# Patient Record
Sex: FEMALE | Race: WHITE | NOT HISPANIC OR LATINO | Employment: UNEMPLOYED | URBAN - NONMETROPOLITAN AREA
[De-identification: names, ages, dates, MRNs, and addresses within clinical notes are randomized per-mention and may not be internally consistent; named-entity substitution may affect disease eponyms.]

---

## 2017-01-02 ENCOUNTER — HOSPITAL ENCOUNTER (EMERGENCY)
Facility: HOSPITAL | Age: 27
Discharge: HOME | End: 2017-01-02
Attending: PHYSICIAN ASSISTANT | Admitting: PHYSICIAN ASSISTANT
Payer: COMMERCIAL

## 2017-01-02 VITALS
DIASTOLIC BLOOD PRESSURE: 73 MMHG | OXYGEN SATURATION: 97 % | RESPIRATION RATE: 18 BRPM | SYSTOLIC BLOOD PRESSURE: 118 MMHG | HEART RATE: 82 BPM | TEMPERATURE: 97.4 F

## 2017-01-02 DIAGNOSIS — J20.9 ACUTE BRONCHITIS, UNSPECIFIED ORGANISM: ICD-10-CM

## 2017-01-02 PROCEDURE — 99213 OFFICE O/P EST LOW 20 MIN: CPT | Performed by: PHYSICIAN ASSISTANT

## 2017-01-02 PROCEDURE — 99213 OFFICE O/P EST LOW 20 MIN: CPT

## 2017-01-02 RX ORDER — CLARITHROMYCIN 500 MG
500 TABLET ORAL 2 TIMES DAILY
Qty: 20 TABLET | Refills: 0 | Status: SHIPPED | OUTPATIENT
Start: 2017-01-02 | End: 2017-01-12

## 2017-01-02 ASSESSMENT — ENCOUNTER SYMPTOMS
PSYCHIATRIC NEGATIVE: 1
LIGHT-HEADEDNESS: 0
NECK STIFFNESS: 0
SORE THROAT: 0
SINUS PRESSURE: 0
ABDOMINAL PAIN: 0
NECK PAIN: 0
NAUSEA: 0
DIZZINESS: 0
FEVER: 0
COUGH: 1
VOMITING: 0
FATIGUE: 0
APPETITE CHANGE: 0
CARDIOVASCULAR NEGATIVE: 1
HEADACHES: 0
EYE REDNESS: 0
VOICE CHANGE: 0
DIARRHEA: 0
EYE DISCHARGE: 0
TROUBLE SWALLOWING: 0

## 2017-01-02 NOTE — ED AVS SNAPSHOT
[facility]    [address] [address]    Phone:  [phone]                                       Jannet San   MRN: [id number]    Department:  [facility]   Date of Visit:  1/2/2017           After Visit Summary Signature Page     I have received my discharge instructions, and my questions have been answered. I have discussed any challenges I see with this plan with the nurse or doctor.    ..........................................................................................................................................  Patient/Patient Representative Signature      ..........................................................................................................................................  Patient Representative Print Name and Relationship to Patient    ..................................................               ................................................  Date                                            Time    ..........................................................................................................................................  Reviewed by Signature/Title    ...................................................              ..............................................  Date                                                            Time
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
## 2017-01-02 NOTE — ED AVS SNAPSHOT
[facility]    [address]    [place] [address]    Phone:  [phone]                                       Jannet San   MRN: [id number]    Department[facility]  [facility]   Date of Visit:  1/2/2017           Patient Information     Date Of Birth          1990        Your diagnoses for this visit were:     Acute bronchitis, unspecified organism        You were seen by Brianna Enriquez PA.      Follow-up Information     Follow up with Leona Matthews MD.    Specialty:  Family Practice    Why:  If symptoms worsen    Contact information:    [facility] [place]  [address]  [place] [address] [phone]          Follow up with [facility].    Specialty:  EMERGENCY MEDICINE    Why:  If further concerns develop    Contact information:    [address]  [place] [address] [phone]    Additional information:    From [facility]: Take [address]. Turn left at [address]. Turn left at the first stoplight on [address]. At the first stop sign, take a right onto [address]. Take a left into the parking lot and continue through until you reach the North enterance of the building.       From [place]: Take [address]. Take the [address] ramp towards [place]. Turn left onto [address]. Take a slight right onto [address]. Turn left at the first stoplight on [address]. At the first stop sign, take a right onto [address]. Take a left into the parking lot and continue through until you reach the North enterance of the building.       From [place]: Take [address]. Take a right at [address]. At the first stop sign, take a right onto [address]. Take a left into the parking lot and continue through until you reach the North enterance of the building.       Discharge References/Attachments     BRONCHITIS, ACUTE (ENGLISH)         Review of your medicines      START  taking        Dose / Directions Last dose taken    clarithromycin 500 MG tablet   Commonly known as:  BIAXIN   Dose:  500 mg   Quantity:  20 tablet        Take 1 tablet (500 mg) by mouth 2 times daily for 10 days   Refills:  0          Our records show that you are taking the medicines listed below. If these are incorrect, please call your family doctor or clinic.        Dose / Directions Last dose taken    etonogestrel 68 MG Impl   Commonly known as:  IMPLANON/NEXPLANON   Dose:  1 each        1 each (68 mg) by Subdermal route continuous   Refills:  0                Prescriptions were sent or printed at these locations (1 Prescription)                   [id number] - [place], [place] - [address]   [address] [facility], [place] [address]    Telephone:  [phone]   Fax:  [phone]   Hours:                  E-Prescribed (1 of 1)         clarithromycin (BIAXIN) 500 MG tablet                Orders Needing Specimen Collection     None      Pending Results     No orders found from 1/1/2017 to 1/3/2017.            Thank you for choosing [facility]       Thank you for choosing [facility] for your care. Our goal is always to provide you with excellent care. Hearing back from our patients is one way we can continue to improve our services. Please take a few minutes to complete the written survey that you may receive in the mail after you visit with us. Thank you!        [employer] Information     [facility] [facility] gives you secure access to your electronic health record. If you see a primary care provider, you can also send messages to your care team and make appointments. If you have questions, please call your primary care clinic.  If you do not have a primary care provider, please call [phone] and they will assist you.        After Visit Summary       This is your record. Keep this with you and show to your community pharmacist(s) and doctor(s) at your next visit.

## 2017-01-05 ENCOUNTER — HOSPITAL ENCOUNTER (EMERGENCY)
Facility: HOSPITAL | Age: 27
Discharge: HOME | End: 2017-01-05
Attending: FAMILY MEDICINE | Admitting: FAMILY MEDICINE
Payer: COMMERCIAL

## 2017-01-05 VITALS
OXYGEN SATURATION: 100 % | SYSTOLIC BLOOD PRESSURE: 134 MMHG | DIASTOLIC BLOOD PRESSURE: 84 MMHG | TEMPERATURE: 98 F | HEART RATE: 70 BPM | RESPIRATION RATE: 16 BRPM

## 2017-01-05 DIAGNOSIS — M94.0 COSTOCHONDRITIS: ICD-10-CM

## 2017-01-05 DIAGNOSIS — J20.9 ACUTE BRONCHITIS, UNSPECIFIED ORGANISM: ICD-10-CM

## 2017-01-05 PROCEDURE — 99283 EMERGENCY DEPT VISIT LOW MDM: CPT

## 2017-01-05 PROCEDURE — 99284 EMERGENCY DEPT VISIT MOD MDM: CPT | Performed by: FAMILY MEDICINE

## 2017-01-05 RX ORDER — BENZONATATE 200 MG/1
200 CAPSULE ORAL 3 TIMES DAILY PRN
Qty: 42 CAPSULE | Refills: 0 | Status: SHIPPED | OUTPATIENT
Start: 2017-01-05 | End: 2017-01-19

## 2017-01-05 RX ORDER — IBUPROFEN 800 MG/1
800 TABLET, FILM COATED ORAL EVERY 8 HOURS PRN
Qty: 60 TABLET | Refills: 0 | Status: SHIPPED | OUTPATIENT
Start: 2017-01-05 | End: 2017-01-13

## 2017-01-05 RX ORDER — TRAMADOL HYDROCHLORIDE 50 MG/1
50-100 TABLET ORAL EVERY 6 HOURS PRN
Qty: 15 TABLET | Refills: 0 | Status: SHIPPED | OUTPATIENT
Start: 2017-01-05 | End: 2017-01-19

## 2017-01-05 ASSESSMENT — ENCOUNTER SYMPTOMS
SHORTNESS OF BREATH: 0
GASTROINTESTINAL NEGATIVE: 1
COUGH: 1
ACTIVITY CHANGE: 0
WHEEZING: 0
NEUROLOGICAL NEGATIVE: 1

## 2017-01-05 NOTE — ED AVS SNAPSHOT
[facility]    [address] [address]    Phone:  [phone]                                       Jannet San   MRN: [id number]    Department[facility]  [facility]   Date of Visit:  1/5/2017           After Visit Summary Signature Page     I have received my discharge instructions, and my questions have been answered. I have discussed any challenges I see with this plan with the nurse or doctor.    ..........................................................................................................................................  Patient/Patient Representative Signature      ..........................................................................................................................................  Patient Representative Print Name and Relationship to Patient    ..................................................               ................................................  Date                                            Time    ..........................................................................................................................................  Reviewed by Signature/Title    ...................................................              ..............................................  Date                                                            Time

## 2017-01-05 NOTE — ED PROVIDER NOTES
"  History     Chief Complaint   Patient presents with     Rib Pain     HPI  Jannet San is a 26 year old female who came to the ED with pain in her right lateral ribs that is a result of coughing with bronchitis.  She states \"I literally came in her for some pain medication like hydrocodone for this\".  Discussed the fact that costochondritis does not respond well to narcotics, that it does improve with ibuprofen and similar drugs.  She then requested codeine cough syrup, and I offered her Tessalon for the cough.  She was not happy with this.  Gave her small supply of Ultram for nighttime use, no additional narcotics.    I have reviewed the Medications, Allergies, Past Medical and Surgical History, and Social History in the Epic system.    Review of Systems   Constitutional: Negative for activity change.   HENT: Negative.    Respiratory: Positive for cough. Negative for shortness of breath and wheezing.         Occasionally productive.   Cardiovascular: Positive for chest pain.        Right lateral ribs   Gastrointestinal: Negative.    Genitourinary: Negative.    Musculoskeletal:        Costochondritis   Skin: Negative.    Neurological: Negative.    Psychiatric/Behavioral:        Angry.       Physical Exam   BP: 136/84 mmHg  Heart Rate: 70  Temp: 98  F (36.7  C)  Resp: 16  SpO2: 100 %  Physical Exam   Constitutional: She is oriented to person, place, and time. She appears well-developed and well-nourished.   HENT:   Head: Normocephalic and atraumatic.   Neck: Normal range of motion. Neck supple.   Cardiovascular: Normal rate, regular rhythm, normal heart sounds and intact distal pulses.    No murmur heard.  Pulmonary/Chest: Effort normal and breath sounds normal. No respiratory distress.   Abdominal: Soft. Bowel sounds are normal. She exhibits no distension. There is no tenderness.   Musculoskeletal: Normal range of motion. She exhibits tenderness.        Arms:  Neurological: She is alert and oriented to person, " place, and time.   Skin: Skin is warm and dry.   Psychiatric: Her affect is angry and inappropriate. Cognition and memory are normal. She expresses inappropriate judgment.   Nursing note and vitals reviewed.      ED Course   Procedures        Labs Ordered and Resulted from Time of ED Arrival Up to the Time of Departure from the ED - No data to display    Assessments & Plan (with Medical Decision Making)   Patient here for pain control for costochondritis.  Offered most effective therapy, wanted narcotics, not given excepting #15 Ultram, which she did not appreciate.  Advised to ice and splint the area when coughing, she states the cough is less than previously.  She should see Dr. Matthews for follow up if the cough does not resolve, she is currently on Biaxin from the urgent care.    I have reviewed the nursing notes.    I have reviewed the findings, diagnosis, plan and need for follow up with the patient.    Discharge Medication List as of 1/5/2017  9:09 AM      START taking these medications    Details   benzonatate (TESSALON) 200 MG capsule Take 1 capsule (200 mg) by mouth 3 times daily as needed for cough, Disp-42 capsule, R-0, E-Prescribe      ibuprofen (ADVIL/MOTRIN) 800 MG tablet Take 1 tablet (800 mg) by mouth every 8 hours as needed for moderate pain, Disp-60 tablet, R-0, E-Prescribe      traMADol (ULTRAM) 50 MG tablet Take 1-2 tablets ( mg) by mouth every 6 hours as needed for pain, Disp-15 tablet, R-0, Local Print             Final diagnoses:   Costochondritis   Acute bronchitis, unspecified organism       1/5/2017   [facility]      Blanka Wing MD  01/05/17 0902

## 2017-01-05 NOTE — ED AVS SNAPSHOT
[facility]    [address] [place] [address]    Phone:  [phone]                                       Jannet San   MRN: [id number]    Department[facility]  [facility]   Date of Visit:  1/5/2017           Patient Information     Date Of Birth          1990        Your diagnoses for this visit were:     Costochondritis     Acute bronchitis, unspecified organism        You were seen by Blanka Wing MD.      Follow-up Information     Follow up with Leona Matthews MD.    Specialty:  Family Practice    Why:  As needed    Contact information:    [facility] [place] [facility] [place]  [address]  [facility] [address]  [phone]          Discharge Instructions         Chest Wall Pain: Costochondritis    The chest pain that you have had today is caused by costochondritis. This condition is caused by an inflammation of the cartilage joining your ribs your breastbone or the soft joints in the ribs themselves. It is not caused by heart or lung problems. The inflammation may have been brought on by a blow to the chest, lifting heavy objects, intense exercise, or an illness that made you cough and sneeze. It often occurs during times of emotional stress. It can be painful, but it is not dangerous. It usually goes away in 1 to 2 weeks. But it may happen again. Rarely, a more serious condition may cause symptoms similar to costochondritis. That s why it s important to watch for the warning signs listed below.  Home care  Follow these guidelines when caring for yourself at home:    You may use ibuprofen to control pain, even if another pain medicine was prescribed.    You can also help ease pain by using a hot, wet compress or heating pad. Use this with or without a medicated skin cream that helps relieves pain.  Ice may also be helpful.    Do stretching exercise as advised by your provider.    Take any prescribed medicines as directed.  Follow-up care  Follow up with your health  care provider, or as advised, if you do not start to get better in the next 2 days.  When to seek medical advice  Call your health care provider right away if any of these occur:    A change in the type of pain. Call if it feels different, becomes more serious, lasts longer, or spreads into your shoulder, arm, neck, jaw, or back.    Shortness of breath or pain gets worse when you breathe    Weakness, dizziness, or fainting    Cough with dark-colored sputum (phlegm) or blood    Abdominal pain    Dark red or black stools    Fever of 100.4 F (38 C) or higher, or as directed by your health care provider    1059-6075 [employer]. [address] [place] [address], [place] [address]. All rights reserved. This information is not intended as a substitute for professional medical care. Always follow your healthcare professional's instructions.             Review of your medicines      START taking        Dose / Directions Last dose taken    benzonatate 200 MG capsule   Commonly known as:  TESSALON   Dose:  200 mg   Quantity:  42 capsule        Take 1 capsule (200 mg) by mouth 3 times daily as needed for cough   Refills:  0        ibuprofen 800 MG tablet   Commonly known as:  ADVIL/MOTRIN   Dose:  800 mg   Quantity:  60 tablet        Take 1 tablet (800 mg) by mouth every 8 hours as needed for moderate pain   Refills:  0        traMADol 50 MG tablet   Commonly known as:  ULTRAM   Dose:   mg   Quantity:  15 tablet        Take 1-2 tablets ( mg) by mouth every 6 hours as needed for pain   Refills:  0          Our records show that you are taking the medicines listed below. If these are incorrect, please call your family doctor or clinic.        Dose / Directions Last dose taken    clarithromycin 500 MG tablet   Commonly known as:  BIAXIN   Dose:  500 mg   Quantity:  20 tablet        Take 1 tablet (500 mg) by mouth 2 times daily for 10 days   Refills:  0        etonogestrel 68 MG Impl   Commonly known as:   IMPLANON/NEXPLANON   Dose:  1 each        1 each (68 mg) by Subdermal route continuous   Refills:  0                Prescriptions were sent or printed at these locations (3 Prescriptions)                   [id number] - [place], [place] - [address]   [address] [place] [address]    Telephone:  [phone]   Fax:  [phone]   Hours:                  E-Prescribed (2 of 3)         benzonatate (TESSALON) 200 MG capsule               ibuprofen (ADVIL/MOTRIN) 800 MG tablet                 Printed at Department/Unit printer (1 of 3)         traMADol (ULTRAM) 50 MG tablet                Orders Needing Specimen Collection     None      Pending Results     No orders found from 1/4/2017 to 1/6/2017.            Pending Culture Results     No orders found from 1/4/2017 to 1/6/2017.            Thank you for choosing [facility]       Thank you for choosing [facility] for your care. Our goal is always to provide you with excellent care. Hearing back from our patients is one way we can continue to improve our services. Please take a few minutes to complete the written survey that you may receive in the mail after you visit with us. Thank you!        [employer] Information     [employer] gives you secure access to your electronic health record. If you see a primary care provider, you can also send messages to your care team and make appointments. If you have questions, please call your primary care clinic.  If you do not have a primary care provider, please call [phone] and they will assist you.        Care EveryWhere ID     This is your Care EveryWhere ID. This could be used by other organizations to access your [facility] medical records  [id number]        After Visit Summary       This is your record. Keep this with you and show to your community pharmacist(s) and doctor(s) at your next visit.

## 2017-01-05 NOTE — DISCHARGE INSTRUCTIONS
Chest Wall Pain: Costochondritis    The chest pain that you have had today is caused by costochondritis. This condition is caused by an inflammation of the cartilage joining your ribs your breastbone or the soft joints in the ribs themselves. It is not caused by heart or lung problems. The inflammation may have been brought on by a blow to the chest, lifting heavy objects, intense exercise, or an illness that made you cough and sneeze. It often occurs during times of emotional stress. It can be painful, but it is not dangerous. It usually goes away in 1 to 2 weeks. But it may happen again. Rarely, a more serious condition may cause symptoms similar to costochondritis. That s why it s important to watch for the warning signs listed below.  Home care  Follow these guidelines when caring for yourself at home:    You may use ibuprofen to control pain, even if another pain medicine was prescribed.    You can also help ease pain by using a hot, wet compress or heating pad. Use this with or without a medicated skin cream that helps relieves pain.  Ice may also be helpful.    Do stretching exercise as advised by your provider.    Take any prescribed medicines as directed.  Follow-up care  Follow up with your health care provider, or as advised, if you do not start to get better in the next 2 days.  When to seek medical advice  Call your health care provider right away if any of these occur:    A change in the type of pain. Call if it feels different, becomes more serious, lasts longer, or spreads into your shoulder, arm, neck, jaw, or back.    Shortness of breath or pain gets worse when you breathe    Weakness, dizziness, or fainting    Cough with dark-colored sputum (phlegm) or blood    Abdominal pain    Dark red or black stools    Fever of 100.4 F (38 C) or higher, or as directed by your health care provider    3431-4750 [employer]. [address] [place] [address], [place] [address]. All rights reserved. This  information is not intended as a substitute for professional medical care. Always follow your healthcare professional's instructions.

## 2017-01-05 NOTE — ED NOTES
"Patient to ED with reports of her \"ribs hurting\" relating to her coughing from her bronchitis.  Patient has been on an abx since Monday, no fevers. Patient wanting to be seen in the ED \"so I can get out of here quicker\" rather then waiting for UC to open  "

## 2017-01-05 NOTE — ED NOTES
Reports cough times one month, production with brown sputum.  Seen by UC on 1/2/17, dx bronchitis.  Pt reports inability to sleep due to cough.  Rates pain 7/10 R rib side.

## 2017-01-05 NOTE — ED NOTES
"Pt verbalized understanding of discharge instructions.  Pt given paper script for ultram pt states \"i'm not gonna fill this shit\".  Pt states no other questions or concerns.    "

## 2017-01-19 ENCOUNTER — HOSPITAL ENCOUNTER (EMERGENCY)
Facility: HOSPITAL | Age: 27
Discharge: HOME | End: 2017-01-19
Attending: NURSE PRACTITIONER | Admitting: NURSE PRACTITIONER
Payer: COMMERCIAL

## 2017-01-19 VITALS
TEMPERATURE: 98.6 F | RESPIRATION RATE: 16 BRPM | SYSTOLIC BLOOD PRESSURE: 131 MMHG | DIASTOLIC BLOOD PRESSURE: 74 MMHG | OXYGEN SATURATION: 98 % | HEART RATE: 65 BPM

## 2017-01-19 DIAGNOSIS — S20.211A CONTUSION OF RIB, RIGHT, INITIAL ENCOUNTER: ICD-10-CM

## 2017-01-19 PROCEDURE — 99213 OFFICE O/P EST LOW 20 MIN: CPT | Mod: 25

## 2017-01-19 PROCEDURE — 99213 OFFICE O/P EST LOW 20 MIN: CPT | Performed by: NURSE PRACTITIONER

## 2017-01-19 PROCEDURE — 71020 ZZHC CHEST TWO VIEWS, FRONT/LAT: CPT | Mod: TC

## 2017-01-19 RX ORDER — CYCLOBENZAPRINE HCL 10 MG
5 TABLET ORAL 3 TIMES DAILY PRN
Qty: 5 TABLET | Refills: 0 | Status: SHIPPED | OUTPATIENT
Start: 2017-01-19 | End: 2017-01-25

## 2017-01-19 RX ORDER — BENZONATATE 100 MG/1
100 CAPSULE ORAL 3 TIMES DAILY PRN
Qty: 30 CAPSULE | Refills: 0 | Status: SHIPPED | OUTPATIENT
Start: 2017-01-19 | End: 2017-02-06

## 2017-01-19 ASSESSMENT — ENCOUNTER SYMPTOMS
ACTIVITY CHANGE: 0
SINUS PRESSURE: 0
FEVER: 0
SHORTNESS OF BREATH: 0
APPETITE CHANGE: 0
SORE THROAT: 0
PSYCHIATRIC NEGATIVE: 1
STRIDOR: 0
RHINORRHEA: 0
COUGH: 1
TROUBLE SWALLOWING: 0
CHILLS: 0
NAUSEA: 0
WHEEZING: 0
VOMITING: 0
DYSURIA: 0

## 2017-01-19 NOTE — ED NOTES
Pt presents today with her son for c/o chest muscle pain from coughing so much from bronchitis and this has been going on for a few weeks and feels she may still have something, isn't sleeping because of the cough and is having a lot of pain associated with it.

## 2017-01-19 NOTE — ED PROVIDER NOTES
History     Chief Complaint   Patient presents with     Rib Pain     The history is provided by the patient. No  was used.     Jannet San is a 26 year old female who presents with right lower rib pain. She recently had bronchitis and has been coughing. Denies SOB. She has been taking tessalon pearls and ibuprofen with mild relief. Denies fever, chills, or night sweats. Eating and drinking well. Bowel and bladder are working well.     I have reviewed the Medications, Allergies, Past Medical and Surgical History, and Social History in the Epic system.    Review of Systems   Constitutional: Negative for fever, chills, activity change, appetite change and fatigue.   HENT: Positive for congestion and ear pain. Negative for ear discharge, rhinorrhea, sinus pressure, sore throat and trouble swallowing.    Respiratory: Positive for cough. Negative for shortness of breath, wheezing and stridor.    Gastrointestinal: Negative for nausea and vomiting.   Genitourinary: Negative for dysuria.   Musculoskeletal:        Right lower rib pain.    Skin: Negative for rash.   Psychiatric/Behavioral: Negative.        Physical Exam   BP: 131/74 mmHg  Pulse: 65  Temp: 98.6  F (37  C)  Resp: 16  SpO2: 98 %  Physical Exam   Constitutional: She is oriented to person, place, and time. She appears well-developed and well-nourished. No distress.   HENT:   Right Ear: External ear normal.   Left Ear: External ear normal.   Mouth/Throat: Oropharynx is clear and moist. No oropharyngeal exudate.   Neck: Normal range of motion. Neck supple.   Cardiovascular: Normal rate, regular rhythm and normal heart sounds.    Pulmonary/Chest: Effort normal. No respiratory distress. She has no wheezes. She has no rales. She exhibits tenderness.   Pain is reproducible to right lower rib cage on palpation.    Abdominal: Soft. She exhibits no distension.   Musculoskeletal: Normal range of motion.   Lymphadenopathy:     She has no cervical  adenopathy.   Neurological: She is alert and oriented to person, place, and time.   Skin: Skin is warm and dry. No rash noted. She is not diaphoretic.   Psychiatric: She has a normal mood and affect. Her behavior is normal.   Nursing note and vitals reviewed.      ED Course   Procedures  Results for orders placed or performed during the hospital encounter of 01/19/17   Chest XR,  PA & LAT    Narrative    CHEST TWO VIEWS    HISTORY:  Cough, pain.    FINDINGS:  The cardiac silhouette and pulmonary vasculature are within  normal limits.  Lungs are clear.  Bony structures are unremarkable.    IMPRESSION:  NO EVIDENCE OF ACUTE ABNORMALITY.  Exam Date: Jan 19, 2017 11:09:51 AM  Author: SAURAV MIKE  This report is final and signed         Assessments & Plan (with Medical Decision Making)     I have reviewed the nursing notes.    I have reviewed the findings, diagnosis, plan and need for follow up with the patient.  Discharged in stable condition.     New Prescriptions    BENZONATATE (TESSALON) 100 MG CAPSULE    Take 1 capsule (100 mg) by mouth 3 times daily as needed for cough    CYCLOBENZAPRINE (FLEXERIL) 10 MG TABLET    Take 0.5 tablets (5 mg) by mouth 3 times daily as needed for muscle spasms (Take 5-10mg 3 times daily as needed for muscle spasm.)       Final diagnoses:   Contusion of rib, right, initial encounter     Take tylenol and or ibuprofen for pain.   Take Flexeril as directed for spasm. Do not drive or participate in activities that require alertness when taking.   Use a heating pad to right lower rib pain. Protect skin from burn.   Follow up with PCP with an increase in symptoms or concerns.   Return to urgent care or emergency department with an increase in symptoms or concerns.     ELMER Willams  10:37 AM  January 19, 2017          Cori Conti NP  01/22/17 [id number]

## 2017-01-19 NOTE — DISCHARGE INSTRUCTIONS
Take tylenol and or ibuprofen for pain.   Take Flexeril as directed for spasm. Do not drive or participate in activities that require alertness when taking.   Use a heating pad to right lower rib pain. Protect skin from burn.   Follow up with PCP with an increase in symptoms or concerns.   Return to urgent care or emergency department with an increase in symptoms or concerns.

## 2017-01-19 NOTE — ED AVS SNAPSHOT
[facility]    [address] [address]    Phone:  [phone]                                       Jannet San   MRN: [id number]    Department:  [facility]   Date of Visit:  1/19/2017           After Visit Summary Signature Page     I have received my discharge instructions, and my questions have been answered. I have discussed any challenges I see with this plan with the nurse or doctor.    ..........................................................................................................................................  Patient/Patient Representative Signature      ..........................................................................................................................................  Patient Representative Print Name and Relationship to Patient    ..................................................               ................................................  Date                                            Time    ..........................................................................................................................................  Reviewed by Signature/Title    ...................................................              ..............................................  Date                                                            Time

## 2017-01-22 ASSESSMENT — ENCOUNTER SYMPTOMS: FATIGUE: 0

## 2017-02-06 ENCOUNTER — OFFICE VISIT (OUTPATIENT)
Dept: OBGYN | Facility: OTHER | Age: 27
End: 2017-02-06
Attending: ADVANCED PRACTICE MIDWIFE
Payer: COMMERCIAL

## 2017-02-06 VITALS
WEIGHT: 175 LBS | OXYGEN SATURATION: 98 % | HEIGHT: 71 IN | BODY MASS INDEX: 24.5 KG/M2 | HEART RATE: 86 BPM | DIASTOLIC BLOOD PRESSURE: 70 MMHG | SYSTOLIC BLOOD PRESSURE: 116 MMHG

## 2017-02-06 DIAGNOSIS — N64.4 MASTODYNIA: ICD-10-CM

## 2017-02-06 DIAGNOSIS — N63.0 LUMP OR MASS IN BREAST: Primary | ICD-10-CM

## 2017-02-06 PROCEDURE — 99212 OFFICE O/P EST SF 10 MIN: CPT | Performed by: ADVANCED PRACTICE MIDWIFE

## 2017-02-06 ASSESSMENT — PAIN SCALES - GENERAL: PAINLEVEL: MILD PAIN (2)

## 2017-02-06 NOTE — NURSING NOTE
"Chief Complaint   Patient presents with     Breast Pain     Right breast pain       Initial /70 mmHg  Pulse 86  Ht 5' 11\" (1.803 m)  Wt 175 lb (79.379 kg)  BMI 24.42 kg/m2  SpO2 98%  LMP 12/25/2016 Estimated body mass index is 24.42 kg/(m^2) as calculated from the following:    Height as of this encounter: 5' 11\" (1.803 m).    Weight as of this encounter: 175 lb (79.379 kg).  Medication Reconciliation: cori Lynne      "

## 2017-02-08 ENCOUNTER — OFFICE VISIT (OUTPATIENT)
Dept: OBGYN | Facility: OTHER | Age: 27
End: 2017-02-08
Attending: ADVANCED PRACTICE MIDWIFE
Payer: COMMERCIAL

## 2017-02-08 ENCOUNTER — OFFICE VISIT (OUTPATIENT)
Dept: PSYCHIATRY | Facility: OTHER | Age: 27
End: 2017-02-08
Attending: ADVANCED PRACTICE MIDWIFE
Payer: COMMERCIAL

## 2017-02-08 VITALS
WEIGHT: 175 LBS | HEIGHT: 71 IN | HEART RATE: 87 BPM | SYSTOLIC BLOOD PRESSURE: 114 MMHG | OXYGEN SATURATION: 100 % | DIASTOLIC BLOOD PRESSURE: 76 MMHG | BODY MASS INDEX: 24.5 KG/M2

## 2017-02-08 VITALS
TEMPERATURE: 98.4 F | HEIGHT: 71 IN | WEIGHT: 175 LBS | BODY MASS INDEX: 24.5 KG/M2 | SYSTOLIC BLOOD PRESSURE: 108 MMHG | HEART RATE: 67 BPM | DIASTOLIC BLOOD PRESSURE: 60 MMHG

## 2017-02-08 DIAGNOSIS — F48.9 MENTAL HEALTH PROBLEM: ICD-10-CM

## 2017-02-08 DIAGNOSIS — F60.3 BORDERLINE PERSONALITY DISORDER (H): Primary | ICD-10-CM

## 2017-02-08 DIAGNOSIS — N63.10 LUMP OF RIGHT BREAST: ICD-10-CM

## 2017-02-08 DIAGNOSIS — Z01.419 WELL WOMAN EXAM: ICD-10-CM

## 2017-02-08 DIAGNOSIS — Z20.2 EXPOSURE TO STD: Primary | ICD-10-CM

## 2017-02-08 LAB
ERYTHROCYTE [DISTWIDTH] IN BLOOD BY AUTOMATED COUNT: 12.7 % (ref 10–15)
HCT VFR BLD AUTO: 41.3 % (ref 35–47)
HGB BLD-MCNC: 14.2 G/DL (ref 11.7–15.7)
MCH RBC QN AUTO: 29.6 PG (ref 26.5–33)
MCHC RBC AUTO-ENTMCNC: 34.4 G/DL (ref 31.5–36.5)
MCV RBC AUTO: 86 FL (ref 78–100)
MICRO REPORT STATUS: NORMAL
PLATELET # BLD AUTO: 232 10E9/L (ref 150–450)
RBC # BLD AUTO: 4.79 10E12/L (ref 3.8–5.2)
SPECIMEN SOURCE: NORMAL
TSH SERPL DL<=0.005 MIU/L-ACNC: 0.84 MU/L (ref 0.4–4)
WBC # BLD AUTO: 7.3 10E9/L (ref 4–11)
WET PREP SPEC: NORMAL

## 2017-02-08 PROCEDURE — 86803 HEPATITIS C AB TEST: CPT | Mod: 90 | Performed by: ADVANCED PRACTICE MIDWIFE

## 2017-02-08 PROCEDURE — 84443 ASSAY THYROID STIM HORMONE: CPT | Performed by: ADVANCED PRACTICE MIDWIFE

## 2017-02-08 PROCEDURE — 87340 HEPATITIS B SURFACE AG IA: CPT | Mod: 90 | Performed by: ADVANCED PRACTICE MIDWIFE

## 2017-02-08 PROCEDURE — 36415 COLL VENOUS BLD VENIPUNCTURE: CPT | Performed by: ADVANCED PRACTICE MIDWIFE

## 2017-02-08 PROCEDURE — 99213 OFFICE O/P EST LOW 20 MIN: CPT | Performed by: PSYCHIATRY & NEUROLOGY

## 2017-02-08 PROCEDURE — 85027 COMPLETE CBC AUTOMATED: CPT | Performed by: ADVANCED PRACTICE MIDWIFE

## 2017-02-08 PROCEDURE — 87389 HIV-1 AG W/HIV-1&-2 AB AG IA: CPT | Mod: 90 | Performed by: ADVANCED PRACTICE MIDWIFE

## 2017-02-08 PROCEDURE — 99000 SPECIMEN HANDLING OFFICE-LAB: CPT | Performed by: ADVANCED PRACTICE MIDWIFE

## 2017-02-08 PROCEDURE — 88142 CYTOPATH C/V THIN LAYER: CPT | Performed by: ADVANCED PRACTICE MIDWIFE

## 2017-02-08 PROCEDURE — 87210 SMEAR WET MOUNT SALINE/INK: CPT | Performed by: ADVANCED PRACTICE MIDWIFE

## 2017-02-08 PROCEDURE — 99395 PREV VISIT EST AGE 18-39: CPT | Performed by: ADVANCED PRACTICE MIDWIFE

## 2017-02-08 PROCEDURE — 87491 CHLMYD TRACH DNA AMP PROBE: CPT | Mod: 90 | Performed by: ADVANCED PRACTICE MIDWIFE

## 2017-02-08 PROCEDURE — 99213 OFFICE O/P EST LOW 20 MIN: CPT | Mod: 25 | Performed by: ADVANCED PRACTICE MIDWIFE

## 2017-02-08 PROCEDURE — 87591 N.GONORRHOEAE DNA AMP PROB: CPT | Mod: 90 | Performed by: ADVANCED PRACTICE MIDWIFE

## 2017-02-08 PROCEDURE — 86780 TREPONEMA PALLIDUM: CPT | Mod: 90 | Performed by: ADVANCED PRACTICE MIDWIFE

## 2017-02-08 ASSESSMENT — ANXIETY QUESTIONNAIRES
2. NOT BEING ABLE TO STOP OR CONTROL WORRYING: SEVERAL DAYS
1. FEELING NERVOUS, ANXIOUS, OR ON EDGE: SEVERAL DAYS
3. WORRYING TOO MUCH ABOUT DIFFERENT THINGS: SEVERAL DAYS
IF YOU CHECKED OFF ANY PROBLEMS ON THIS QUESTIONNAIRE, HOW DIFFICULT HAVE THESE PROBLEMS MADE IT FOR YOU TO DO YOUR WORK, TAKE CARE OF THINGS AT HOME, OR GET ALONG WITH OTHER PEOPLE: EXTREMELY DIFFICULT
7. FEELING AFRAID AS IF SOMETHING AWFUL MIGHT HAPPEN: SEVERAL DAYS
5. BEING SO RESTLESS THAT IT IS HARD TO SIT STILL: NEARLY EVERY DAY

## 2017-02-08 ASSESSMENT — PAIN SCALES - GENERAL
PAINLEVEL: MODERATE PAIN (4)
PAINLEVEL: MILD PAIN (3)

## 2017-02-08 ASSESSMENT — PATIENT HEALTH QUESTIONNAIRE - PHQ9: 5. POOR APPETITE OR OVEREATING: SEVERAL DAYS
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
## 2017-02-08 NOTE — PROGRESS NOTES
"Last visit was 12/31/15 with April Libia: no med changes made at that visit (lithium - plan was to check a level and creatinine following visit) and was suggested Jannet do therapy -> \" I highly recommend that she get back into therapy.  She dropped out of DBT therapy because \"I don't want to be with a bunch of low lifes\" she does not want to back individual therapy yet\"      PSYCHIATRY CLINIC PROGRESS NOTE   20 minute medication management, more than 50% of time spent counseling patient on medications, medication side effects, symptom history and management   SUBJECTIVE / INTERIM HISTORY                                                                       Jannet just quit her job at [employer] yesterday. In past was working at [employer] but let go in re: missing work.  Finances majorly strained and not sure how she is going to make ends meet. Her son is 6 yo and has behavioral issues. She is with him daily and gets overwhelming. Son going to start program 4-5 days per week soon for kiddos with emotional issues. Jannet living with her ex- (father of son) and she notes \"I hate him\". She has a lot of resentment towards him. I inquired about medications today and she doesn't want to take any medications at this time and notes is afraid of starting new medications. I inquired about if she has had a therapist in past and she met with once through [facility] and felt they didn't click so never went back.Went through sx required to meet hypomanic episode and she thinks chance she has had # of sx required for at least several days but she isn't certain. Jannet notes she also has period however where mood is more rapidly up and down over day or two. I inquired about sx of borderline personaltiy d/o and she identified with many of the sx. She has lot of guilt in re: feels like she is a bad mom.   MEDICAL / SURGICAL HISTORY                pregnant [if applicable]--no     Patient Active Problem List   Diagnosis     Depression " "    Lumbago     PTSD (post-traumatic stress disorder)     Migraine     Drug use disorder     Bilateral low back pain without sciatica     Bilateral low back pain with left-sided sciatica     Bipolar II disorder (H)     Fibromyalgia     Lump or mass in breast     Mastodynia     Encounter for gynecological examination without abnormal finding     ALLERGY   Bee pollen  MEDICATIONS                                                                                             Current Outpatient Prescriptions   Medication Sig     etonogestrel (IMPLANON/NEXPLANON) 68 MG IMPL 1 each (68 mg) by Subdermal route continuous     No current facility-administered medications for this visit.     Facility-Administered Medications Ordered in Other Visits   Medication     lidocaine 1 % injection 10 mL     betamethasone acet & sod phos (CELESTONE) injection 12 mg       VITALS   /60 mmHg  Pulse 67  Temp(Src) 98.4  F (36.9  C) (Tympanic)  Ht 5' 11\" (1.803 m)  Wt 175 lb (79.379 kg)  BMI 24.42 kg/m2  LMP 12/25/2016     LABS                                                                                                                           Results for orders placed or performed in visit on 02/08/17   TSH with free T4 reflex   Result Value Ref Range    TSH 0.84 0.40 - 4.00 mU/L   CBC with platelets   Result Value Ref Range    WBC 7.3 4.0 - 11.0 10e9/L    RBC Count 4.79 3.8 - 5.2 10e12/L    Hemoglobin 14.2 11.7 - 15.7 g/dL    Hematocrit 41.3 35.0 - 47.0 %    MCV 86 78 - 100 fl    MCH 29.6 26.5 - 33.0 pg    MCHC 34.4 31.5 - 36.5 g/dL    RDW 12.7 10.0 - 15.0 %    Platelet Count 232 150 - 450 10e9/L   Wet prep   Result Value Ref Range    Specimen Description Vagina     Wet Prep       Few WBC'S seen  No Trichomonas seen  No clue cells seen  No yeast seen      Micro Report Status FINAL 02/08/2017        MENTAL STATUS EXAM                                                                                        Alert. Oriented to person, " "place, and date / time. Well groomed, calm, cooperative with good eye contact. No problems with speech or psychomotor behavior. Mood was described as \"up and down\" and affect was congruent to speech content and full range - tearful. Thought process, including associations, was unremarkable and thought content was devoid homicidal ideation and psychotic thought. + SI with no plan or intent. No hallucinations. Insight was good. Judgment was intact and adequate for safety. Fund of knowledge was intact. Pt demonstrates no obvious problems with attention, concentration, language, recent or remote memory although these were not formally tested.       DIAGNOSES      (Use of Axes system will continue, although absent from DSM 5)        Major depressive disorder, recurrent, moderate    Borderline personality disorder  Dependent personality disorder  PLAN                                                                                                                       1)  MEDICATIONS:     no medication changes are made today as she did not want to start any medications at this time    2)  THERAPY: I agree with April who was seeing her that therapy is a big (important) part of treatment for Jannet that is missing    3)  LABS:  [facility]  4)  PT MONITOR [call for probs]: worsening symptoms , SI/HI  5)  REFERRALS [CD, medical, other]:  None  6)  RTC: I asked Jannet today if she would like to follow-up with me or April - she would like to follow-up with April as she had been working with her ~ year ago.                                                                                                                                                                                                  "

## 2017-02-08 NOTE — NURSING NOTE
"Chief Complaint   Patient presents with     Consult     Mental health.  Referral from Manuel Mccarthy.       Initial /60 mmHg  Pulse 67  Temp(Src) 98.4  F (36.9  C) (Tympanic)  Ht 5' 11\" (1.803 m)  Wt 175 lb (79.379 kg)  BMI 24.42 kg/m2  LMP 12/25/2016 Estimated body mass index is 24.42 kg/(m^2) as calculated from the following:    Height as of this encounter: 5' 11\" (1.803 m).    Weight as of this encounter: 175 lb (79.379 kg).  Medication Reconciliation: complete     OLGA KITCHEN      "

## 2017-02-08 NOTE — PATIENT INSTRUCTIONS
Follow up with mental health counselor    Return in one year for well-woman annual visit or as needed.

## 2017-02-08 NOTE — NURSING NOTE
"Chief Complaint   Patient presents with     Gyn Exam       Initial /76 mmHg  Pulse 87  Ht 5' 11\" (1.803 m)  Wt 175 lb (79.379 kg)  BMI 24.42 kg/m2  SpO2 100%  LMP 12/25/2016 Estimated body mass index is 24.42 kg/(m^2) as calculated from the following:    Height as of this encounter: 5' 11\" (1.803 m).    Weight as of this encounter: 175 lb (79.379 kg).  Medication Reconciliation: cori Lynne      "

## 2017-02-09 ASSESSMENT — PATIENT HEALTH QUESTIONNAIRE - PHQ9: SUM OF ALL RESPONSES TO PHQ QUESTIONS 1-9: 8

## 2017-02-10 ENCOUNTER — HOSPITAL ENCOUNTER (OUTPATIENT)
Dept: ULTRASOUND IMAGING | Facility: HOSPITAL | Age: 27
Discharge: HOME | End: 2017-02-10
Attending: ADVANCED PRACTICE MIDWIFE | Admitting: ADVANCED PRACTICE MIDWIFE
Payer: COMMERCIAL

## 2017-02-10 LAB
C TRACH DNA SPEC QL NAA+PROBE: NORMAL
HBV SURFACE AG SERPL QL IA: NONREACTIVE
HCV AB SERPL QL IA: NORMAL
HIV 1+2 AB+HIV1 P24 AG SERPL QL IA: NORMAL
N GONORRHOEA DNA SPEC QL NAA+PROBE: NORMAL
SPECIMEN SOURCE: NORMAL
SPECIMEN SOURCE: NORMAL

## 2017-02-10 PROCEDURE — 76642 ULTRASOUND BREAST LIMITED: CPT | Mod: TC,RT

## 2017-02-11 LAB — T PALLIDUM AB SER QL AGGL: NORMAL

## 2017-02-15 LAB
COPATH REPORT: NORMAL
PAP: NORMAL

## 2017-03-16 ENCOUNTER — OFFICE VISIT (OUTPATIENT)
Dept: PSYCHIATRY | Facility: OTHER | Age: 27
End: 2017-03-16
Attending: NURSE PRACTITIONER
Payer: COMMERCIAL

## 2017-03-16 VITALS
SYSTOLIC BLOOD PRESSURE: 128 MMHG | DIASTOLIC BLOOD PRESSURE: 84 MMHG | WEIGHT: 180 LBS | HEART RATE: 101 BPM | TEMPERATURE: 99.4 F | BODY MASS INDEX: 25.2 KG/M2 | HEIGHT: 71 IN

## 2017-03-16 DIAGNOSIS — F31.81 BIPOLAR 2 DISORDER (H): Primary | ICD-10-CM

## 2017-03-16 PROCEDURE — 99214 OFFICE O/P EST MOD 30 MIN: CPT | Performed by: NURSE PRACTITIONER

## 2017-03-16 RX ORDER — CLONAZEPAM 1 MG/1
TABLET ORAL
Qty: 14 TABLET | Refills: 0 | Status: SHIPPED | OUTPATIENT
Start: 2017-03-16 | End: 2017-03-30

## 2017-03-16 RX ORDER — DIVALPROEX SODIUM 250 MG/1
TABLET, EXTENDED RELEASE ORAL
Qty: 28 TABLET | Refills: 1 | Status: SHIPPED | OUTPATIENT
Start: 2017-03-16 | End: 2017-03-30

## 2017-03-16 ASSESSMENT — ANXIETY QUESTIONNAIRES
GAD7 TOTAL SCORE: 18
7. FEELING AFRAID AS IF SOMETHING AWFUL MIGHT HAPPEN: NEARLY EVERY DAY
6. BECOMING EASILY ANNOYED OR IRRITABLE: NEARLY EVERY DAY
1. FEELING NERVOUS, ANXIOUS, OR ON EDGE: NEARLY EVERY DAY
3. WORRYING TOO MUCH ABOUT DIFFERENT THINGS: NEARLY EVERY DAY
5. BEING SO RESTLESS THAT IT IS HARD TO SIT STILL: NOT AT ALL
2. NOT BEING ABLE TO STOP OR CONTROL WORRYING: NEARLY EVERY DAY
IF YOU CHECKED OFF ANY PROBLEMS ON THIS QUESTIONNAIRE, HOW DIFFICULT HAVE THESE PROBLEMS MADE IT FOR YOU TO DO YOUR WORK, TAKE CARE OF THINGS AT HOME, OR GET ALONG WITH OTHER PEOPLE: EXTREMELY DIFFICULT

## 2017-03-16 ASSESSMENT — PATIENT HEALTH QUESTIONNAIRE - PHQ9: 5. POOR APPETITE OR OVEREATING: NEARLY EVERY DAY

## 2017-03-16 ASSESSMENT — PAIN SCALES - GENERAL: PAINLEVEL: NO PAIN (0)

## 2017-03-16 NOTE — PROGRESS NOTES
[facility]  Psychiatric Progress Note      Impression:   This is a 26 year old female with borderlin PD and bipolar type II disorder. I have not seen her in over a year. Since i last saw her she moved to [facility] she was transferred for work. She broke up with her boyfriend and now she is homeless without a job living at friends houses. She starts sobbing and tells me how her ex  calls her a loser and bad mom. She is crying and does report SI. She has no plan and no intent. She states her brother is picking her up tomorrow and she is spending the weekend with him. She still has significant issues with abandonment. She has difficulty sleeping. She has had an increase in hunger. She is often very irritable. She is going to be moveing into [facility] though is unsure of how long it will take. She still has 50/50 custody of her son though he lives at her ex's    Educated regarding medication indications, risks, benefits, side effects, contraindications and possible interactions. Verbally expressed understanding.        DIagnoses:     Borderline PD  Bipolar type II  Marijuana use disorder, moderate    Attestation:  Patient has been seen and evaluated by me,  April Shweta Reza NP        PHQ-9 SCORE 12/24/2015 2/8/2017 3/16/2017   Total Score - - -   Total Score 8 8 25           Interim History:   The patient's care was discussed with the treatment team and chart notes were reviewed.          Medications:     Prescription Medications as of 3/16/2017             DiphenhydrAMINE HCl (BENADRYL PO) Take 75 mg by mouth nightly as needed    clonazePAM (KLONOPIN) 1 MG tablet Take 1/2 to 1 tablet daily prn    divalproex (DEPAKOTE ER) 250 MG 24 hr tablet Take 250 mg for 3 days then increase to 500 mg for 3 days then increase to 750 mg for 3 days then increase to 1000 mg    etonogestrel (IMPLANON/NEXPLANON) 68 MG IMPL 1 each (68 mg) by Subdermal route continuous      Facility Administered  "Medications as of 3/16/2017             lidocaine 1 % injection 10 mL 10 mLs by Other route once    betamethasone acet & sod phos (CELESTONE) injection 12 mg 2 mLs (12 mg) by EPIDURAL route once                   10 point ROS negative        Allergies:     Allergies   Allergen Reactions     Bee Pollen Swelling     Throat              Psychiatric Examination:   /84 (BP Location: Left arm, Patient Position: Chair, Cuff Size: Adult Regular)  Pulse 101  Temp 99.4  F (37.4  C) (Tympanic)  Ht 1.803 m (5' 11\")  Wt 81.6 kg (180 lb)  BMI 25.1 kg/m2  Weight is 180 lbs 0 oz  Body mass index is 25.1 kg/(m^2).    Appearance:  poorly groomed  Attitude:  cooperative  Eye Contact:  fair  Mood:  sad  and depressed  Affect:  tearful  Speech:  clear, coherent  Psychomotor Behavior:  no evidence of tardive dyskinesia, dystonia, or tics  Thought Process:  logical and goal oriented  Associations:  no loose associations  Thought Content:  passive suicidal ideation present and no auditory hallucinations present  Insight:  fair  Judgment:  fair  Oriented to:  time, person, and place  Attention Span and Concentration:  intact  Recent and Remote Memory:  intact  Fund of Knowledge: appropriate  Muscle Strength and Tone: normal  Gait and Station: Normal           Labs:     Results for orders placed or performed in visit on 02/08/17   US Breast Right Limited 1-3 Quadrants    Narrative    ULTRASOUND OF RIGHT BREAST - LIMITED    HISTORY:  The patient states that she has had an approximately  pea-sized region of palpable concern that becomes larger during the  time of her menstrual cycle. It is variably painful and tender, she  states. It has been present for a few months, and she states that she  had a normal breast ultrasound recently while [place] state.    FINDINGS:  Scanning is completed in the right breast upper-outer  quadrant, at the site of palpable concern.  Scanning here fails to  demonstrate evidence for a cyst, or a focal " hyperechoic or hypoechoic  abnormality.    IMPRESSION:  THE ULTRASOUND IS NEGATIVE.  IN REGARDS TO THE AREA OF  PALPABLE CONCERN, IT IS RECOMMENDED TO PROCEED ON CLINICAL GROUNDS.  IF THE ABNORMALITY DOES CONTINUE TO PERSIST OR GETS LARGER, A  MAMMOGRAM COULD BE CONSIDERED.    BI-RADS CATEGORY:  1 - Negative.  Exam Date: Feb 10, 2017 03:44:00 PM  Author: KARLO RANDOLPH  This report is final and signed     A pap thin layer screen reflex to HPV if ASCUS - recommend age 25 - 29   Result Value Ref Range    PAP NIL     Copath Report         Patient Name: HUMBERTO HINKLE  MR#: [id number]  Specimen #: [id number]  Collected: 2/8/2017  Received: 2/9/2017  Reported: 2/15/2017 10:36  Ordering Phy(s): ADIEL YOO    For improved result formatting, select 'View Enhanced Report Format'  under Linked Documents section.    SPECIMEN/STAIN PROCESS:  Pap thin layer prep screening ([facility])       Pap-Cyto x 1, Pap with reflex to HPV if ASCUS x 1    SOURCE: Cervical, endocervical  ----------------------------------------------------------------   Pap thin layer prep screening ([facility])  SPECIMEN ADEQUACY:  Satisfactory for evaluation.  -Transformation zone component present.    CYTOLOGIC INTERPRETATION:    Negative for Intraepithelial Lesion or Malignancy    Electronically signed out by:  NIDIA Champion ( ASCP)    Processed and screened at [facility],  Critical access hospital    CLINICAL HISTORY:  LMP: 12/25/16  Previous normal pap: 9/14,    Papanicolaou Test Limitations:  Cervical cytology is a scr eening test  with limited sensitivity; regular screening is critical for cancer  prevention; Pap tests are primarily effective for the  diagnosis/prevention of squamous cell carcinoma, not adenocarcinomas or  other cancers.    TESTING LAB LOCATION:  [address] [address]  [phone]    COLLECTION SITE:  Client:  [facility]  Location:  [facility] ([facility])     TSH with free T4 reflex   Result Value Ref Range    TSH 0.84 0.40 - 4.00 mU/L   CBC with platelets   Result Value Ref Range    WBC 7.3 4.0 - 11.0 10e9/L    RBC Count 4.79 3.8 - 5.2 10e12/L    Hemoglobin 14.2 11.7 - 15.7 g/dL    Hematocrit 41.3 35.0 - 47.0 %    MCV 86 78 - 100 fl    MCH 29.6 26.5 - 33.0 pg    MCHC 34.4 31.5 - 36.5 g/dL    RDW 12.7 10.0 - 15.0 %    Platelet Count 232 150 - 450 10e9/L   HIV Antigen Antibody Combo   Result Value Ref Range    HIV Antigen Antibody Combo  NR     Nonreactive   HIV-1 p24 Ag & HIV-1/HIV-2 Ab Not Detected     Hepatitis B surface antigen   Result Value Ref Range    Hep B Surface Agn Nonreactive NR   Hepatitis C antibody   Result Value Ref Range    Hepatitis C Antibody  NR     Nonreactive   Assay performance characteristics have not been established for newborns,   infants, and children     Treponema pallidum antibody confirm   Result Value Ref Range    T Pallidum by TP-PA conf       Non Reactive  Reference range: Non Reactive  (Note)  Performed by [employer] [facility],  [address] [place] [id number] [phone]  [email or web address], Aditya Martin MD, Lab. Director     Wet prep   Result Value Ref Range    Specimen Description Vagina     Wet Prep       Few WBC'S seen  No Trichomonas seen  No clue cells seen  No yeast seen      Micro Report Status FINAL 02/08/2017    Neisseria gonorrhoeae PCR   Result Value Ref Range    Specimen Descrip Cervix     N Gonorrhea PCR  NEG     Negative   Negative for N. gonorrhoeae rRNA by transcription mediated amplification.   A negative result by transcription mediated amplification does not preclude the   presence of N. gonorrhoeae infection because results are dependent on proper   and adequate collection, absence of inhibitors, and sufficient rRNA to be   detected.     Chlamydia trachomatis PCR   Result Value Ref Range    Specimen Description Cervix     Chlamydia Trachomatis PCR  NEG     Negative   Negative for C. trachomatis rRNA by  transcription mediated amplification.   A negative result by transcription mediated amplification does not preclude the   presence of C. trachomatis infection because results are dependent on proper   and adequate collection, absence of inhibitors, and sufficient rRNA to be   detected.                Plan:   Start depakote  for 3 days then 500 for 3 days then 750 for 3 days then increase to 1000 mg   Start klonopin 0.5-1 mg daily prn- 2 week supply will be given with refill. Risk of OD

## 2017-03-16 NOTE — NURSING NOTE
"Chief Complaint   Patient presents with     RECHECK     Mental health.  Patient reports that she is not doing well in all aspects of her life.       Initial /84 (BP Location: Left arm, Patient Position: Chair, Cuff Size: Adult Regular)  Pulse 101  Temp 99.4  F (37.4  C) (Tympanic)  Ht 5' 11\" (1.803 m)  Wt 180 lb (81.6 kg)  BMI 25.1 kg/m2 Estimated body mass index is 25.1 kg/(m^2) as calculated from the following:    Height as of this encounter: 5' 11\" (1.803 m).    Weight as of this encounter: 180 lb (81.6 kg).  Medication Reconciliation: complete     OLGA KITCHEN      "

## 2017-03-16 NOTE — PATIENT INSTRUCTIONS
Take depakote  mg at night for 3 days then increase to 500 mg for 3 nights then increase to 750 for 3 nights then increase 1000 mg at night and continue    Start klonopin 0.5-1 mg daily as needed for anxiety.

## 2017-03-16 NOTE — MR AVS SNAPSHOT
After Visit Summary   3/16/2017    Jannet San    MRN: [id number]           Patient Information     Date Of Birth          1990        Visit Information        Provider Department      3/16/2017 1:00 PM Nellie [facility] NP [facility]        Today's Diagnoses     Bipolar 2 disorder (H)    -  1      Care Instructions    Take depakote  mg at night for 3 days then increase to 500 mg for 3 nights then increase to 750 for 3 nights then increase 1000 mg at night and continue    Start klonopin 0.5-1 mg daily as needed for anxiety.         Follow-ups after your visit        Who to contact     If you have questions or need follow up information about today's clinic visit or your schedule please contact [facility] directly at [phone].  Normal or non-critical lab and imaging results will be communicated to you by MyChart, letter or phone within 4 business days after the clinic has received the results. If you do not hear from us within 7 days, please contact the clinic through MyChart or phone. If you have a critical or abnormal lab result, we will notify you by phone as soon as possible.  Submit refill requests through [employer] or call your pharmacy and they will forward the refill request to us. Please allow 3 business days for your refill to be completed.          Additional Information About Your Visit        MyChart Information     [employer] gives you secure access to your electronic health record. If you see a primary care provider, you can also send messages to your care team and make appointments. If you have questions, please call your primary care clinic.  If you do not have a primary care provider, please call [phone] and they will assist you.        Care EveryWhere ID     This is your Care EveryWhere ID. This could be used by other organizations to access your [facility] medical records  [id number]        Your Vitals Were     Pulse Temperature  "Height BMI (Body Mass Index)          101 99.4  F (37.4  C) (Tympanic) 1.803 m (5' 11\") 25.1 kg/m2         Blood Pressure from Last 3 Encounters:   03/16/17 128/84   02/08/17 108/60   02/08/17 114/76    Weight from Last 3 Encounters:   03/16/17 81.6 kg (180 lb)   02/08/17 79.4 kg (175 lb)   02/08/17 79.4 kg (175 lb)              Today, you had the following     No orders found for display         Today's Medication Changes          These changes are accurate as of: 3/16/17  1:39 PM.  If you have any questions, ask your nurse or doctor.               Start taking these medicines.        Dose/Directions    clonazePAM 1 MG tablet   Commonly known as:  klonoPIN   Used for:  Bipolar 2 disorder (H)   Started by:  Nellie Reza NP        Take 1/2 to 1 tablet daily prn   Quantity:  14 tablet   Refills:  0       divalproex 250 MG 24 hr tablet   Commonly known as:  DEPAKOTE ER   Used for:  Bipolar 2 disorder (H)   Started by:  Nellie Reza NP        Take 250 mg for 3 days then increase to 500 mg for 3 days then increase to 750 mg for 3 days then increase to 1000 mg   Quantity:  28 tablet   Refills:  1            Where to get your medicines      These medications were sent to [facility] [place], [address] [address]     Phone:  [phone]     divalproex 250 MG 24 hr tablet         Some of these will need a paper prescription and others can be bought over the counter.  Ask your nurse if you have questions.     Bring a paper prescription for each of these medications     clonazePAM 1 MG tablet                Primary Care Provider Office Phone # Fax #    Leona Matthews [phone]       [facility] [address]  [place] [address]        Thank you!     Thank you for choosing [facility]  for your care. Our goal is always to provide you with excellent care. Hearing back from our patients is one way we can continue to " improve our services. Please take a few minutes to complete the written survey that you may receive in the mail after your visit with us. Thank you!             Your Updated Medication List - Protect others around you: Learn how to safely use, store and throw away your medicines at www.disposemymeds.org.          This list is accurate as of: 3/16/17  1:39 PM.  Always use your most recent med list.                   Brand Name Dispense Instructions for use    BENADRYL PO      Take 75 mg by mouth nightly as needed       clonazePAM 1 MG tablet    klonoPIN    14 tablet    Take 1/2 to 1 tablet daily prn       divalproex 250 MG 24 hr tablet    DEPAKOTE ER    28 tablet    Take 250 mg for 3 days then increase to 500 mg for 3 days then increase to 750 mg for 3 days then increase to 1000 mg       etonogestrel 68 MG Impl    IMPLANON/NEXPLANON     1 each (68 mg) by Subdermal route continuous

## 2017-03-17 ASSESSMENT — PATIENT HEALTH QUESTIONNAIRE - PHQ9: SUM OF ALL RESPONSES TO PHQ QUESTIONS 1-9: 25

## 2017-03-17 ASSESSMENT — ANXIETY QUESTIONNAIRES: GAD7 TOTAL SCORE: 18

## 2017-03-23 ENCOUNTER — TELEPHONE (OUTPATIENT)
Dept: PSYCHIATRY | Facility: OTHER | Age: 27
End: 2017-03-23

## 2017-03-23 NOTE — TELEPHONE ENCOUNTER
Patient c/o nausea, vomiting and feeling dizzy with the next dose increase of 750 mg of Depakote yesterday.  Patient reports no problem before this.  Wondering if she should stick with 500 mg.  Next f/u is 3-30-17.  Thank you, please advise.

## 2017-03-27 NOTE — TELEPHONE ENCOUNTER
Patient notified to stay with dose of 500 mg of Depakote until seen at appt. on 3-30.  She is ok with this plan.

## 2017-03-30 ENCOUNTER — OFFICE VISIT (OUTPATIENT)
Dept: PSYCHIATRY | Facility: OTHER | Age: 27
End: 2017-03-30
Attending: NURSE PRACTITIONER
Payer: COMMERCIAL

## 2017-03-30 VITALS
BODY MASS INDEX: 25.2 KG/M2 | DIASTOLIC BLOOD PRESSURE: 56 MMHG | HEIGHT: 71 IN | WEIGHT: 180 LBS | TEMPERATURE: 98.3 F | HEART RATE: 66 BPM | SYSTOLIC BLOOD PRESSURE: 116 MMHG

## 2017-03-30 DIAGNOSIS — F31.81 BIPOLAR 2 DISORDER (H): Primary | ICD-10-CM

## 2017-03-30 PROCEDURE — 99214 OFFICE O/P EST MOD 30 MIN: CPT | Performed by: NURSE PRACTITIONER

## 2017-03-30 RX ORDER — OXCARBAZEPINE 150 MG/1
TABLET, FILM COATED ORAL
Qty: 120 TABLET | Refills: 1 | Status: SHIPPED | OUTPATIENT
Start: 2017-03-30 | End: 2017-04-20

## 2017-03-30 RX ORDER — LORAZEPAM 0.5 MG/1
.5-1 TABLET ORAL DAILY PRN
Qty: 60 TABLET | Refills: 1 | Status: SHIPPED | OUTPATIENT
Start: 2017-03-30 | End: 2017-04-20

## 2017-03-30 RX ORDER — OXCARBAZEPINE 150 MG/1
300 TABLET, FILM COATED ORAL 2 TIMES DAILY
Qty: 120 TABLET | Refills: 1 | Status: SHIPPED | OUTPATIENT
Start: 2017-03-30 | End: 2017-03-30

## 2017-03-30 ASSESSMENT — ANXIETY QUESTIONNAIRES
7. FEELING AFRAID AS IF SOMETHING AWFUL MIGHT HAPPEN: MORE THAN HALF THE DAYS
6. BECOMING EASILY ANNOYED OR IRRITABLE: MORE THAN HALF THE DAYS
3. WORRYING TOO MUCH ABOUT DIFFERENT THINGS: NEARLY EVERY DAY
2. NOT BEING ABLE TO STOP OR CONTROL WORRYING: MORE THAN HALF THE DAYS
5. BEING SO RESTLESS THAT IT IS HARD TO SIT STILL: NEARLY EVERY DAY
IF YOU CHECKED OFF ANY PROBLEMS ON THIS QUESTIONNAIRE, HOW DIFFICULT HAVE THESE PROBLEMS MADE IT FOR YOU TO DO YOUR WORK, TAKE CARE OF THINGS AT HOME, OR GET ALONG WITH OTHER PEOPLE: VERY DIFFICULT
1. FEELING NERVOUS, ANXIOUS, OR ON EDGE: MORE THAN HALF THE DAYS
GAD7 TOTAL SCORE: 17

## 2017-03-30 ASSESSMENT — PAIN SCALES - GENERAL: PAINLEVEL: MODERATE PAIN (4)

## 2017-03-30 ASSESSMENT — PATIENT HEALTH QUESTIONNAIRE - PHQ9: 5. POOR APPETITE OR OVEREATING: NEARLY EVERY DAY

## 2017-03-30 NOTE — PROGRESS NOTES
"[facility]  Psychiatric Progress Note     Impression:     Was nauseated dizzy, sleepy. Doesn't feel like klonopin works. Feels sedated from it. Wants something that isnt sedating. Making plans with people then \"freaking out about going\". Is attempting to date. Very nervous about it. Afraid to get close to them because it wont work out. Is now living at [facility]. Would like a pet note. She is still tearful. She denies any SI. Does have history of ovarian cysts. Will stop depakote.    She doesn't want to take an antidepressant. She states she has had headaches when on any antddepressants.     Educated regarding medication indications, risks, benefits, side effects, contraindications and possible interactions. Verbally expressed understanding.   DIagnoses:      Borderline PD  Bipolar type II  Marijuana use disorder, moderate     Attestation:  Patient has been seen and evaluated by me, April Shweta Reza NP         PHQ-9 SCORE 2/8/2017 3/16/2017 3/30/2017   Total Score - - -   Total Score 8 25 23       Interim History:   The patient's care was discussed with the treatment team and chart notes were reviewed.      Medications:      Prescription Medications as of 3/30/2017             DiphenhydrAMINE HCl (BENADRYL PO) Take 75 mg by mouth nightly as needed    clonazePAM (KLONOPIN) 1 MG tablet Take 1/2 to 1 tablet daily prn    divalproex (DEPAKOTE ER) 250 MG 24 hr tablet Take 250 mg for 3 days then increase to 500 mg for 3 days then increase to 750 mg for 3 days then increase to 1000 mg    etonogestrel (IMPLANON/NEXPLANON) 68 MG IMPL 1 each (68 mg) by Subdermal route continuous      Facility Administered Medications as of 3/30/2017             lidocaine 1 % injection 10 mL 10 mLs by Other route once    betamethasone acet & sod phos (CELESTONE) injection 12 mg 2 mLs (12 mg) by EPIDURAL route once                      10 point ROS negative states she has pain in abd likely from ovarian cysts.  Allergies: " "           Allergies   Allergen Reactions     Bee Pollen Swelling       Throat         Psychiatric Examination:   /56 (BP Location: Right arm, Patient Position: Chair, Cuff Size: Adult Regular)  Pulse 66  Temp 98.3  F (36.8  C) (Tympanic)  Ht 1.803 m (5' 11\")  Wt 81.6 kg (180 lb)  BMI 25.1 kg/m2       Appearance: hygiene improved  Attitude: cooperative  Eye Contact: better  Mood: less depressed   Affect: less tearful  Speech: clear, coherent  Psychomotor Behavior: no evidence of tardive dyskinesia, dystonia, or tics  Thought Process: logical and goal oriented  Associations: no loose associations  Thought Content: No SI  Insight: fair  Judgment: improving  Oriented to: time, person, and place  Attention Span and Concentration: intact  Recent and Remote Memory: intact  Fund of Knowledge: appropriate  Muscle Strength and Tone: normal  Gait and Station: Normal        Labs:    none needed at this appt.          Plan:           Stop depakote. No taper needed. -ovarian cysts  Trileptal 150 bid for one week then increase to ii tab BID  Dc klonopin  Start ativan 0.5-1 mg dialy prn          Has tried effexor and cymbalta  Has been on lamictal with no benefit.   Lithium gave \"runs\"             "

## 2017-03-30 NOTE — MR AVS SNAPSHOT
After Visit Summary   3/30/2017    Jannet San    MRN: [id number]           Patient Information     Date Of Birth          1990        Visit Information        Provider Department      3/30/2017 12:30 PM Libia April Shweta, NP [facility]        Today's Diagnoses     Bipolar 2 disorder (H)    -  1      Care Instructions    Start trileptal 150 mg twice a day for one week then increase to 300 mg twice a day.  Start ativan 0.5-1 mg daily as needed for anxiety  Stop klonopin  Stop depakote        Follow-ups after your visit        Who to contact     If you have questions or need follow up information about today's clinic visit or your schedule please contact [facility] directly at [phone].  Normal or non-critical lab and imaging results will be communicated to you by [employer], letter or phone within 4 business days after the clinic has received the results. If you do not hear from us within 7 days, please contact the clinic through [employer] or phone. If you have a critical or abnormal lab result, we will notify you by phone as soon as possible.  Submit refill requests through [employer] or call your pharmacy and they will forward the refill request to us. Please allow 3 business days for your refill to be completed.          Additional Information About Your Visit        MyChart Information     [employer] gives you secure access to your electronic health record. If you see a primary care provider, you can also send messages to your care team and make appointments. If you have questions, please call your primary care clinic.  If you do not have a primary care provider, please call [phone] and they will assist you.        Care EveryWhere ID     This is your Care EveryWhere ID. This could be used by other organizations to access your [place] medical records  [id number]         Blood Pressure from Last 3 Encounters:   03/16/17 128/84   02/08/17 108/60   02/08/17  114/76    Weight from Last 3 Encounters:   03/16/17 81.6 kg (180 lb)   02/08/17 79.4 kg (175 lb)   02/08/17 79.4 kg (175 lb)              Today, you had the following     No orders found for display         Today's Medication Changes          These changes are accurate as of: 3/30/17 12:57 PM.  If you have any questions, ask your nurse or doctor.               Start taking these medicines.        Dose/Directions    LORazepam 0.5 MG tablet   Commonly known as:  ATIVAN   Used for:  Bipolar 2 disorder (H)   Started by:  Nellie Reza NP        Dose:  0.5-1 mg   Take 1-2 tablets (0.5-1 mg) by mouth daily as needed for anxiety   Quantity:  60 tablet   Refills:  1       OXcarbazepine 150 MG tablet   Commonly known as:  TRILEPTAL   Used for:  Bipolar 2 disorder (H)   Started by:  Nellie Reza NP        Take 150 mg BID for 1 week then increase to 300 mg bid and continue   Quantity:  120 tablet   Refills:  1         Stop taking these medicines if you haven't already. Please contact your care team if you have questions.     clonazePAM 1 MG tablet   Commonly known as:  klonoPIN   Stopped by:  Nellie Reza NP           divalproex 250 MG 24 hr tablet   Commonly known as:  DEPAKOTE ER   Stopped by:  Nellie Reza NP                Where to get your medicines      These medications were sent to [facility] [place] [facility]  [facility] [place] [address]     Phone:  [phone]     OXcarbazepine 150 MG tablet         Some of these will need a paper prescription and others can be bought over the counter.  Ask your nurse if you have questions.     Bring a paper prescription for each of these medications     LORazepam 0.5 MG tablet                Primary Care Provider Office Phone # Fax #    Leona Matthews [phone]       [address] [place] [address]        Thank you!     Thank you for choosing [facility]  for your care. Our goal is always to provide you with excellent care. Hearing back from our patients is one way we can continue to improve our services. Please take a few minutes to complete the written survey that you may receive in the mail after your visit with us. Thank you!             Your Updated Medication List - Protect others around you: Learn how to safely use, store and throw away your medicines at www.disposemymeds.org.          This list is accurate as of: 3/30/17 12:57 PM.  Always use your most recent med list.                   Brand Name Dispense Instructions for use    BENADRYL PO      Take 75 mg by mouth nightly as needed       etonogestrel 68 MG Impl    IMPLANON/NEXPLANON     1 each (68 mg) by Subdermal route continuous       LORazepam 0.5 MG tablet    ATIVAN    60 tablet    Take 1-2 tablets (0.5-1 mg) by mouth daily as needed for anxiety       OXcarbazepine 150 MG tablet    TRILEPTAL    120 tablet    Take 150 mg BID for 1 week then increase to 300 mg bid and continue

## 2017-03-30 NOTE — NURSING NOTE
"Chief Complaint   Patient presents with     RECHECK     Mental health.       Initial /56 (BP Location: Right arm, Patient Position: Chair, Cuff Size: Adult Regular)  Pulse 66  Temp 98.3  F (36.8  C) (Tympanic)  Ht 5' 11\" (1.803 m)  Wt 180 lb (81.6 kg)  BMI 25.1 kg/m2 Estimated body mass index is 25.1 kg/(m^2) as calculated from the following:    Height as of this encounter: 5' 11\" (1.803 m).    Weight as of this encounter: 180 lb (81.6 kg).  Medication Reconciliation: complete     OLGA KITCHEN      "

## 2017-03-30 NOTE — PATIENT INSTRUCTIONS
Start trileptal 150 mg twice a day for one week then increase to 300 mg twice a day.  Start ativan 0.5-1 mg daily as needed for anxiety  Stop klonopin  Stop depakote  Refer to [facility] for testing

## 2017-03-31 ASSESSMENT — PATIENT HEALTH QUESTIONNAIRE - PHQ9: SUM OF ALL RESPONSES TO PHQ QUESTIONS 1-9: 23

## 2017-03-31 ASSESSMENT — ANXIETY QUESTIONNAIRES: GAD7 TOTAL SCORE: 17

## 2017-04-03 ENCOUNTER — TELEPHONE (OUTPATIENT)
Dept: PSYCHIATRY | Facility: OTHER | Age: 27
End: 2017-04-03

## 2017-04-03 ENCOUNTER — HOSPITAL ENCOUNTER (EMERGENCY)
Facility: HOSPITAL | Age: 27
Discharge: HOME | End: 2017-04-03
Attending: PHYSICIAN ASSISTANT | Admitting: PHYSICIAN ASSISTANT
Payer: COMMERCIAL

## 2017-04-03 ENCOUNTER — TELEPHONE (OUTPATIENT)
Dept: FAMILY MEDICINE | Facility: OTHER | Age: 27
End: 2017-04-03

## 2017-04-03 VITALS
SYSTOLIC BLOOD PRESSURE: 115 MMHG | TEMPERATURE: 97.8 F | DIASTOLIC BLOOD PRESSURE: 69 MMHG | OXYGEN SATURATION: 100 % | RESPIRATION RATE: 16 BRPM

## 2017-04-03 DIAGNOSIS — S39.012A SACROILIAC STRAIN, INITIAL ENCOUNTER: ICD-10-CM

## 2017-04-03 DIAGNOSIS — S39.012A STRAIN OF COCCYX, INITIAL ENCOUNTER: ICD-10-CM

## 2017-04-03 PROCEDURE — 72220 X-RAY EXAM SACRUM TAILBONE: CPT | Mod: TC

## 2017-04-03 PROCEDURE — 99213 OFFICE O/P EST LOW 20 MIN: CPT

## 2017-04-03 PROCEDURE — 99213 OFFICE O/P EST LOW 20 MIN: CPT | Performed by: PHYSICIAN ASSISTANT

## 2017-04-03 ASSESSMENT — ENCOUNTER SYMPTOMS
MYALGIAS: 1
CARDIOVASCULAR NEGATIVE: 1
ARTHRALGIAS: 1
PSYCHIATRIC NEGATIVE: 1
CONSTITUTIONAL NEGATIVE: 1
NEUROLOGICAL NEGATIVE: 1

## 2017-04-03 NOTE — ED NOTES
Pt presents with falling  1-1/2 weeks ago and fell on tailbone and right arm which had bruising but is gone now.

## 2017-04-03 NOTE — TELEPHONE ENCOUNTER
Please schedule patient for date/time: Unable to see today, may go to urgent care    Have patient go to ER/Urgent Care Center. Urgent Care hours are 9:30 am to 8 pm, open 7 days a week. Yes.    Provider will call patient.No.    Other:

## 2017-04-03 NOTE — TELEPHONE ENCOUNTER
Patient is requesting a note / letter for an emotional support animal.  Next f/u is 4-27-17.  Thank you, please advise.   Will call Jnanet when ready.  Will fax to housing facility and mail out copy per request.  Fax # to facility is [phone].

## 2017-04-03 NOTE — TELEPHONE ENCOUNTER
8:55 AM    Reason for Call: OVERBOOK    Patient is having the following symptoms: Tailbone pain - fell / would like xray for 1 weeks.    The patient is requesting an appointment for overbook this week  with Dr ELIO Matthews.    Was an appointment offered for this call? Yes - first avail 4-24-17 pt declined    Preferred method for responding to this message: Telephone Call - [phone]    If we cannot reach you directly, may we leave a detailed response at the number you provided? Yes    Can this message wait until your PCP/provider returns, if unavailable today? Not applicable,     Tresa Pierce

## 2017-04-03 NOTE — ED AVS SNAPSHOT
[facility]    [address] [address]    Phone:  [phone]                                       Jannet San   MRN: [id number]    Department:  [facility]   Date of Visit:  4/3/2017           After Visit Summary Signature Page     I have received my discharge instructions, and my questions have been answered. I have discussed any challenges I see with this plan with the nurse or doctor.    ..........................................................................................................................................  Patient/Patient Representative Signature      ..........................................................................................................................................  Patient Representative Print Name and Relationship to Patient    ..................................................               ................................................  Date                                            Time    ..........................................................................................................................................  Reviewed by Signature/Title    ...................................................              ..............................................  Date                                                            Time

## 2017-04-03 NOTE — LETTER
[facility] [place]  [address] [place] [address]  [place] [address]  Phone: [phone]    04/04/17    Jannet San  [address] [place] [address] [address] [place] [address]      To whom it may concern:     I have been working as Jannet's psychiatric medication provider for over a year. I do feel that a therapeutic pet would decrease her depression and anxiety.    Sincerely,          Nellie Reza NP
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
## 2017-04-03 NOTE — ED AVS SNAPSHOT
[facility]    [address]    [place] [address]    Phone:  [phone]                                       Jannet San   MRN: [id number]    Department:  [facility]   Date of Visit:  4/3/2017           Patient Information     Date Of Birth          1990        Your diagnoses for this visit were:     Strain of coccyx, initial encounter     Sacroiliac strain, initial encounter        You were seen by Brianna Enriquez PA.      Follow-up Information     Follow up with Leona Matthews MD.    Specialty:  Family Practice    Why:  If symptoms worsen    Contact information:    [facility] [place]  [address]  [place] [address] [phone]          Follow up with [facility].    Specialty:  EMERGENCY MEDICINE    Why:  If further concerns develop    Contact information:    [address]  [place] [address] [phone]    Additional information:    From [place] Area: Take [address]. Turn left at [address]. Turn left at the first stoplight on [address]. At the first stop sign, take a right onto [address]. Take a left into the parking lot and continue through until you reach the North enterance of the building.       From [place]: Take [address]. Take the [address] ramp towards [place]. Turn left onto [address]. Take a slight right onto [address]. Turn left at the first stoplight on [address]. At the first stop sign, take a right onto [address]. Take a left into the parking lot and continue through until you reach the North enterance of the building.       From [place]: Take [address]. Take a right at [address]. At the first stop sign, take a right onto [address]. Take a left into the parking lot and continue through until you reach the North enterance of the building.         Discharge Instructions       Take Tylenol or Motrin as directed on  the bottle as needed for pain.    Future Appointments        Provider Department Dept Phone Center    4/27/2017 2:30 PM April Shweta Reza NP [facility] [phone] [facility]    6/13/2017 12:30 PM Reina Adamsonamanda Garibay, [facility] [phone] [facility]         Review of your medicines      Our records show that you are taking the medicines listed below. If these are incorrect, please call your family doctor or clinic.        Dose / Directions Last dose taken    BENADRYL PO   Dose:  75 mg        Take 75 mg by mouth nightly as needed   Refills:  0        etonogestrel 68 MG Impl   Commonly known as:  IMPLANON/NEXPLANON   Dose:  1 each        1 each (68 mg) by Subdermal route continuous   Refills:  0        LORazepam 0.5 MG tablet   Commonly known as:  ATIVAN   Dose:  0.5-1 mg   Quantity:  60 tablet        Take 1-2 tablets (0.5-1 mg) by mouth daily as needed for anxiety   Refills:  1        OXcarbazepine 150 MG tablet   Commonly known as:  TRILEPTAL   Quantity:  120 tablet        Take 150 mg BID for 1 week then increase to 300 mg bid and continue   Refills:  1                Procedures and tests performed during your visit     XR Sacrum and Coccyx 2 Views      Orders Needing Specimen Collection     None      Pending Results     Date and Time Order Name Status Description    4/3/2017 1457 XR Sacrum and Coccyx 2 Views In process             Pending Culture Results     No orders found from 4/1/2017 to 4/4/2017.            Thank you for choosing [facility]       Thank you for choosing [facility] for your care. Our goal is always to provide you with excellent care. Hearing back from our patients is one way we can continue to improve our services. Please take a few minutes to complete the written survey that you may receive in the mail after you visit with us. Thank you!        [employer] Information     [employer] gives you secure access to your electronic health record. If you see a  primary care provider, you can also send messages to your care team and make appointments. If you have questions, please call your primary care clinic.  If you do not have a primary care provider, please call [phone] and they will assist you.        Care EveryWhere ID     This is your Care EveryWhere ID. This could be used by other organizations to access your [facility] medical records  [id number]        After Visit Summary       This is your record. Keep this with you and show to your community pharmacist(s) and doctor(s) at your next visit.

## 2017-04-07 NOTE — TELEPHONE ENCOUNTER
Patient notified by  that letter (for  animal) is completed and will send copy in mail to her, per request.  Will fax copy of letter to facility.  Gave call back # for questions.

## 2017-04-14 ENCOUNTER — TRANSFERRED RECORDS (OUTPATIENT)
Dept: HEALTH INFORMATION MANAGEMENT | Facility: HOSPITAL | Age: 27
End: 2017-04-14

## 2017-04-19 ENCOUNTER — OFFICE VISIT (OUTPATIENT)
Dept: OBGYN | Facility: OTHER | Age: 27
End: 2017-04-19
Attending: ADVANCED PRACTICE MIDWIFE
Payer: COMMERCIAL

## 2017-04-19 VITALS
OXYGEN SATURATION: 99 % | SYSTOLIC BLOOD PRESSURE: 112 MMHG | HEIGHT: 71 IN | BODY MASS INDEX: 25.9 KG/M2 | WEIGHT: 185 LBS | DIASTOLIC BLOOD PRESSURE: 76 MMHG | HEART RATE: 67 BPM

## 2017-04-19 DIAGNOSIS — Z30.40 ENCOUNTER FOR SURVEILLANCE OF CONTRACEPTIVES: ICD-10-CM

## 2017-04-19 DIAGNOSIS — Z30.46 NEXPLANON REMOVAL: Primary | ICD-10-CM

## 2017-04-19 PROCEDURE — 11982 REMOVE DRUG IMPLANT DEVICE: CPT | Performed by: ADVANCED PRACTICE MIDWIFE

## 2017-04-19 PROCEDURE — 99213 OFFICE O/P EST LOW 20 MIN: CPT | Mod: 25 | Performed by: ADVANCED PRACTICE MIDWIFE

## 2017-04-19 ASSESSMENT — PAIN SCALES - GENERAL: PAINLEVEL: NO PAIN (0)

## 2017-04-19 NOTE — NURSING NOTE
"Chief Complaint   Patient presents with     Vaginal Bleeding     Irregular Bleeding        Initial /76 (BP Location: Left arm, Patient Position: Chair, Cuff Size: Adult Regular)  Pulse 67  Ht 5' 11\" (1.803 m)  Wt 185 lb (83.9 kg)  LMP 03/21/2017  SpO2 99%  BMI 25.8 kg/m2 Estimated body mass index is 25.8 kg/(m^2) as calculated from the following:    Height as of this encounter: 5' 11\" (1.803 m).    Weight as of this encounter: 185 lb (83.9 kg).  Medication Reconciliation: cori Lynne      "

## 2017-04-19 NOTE — MR AVS SNAPSHOT
After Visit Summary   4/19/2017    Jannet San    MRN: [id number]           Patient Information     Date Of Birth          1990        Visit Information        Provider Department      4/19/2017 3:00 PM [facility]        Today's Diagnoses     Nexplanon removal    -  1    Encounter for surveillance of contraceptives          Care Instructions    Return as needed.        Follow-ups after your visit        Your next 10 appointments already scheduled     Apr 20, 2017  2:30 PM CDT   (Arrive by 2:15 PM)   Return Visit with Nellie Reza NP   [facility] ([facility]    [address] [place] [id number] [phone]            Jun 13, 2017 12:30 PM CDT   (Arrive by 12:15 PM)   New Visit with Reina Garibay [facility] [place] [facility] ([facility])    [address] [place] [id number] [phone]              Who to contact     If you have questions or need follow up information about today's clinic visit or your schedule please contact [facility] directly at [phone].  Normal or non-critical lab and imaging results will be communicated to you by MyChart, letter or phone within 4 business days after the clinic has received the results. If you do not hear from us within 7 days, please contact the clinic through [employer] or phone. If you have a critical or abnormal lab result, we will notify you by phone as soon as possible.  Submit refill requests through [employer] or call your pharmacy and they will forward the refill request to us. Please allow 3 business days for your refill to be completed.          Additional Information About Your Visit        MyChart Information     [employer] gives you secure access to your electronic health record. If you see a primary care provider, you can also send messages to your care team and make appointments. If you have questions, please call your  "primary care clinic.  If you do not have a primary care provider, please call [phone] and they will assist you.        Care EveryWhere ID     This is your Care EveryWhere ID. This could be used by other organizations to access your [facility] medical records  [id number]        Your Vitals Were     Pulse Height Last Period Pulse Oximetry BMI (Body Mass Index)       67 5' 11\" (1.803 m) 03/21/2017 99% 25.8 kg/m2        Blood Pressure from Last 3 Encounters:   04/19/17 112/76   04/03/17 115/69   03/30/17 116/56    Weight from Last 3 Encounters:   04/19/17 185 lb (83.9 kg)   03/30/17 180 lb (81.6 kg)   03/16/17 180 lb (81.6 kg)              We Performed the Following     REMOVAL [facility]        Primary Care Provider Office Phone # Fax #    Leona Matthews [phone]       [facility] [place] [address] [facility] [address]        Thank you!     Thank you for choosing [facility] [place]  for your care. Our goal is always to provide you with excellent care. Hearing back from our patients is one way we can continue to improve our services. Please take a few minutes to complete the written survey that you may receive in the mail after your visit with us. Thank you!             Your Updated Medication List - Protect others around you: Learn how to safely use, store and throw away your medicines at www.disposemymeds.org.          This list is accurate as of: 4/19/17  3:49 PM.  Always use your most recent med list.                   Brand Name Dispense Instructions for use    BENADRYL PO      Take 75 mg by mouth nightly as needed       etonogestrel 68 MG Impl    IMPLANON/NEXPLANON     1 each (68 mg) by Subdermal route continuous       LORazepam 0.5 MG tablet    ATIVAN    60 tablet    Take 1-2 tablets (0.5-1 mg) by mouth daily as needed for anxiety       OXcarbazepine 150 MG tablet    TRILEPTAL    120 tablet    Take 150 mg BID for 1 week then increase to 300 mg bid and continue         "

## 2017-04-20 ENCOUNTER — OFFICE VISIT (OUTPATIENT)
Dept: PSYCHIATRY | Facility: OTHER | Age: 27
End: 2017-04-20
Attending: NURSE PRACTITIONER
Payer: COMMERCIAL

## 2017-04-20 VITALS
HEART RATE: 75 BPM | SYSTOLIC BLOOD PRESSURE: 96 MMHG | DIASTOLIC BLOOD PRESSURE: 68 MMHG | HEIGHT: 71 IN | TEMPERATURE: 98.4 F | BODY MASS INDEX: 25.9 KG/M2 | WEIGHT: 185 LBS

## 2017-04-20 DIAGNOSIS — F90.0 ATTENTION DEFICIT HYPERACTIVITY DISORDER (ADHD), PREDOMINANTLY INATTENTIVE TYPE: Primary | ICD-10-CM

## 2017-04-20 DIAGNOSIS — F31.81 BIPOLAR 2 DISORDER (H): ICD-10-CM

## 2017-04-20 PROCEDURE — 90833 PSYTX W PT W E/M 30 MIN: CPT | Performed by: NURSE PRACTITIONER

## 2017-04-20 PROCEDURE — 99214 OFFICE O/P EST MOD 30 MIN: CPT | Performed by: NURSE PRACTITIONER

## 2017-04-20 RX ORDER — LORAZEPAM 0.5 MG/1
.5-1 TABLET ORAL DAILY PRN
Qty: 60 TABLET | Refills: 1 | Status: SHIPPED | OUTPATIENT
Start: 2017-04-20 | End: 2017-05-04

## 2017-04-20 RX ORDER — ATOMOXETINE 25 MG/1
CAPSULE ORAL
Qty: 60 CAPSULE | Refills: 1 | Status: SHIPPED | OUTPATIENT
Start: 2017-04-20 | End: 2017-05-04

## 2017-04-20 ASSESSMENT — PATIENT HEALTH QUESTIONNAIRE - PHQ9: 5. POOR APPETITE OR OVEREATING: NEARLY EVERY DAY

## 2017-04-20 ASSESSMENT — ANXIETY QUESTIONNAIRES
1. FEELING NERVOUS, ANXIOUS, OR ON EDGE: NEARLY EVERY DAY
7. FEELING AFRAID AS IF SOMETHING AWFUL MIGHT HAPPEN: NEARLY EVERY DAY
5. BEING SO RESTLESS THAT IT IS HARD TO SIT STILL: NEARLY EVERY DAY
3. WORRYING TOO MUCH ABOUT DIFFERENT THINGS: MORE THAN HALF THE DAYS
GAD7 TOTAL SCORE: 19
6. BECOMING EASILY ANNOYED OR IRRITABLE: MORE THAN HALF THE DAYS
2. NOT BEING ABLE TO STOP OR CONTROL WORRYING: NEARLY EVERY DAY

## 2017-04-20 ASSESSMENT — PAIN SCALES - GENERAL: PAINLEVEL: MODERATE PAIN (4)

## 2017-04-20 NOTE — PATIENT INSTRUCTIONS
Start strattera 25 mg for one week then increase to 50 mg    Stop trileptal    Start zoloft 50 mg daily    Continue ativan

## 2017-04-20 NOTE — MR AVS SNAPSHOT
After Visit Summary   4/20/2017    Jannet San    MRN: [id number]           Patient Information     Date Of Birth          1990        Visit Information        Provider Department      4/20/2017 2:30 PM Nellie Reza NP [facility]        Today's Diagnoses     Attention deficit hyperactivity disorder (ADHD), predominantly inattentive type    -  1    Bipolar 2 disorder (H)          Care Instructions    Start strattera 25 mg for one week then increase to 50 mg    Stop trileptal    Start zoloft 50 mg daily    Continue ativan        Follow-ups after your visit        Your next 10 appointments already scheduled     Jun 13, 2017 12:30 PM CDT   (Arrive by 12:15 PM)   New Visit with Reina [facility]ib[facility], [facility]   [facility] [place] [facility] ([facility]    [address] [place] [address] [phone]              Who to contact     If you have questions or need follow up information about today's clinic visit or your schedule please contact [facility] directly at [phone].  Normal or non-critical lab and imaging results will be communicated to you by [employer], letter or phone within 4 business days after the clinic has received the results. If you do not hear from us within 7 days, please contact the clinic through [employer] or phone. If you have a critical or abnormal lab result, we will notify you by phone as soon as possible.  Submit refill requests through [employer] or call your pharmacy and they will forward the refill request to us. Please allow 3 business days for your refill to be completed.          Additional Information About Your Visit        [employer] Information     [employer] gives you secure access to your electronic health record. If you see a primary care provider, you can also send messages to your care team and make appointments. If you have questions, please call your primary care clinic.  If you do not have a primary care provider,  "please call [phone] and they will assist you.        Care EveryWhere ID     This is your Care EveryWhere ID. This could be used by other organizations to access your [facility] medical records  [id number]        Your Vitals Were     Pulse Temperature Height Last Period BMI (Body Mass Index)       75 98.4  F (36.9  C) (Tympanic) 1.803 m (5' 11\") 03/21/2017 25.8 kg/m2        Blood Pressure from Last 3 Encounters:   04/20/17 96/68   04/19/17 112/76   04/03/17 115/69    Weight from Last 3 Encounters:   04/20/17 83.9 kg (185 lb)   04/19/17 83.9 kg (185 lb)   03/30/17 81.6 kg (180 lb)              Today, you had the following     No orders found for display         Today's Medication Changes          These changes are accurate as of: 4/20/17  3:16 PM.  If you have any questions, ask your nurse or doctor.               Start taking these medicines.        Dose/Directions    sertraline 50 MG tablet   Commonly known as:  ZOLOFT   Used for:  Bipolar 2 disorder (H)   Started by:  Nellie Reza NP        Dose:  50 mg   Take 1 tablet (50 mg) by mouth daily   Quantity:  30 tablet   Refills:  1         Stop taking these medicines if you haven't already. Please contact your care team if you have questions.     OXcarbazepine 150 MG tablet   Commonly known as:  TRILEPTAL   Stopped by:  Nellie Reza NP                Where to get your medicines      These medications were sent to [facility]  [address]  [facility] [place] [address]     Phone:  [phone]     sertraline 50 MG tablet         Some of these will need a paper prescription and others can be bought over the counter.  Ask your nurse if you have questions.     Bring a paper prescription for each of these medications     LORazepam 0.5 MG tablet                Primary Care Provider Office Phone # Fax #    Leona Matthews [phone]       [facility] [place] [address]  [place] " [address]        Thank you!     Thank you [facility]  for your care. Our goal is always to provide you with excellent care. Hearing back from our patients is one way we can continue to improve our services. Please take a few minutes to complete the written survey that you may receive in the mail after your visit with us. Thank you!             Your Updated Medication List - Protect others around you: Learn how to safely use, store and throw away your medicines at www.disposemymeds.org.          This list is accurate as of: 4/20/17  3:16 PM.  Always use your most recent med list.                   Brand Name Dispense Instructions for use    BENADRYL PO      Take 75 mg by mouth nightly as needed       LORazepam 0.5 MG tablet    ATIVAN    60 tablet    Take 1-2 tablets (0.5-1 mg) by mouth daily as needed for anxiety       sertraline 50 MG tablet    ZOLOFT    30 tablet    Take 1 tablet (50 mg) by mouth daily

## 2017-04-20 NOTE — NURSING NOTE
"Chief Complaint   Patient presents with     RECHECK     Mental health.       Initial BP 96/68 (BP Location: Left arm, Patient Position: Chair, Cuff Size: Adult Regular)  Pulse 75  Temp 98.4  F (36.9  C) (Tympanic)  Ht 5' 11\" (1.803 m)  Wt 185 lb (83.9 kg)  LMP 03/21/2017  BMI 25.8 kg/m2 Estimated body mass index is 25.8 kg/(m^2) as calculated from the following:    Height as of this encounter: 5' 11\" (1.803 m).    Weight as of this encounter: 185 lb (83.9 kg).  Medication Reconciliation: complete     OLGA KITCHEN      "

## 2017-04-21 ENCOUNTER — TELEPHONE (OUTPATIENT)
Dept: PSYCHIATRY | Facility: OTHER | Age: 27
End: 2017-04-21

## 2017-04-21 ASSESSMENT — ANXIETY QUESTIONNAIRES: GAD7 TOTAL SCORE: 19

## 2017-04-21 ASSESSMENT — PATIENT HEALTH QUESTIONNAIRE - PHQ9: SUM OF ALL RESPONSES TO PHQ QUESTIONS 1-9: 22

## 2017-04-21 NOTE — PROGRESS NOTES
"  PSYCHIATRY CLINIC PROGRESS NOTE         16 min Spent on therapy  SUBJECTIVE / INTERIM HISTORY                                                                       Lavonne did go to psychological testing that i recommended. Stanley Woody does believe she has symptoms of ADHD and not bipolar idsorder. Mood stabilizers and antidepressants have had limited benefit. Mmpi does reflect that she does have borderline and avoidant pd traits that account for a large part of depression. She is not in therpay. She still has passive SI. She has no plan. She has one attempt many years ago which was an OD on xanax. She has no intent. She states \"my son keeps me going\". She is now living in [place]. She has no vehicle and no employment. She shares custody of her son.     Initially when she came in she was very relaxed looking. Too relaxed. i have never seen her this relaxed. She doesn't have slurred speech though she has slow speech and her eyes appear tired or very relaxed. I asked her if she drove to the appt and she reminded me she didn't have a vehicle. She did walk. i did tell her that i have never seen her this relaxed and i was wondering if she was high on marijuana or had been overusing ativan. She denied that she smoked marijuana today. She does state she took 2 0.5 mg ativan at 12:00 today prior to coming here. This could account for her narcotised appearance. It is questionable if she took more than 1 mg prior to coming here. She will be monitored closely while on the ativan and it will be used only short term until her depression and anxiety are more controlled.   She states she sleeps poorly. She has difficult staying asleep and falling asleep. We discussed past medicatios tried that may have helped. The most effective medication for her was zoloft per her report. When i had her on zoloft she was not near as tearful as she has been the past few times i have seen her. I also recall her responding fairly well to " "lithium with no side effects. I asked her if she wanted to try paxil as that is one of the many ssri's she has not treid. She states \"i have heard too many bad side effects\" she is very fearful of weight gain potential. She states she wants to start zoloft again and is hoping to start something for ADHD. Her son also has adhd. He does take adderal and she doesn't like that he is on it. She has never heard of strattera and is willing to try this along with the zoloft.     We discussed ECT at [facility]. She was quite interested in this. She spoke with Stanley ferguson about this and he did not feel that she has tried and failed enough medications and would like her see treated for ADD to see if that alleviates depression and anxiety symptoms. She has not tried brintellex nor has she tried antipsychotics to target some paranoia she has from personality d/o traits. abilify and latuda will be considered at next appt likely.     She denies intent of SI though she is appearing extremely depressed and her hope is bleak. i did ask her if she felt the need for hospitalzation or crisis center. She declines currently though has made comments that if she was hospitalized, \"my  would try to get custody\".          MEDICAL ROS-  negative  MEDICAL / SURGICAL HISTORY                pregnant [if applicable]--no     Patient Active Problem List   Diagnosis     Depression     Lumbago     PTSD (post-traumatic stress disorder)     Migraine     Drug use disorder     Bilateral low back pain without sciatica     Bilateral low back pain with left-sided sciatica     Bipolar II disorder (H)     Fibromyalgia     Lump or mass in breast     Mastodynia     Encounter for gynecological examination without abnormal finding     Encounter for surveillance of contraceptives     ALLERGY   Bee pollen  MEDICATIONS                                                                                             Current Outpatient Prescriptions   Medication Sig     " "LORazepam (ATIVAN) 0.5 MG tablet Take 1-2 tablets (0.5-1 mg) by mouth daily as needed for anxiety     sertraline (ZOLOFT) 50 MG tablet Take 1 tablet (50 mg) by mouth daily     atomoxetine (STRATTERA) 25 MG capsule Take 1 pill for 1 week then increase to 2 pills     DiphenhydrAMINE HCl (BENADRYL PO) Take 75 mg by mouth nightly as needed     No current facility-administered medications for this visit.      Facility-Administered Medications Ordered in Other Visits   Medication     lidocaine 1 % injection 10 mL     betamethasone acet & sod phos (CELESTONE) injection 12 mg     DRUG INTERACTION CHECK was unremarkable.  VITALS   BP 96/68 (BP Location: Left arm, Patient Position: Chair, Cuff Size: Adult Regular)  Pulse 75  Temp 98.4  F (36.9  C) (Tympanic)  Ht 5' 11\" (1.803 m)  Wt 185 lb (83.9 kg)  LMP 03/21/2017  BMI 25.8 kg/m2     PHQ9                       PHQ-9 SCORE 3/16/2017 3/30/2017 4/20/2017   Total Score - - -   Total Score 25 23 22       LABS                                                                                                                         None needed at this appt  MENTAL STATUS EXAM                                                                                       Appearance:  awake, alert, adequately groomed and appeared as age stated  Attitude:  cooperative  Eye Contact:  poor   Mood:  depressed  Affect:  guarded  Speech:  clear, coherent  Psychomotor Behavior:  no evidence of tardive dyskinesia, dystonia, or tics  Thought Process:  logical and goal oriented  Associations:  no loose associations  Thought Content:  no evidence of psychotic thought, passive suicidal ideation present and no visual hallucinations present  Insight:  limited  Judgment:  limited  Oriented to:  time, person, and place  Attention Span and Concentration:  intact  Recent and Remote Memory:  intact  Fund of Knowledge: appropriate  Muscle Strength and Tone: normal  Gait and Station: Normal      DIAGNOSES       "   ptsd   Borderline PD  Avoidant traits  Mdd, recurrent, severe      PLAN                                                                                                                       1)  MEDICATIONS:         Restart zoloft 50 mg     Dc trileptal  Will only give 2 week supply of ativan at a time due to risk of OD  Did have psych testint with jesse ferguson showing ptsd, add  Start strattera 25 mg for one week then increase to 50 mg     At her next appt we will discuss latuda or abilfiy.    2)  THERAPY:   Needs to start seening therapist regularly    3)  LABS:  None needed at this appt  4)  PT MONITOR [call for probs]:  anxiety  5)  REFERRALS [CD, medical, other]:  None  6)  RTC:    2 weeks

## 2017-04-21 NOTE — TELEPHONE ENCOUNTER
Received PA form from [employer] for the medication Strattera 25 mg.  Will complete, fax and wait for determination.

## 2017-04-25 ENCOUNTER — TELEPHONE (OUTPATIENT)
Dept: PSYCHIATRY | Facility: OTHER | Age: 27
End: 2017-04-25

## 2017-04-25 NOTE — TELEPHONE ENCOUNTER
PA approval was received from [employer] for the medication Strattera 25 mg capsule.  Coverage is approved for the following time frame:  4- through 4-23-18.  The following reference # has been assigned to this approval:  [id number]

## 2017-05-04 ENCOUNTER — OFFICE VISIT (OUTPATIENT)
Dept: PSYCHIATRY | Facility: OTHER | Age: 27
End: 2017-05-04
Attending: NURSE PRACTITIONER
Payer: COMMERCIAL

## 2017-05-04 VITALS
DIASTOLIC BLOOD PRESSURE: 70 MMHG | HEIGHT: 71 IN | BODY MASS INDEX: 25.2 KG/M2 | WEIGHT: 180 LBS | SYSTOLIC BLOOD PRESSURE: 126 MMHG | OXYGEN SATURATION: 100 % | HEART RATE: 70 BPM | TEMPERATURE: 98.1 F | RESPIRATION RATE: 18 BRPM

## 2017-05-04 DIAGNOSIS — F90.0 ATTENTION DEFICIT HYPERACTIVITY DISORDER (ADHD), PREDOMINANTLY INATTENTIVE TYPE: Primary | ICD-10-CM

## 2017-05-04 PROCEDURE — 99212 OFFICE O/P EST SF 10 MIN: CPT

## 2017-05-04 PROCEDURE — 99214 OFFICE O/P EST MOD 30 MIN: CPT | Performed by: NURSE PRACTITIONER

## 2017-05-04 RX ORDER — LISDEXAMFETAMINE DIMESYLATE 30 MG/1
30 CAPSULE ORAL EVERY MORNING
Qty: 30 CAPSULE | Refills: 0 | Status: SHIPPED | OUTPATIENT
Start: 2017-05-04 | End: 2017-06-22 | Stop reason: DRUGHIGH

## 2017-05-04 ASSESSMENT — ANXIETY QUESTIONNAIRES
IF YOU CHECKED OFF ANY PROBLEMS ON THIS QUESTIONNAIRE, HOW DIFFICULT HAVE THESE PROBLEMS MADE IT FOR YOU TO DO YOUR WORK, TAKE CARE OF THINGS AT HOME, OR GET ALONG WITH OTHER PEOPLE: EXTREMELY DIFFICULT
GAD7 TOTAL SCORE: 17
1. FEELING NERVOUS, ANXIOUS, OR ON EDGE: MORE THAN HALF THE DAYS
5. BEING SO RESTLESS THAT IT IS HARD TO SIT STILL: MORE THAN HALF THE DAYS
6. BECOMING EASILY ANNOYED OR IRRITABLE: NEARLY EVERY DAY
2. NOT BEING ABLE TO STOP OR CONTROL WORRYING: MORE THAN HALF THE DAYS
7. FEELING AFRAID AS IF SOMETHING AWFUL MIGHT HAPPEN: NEARLY EVERY DAY
3. WORRYING TOO MUCH ABOUT DIFFERENT THINGS: MORE THAN HALF THE DAYS

## 2017-05-04 ASSESSMENT — PAIN SCALES - GENERAL: PAINLEVEL: NO PAIN (0)

## 2017-05-04 ASSESSMENT — PATIENT HEALTH QUESTIONNAIRE - PHQ9: 5. POOR APPETITE OR OVEREATING: NEARLY EVERY DAY

## 2017-05-04 NOTE — MR AVS SNAPSHOT
After Visit Summary   5/4/2017    Jannet San    MRN: [id number]           Patient Information     Date Of Birth          1990        Visit Information        Provider Department      5/4/2017 1:30 PM Nellie Reza NP [facility]        Today's Diagnoses     Attention deficit hyperactivity disorder (ADHD), predominantly inattentive type    -  1      Care Instructions    Take ativan 0.5 mg for one week then stop    Start vyvanse 30 mg daily    Stop strattera        Follow-ups after your visit        Your next 10 appointments already scheduled     May 15, 2017  3:30 PM CDT   (Arrive by 3:15 PM)   SHORT with Leona Matthews MD   [facility] ([facility])    [address] [place] [address]   [phone]            Jun 13, 2017 12:30 PM CDT   (Arrive by 12:15 PM)   New Visit with Reina Garibay [facility] [place] [facility] ([facility])    [address] [place] [id number] [phone]              Who to contact     If you have questions or need follow up information about today's clinic visit or your schedule please contact [facility] directly at [phone].  Normal or non-critical lab and imaging results will be communicated to you by MyChart, letter or phone within 4 business days after the clinic has received the results. If you do not hear from us within 7 days, please contact the clinic through [employer] or phone. If you have a critical or abnormal lab result, we will notify you by phone as soon as possible.  Submit refill requests through [employer] or call your pharmacy and they will forward the refill request to us. Please allow 3 business days for your refill to be completed.          Additional Information About Your Visit        [employer]ociety[employer] Information     [employer] gives you secure access to your electronic health record. If you see a primary care provider, you can also send messages to your care team  "and make appointments. If you have questions, please call your primary care clinic.  If you do not have a primary care provider, please call [phone] and they will assist you.        Care EveryWhere ID     This is your Care EveryWhere ID. This could be used by other organizations to access your [facility] medical records  [id number]        Your Vitals Were     Pulse Temperature Respirations Height Last Period Pulse Oximetry    70 98.1  F (36.7  C) (Tympanic) 18 5' 11\" (1.803 m) 03/21/2017 100%    BMI (Body Mass Index)                   25.1 kg/m2            Blood Pressure from Last 3 Encounters:   05/04/17 126/70   04/20/17 96/68   04/19/17 112/76    Weight from Last 3 Encounters:   05/04/17 180 lb (81.6 kg)   04/20/17 185 lb (83.9 kg)   04/19/17 185 lb (83.9 kg)              Today, you had the following     No orders found for display         Today's Medication Changes          These changes are accurate as of: 5/4/17  2:05 PM.  If you have any questions, ask your nurse or doctor.               Start taking these medicines.        Dose/Directions    lisdexamfetamine 30 MG capsule   Commonly known as:  VYVANSE   Used for:  Attention deficit hyperactivity disorder (ADHD), predominantly inattentive type   Started by:  Nellie Reza NP        Dose:  30 mg   Take 1 capsule (30 mg) by mouth every morning   Quantity:  30 capsule   Refills:  0         Stop taking these medicines if you haven't already. Please contact your care team if you have questions.     atomoxetine 25 MG capsule   Commonly known as:  STRATTERA   Stopped by:  Nellie Reza NP           LORazepam 0.5 MG tablet   Commonly known as:  ATIVAN   Stopped by:  Nellie Reza NP                Where to get your medicines      Some of these will need a paper prescription and others can be bought over the counter.  Ask your nurse if you have questions.     Bring a paper prescription for each of these medications     " lisdexamfetamine 30 MG capsule                Primary Care Provider Office Phone # Fax #    Leona Matthews [phone]       [facility] [place] [address]  [facility] [address]        Thank you!     Thank you for choosing [facility] [place]  for your care. Our goal is always to provide you with excellent care. Hearing back from our patients is one way we can continue to improve our services. Please take a few minutes to complete the written survey that you may receive in the mail after your visit with us. Thank you!             Your Updated Medication List - Protect others around you: Learn how to safely use, store and throw away your medicines at www.disposemymeds.org.          This list is accurate as of: 5/4/17  2:05 PM.  Always use your most recent med list.                   Brand Name Dispense Instructions for use    BENADRYL PO      Take 75 mg by mouth nightly as needed       lisdexamfetamine 30 MG capsule    VYVANSE    30 capsule    Take 1 capsule (30 mg) by mouth every morning       sertraline 50 MG tablet    ZOLOFT    30 tablet    Take 1 tablet (50 mg) by mouth daily

## 2017-05-04 NOTE — NURSING NOTE
"Chief Complaint   Patient presents with     RECHECK     Follow up ptsd.  Patient has had nausea and headaches and intermittent fever up to 99.6       Initial /70 (BP Location: Left arm, Patient Position: Chair, Cuff Size: Adult Regular)  Pulse 70  Temp 98.1  F (36.7  C) (Tympanic)  Resp 18  Ht 5' 11\" (1.803 m)  Wt 180 lb (81.6 kg)  LMP 03/21/2017  SpO2 100%  BMI 25.1 kg/m2 Estimated body mass index is 25.1 kg/(m^2) as calculated from the following:    Height as of this encounter: 5' 11\" (1.803 m).    Weight as of this encounter: 180 lb (81.6 kg).  Medication Reconciliation: complete     Marcela Mora    "

## 2017-05-04 NOTE — PROGRESS NOTES
PSYCHIATRY CLINIC PROGRESS NOTE         Taking typically 0.5 mg daily of ativan at times might take a seond one at night. Just got back from [place] with a friend. She is not in a relationship withthis man she went on a trip with. Is now on 50 mg of strattera. Feels like she has to nap every day. She has been feeling sick. Did have some SI with no plan. Is a bit tearful today when talking about her recent birthday.       I did send her for psychiatric testing with [facility]. She was diagnosed with ADHD and also had mmpi. She does have difficulty with concentration. She has poor follow through on tasks. Difficulty following others conversations.      MEDICAL ROS-  negative  MEDICAL / SURGICAL HISTORY pregnant [if applicable]--no          Patient Active Problem List   Diagnosis     Depression     Lumbago     PTSD (post-traumatic stress disorder)     Migraine     Drug use disorder     Bilateral low back pain without sciatica     Bilateral low back pain with left-sided sciatica     Bipolar II disorder (H)     Fibromyalgia     Lump or mass in breast     Mastodynia     Encounter for gynecological examination without abnormal finding     Encounter for surveillance of contraceptives      ALLERGY   Bee pollen  MEDICATIONS       Prescription Medications as of 5/4/2017             LORazepam (ATIVAN) 0.5 MG tablet Take 1-2 tablets (0.5-1 mg) by mouth daily as needed for anxiety    sertraline (ZOLOFT) 50 MG tablet Take 1 tablet (50 mg) by mouth daily    atomoxetine (STRATTERA) 25 MG capsule Take 1 pill for 1 week then increase to 2 pills    DiphenhydrAMINE HCl (BENADRYL PO) Take 75 mg by mouth nightly as needed      Facility Administered Medications as of 5/4/2017             lidocaine 1 % injection 10 mL 10 mLs by Other route once    betamethasone acet & sod phos (CELESTONE) injection 12 mg 2 mLs (12 mg) by EPIDURAL route once          DRUG INTERACTION CHECK was unremarkable.  VITALS   /70 (BP Location: Left arm,  "Patient Position: Chair, Cuff Size: Adult Regular)  Pulse 70  Temp 98.1  F (36.7  C) (Tympanic)  Resp 18  Ht 5' 11\" (1.803 m)  Wt 180 lb (81.6 kg)  LMP 03/21/2017  SpO2 100%  BMI 25.1 kg/m2    PHQ9      PHQ-9 SCORE 3/30/2017 4/20/2017 5/4/2017   Total Score - - -   Total Score 23 22 25          LABS        None needed at this appt  MENTAL STATUS EXAM     Appearance:  awake, alert and adequately groomed  Attitude:  cooperative  Eye Contact:  good  Mood:  sad   Affect:  intensity is flat  Speech:  clear, coherent  Psychomotor Behavior:  no evidence of tardive dyskinesia, dystonia, or tics  Thought Process:  logical  Associations:  no loose associations  Thought Content:  no evidence of suicidal ideation or homicidal ideation, no evidence of psychotic thought and no visual hallucinations present  Insight:  fair  Judgment:  fair  Oriented to:  time, person, and place  Attention Span and Concentration:  fair  Recent and Remote Memory:  fair  Fund of Knowledge: appropriate  Muscle Strength and Tone: normal  Gait and Station: Normal            DIAGNOSES     ptsd   Borderline PD  Avoidant traits  Mdd, recurrent, severe  adhd        PLAN    1) MEDICATIONS:   Dc strattera  Start vyvanse 30 mg daily   take ativan 0.5 mg for one week then stop       2) THERAPY: has appt with reyna June 13     3) LABS: None needed at this appt  4) PT MONITOR [call for probs]: anxiety  5) REFERRALS [CD, medical, other]: None  6)  RTC: 2 weeks                "

## 2017-05-05 ASSESSMENT — ANXIETY QUESTIONNAIRES: GAD7 TOTAL SCORE: 17

## 2017-05-05 ASSESSMENT — PATIENT HEALTH QUESTIONNAIRE - PHQ9: SUM OF ALL RESPONSES TO PHQ QUESTIONS 1-9: 25

## 2017-05-15 ENCOUNTER — OFFICE VISIT (OUTPATIENT)
Dept: FAMILY MEDICINE | Facility: OTHER | Age: 27
End: 2017-05-15
Attending: FAMILY MEDICINE
Payer: COMMERCIAL

## 2017-05-15 VITALS
DIASTOLIC BLOOD PRESSURE: 60 MMHG | WEIGHT: 185 LBS | OXYGEN SATURATION: 98 % | HEART RATE: 79 BPM | BODY MASS INDEX: 25.9 KG/M2 | RESPIRATION RATE: 20 BRPM | SYSTOLIC BLOOD PRESSURE: 124 MMHG | HEIGHT: 71 IN

## 2017-05-15 DIAGNOSIS — G89.29 CHRONIC LEFT-SIDED LOW BACK PAIN WITHOUT SCIATICA: ICD-10-CM

## 2017-05-15 DIAGNOSIS — M54.40 BACK PAIN OF LUMBAR REGION WITH SCIATICA: Primary | ICD-10-CM

## 2017-05-15 DIAGNOSIS — M54.50 CHRONIC BILATERAL LOW BACK PAIN WITHOUT SCIATICA: ICD-10-CM

## 2017-05-15 DIAGNOSIS — G89.29 CHRONIC BILATERAL LOW BACK PAIN WITHOUT SCIATICA: ICD-10-CM

## 2017-05-15 DIAGNOSIS — M54.50 CHRONIC LEFT-SIDED LOW BACK PAIN WITHOUT SCIATICA: ICD-10-CM

## 2017-05-15 PROCEDURE — 99213 OFFICE O/P EST LOW 20 MIN: CPT | Performed by: FAMILY MEDICINE

## 2017-05-15 PROCEDURE — 99212 OFFICE O/P EST SF 10 MIN: CPT

## 2017-05-15 ASSESSMENT — PAIN SCALES - GENERAL: PAINLEVEL: MODERATE PAIN (4)

## 2017-05-15 NOTE — NURSING NOTE
"Chief Complaint   Patient presents with     Back Pain     pain is worse in the morning and at night. No pain right now.        Initial /60  Pulse 79  Resp 20  Ht 5' 11\" (1.803 m)  Wt 185 lb (83.9 kg)  LMP 03/21/2017  SpO2 98%  Breastfeeding? No  BMI 25.8 kg/m2 Estimated body mass index is 25.8 kg/(m^2) as calculated from the following:    Height as of this encounter: 5' 11\" (1.803 m).    Weight as of this encounter: 185 lb (83.9 kg).  Medication Reconciliation: complete   Marium Lee      "

## 2017-05-15 NOTE — MR AVS SNAPSHOT
After Visit Summary   5/15/2017    Jannet San    MRN: [id number]           Patient Information     Date Of Birth          1990        Visit Information        Provider Department      5/15/2017 3:30 PM Leona Matthews MD [facility]        Today's Diagnoses     Back pain of lumbar region with sciatica    -  1    Chronic left-sided low back pain without sciatica           Follow-ups after your visit        Your next 10 appointments already scheduled     May 17, 2017  1:15 PM CDT   Radiology with [facility]   [facility] ([facility] )    [address] [id number] [phone]            May 25, 2017  1:00 PM CDT   (Arrive by 12:45 PM)   Return Visit with Nellie Reza NP   [facility] ([facility])    [address] [id number] [phone]            Jun 13, 2017 12:30 PM CDT   (Arrive by 12:15 PM)   New Visit with Reina Garibay [facility] ([facility])    [address] [id number] [phone]              Who to contact     If you have questions or need follow up information about today's clinic visit or your schedule please contact [facility] directly at [phone].  Normal or non-critical lab and imaging results will be communicated to you by MyChart, letter or phone within 4 business days after the clinic has received the results. If you do not hear from us within 7 days, please contact the clinic through MyChart or phone. If you have a critical or abnormal lab result, we will notify you by phone as soon as possible.  Submit refill requests through [employer] or call your pharmacy and they will forward the refill request to us. Please allow 3 business days for your refill to be completed.          Additional Information About Your Visit        [employer] Information     [employer] gives you secure access to your electronic health record. If you  "see a primary care provider, you can also send messages to your care team and make appointments. If you have questions, please call your primary care clinic.  If you do not have a primary care provider, please call [phone] and they will assist you.        Care EveryWhere ID     This is your Care EveryWhere ID. This could be used by other organizations to access your [place] medical records  [id number]        Your Vitals Were     Pulse Respirations Height Last Period Pulse Oximetry Breastfeeding?    79 20 5' 11\" (1.803 m) 03/21/2017 98% No    BMI (Body Mass Index)                   25.8 kg/m2            Blood Pressure from Last 3 Encounters:   05/15/17 124/60   05/04/17 126/70   04/20/17 96/68    Weight from Last 3 Encounters:   05/15/17 185 lb (83.9 kg)   05/04/17 180 lb (81.6 kg)   04/20/17 185 lb (83.9 kg)              We Performed the Following     MR Lumbar Spine w/o Contrast        Primary Care Provider Office Phone # Fax #    Leona Matthews [phone]       [facility] [place] [address]  [place] [address]        Thank you!     Thank you for choosing [facility]  for your care. Our goal is always to provide you with excellent care. Hearing back from our patients is one way we can continue to improve our services. Please take a few minutes to complete the written survey that you may receive in the mail after your visit with us. Thank you!             Your Updated Medication List - Protect others around you: Learn how to safely use, store and throw away your medicines at www.disposemymeds.org.          This list is accurate as of: 5/15/17  4:17 PM.  Always use your most recent med list.                   Brand Name Dispense Instructions for use    BENADRYL PO      Take 75 mg by mouth nightly as needed       lisdexamfetamine 30 MG capsule    VYVANSE    30 capsule    Take 1 capsule (30 mg) by mouth every morning       sertraline 50 MG tablet    ZOLOFT    30 tablet    " Take 1 tablet (50 mg) by mouth daily
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
## 2017-05-17 ENCOUNTER — HOSPITAL ENCOUNTER (OUTPATIENT)
Dept: MRI IMAGING | Facility: HOSPITAL | Age: 27
Discharge: HOME | End: 2017-05-17
Attending: FAMILY MEDICINE | Admitting: FAMILY MEDICINE
Payer: COMMERCIAL

## 2017-05-17 PROCEDURE — 72148 MRI LUMBAR SPINE W/O DYE: CPT | Mod: TC

## 2017-05-18 DIAGNOSIS — F31.81 BIPOLAR 2 DISORDER (H): ICD-10-CM

## 2017-05-25 ENCOUNTER — OFFICE VISIT (OUTPATIENT)
Dept: PSYCHIATRY | Facility: OTHER | Age: 27
End: 2017-05-25
Attending: NURSE PRACTITIONER
Payer: COMMERCIAL

## 2017-05-25 VITALS
WEIGHT: 185 LBS | BODY MASS INDEX: 25.9 KG/M2 | HEART RATE: 74 BPM | HEIGHT: 71 IN | DIASTOLIC BLOOD PRESSURE: 66 MMHG | SYSTOLIC BLOOD PRESSURE: 110 MMHG | TEMPERATURE: 98.6 F

## 2017-05-25 DIAGNOSIS — F31.81 BIPOLAR 2 DISORDER (H): ICD-10-CM

## 2017-05-25 DIAGNOSIS — F90.0 ATTENTION DEFICIT HYPERACTIVITY DISORDER (ADHD), PREDOMINANTLY INATTENTIVE TYPE: Primary | ICD-10-CM

## 2017-05-25 PROCEDURE — 99212 OFFICE O/P EST SF 10 MIN: CPT

## 2017-05-25 PROCEDURE — 90833 PSYTX W PT W E/M 30 MIN: CPT | Performed by: NURSE PRACTITIONER

## 2017-05-25 PROCEDURE — 90833 PSYTX W PT W E/M 30 MIN: CPT

## 2017-05-25 PROCEDURE — 99214 OFFICE O/P EST MOD 30 MIN: CPT | Performed by: NURSE PRACTITIONER

## 2017-05-25 RX ORDER — LISDEXAMFETAMINE DIMESYLATE 40 MG/1
40 CAPSULE ORAL EVERY MORNING
Qty: 30 CAPSULE | Refills: 0 | Status: SHIPPED | OUTPATIENT
Start: 2017-05-25 | End: 2017-06-22

## 2017-05-25 RX ORDER — SERTRALINE HYDROCHLORIDE 25 MG/1
75 TABLET, FILM COATED ORAL DAILY
Qty: 90 TABLET | Refills: 1 | Status: SHIPPED | OUTPATIENT
Start: 2017-05-25 | End: 2017-06-22

## 2017-05-25 ASSESSMENT — ANXIETY QUESTIONNAIRES
5. BEING SO RESTLESS THAT IT IS HARD TO SIT STILL: SEVERAL DAYS
1. FEELING NERVOUS, ANXIOUS, OR ON EDGE: SEVERAL DAYS
6. BECOMING EASILY ANNOYED OR IRRITABLE: SEVERAL DAYS
2. NOT BEING ABLE TO STOP OR CONTROL WORRYING: NEARLY EVERY DAY
3. WORRYING TOO MUCH ABOUT DIFFERENT THINGS: MORE THAN HALF THE DAYS
7. FEELING AFRAID AS IF SOMETHING AWFUL MIGHT HAPPEN: SEVERAL DAYS
GAD7 TOTAL SCORE: 11
IF YOU CHECKED OFF ANY PROBLEMS ON THIS QUESTIONNAIRE, HOW DIFFICULT HAVE THESE PROBLEMS MADE IT FOR YOU TO DO YOUR WORK, TAKE CARE OF THINGS AT HOME, OR GET ALONG WITH OTHER PEOPLE: VERY DIFFICULT

## 2017-05-25 ASSESSMENT — PATIENT HEALTH QUESTIONNAIRE - PHQ9: 5. POOR APPETITE OR OVEREATING: MORE THAN HALF THE DAYS

## 2017-05-25 ASSESSMENT — PAIN SCALES - GENERAL: PAINLEVEL: MILD PAIN (2)

## 2017-05-25 NOTE — MR AVS SNAPSHOT
After Visit Summary   5/25/2017    Jannet San    MRN: [id number]           Patient Information     Date Of Birth          1990        Visit Information        Provider Department      5/25/2017 1:00 PM Nellie Reza NP [facility]        Today's Diagnoses     Attention deficit hyperactivity disorder (ADHD), predominantly inattentive type    -  1    Bipolar 2 disorder (H)          Care Instructions    Increase vyvanse to 40 mg daily    Increase zoloft to 75 mg    Continue melatonin               Follow-ups after your visit        Your next 10 appointments already scheduled     May 31, 2017  1:00 PM CDT   Radiology with Need Interventional Radiologist, HI FLUOROSCOPY   [facility] ([facility] )    [address] [place] [id number] [phone]            Jun 13, 2017 12:30 PM CDT   (Arrive by 12:15 PM)   New Visit with Reina Garibay [facility] ([facility])    [address] [address] [phone]              Who to contact     If you have questions or need follow up information about today's clinic visit or your schedule please contact [facility] directly at [phone].  Normal or non-critical lab and imaging results will be communicated to you by MyChart, letter or phone within 4 business days after the clinic has received the results. If you do not hear from us within 7 days, please contact the clinic through MyShapehart or phone. If you have a critical or abnormal lab result, we will notify you by phone as soon as possible.  Submit refill requests through [employer] or call your pharmacy and they will forward the refill request to us. Please allow 3 business days for your refill to be completed.          Additional Information About Your Visit        MyChart Information     [employer] gives you secure access to your electronic health record. If you see a primary care provider,  "you can also send messages to your care team and make appointments. If you have questions, please call your primary care clinic.  If you do not have a primary care provider, please call [phone] and they will assist you.        Care EveryWhere ID     This is your Care EveryWhere ID. This could be used by other organizations to access your [place] medical records  [id number]        Your Vitals Were     Pulse Temperature Height BMI (Body Mass Index)          74 98.6  F (37  C) (Tympanic) 1.803 m (5' 11\") 25.8 kg/m2         Blood Pressure from Last 3 Encounters:   05/25/17 110/66   05/15/17 124/60   05/04/17 126/70    Weight from Last 3 Encounters:   05/25/17 83.9 kg (185 lb)   05/15/17 83.9 kg (185 lb)   05/04/17 81.6 kg (180 lb)              Today, you had the following     No orders found for display         Today's Medication Changes          These changes are accurate as of: 5/25/17  1:33 PM.  If you have any questions, ask your nurse or doctor.               These medicines have changed or have updated prescriptions.        Dose/Directions    * lisdexamfetamine 30 MG capsule   Commonly known as:  VYVANSE   This may have changed:  Another medication with the same name was added. Make sure you understand how and when to take each.   Used for:  Attention deficit hyperactivity disorder (ADHD), predominantly inattentive type   Changed by:  Nellie Reza NP        Dose:  30 mg   Take 1 capsule (30 mg) by mouth every morning   Quantity:  30 capsule   Refills:  0       * lisdexamfetamine 40 MG capsule   Commonly known as:  VYVANSE   This may have changed:  You were already taking a medication with the same name, and this prescription was added. Make sure you understand how and when to take each.   Used for:  Attention deficit hyperactivity disorder (ADHD), predominantly inattentive type   Changed by:  Nellie Reza NP        Dose:  40 mg   Take 1 capsule (40 mg) by mouth every morning "   Quantity:  30 capsule   Refills:  0       sertraline 25 MG tablet   Commonly known as:  ZOLOFT   This may have changed:  See the new instructions.   Used for:  Bipolar 2 disorder (H)   Changed by:  Nellie Reza NP        Dose:  75 mg   Take 3 tablets (75 mg) by mouth daily   Quantity:  90 tablet   Refills:  1       * Notice:  This list has 2 medication(s) that are the same as other medications prescribed for you. Read the directions carefully, and ask your doctor or other care provider to review them with you.         Where to get your medicines      These medications were [facility] [place] [facility] [address]  [address] [facility] [place] [address]     Phone:  [phone]     sertraline 25 MG tablet         Some of these will need a paper prescription and others can be bought over the counter.  Ask your nurse if you have questions.     Bring a paper prescription for each of these medications     lisdexamfetamine 40 MG capsule                Primary Care Provider Office Phone # Fax #    Leona Matthews [phone]       [facility] [address]  [facility] [address]        Thank you!     Thank you for choosing [facility]  for your care. Our goal is always to provide you with excellent care. Hearing back from our patients is one way we can continue to improve our services. Please take a few minutes to complete the written survey that you may receive in the mail after your visit with us. Thank you!             Your Updated Medication List - Protect others around you: Learn how to safely use, store and throw away your medicines at www.disposemymeds.org.          This list is accurate as of: 5/25/17  1:33 PM.  Always use your most recent med list.                   Brand Name Dispense Instructions for use    BENADRYL PO      Take 75 mg by mouth nightly as needed       * lisdexamfetamine 30 MG capsule    VYVANSE    30 capsule    Take 1  capsule (30 mg) by mouth every morning       * lisdexamfetamine 40 MG capsule    VYVANSE    30 capsule    Take 1 capsule (40 mg) by mouth every morning       MAGNESIUM OXIDE PO      Take 500 mg by mouth       MELATONIN PO      Take 10 mg by mouth At Bedtime       sertraline 25 MG tablet    ZOLOFT    90 tablet    Take 3 tablets (75 mg) by mouth daily       * Notice:  This list has 2 medication(s) that are the same as other medications prescribed for you. Read the directions carefully, and ask your doctor or other care provider to review them with you.

## 2017-05-25 NOTE — PROGRESS NOTES
20 min spent in therapy with patient      PSYCHIATRY CLINIC PROGRESS NOTE             Her last appointment, she started him on Vyvanse 30 mg daily and Ativan taper was given.she should be completely off of ativan.  Since her last appointment she met with her primary care doctor for back pain and declined a prescription for Relafen.  There has been questions if there is substance use She does admit to using THC. I did check the [place] prescription monitoring sites today and there is no concerns That she is getting any medications other than from myself And her primary care doctor.    States she can feel like she can finally relax. She states she sits down and is able to play games. She looks much more put together. She went and hung out with friends. She appears much calmer and interacts with her son better. She doesn't appear to be as short tempered. Appears more confident. Was much less tearful though when talking about her ex . Passive SI. With no plan. Has seen xiao marty three times. Sees reyna June 13th. I talked to her about PHP though states seh wouldn't have time beucase she watches her son during week  She is upset about her ex          MEDICAL ROS-  negative  MEDICAL / SURGICAL HISTORY pregnant [if applicable]--no             Patient Active Problem List   Diagnosis     Depression     Lumbago     PTSD (post-traumatic stress disorder)     Migraine     Drug use disorder     Bilateral low back pain without sciatica     Bilateral low back pain with left-sided sciatica     Bipolar II disorder (H)     Fibromyalgia     Lump or mass in breast     Mastodynia     Encounter for gynecological examination without abnormal finding     Encounter for surveillance of contraceptives       ALLERGY   Bee pollen  MEDICATIONS        Prescription Medications as of 5/4/2017              LORazepam (ATIVAN) 0.5 MG tablet Take 1-2 tablets (0.5-1 mg) by mouth daily as needed for anxiety     sertraline (ZOLOFT) 50 MG  "tablet Take 1 tablet (50 mg) by mouth daily     atomoxetine (STRATTERA) 25 MG capsule Take 1 pill for 1 week then increase to 2 pills     DiphenhydrAMINE HCl (BENADRYL PO) Take 75 mg by mouth nightly as needed      Facility Administered Medications as of 5/4/2017              lidocaine 1 % injection 10 mL 10 mLs by Other route once     betamethasone acet & sod phos (CELESTONE) injection 12 mg 2 mLs (12 mg) by EPIDURAL route once            DRUG INTERACTION CHECK was unremarkable.  VITALS   /70 (BP Location: Left arm, Patient Position: Chair, Cuff Size: Adult Regular)  Pulse 70  Temp 98.1  F (36.7  C) (Tympanic)  Resp 18  Ht 5' 11\" (1.803 m)  Wt 180 lb (81.6 kg)  LMP 03/21/2017  SpO2 100%  BMI 25.1 kg/m2     PHQ9       PHQ-9 SCORE 3/30/2017 4/20/2017 5/4/2017   Total Score - - -   Total Score 23 22 25            LABS        None needed at this appt  MENTAL STATUS EXAM        Appearance:  awake, alert and adequately groomed  Attitude:  cooperative  Eye Contact:  good  Mood:  better  Affect:  appropriate and in normal range and mood congruent  Speech:  clear, coherent and decreased prosody  Psychomotor Behavior:  no evidence of tardive dyskinesia, dystonia, or tics  Thought Process:  logical and goal oriented  Associations:  no loose associations  Thought Content:  no evidence of suicidal ideation or homicidal ideation, no evidence of psychotic thought and no visual hallucinations present  Insight:  good  Judgment:  fair  Oriented to:  time, person, and place  Attention Span and Concentration:  intact  Recent and Remote Memory:  intact  Fund of Knowledge: appropriate  Muscle Strength and Tone: normal  Gait and Station: Normal                 DIAGNOSES     ptsd   Borderline PD  Avoidant traits  Mdd, recurrent, severe  adhd          PLAN    1) MEDICATIONS:   Increase vyvanse to 40 mg daily  Increase zoloft to 75 mg  Continue melatonin           2) THERAPY: has appt with reyna June 13      3) LABS: None " needed at this appt  4) PT MONITOR [call for probs]: anxiety  5) REFERRALS [CD, medical, other]: None  6)  RTC: 2 weeks

## 2017-05-26 ASSESSMENT — ANXIETY QUESTIONNAIRES: GAD7 TOTAL SCORE: 11

## 2017-06-12 ENCOUNTER — HOSPITAL ENCOUNTER (OUTPATIENT)
Dept: GENERAL RADIOLOGY | Facility: HOSPITAL | Age: 27
Discharge: HOME | End: 2017-06-12
Attending: FAMILY MEDICINE | Admitting: FAMILY MEDICINE
Payer: COMMERCIAL

## 2017-06-12 PROCEDURE — 99212 OFFICE O/P EST SF 10 MIN: CPT | Mod: TC

## 2017-06-13 ENCOUNTER — OFFICE VISIT (OUTPATIENT)
Dept: PSYCHOLOGY | Facility: OTHER | Age: 27
End: 2017-06-13
Attending: SOCIAL WORKER
Payer: COMMERCIAL

## 2017-06-13 DIAGNOSIS — F33.1 MAJOR DEPRESSIVE DISORDER, RECURRENT EPISODE, MODERATE (H): Primary | ICD-10-CM

## 2017-06-13 DIAGNOSIS — F60.3 BORDERLINE PERSONALITY DISORDER (H): ICD-10-CM

## 2017-06-13 DIAGNOSIS — M54.50 LOWER BACK PAIN: Primary | ICD-10-CM

## 2017-06-13 PROCEDURE — 90837 PSYTX W PT 60 MINUTES: CPT | Performed by: SOCIAL WORKER

## 2017-06-13 PROCEDURE — 90834 PSYTX W PT 45 MINUTES: CPT | Performed by: SOCIAL WORKER

## 2017-06-13 ASSESSMENT — PATIENT HEALTH QUESTIONNAIRE - PHQ9: 5. POOR APPETITE OR OVEREATING: SEVERAL DAYS

## 2017-06-13 ASSESSMENT — ANXIETY QUESTIONNAIRES
1. FEELING NERVOUS, ANXIOUS, OR ON EDGE: SEVERAL DAYS
2. NOT BEING ABLE TO STOP OR CONTROL WORRYING: SEVERAL DAYS
IF YOU CHECKED OFF ANY PROBLEMS ON THIS QUESTIONNAIRE, HOW DIFFICULT HAVE THESE PROBLEMS MADE IT FOR YOU TO DO YOUR WORK, TAKE CARE OF THINGS AT HOME, OR GET ALONG WITH OTHER PEOPLE: SOMEWHAT DIFFICULT
5. BEING SO RESTLESS THAT IT IS HARD TO SIT STILL: SEVERAL DAYS
6. BECOMING EASILY ANNOYED OR IRRITABLE: SEVERAL DAYS
3. WORRYING TOO MUCH ABOUT DIFFERENT THINGS: SEVERAL DAYS
GAD7 TOTAL SCORE: 7
7. FEELING AFRAID AS IF SOMETHING AWFUL MIGHT HAPPEN: SEVERAL DAYS

## 2017-06-13 NOTE — MR AVS SNAPSHOT
After Visit Summary   6/13/2017    Jannet San    MRN: [id number]           Patient Information     Date Of Birth          1990        Visit Information        Provider Department      6/13/2017 12:30 PM Reina Garibay [facility]        Today's Diagnoses     Major depressive disorder, recurrent episode, moderate (H)    -  1    Borderline personality disorder           Follow-ups after your visit        Your next 10 appointments already scheduled     Jun 28, 2017  2:30 PM CDT   Radiology with Need Interventional Radiologist, [place] INTERVENTIONAL ROOM   [facility] ([facility] )    [address] [address]   [phone]            Jul 20, 2017 10:45 AM CDT   (Arrive by 10:30 AM)   Return Visit with Nellie Reza NP   [facility] ([facility] )    [address] [id number] [phone]              Who to contact     If you have questions or need follow up information about today's clinic visit or your schedule please contact [facility] directly at [phone].  Normal or non-critical lab and imaging results will be communicated to you by [employer] Apphart, letter or phone within 4 business days after the clinic has received the results. If you do not hear from us within 7 days, please contact the clinic through [employer] or phone. If you have a critical or abnormal lab result, we will notify you by phone as soon as possible.  Submit refill requests through [employer]Anthem or call your pharmacy and they will forward the refill request to us. Please allow 3 business days for your refill to be completed.          Additional Information About Your Visit        MyChart Information     [employer] gives you secure access to your electronic health record. If you see a primary care provider, you can also send messages to your care team and make appointments. If you have questions, please  call your primary care clinic.  If you do not have a primary care provider, please call [phone] and they will assist you.        Care EveryWhere ID     This is your Care EveryWhere ID. This could be used by other organizations to access your [place] medical records  [id number]         Blood Pressure from Last 3 Encounters:   06/26/17 128/80   06/22/17 136/78   05/25/17 110/66    Weight from Last 3 Encounters:   06/26/17 180 lb (81.6 kg)   06/22/17 180 lb (81.6 kg)   05/25/17 185 lb (83.9 kg)              Today, you had the following     No orders found for display       Primary Care Provider Office Phone # Fax #    Leona Matthews [phone]       [facility] [place] [address]  [facility] [address]        Equal Access to Services     [facility] Hospital[facility]ARD[facility] : Hadii aad ku hadasho Soomaali, waaxda luqadaha, qaybta kaalmada adeegyada, waxay idiin hayaan adepepper nieto . So [facility] [phone].    ATENCIÓN: Si habla español, tiene a mcdermott disposición servicios gratuitos de asistencia lingüística. Llame al [phone].    We comply with applicable federal civil rights laws and [place] laws. We do not discriminate on the basis of race, color, national origin, age, disability sex, sexual orientation or gender identity.            Thank you!     Thank you for choosing [facility]  for your care. Our goal is always to provide you with excellent care. Hearing back from our patients is one way we can continue to improve our services. Please take a few minutes to complete the written survey that you may receive in the mail after your visit with us. Thank you!             Your Updated Medication List - Protect others around you: Learn how to safely use, store and throw away your medicines at www.disposemymeds.org.          This list is accurate as of: 6/13/17 11:59 PM.  Always use your most recent med list.                   Brand Name Dispense Instructions for use Diagnosis    BENADRYL PO       Take 75 mg by mouth nightly as needed        lisdexamfetamine 30 MG capsule    VYVANSE    30 capsule    Take 1 capsule (30 mg) by mouth every morning    Attention deficit hyperactivity disorder (ADHD), predominantly inattentive type       MAGNESIUM OXIDE PO      Take 500 mg by mouth        MELATONIN PO      Take 10 mg by mouth At Bedtime

## 2017-06-22 ENCOUNTER — OFFICE VISIT (OUTPATIENT)
Dept: PSYCHOLOGY | Facility: OTHER | Age: 27
End: 2017-06-22
Attending: SOCIAL WORKER
Payer: COMMERCIAL

## 2017-06-22 ENCOUNTER — OFFICE VISIT (OUTPATIENT)
Dept: PSYCHIATRY | Facility: OTHER | Age: 27
End: 2017-06-22
Attending: NURSE PRACTITIONER
Payer: COMMERCIAL

## 2017-06-22 VITALS
HEIGHT: 71 IN | SYSTOLIC BLOOD PRESSURE: 136 MMHG | HEART RATE: 75 BPM | WEIGHT: 180 LBS | BODY MASS INDEX: 25.2 KG/M2 | TEMPERATURE: 97.3 F | DIASTOLIC BLOOD PRESSURE: 78 MMHG

## 2017-06-22 DIAGNOSIS — F90.0 ATTENTION DEFICIT HYPERACTIVITY DISORDER (ADHD), PREDOMINANTLY INATTENTIVE TYPE: ICD-10-CM

## 2017-06-22 DIAGNOSIS — F60.3 BORDERLINE PERSONALITY DISORDER (H): ICD-10-CM

## 2017-06-22 DIAGNOSIS — F31.81 BIPOLAR 2 DISORDER (H): ICD-10-CM

## 2017-06-22 DIAGNOSIS — F33.1 MAJOR DEPRESSIVE DISORDER, RECURRENT EPISODE, MODERATE (H): Primary | ICD-10-CM

## 2017-06-22 PROCEDURE — 90837 PSYTX W PT 60 MINUTES: CPT | Performed by: SOCIAL WORKER

## 2017-06-22 PROCEDURE — 99213 OFFICE O/P EST LOW 20 MIN: CPT | Performed by: NURSE PRACTITIONER

## 2017-06-22 PROCEDURE — 99212 OFFICE O/P EST SF 10 MIN: CPT | Mod: 25

## 2017-06-22 RX ORDER — LISDEXAMFETAMINE DIMESYLATE 40 MG/1
40 CAPSULE ORAL EVERY MORNING
Qty: 30 CAPSULE | Refills: 0 | Status: SHIPPED | OUTPATIENT
Start: 2017-06-22 | End: 2017-07-28

## 2017-06-22 RX ORDER — SERTRALINE HYDROCHLORIDE 25 MG/1
75 TABLET, FILM COATED ORAL DAILY
Qty: 90 TABLET | Refills: 1 | Status: SHIPPED | OUTPATIENT
Start: 2017-06-22 | End: 2017-08-03

## 2017-06-22 ASSESSMENT — PATIENT HEALTH QUESTIONNAIRE - PHQ9
5. POOR APPETITE OR OVEREATING: SEVERAL DAYS
5. POOR APPETITE OR OVEREATING: SEVERAL DAYS

## 2017-06-22 ASSESSMENT — ANXIETY QUESTIONNAIRES
5. BEING SO RESTLESS THAT IT IS HARD TO SIT STILL: SEVERAL DAYS
3. WORRYING TOO MUCH ABOUT DIFFERENT THINGS: SEVERAL DAYS
2. NOT BEING ABLE TO STOP OR CONTROL WORRYING: SEVERAL DAYS
GAD7 TOTAL SCORE: 7
1. FEELING NERVOUS, ANXIOUS, OR ON EDGE: SEVERAL DAYS
7. FEELING AFRAID AS IF SOMETHING AWFUL MIGHT HAPPEN: SEVERAL DAYS
IF YOU CHECKED OFF ANY PROBLEMS ON THIS QUESTIONNAIRE, HOW DIFFICULT HAVE THESE PROBLEMS MADE IT FOR YOU TO DO YOUR WORK, TAKE CARE OF THINGS AT HOME, OR GET ALONG WITH OTHER PEOPLE: SOMEWHAT DIFFICULT
5. BEING SO RESTLESS THAT IT IS HARD TO SIT STILL: SEVERAL DAYS
6. BECOMING EASILY ANNOYED OR IRRITABLE: MORE THAN HALF THE DAYS
IF YOU CHECKED OFF ANY PROBLEMS ON THIS QUESTIONNAIRE, HOW DIFFICULT HAVE THESE PROBLEMS MADE IT FOR YOU TO DO YOUR WORK, TAKE CARE OF THINGS AT HOME, OR GET ALONG WITH OTHER PEOPLE: SOMEWHAT DIFFICULT
GAD7 TOTAL SCORE: 8
3. WORRYING TOO MUCH ABOUT DIFFERENT THINGS: SEVERAL DAYS
7. FEELING AFRAID AS IF SOMETHING AWFUL MIGHT HAPPEN: SEVERAL DAYS
2. NOT BEING ABLE TO STOP OR CONTROL WORRYING: SEVERAL DAYS
1. FEELING NERVOUS, ANXIOUS, OR ON EDGE: SEVERAL DAYS
6. BECOMING EASILY ANNOYED OR IRRITABLE: SEVERAL DAYS

## 2017-06-22 ASSESSMENT — PAIN SCALES - GENERAL: PAINLEVEL: MODERATE PAIN (5)

## 2017-06-22 NOTE — MR AVS SNAPSHOT
After Visit Summary   6/22/2017    Jannet San    MRN: [id number]           Patient Information     Date Of Birth          1990        Visit Information        Provider Department      6/22/2017 4:00 PM [facility]        Today's Diagnoses     Major depressive disorder, recurrent episode, moderate (H)    -  1    Borderline personality disorder           Follow-ups after your visit        Your next 10 appointments already scheduled     Jul 27, 2017 10:45 AM CDT   (Arrive by 10:30 AM)   Return Visit with Nellie Reza NP   [facility] ([facility] )    [address] [place] [address] [phone]              Who to contact     If you have questions or need follow up information about today's clinic visit or your schedule please contact [facility] directly at [phone].  Normal or non-critical lab and imaging results will be communicated to you by MyChart, letter or phone within 4 business days after the clinic has received the results. If you do not hear from us within 7 days, please contact the clinic through MyChart or phone. If you have a critical or abnormal lab result, we will notify you by phone as soon as possible.  Submit refill requests through [employer] or call your pharmacy and they will forward the refill request to us. Please allow 3 business days for your refill to be completed.          Additional Information About Your Visit        MyChart Information     [employer] gives you secure access to your electronic health record. If you see a primary care provider, you can also send messages to your care team and make appointments. If you have questions, please call your primary care clinic.  If you do not have a primary care provider, please call [phone] and they will assist you.        Care EveryWhere ID     This is your Care EveryWhere ID. This could be used by other  organizations to access your [place] medical records  [id number]         Blood Pressure from Last 3 Encounters:   07/09/17 133/92   06/26/17 128/80   06/22/17 136/78    Weight from Last 3 Encounters:   06/26/17 180 lb (81.6 kg)   06/22/17 180 lb (81.6 kg)   05/25/17 185 lb (83.9 kg)              Today, you had the following     No orders found for display         Today's Medication Changes          These changes are accurate as of: 6/22/17 11:59 PM.  If you have any questions, ask your nurse or doctor.               These medicines have changed or have updated prescriptions.        Dose/Directions    lisdexamfetamine 40 MG capsule   Commonly known as:  VYVANSE   This may have changed:  Another medication with the same name was removed. Continue taking this medication, and follow the directions you see here.   Used for:  Attention deficit hyperactivity disorder (ADHD), predominantly inattentive type   Changed by:  Nellie Reza NP        Dose:  40 mg   Take 1 capsule (40 mg) by mouth every morning   Quantity:  30 capsule   Refills:  0            Where to get your medicines      These medications were sent to [facility] [place] [place] - [address]  [address] [place] [address]     Phone:  [phone]     sertraline 25 MG tablet         Some of these will need a paper prescription and others can be bought over the counter.  Ask your nurse if you have questions.     Bring a paper prescription for each of these medications     lisdexamfetamine 40 MG capsule                Primary Care Provider Office Phone # Fax #    Leona Matthews [phone]       [facility] [place] [address] [place] [address]        Equal Access to Services     [facility] : Hadii deng So, waaxda luqadaha, qaybta kaalmada robby, ryann hartmann. So [facility] [phone].    ATENCIÓN: Si habla español, tiene a mcdermott disposición servicios  satinder de asistencia lingüística. Beatrice miguel [phone].    We comply with applicable federal civil rights laws and [place] laws. We do not discriminate on the basis of race, color, national origin, age, disability sex, sexual orientation or gender identity.            Thank you!     Thank you for choosing [facility]  for your care. Our goal is always to provide you with excellent care. Hearing back from our patients is one way we can continue to improve our services. Please take a few minutes to complete the written survey that you may receive in the mail after your visit with us. Thank you!             Your Updated Medication List - Protect others around you: Learn how to safely use, store and throw away your medicines at www.disposemymeds.org.          This list is accurate as of: 6/22/17 11:59 PM.  Always use your most recent med list.                   Brand Name Dispense Instructions for use Diagnosis    BENADRYL PO      Take 75 mg by mouth nightly as needed        lisdexamfetamine 40 MG capsule    VYVANSE    30 capsule    Take 1 capsule (40 mg) by mouth every morning    Attention deficit hyperactivity disorder (ADHD), predominantly inattentive type       MAGNESIUM OXIDE PO      Take 500 mg by mouth        MELATONIN PO      Take 10 mg by mouth At Bedtime        sertraline 25 MG tablet    ZOLOFT    90 tablet    Take 3 tablets (75 mg) by mouth daily    Bipolar 2 disorder (H)

## 2017-06-22 NOTE — NURSING NOTE
"Chief Complaint   Patient presents with     RECHECK     Mental health.  No concerns today.   Patient reports medications are working pretty good.       Initial /78 (BP Location: Right arm, Patient Position: Sitting, Cuff Size: Adult Regular)  Pulse 75  Temp 97.3  F (36.3  C) (Tympanic)  Ht 5' 11\" (1.803 m)  Wt 180 lb (81.6 kg)  BMI 25.1 kg/m2 Estimated body mass index is 25.1 kg/(m^2) as calculated from the following:    Height as of this encounter: 5' 11\" (1.803 m).    Weight as of this encounter: 180 lb (81.6 kg).  Medication Reconciliation: complete     OLGA KITCHEN      "

## 2017-06-22 NOTE — MR AVS SNAPSHOT
After Visit Summary   6/22/2017    Jannet San    MRN: [id number]           Patient Information     Date Of Birth          1990        Visit Information        Provider Department      6/22/2017 10:45 AM Nellie Reza NP [facility]        Today's Diagnoses     Bipolar 2 disorder (H)        Attention deficit hyperactivity disorder (ADHD), predominantly inattentive type           Follow-ups after your visit        Your next 10 appointments already scheduled     Jun 22, 2017  4:00 PM CDT   (Arrive by 3:45 PM)   Return Visit with Reina Garibay, [facility] ([facility] )    [address] [address]   [phone]            Jun 28, 2017  2:30 PM CDT   Radiology with [facility] Interventional Radiologist, [place] INTERVENTIONAL ROOM   [place] Inter[facility]iology ([facility] )    [address] [address]   [phone]            Jul 20, 2017 10:45 AM CDT   (Arrive by 10:30 AM)   Return Visit with Nellie Reza NP   [facility] ([facility] )    [address] [address] [phone]              Who to contact     If you have questions or need follow up information about today's clinic visit or your schedule please contact [facility] directly at [phone].  Normal or non-critical lab and imaging results will be communicated to you by MyChart, letter or phone within 4 business days after the clinic has received the results. If you do not hear from us within 7 days, please contact the clinic through MyChart or phone. If you have a critical or abnormal lab result, we will notify you by phone as soon as possible.  Submit refill requests through [employer] or call your pharmacy and they will forward the refill request to us. Please allow 3 business days for your refill to be completed.          Additional Information About  "Your Visit        MyChart Information     [email or web address] gives you secure access to your electronic health record. If you see a primary care provider, you can also send messages to your care team and make appointments. If you have questions, please call your primary care clinic.  If you do not have a primary care provider, please call [phone] and they will assist you.        Care EveryWhere ID     This is your Care EveryWhere ID. This could be used by other organizations to access your [place] medical records  [id number]        Your Vitals Were     Pulse Temperature Height BMI (Body Mass Index)          75 97.3  F (36.3  C) (Tympanic) 1.803 m (5' 11\") 25.1 kg/m2         Blood Pressure from Last 3 Encounters:   06/22/17 136/78   05/25/17 110/66   05/15/17 124/60    Weight from Last 3 Encounters:   06/22/17 81.6 kg (180 lb)   05/25/17 83.9 kg (185 lb)   05/15/17 83.9 kg (185 lb)              Today, you had the following     No orders found for display         Today's Medication Changes          These changes are accurate as of: 6/22/17 12:27 PM.  If you have any questions, ask your nurse or doctor.               These medicines have changed or have updated prescriptions.        Dose/Directions    lisdexamfetamine 40 MG capsule   Commonly known as:  VYVANSE   This may have changed:  Another medication with the same name was removed. Continue taking this medication, and follow the directions you see here.   Used for:  Attention deficit hyperactivity disorder (ADHD), predominantly inattentive type   Changed by:  Nellie Reaz NP        Dose:  40 mg   Take 1 capsule (40 mg) by mouth every morning   Quantity:  30 capsule   Refills:  0            Where to get your medicines      These medications were sent to [facility] - [place] - [address]  [address] [address]     Phone:  [phone]     sertraline 25 MG tablet         Some of these will need a paper prescription and " others can be bought over the counter.  Ask your nurse if you have questions.     Bring a paper prescription for each of these medications     lisdexamfetamine 40 MG capsule                Primary Care Provider Office Phone # Fax #    Leona Matthews [phone]       [facility] [place] [address]  [facility] [address]        Equal Access to Services     [facility] : Hadii aad ku hadasho Soomaali, waaxda luqadaha, qaybta kaalmada adeegyada, waxay idiin hayaan adeeg kharash la'aan . So [facility] [phone].    ATENCIÓN: Si habla español, tiene a mcdermott disposición servicios gratuitos de asistencia lingüística. Llame al [phone].    We comply with applicable federal civil rights laws and [place] laws. We do not discriminate on the basis of race, color, national origin, age, disability sex, sexual orientation or gender identity.            Thank you!     Thank you for choosing [facility]  for your care. Our goal is always to provide you with excellent care. Hearing back from our patients is one way we can continue to improve our services. Please take a few minutes to complete the written survey that you may receive in the mail after your visit with us. Thank you!             Your Updated Medication List - Protect others around you: Learn how to safely use, store and throw away your medicines at www.disposemymeds.org.          This list is accurate as of: 6/22/17 12:27 PM.  Always use your most recent med list.                   Brand Name Dispense Instructions for use Diagnosis    BENADRYL PO      Take 75 mg by mouth nightly as needed        lisdexamfetamine 40 MG capsule    VYVANSE    30 capsule    Take 1 capsule (40 mg) by mouth every morning    Attention deficit hyperactivity disorder (ADHD), predominantly inattentive type       MAGNESIUM OXIDE PO      Take 500 mg by mouth        MELATONIN PO      Take 10 mg by mouth At Bedtime        sertraline 25 MG tablet    ZOLOFT    90  tablet    Take 3 tablets (75 mg) by mouth daily    Bipolar 2 disorder (H)
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
## 2017-06-23 ASSESSMENT — PATIENT HEALTH QUESTIONNAIRE - PHQ9: SUM OF ALL RESPONSES TO PHQ QUESTIONS 1-9: 13

## 2017-06-23 ASSESSMENT — ANXIETY QUESTIONNAIRES: GAD7 TOTAL SCORE: 7

## 2017-06-26 ENCOUNTER — OFFICE VISIT (OUTPATIENT)
Dept: FAMILY MEDICINE | Facility: OTHER | Age: 27
End: 2017-06-26
Attending: FAMILY MEDICINE
Payer: COMMERCIAL

## 2017-06-26 VITALS
DIASTOLIC BLOOD PRESSURE: 80 MMHG | HEIGHT: 71 IN | TEMPERATURE: 98.4 F | RESPIRATION RATE: 20 BRPM | WEIGHT: 180 LBS | BODY MASS INDEX: 25.2 KG/M2 | SYSTOLIC BLOOD PRESSURE: 128 MMHG | HEART RATE: 89 BPM | OXYGEN SATURATION: 98 %

## 2017-06-26 DIAGNOSIS — N89.8 VAGINAL DISCHARGE: Primary | ICD-10-CM

## 2017-06-26 LAB
MICRO REPORT STATUS: NORMAL
SPECIMEN SOURCE: NORMAL
WET PREP SPEC: NORMAL

## 2017-06-26 PROCEDURE — 87591 N.GONORRHOEAE DNA AMP PROB: CPT | Mod: ZL | Performed by: FAMILY MEDICINE

## 2017-06-26 PROCEDURE — 99213 OFFICE O/P EST LOW 20 MIN: CPT | Performed by: FAMILY MEDICINE

## 2017-06-26 PROCEDURE — 99212 OFFICE O/P EST SF 10 MIN: CPT

## 2017-06-26 PROCEDURE — 87210 SMEAR WET MOUNT SALINE/INK: CPT | Mod: ZL | Performed by: FAMILY MEDICINE

## 2017-06-26 PROCEDURE — 87491 CHLMYD TRACH DNA AMP PROBE: CPT | Mod: ZL | Performed by: FAMILY MEDICINE

## 2017-06-26 PROCEDURE — 99000 SPECIMEN HANDLING OFFICE-LAB: CPT | Performed by: FAMILY MEDICINE

## 2017-06-26 ASSESSMENT — PAIN SCALES - GENERAL: PAINLEVEL: NO PAIN (0)

## 2017-06-26 NOTE — NURSING NOTE
"Chief Complaint   Patient presents with     Vaginal Problem     yeast infection for 5 days, itchy thick white discharge       Initial /80  Pulse 89  Temp 98.4  F (36.9  C) (Tympanic)  Resp 20  Ht 5' 11\" (1.803 m)  Wt 180 lb (81.6 kg)  SpO2 98%  Breastfeeding? No  BMI 25.1 kg/m2 Estimated body mass index is 25.1 kg/(m^2) as calculated from the following:    Height as of this encounter: 5' 11\" (1.803 m).    Weight as of this encounter: 180 lb (81.6 kg).  Medication Reconciliation: complete   Marium Lee      "

## 2017-06-26 NOTE — MR AVS SNAPSHOT
After Visit Summary   6/26/2017    Jannet San    MRN: [id number]           Patient Information     Date Of Birth          1990        Visit Information        Provider Department      6/26/2017 2:00 PM Leona Matthews MD [facility]        Today's Diagnoses     Vaginal discharge    -  1       Follow-ups after your visit        Follow-up notes from your care team     Return if symptoms worsen or fail to improve.      Your next 10 appointments already scheduled     Jun 28, 2017  2:30 PM CDT   Radiology with Need Interventional Radiologist, [place] INTERVENTIONAL ROOM   [place] Interventional Radiology ([facility] )    [address] [address]   [phone]            Jul 20, 2017 10:45 AM CDT   (Arrive by 10:30 AM)   Return Visit with Nellie Reza NP   [facility] ([facility] )    [address] [id number] [phone]              Who to contact     If you have questions or need follow up information about today's clinic visit or your schedule please contact [facility] directly at [phone].  Normal or non-critical lab and imaging results will be communicated to you by Care[employer] Partners[employer], letter or phone within 4 business days after the clinic has received the results. If you do not hear from us within 7 days, please contact the clinic through [employer] or phone. If you have a critical or abnormal lab result, we will notify you by phone as soon as possible.  Submit refill requests through [employer] or call your pharmacy and they will forward the refill request to us. Please allow 3 business days for your refill to be completed.          Additional Information About Your Visit        MyChart Information     [employer] gives you secure access to your electronic health record. If you see a primary care provider, you can also send messages to your care team and make appointments. If you have questions,  "please call your primary care clinic.  If you do not have a primary care provider, please call [phone] and they will assist you.        Care EveryWhere ID     This is your Care EveryWhere ID. This could be used by other organizations to access your [place] medical records  [id number]        Your Vitals Were     Pulse Temperature Respirations Height Pulse Oximetry Breastfeeding?    89 98.4  F (36.9  C) (Tympanic) 20 5' 11\" (1.803 m) 98% No    BMI (Body Mass Index)                   25.1 kg/m2            Blood Pressure from Last 3 Encounters:   06/26/17 128/80   06/22/17 136/78   05/25/17 110/66    Weight from Last 3 Encounters:   06/26/17 180 lb (81.6 kg)   06/22/17 180 lb (81.6 kg)   05/25/17 185 lb (83.9 kg)              We Performed the Following     Chlamydia trachomatis PCR     Neisseria gonorrhoeae PCR     Wet prep        Primary Care Provider Office Phone # Fax #    Leona Matthews [phone]       [facility] [place] [address]  [place] [address]        Equal Access to Services     [facility]: Hadii aad ku hadasho Soomaali, waaxda luqadaha, qaybta kaalmada adeegyada, waxay idiin hayaan adeeg juve laleanna . So [facility] [phone].    ATENCIÓN: Si habla español, tiene a mcdermott disposición servicios gratuitos de asistencia lingüística. Llame al [phone].    We comply with applicable federal civil rights laws and [place] laws. We do not discriminate on the basis of race, color, national origin, age, disability sex, sexual orientation or gender identity.            Thank you!     Thank you for choosing [facility] [place]  for your care. Our goal is always to provide you with excellent care. Hearing back from our patients is one way we can continue to improve our services. Please take a few minutes to complete the written survey that you may receive in the mail after your visit with us. Thank you!             Your Updated Medication List - Protect others around you: " Learn how to safely use, store and throw away your medicines at www.disposemymeds.org.          This list is accurate as of: 6/26/17  2:52 PM.  Always use your most recent med list.                   Brand Name Dispense Instructions for use Diagnosis    BENADRYL PO      Take 75 mg by mouth nightly as needed        lisdexamfetamine 40 MG capsule    VYVANSE    30 capsule    Take 1 capsule (40 mg) by mouth every morning    Attention deficit hyperactivity disorder (ADHD), predominantly inattentive type       MAGNESIUM OXIDE PO      Take 500 mg by mouth        MELATONIN PO      Take 10 mg by mouth At Bedtime        sertraline 25 MG tablet    ZOLOFT    90 tablet    Take 3 tablets (75 mg) by mouth daily    Bipolar 2 disorder (H)
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
## 2017-06-27 ENCOUNTER — TELEPHONE (OUTPATIENT)
Dept: FAMILY MEDICINE | Facility: OTHER | Age: 27
End: 2017-06-27

## 2017-06-27 NOTE — TELEPHONE ENCOUNTER
Wet prep was discussed in the office and released to my chart. Other STD testing is pending, not final yet.

## 2017-06-27 NOTE — TELEPHONE ENCOUNTER
Reason for call:  RESULTS    1. What is the test that was ordered? For poss yeast infection  2. Who ordered your test? Dr ELIO Matthews  3. When was the test performed?  06/26/17  Description: Pt called looking for test results. Please call her back at [phone]  Was an appointment offered for this a call? No  Preferred method for responding to this message: Telephone Call  If we cannot reach you directly, may we leave a detailed response at the number you provided? Yes  Can this message wait until your PCP/Provider returns if not available today? Not applicable

## 2017-06-28 ENCOUNTER — HOSPITAL ENCOUNTER (OUTPATIENT)
Dept: INTERVENTIONAL RADIOLOGY/VASCULAR | Facility: HOSPITAL | Age: 27
Discharge: HOME | End: 2017-06-28
Attending: FAMILY MEDICINE | Admitting: FAMILY MEDICINE
Payer: COMMERCIAL

## 2017-06-28 LAB
C TRACH DNA SPEC QL NAA+PROBE: NORMAL
N GONORRHOEA DNA SPEC QL NAA+PROBE: NORMAL
SPECIMEN SOURCE: NORMAL
SPECIMEN SOURCE: NORMAL

## 2017-06-28 PROCEDURE — 25000128 H RX IP 250 OP 636: Performed by: RADIOLOGY

## 2017-06-28 PROCEDURE — 62323 NJX INTERLAMINAR LMBR/SAC: CPT | Mod: TC | Performed by: RADIOLOGY

## 2017-06-28 PROCEDURE — 25000125 ZZHC RX 250: Performed by: RADIOLOGY

## 2017-06-28 PROCEDURE — 62323 NJX INTERLAMINAR LMBR/SAC: CPT | Mod: TC

## 2017-06-28 RX ORDER — METHYLPREDNISOLONE ACETATE 80 MG/ML
80 INJECTION, SUSPENSION INTRA-ARTICULAR; INTRALESIONAL; INTRAMUSCULAR; SOFT TISSUE ONCE
Status: COMPLETED | OUTPATIENT
Start: 2017-06-28 | End: 2017-06-28

## 2017-06-28 RX ORDER — METHYLPREDNISOLONE ACETATE 80 MG/ML
INJECTION, SUSPENSION INTRA-ARTICULAR; INTRALESIONAL; INTRAMUSCULAR; SOFT TISSUE
Status: DISCONTINUED
Start: 2017-06-28 | End: 2017-06-29 | Stop reason: HOSPADM

## 2017-06-28 RX ORDER — IOPAMIDOL 612 MG/ML
15 INJECTION, SOLUTION INTRATHECAL ONCE
Status: COMPLETED | OUTPATIENT
Start: 2017-06-28 | End: 2017-06-28

## 2017-06-28 RX ORDER — LIDOCAINE HYDROCHLORIDE 10 MG/ML
INJECTION, SOLUTION EPIDURAL; INFILTRATION; INTRACAUDAL; PERINEURAL
Status: DISCONTINUED
Start: 2017-06-28 | End: 2017-06-29 | Stop reason: HOSPADM

## 2017-06-28 RX ORDER — LIDOCAINE HYDROCHLORIDE 10 MG/ML
5 INJECTION, SOLUTION INFILTRATION; PERINEURAL ONCE
Status: COMPLETED | OUTPATIENT
Start: 2017-06-28 | End: 2017-06-28

## 2017-06-28 RX ADMIN — METHYLPREDNISOLONE ACETATE 80 MG: 80 INJECTION, SUSPENSION INTRA-ARTICULAR; INTRALESIONAL; INTRAMUSCULAR; SOFT TISSUE at 15:12

## 2017-06-28 RX ADMIN — IOPAMIDOL 4 ML: 612 INJECTION, SOLUTION INTRATHECAL at 15:11

## 2017-06-28 RX ADMIN — LIDOCAINE HYDROCHLORIDE 3 ML: 10 INJECTION, SOLUTION EPIDURAL; INFILTRATION; INTRACAUDAL; PERINEURAL at 15:13

## 2017-06-28 NOTE — PROGRESS NOTES
Treatment Plan    Client's Name: Janent San  YOB: 1990    Date: June 13, 2017    DSM-V Diagnoses:   Major Depressive Disorder  PTSD  Borderline Personality Disorder  Anxiety Disorder, unspecified  DA Date: 4/14/17 (outside agency DA)  Psychosocial / Contextual Factors:   Interpersonal, social, mental health symptoms and service needs, biological    Referral / Collaboration:  Referral to another professional/service is not indicated at this time.    Services to be provided/Interventions:   Interventions will include    Anticipated number of session: 6      MeasurableTreatment Goal(s) related to diagnosis / functional impairment(s)  Goal 1: Client will report a reduction in depressed mood 5 sessions out of 6.      Objective A (Client Action)                  Client will identify 5 CBT skills to use for reducing depressed mood.     Objective B  Client will report using at least 2 CBT skills 5 days out of 7.     Objective C  Client will identify and use mindfulness skills 5 days out of 7.     Goal 2: Client will report a decrease in anxiety 5 out of 6 sessions.     Objective A (Client Action)                  Client will identify 5 relaxation skills.     Objective B  Client will report using relaxations skills 5 days out of 7.     Objective C  Client will participate in EMDR to process trauma/triggers of anxiety     Reina Garibay, [facility] Center[facility]

## 2017-06-28 NOTE — IP AVS SNAPSHOT
[facility]    [address] [place] [address]    Phone:  [phone]    Fax:  [phone]                                       After Visit Summary   6/28/2017    Jannet San    MRN: [id number]           After Visit Summary Signature Page     I have received my discharge instructions, and my questions have been answered. I have discussed any challenges I see with this plan with the nurse or doctor.    ..........................................................................................................................................  Patient/Patient Representative Signature      ..........................................................................................................................................  Patient Representative Print Name and Relationship to Patient    ..................................................               ................................................  Date                                            Time    ..........................................................................................................................................  Reviewed by Signature/Title    ...................................................              ..............................................  Date                                                            Time

## 2017-06-28 NOTE — IP AVS SNAPSHOT
MRN:[id number]                      After Visit Summary   6/28/2017    Jannet San    MRN: [id number]           Visit Information        Provider Department      6/28/2017  2:30 PM Radiologist, Need Interventional; [facility]           Review of your medicines      UNREVIEWED medicines. Ask your doctor about these medicines        Dose / Directions    BENADRYL PO        Dose:  75 mg   Take 75 mg by mouth nightly as needed   Refills:  0       lisdexamfetamine 40 MG capsule   Commonly known as:  VYVANSE   Used for:  Attention deficit hyperactivity disorder (ADHD), predominantly inattentive type        Dose:  40 mg   Take 1 capsule (40 mg) by mouth every morning   Quantity:  30 capsule   Refills:  0       MAGNESIUM OXIDE PO        Dose:  500 mg   Take 500 mg by mouth   Refills:  0       MELATONIN PO        Dose:  10 mg   Take 10 mg by mouth At Bedtime   Refills:  0       sertraline 25 MG tablet   Commonly known as:  ZOLOFT   Used for:  Bipolar 2 disorder (H)        Dose:  75 mg   Take 3 tablets (75 mg) by mouth daily   Quantity:  90 tablet   Refills:  1                Protect others around you: Learn how to safely use, store and throw away your medicines at www.disposemymeds.org.         Follow-ups after your visit        Your next 10 appointments already scheduled     Jul 20, 2017 10:45 AM CDT   (Arrive by 10:30 AM)   Return Visit with Nellie Reza NP   [facility] ([facility] )    [address] [place] [id number] [phone]               Care Instructions        Further instructions from your care team       Home number on file [phone] (home)  Is it ok to leave a message at this number(s)? Yes    Dr Pope completed your procedure on 6/28/2017.    Current Pain Level (0-10 Scale): 5/10  Post Pain Level (0-10):  3/10    Radiology Discharge instructions for Steroid Injection    Activity  Level:     Do not do any heavy activity or exercise for 24 hours.   Do not drive for 4 hours after your injection.  Diet:   Return to your normal diet.  Medications:   If you have stopped taking your Aspirin, Coumadin/Warfarin, Ibuprofen, or any   other blood thinner for this procedure you may resume in the morning unless   your primary care provider has given you other instructions.    Diabetics may see an increase in blood sugar after steroid injections. If you are concerned about your blood sugar, please contact your family doctor.    Site Care:  Remove the bandage and bathe or shower the morning after the procedure.      This is a Pain Management procedure.  You will be contacted in two weeks for follow up.    Call your Primary Care Provider if you have the following (if your primary care provider is not available please seek emergency care):   Nausea with vomiting   Severe headache   Drowsiness or confusion   Redness or drainage at the injection or puncture site   Temperature over 101 degrees F   Other concerns   Worsening back pain   Stiff neck         Additional Information About Your Visit        [employer] Information     [employer] gives you secure access to your electronic health record. If you see a primary care provider, you can also send messages to your care team and make appointments. If you have questions, please call your primary care clinic.  If you do not have a primary care provider, please call [phone] and they will assist you.        Care EveryWhere ID     This is your Care EveryWhere ID. This could be used by other organizations to access your [facility] medical records  [id number]         Primary Care Provider Office Phone # Fax #    Leona Matthews [phone]      Equal Access to Services     CESAR BEST : Becca So, kristy henao, ryann torres. So [facility] [phone].    ATENCIÓN: Hodan balderrama  español, tiene a mcdermott disposición servicios gratuitos de asistencia lingüística. Beatrice miguel [phone].    We comply with applicable federal civil rights laws and [place] laws. We do not discriminate on the basis of race, color, national origin, age, disability sex, sexual orientation or gender identity.            Thank you!     Thank you for choosing [facility] for your care. Our goal is always to provide you with excellent care. Hearing back from our patients is one way we can continue to improve our services. Please take a few minutes to complete the written survey that you may receive in the mail after you visit with us. Thank you!             Medication List: This is a list of all your medications and when to take them. Check marks below indicate your daily home schedule. Keep this list as a reference.      Medications           Morning Afternoon Evening Bedtime As Needed    BENADRYL PO   Take 75 mg by mouth nightly as needed                                lisdexamfetamine 40 MG capsule   Commonly known as:  VYVANSE   Take 1 capsule (40 mg) by mouth every morning                                MAGNESIUM OXIDE PO   Take 500 mg by mouth                                MELATONIN PO   Take 10 mg by mouth At Bedtime                                sertraline 25 MG tablet   Commonly known as:  ZOLOFT   Take 3 tablets (75 mg) by mouth daily

## 2017-06-28 NOTE — DISCHARGE INSTRUCTIONS
Home number on file [phone] (home)  Is it ok to leave a message at this number(s)? Yes    Dr Pope completed your procedure on 6/28/2017.    Current Pain Level (0-10 Scale): 5/10  Post Pain Level (0-10):  3/10    Radiology Discharge instructions for Steroid Injection    Activity Level:     Do not do any heavy activity or exercise for 24 hours.   Do not drive for 4 hours after your injection.  Diet:   Return to your normal diet.  Medications:   If you have stopped taking your Aspirin, Coumadin/Warfarin, Ibuprofen, or any   other blood thinner for this procedure you may resume in the morning unless   your primary care provider has given you other instructions.    Diabetics may see an increase in blood sugar after steroid injections. If you are concerned about your blood sugar, please contact your family doctor.    Site Care:  Remove the bandage and bathe or shower the morning after the procedure.      This is a Pain Management procedure.  You will be contacted in two weeks for follow up.    Call your Primary Care Provider if you have the following (if your primary care provider is not available please seek emergency care):   Nausea with vomiting   Severe headache   Drowsiness or confusion   Redness or drainage at the injection or puncture site   Temperature over 101 degrees F   Other concerns   Worsening back pain   Stiff neck

## 2017-06-28 NOTE — PROGRESS NOTES
Fluoro time for this case was 16 :seconds, less than 5 min  Was patient held? NO  If yes, by whom?

## 2017-06-28 NOTE — PROGRESS NOTES
Progress Note    Client Name: Jannet San  Date: June 13, 2017         Service Type: Individual      Session Start Time: 12:30  Session End Time: 1:25      Session Length: 55 minutes     Session #: 1     Attendees: Client attended alone     DSM-V Diagnoses:    Major Depressive Disorder   PTSD   Borderline Personality Disorder   Anxiety Disorder, unspecified   DA Date: 4/14/17 (outside agency DA)    Treatment Plan Due: 9/13/17  PHQ-9 :   PHQ-9 SCORE 5/4/2017 6/13/2017 6/22/2017   Total Score - - -   Total Score 25 10 13      MARY-7 :    MARY-7 SCORE 5/25/2017 6/13/2017 6/22/2017   Total Score 11 7 7           DATA      Progress Since Last Session (Related to Symptoms / Goals / Homework):   Symptoms: N/A    Homework: N/A     Current / Ongoing Stressors and Concerns:   Jannet explained that she deals with symptoms of depression and anxiety. She reports feeling like her life is a mess. Jannet stated that her symptoms include self doubt and self esteem issues, racing thoughts, feeling a sense of doom, and uncontrollable worry. She also reports a history of failed relationships and friendships. Jannet explained that she is often impulsive and is concerned about her risky sexual behavior. Jannet also reports nightmares and flashbacks from a childhood trauma history.     Treatment Objective(s) Addressed in This Session:   Objective A (Client Action)                  Client will identify 5 CBT skills to use for reducing depressed mood.     Intervention:   Worked on rapport building with Jannet. Also began education on CBT.     Response to Interventions:   Jannet stated that she understood this information.     ASSESSMENT: Current Emotional / Mental Status (status of significant symptoms):   Risk status (Self / Other harm or suicidal ideation)   Client denies current fears or concerns for personal safety.   Client reports the following current or recent suicidal ideation or  behaviors: she reports fleeting thoughts that she would be better off dead, she denies any plan or intent.   Client denies current or recent homicidal ideation or behaviors.   Client denies current or recent self injurious behavior or ideation.   Client denies other safety concerns.   A safety and risk management plan has not been developed at this time, however client was given the after-hours number / 911 should there be a change in any of these risk factors.     Appearance:   Appropriate    Eye Contact:   Good    Psychomotor Behavior: Normal    Attitude:   Cooperative  vague at times    Orientation:   All   Speech    Rate / Production: Normal     Volume:  Normal    Mood:    Normal   Affect:    Flat    Thought Content:  Clear    Thought Form:  Coherent  Circumstantial   Insight:    Poor         Medication Compliance:   Yes     Changes in Health Issues:   None reported     Chemical Use Review:   Substance Use: Chemical use reviewed, no active concerns identified      Tobacco Use: No current tobacco use.       Collateral Reports Completed:   Not Applicable    PLAN: (Client Tasks / Therapist Tasks / Other)  Jannet was asked to return for a follow up in about 2 weeks.    DAMARIS NoeSW

## 2017-06-28 NOTE — PROGRESS NOTES
Verified post-procedural status.  There were no complicationsand patient has no symptoms..  The patient had no questions or concerns.Patient reports pain scale of 3/10, and No bleeding.

## 2017-06-29 ASSESSMENT — ANXIETY QUESTIONNAIRES: GAD7 TOTAL SCORE: 7

## 2017-06-29 ASSESSMENT — PATIENT HEALTH QUESTIONNAIRE - PHQ9: SUM OF ALL RESPONSES TO PHQ QUESTIONS 1-9: 10

## 2017-07-09 ENCOUNTER — HOSPITAL ENCOUNTER (EMERGENCY)
Facility: HOSPITAL | Age: 27
Discharge: HOME | End: 2017-07-09
Attending: INTERNAL MEDICINE | Admitting: INTERNAL MEDICINE
Payer: COMMERCIAL

## 2017-07-09 VITALS
TEMPERATURE: 97.4 F | DIASTOLIC BLOOD PRESSURE: 92 MMHG | RESPIRATION RATE: 16 BRPM | HEART RATE: 81 BPM | SYSTOLIC BLOOD PRESSURE: 133 MMHG | OXYGEN SATURATION: 97 %

## 2017-07-09 DIAGNOSIS — F43.21 ADJUSTMENT DISORDER WITH DEPRESSED MOOD: ICD-10-CM

## 2017-07-09 DIAGNOSIS — R45.851 SUICIDAL IDEATION: ICD-10-CM

## 2017-07-09 LAB
ALBUMIN SERPL-MCNC: 4.2 G/DL (ref 3.4–5)
ALP SERPL-CCNC: 73 U/L (ref 40–150)
ALT SERPL W P-5'-P-CCNC: 26 U/L (ref 0–50)
AMPHETAMINES UR QL SCN: ABNORMAL
ANION GAP SERPL CALCULATED.3IONS-SCNC: 9 MMOL/L (ref 3–14)
APAP SERPL-MCNC: ABNORMAL MG/L (ref 10–20)
AST SERPL W P-5'-P-CCNC: 14 U/L (ref 0–45)
B-HCG SERPL-ACNC: <1 IU/L (ref 0–5)
BARBITURATES UR QL: ABNORMAL
BASOPHILS # BLD AUTO: 0.1 10E9/L (ref 0–0.2)
BASOPHILS NFR BLD AUTO: 0.7 %
BENZODIAZ UR QL: ABNORMAL
BILIRUB SERPL-MCNC: 0.4 MG/DL (ref 0.2–1.3)
BUN SERPL-MCNC: 7 MG/DL (ref 7–30)
CALCIUM SERPL-MCNC: 8.4 MG/DL (ref 8.5–10.1)
CANNABINOIDS UR QL SCN: ABNORMAL
CHLORIDE SERPL-SCNC: 112 MMOL/L (ref 94–109)
CK SERPL-CCNC: 116 U/L (ref 30–225)
CO2 SERPL-SCNC: 22 MMOL/L (ref 20–32)
COCAINE UR QL: ABNORMAL
CREAT SERPL-MCNC: 0.66 MG/DL (ref 0.52–1.04)
DIFFERENTIAL METHOD BLD: NORMAL
EOSINOPHIL # BLD AUTO: 0.1 10E9/L (ref 0–0.7)
EOSINOPHIL NFR BLD AUTO: 0.7 %
ERYTHROCYTE [DISTWIDTH] IN BLOOD BY AUTOMATED COUNT: 13.2 % (ref 10–15)
ETHANOL SERPL-MCNC: 0.11 G/DL
GFR SERPL CREATININE-BSD FRML MDRD: ABNORMAL ML/MIN/1.7M2
GLUCOSE SERPL-MCNC: 88 MG/DL (ref 70–99)
HCG UR QL: NEGATIVE
HCT VFR BLD AUTO: 40.7 % (ref 35–47)
HGB BLD-MCNC: 14.2 G/DL (ref 11.7–15.7)
IMM GRANULOCYTES # BLD: 0 10E9/L (ref 0–0.4)
IMM GRANULOCYTES NFR BLD: 0.3 %
LYMPHOCYTES # BLD AUTO: 3 10E9/L (ref 0.8–5.3)
LYMPHOCYTES NFR BLD AUTO: 41.2 %
MCH RBC QN AUTO: 30.1 PG (ref 26.5–33)
MCHC RBC AUTO-ENTMCNC: 34.9 G/DL (ref 31.5–36.5)
MCV RBC AUTO: 86 FL (ref 78–100)
METHADONE UR QL SCN: ABNORMAL
MONOCYTES # BLD AUTO: 0.4 10E9/L (ref 0–1.3)
MONOCYTES NFR BLD AUTO: 5.8 %
NEUTROPHILS # BLD AUTO: 3.7 10E9/L (ref 1.6–8.3)
NEUTROPHILS NFR BLD AUTO: 51.3 %
NRBC # BLD AUTO: 0 10*3/UL
NRBC BLD AUTO-RTO: 0 /100
OPIATES UR QL SCN: ABNORMAL
PCP UR QL SCN: ABNORMAL
PLATELET # BLD AUTO: 250 10E9/L (ref 150–450)
POTASSIUM SERPL-SCNC: 3.5 MMOL/L (ref 3.4–5.3)
PROT SERPL-MCNC: 7.2 G/DL (ref 6.8–8.8)
RBC # BLD AUTO: 4.71 10E12/L (ref 3.8–5.2)
SALICYLATES SERPL-MCNC: 3 MG/DL
SODIUM SERPL-SCNC: 143 MMOL/L (ref 133–144)
WBC # BLD AUTO: 7.2 10E9/L (ref 4–11)

## 2017-07-09 PROCEDURE — 84702 CHORIONIC GONADOTROPIN TEST: CPT | Performed by: INTERNAL MEDICINE

## 2017-07-09 PROCEDURE — 85025 COMPLETE CBC W/AUTO DIFF WBC: CPT | Performed by: INTERNAL MEDICINE

## 2017-07-09 PROCEDURE — 80320 DRUG SCREEN QUANTALCOHOLS: CPT | Performed by: INTERNAL MEDICINE

## 2017-07-09 PROCEDURE — 96372 THER/PROPH/DIAG INJ SC/IM: CPT | Mod: XS

## 2017-07-09 PROCEDURE — 82550 ASSAY OF CK (CPK): CPT | Performed by: INTERNAL MEDICINE

## 2017-07-09 PROCEDURE — 80329 ANALGESICS NON-OPIOID 1 OR 2: CPT | Performed by: INTERNAL MEDICINE

## 2017-07-09 PROCEDURE — 25000128 H RX IP 250 OP 636: Performed by: INTERNAL MEDICINE

## 2017-07-09 PROCEDURE — 99284 EMERGENCY DEPT VISIT MOD MDM: CPT | Mod: 25

## 2017-07-09 PROCEDURE — 80307 DRUG TEST PRSMV CHEM ANLYZR: CPT | Performed by: INTERNAL MEDICINE

## 2017-07-09 PROCEDURE — 99285 EMERGENCY DEPT VISIT HI MDM: CPT | Performed by: FAMILY MEDICINE

## 2017-07-09 PROCEDURE — 96375 TX/PRO/DX INJ NEW DRUG ADDON: CPT

## 2017-07-09 PROCEDURE — 93005 ELECTROCARDIOGRAM TRACING: CPT

## 2017-07-09 PROCEDURE — 81025 URINE PREGNANCY TEST: CPT | Performed by: INTERNAL MEDICINE

## 2017-07-09 PROCEDURE — 80053 COMPREHEN METABOLIC PANEL: CPT | Performed by: INTERNAL MEDICINE

## 2017-07-09 PROCEDURE — 96374 THER/PROPH/DIAG INJ IV PUSH: CPT

## 2017-07-09 PROCEDURE — 25000128 H RX IP 250 OP 636: Performed by: FAMILY MEDICINE

## 2017-07-09 PROCEDURE — 36415 COLL VENOUS BLD VENIPUNCTURE: CPT | Performed by: INTERNAL MEDICINE

## 2017-07-09 RX ORDER — KETOROLAC TROMETHAMINE 30 MG/ML
30 INJECTION, SOLUTION INTRAMUSCULAR; INTRAVENOUS ONCE
Status: COMPLETED | OUTPATIENT
Start: 2017-07-09 | End: 2017-07-09

## 2017-07-09 RX ORDER — LORAZEPAM 2 MG/ML
5 INJECTION INTRAMUSCULAR ONCE
Status: DISCONTINUED | OUTPATIENT
Start: 2017-07-09 | End: 2017-07-09

## 2017-07-09 RX ORDER — LORAZEPAM 2 MG/ML
2 INJECTION INTRAMUSCULAR ONCE
Status: COMPLETED | OUTPATIENT
Start: 2017-07-09 | End: 2017-07-09

## 2017-07-09 RX ORDER — DIPHENHYDRAMINE HYDROCHLORIDE 50 MG/ML
50 INJECTION INTRAMUSCULAR; INTRAVENOUS ONCE
Status: COMPLETED | OUTPATIENT
Start: 2017-07-09 | End: 2017-07-09

## 2017-07-09 RX ORDER — HALOPERIDOL 5 MG/ML
5 INJECTION INTRAMUSCULAR ONCE
Status: COMPLETED | OUTPATIENT
Start: 2017-07-09 | End: 2017-07-09

## 2017-07-09 RX ADMIN — DIPHENHYDRAMINE HYDROCHLORIDE 50 MG: 50 INJECTION, SOLUTION INTRAMUSCULAR; INTRAVENOUS at 07:45

## 2017-07-09 RX ADMIN — PROCHLORPERAZINE EDISYLATE 5 MG: 5 INJECTION INTRAMUSCULAR; INTRAVENOUS at 15:00

## 2017-07-09 RX ADMIN — HALOPERIDOL LACTATE 5 MG: 5 INJECTION, SOLUTION INTRAMUSCULAR at 07:44

## 2017-07-09 RX ADMIN — LORAZEPAM 2 MG: 2 INJECTION INTRAMUSCULAR at 07:44

## 2017-07-09 RX ADMIN — KETOROLAC TROMETHAMINE 30 MG: 30 INJECTION, SOLUTION INTRAMUSCULAR; INTRAVENOUS at 15:00

## 2017-07-09 NOTE — ED AVS SNAPSHOT
[facility]    [address] [facility] [address]    Phone:  [phone]                                       Jannet San   MRN: [id number]    Department:  [facility]   Date of Visit:  7/9/2017           Patient Information     Date Of Birth          1990        Your diagnoses for this visit were:     Adjustment disorder with depressed mood     Suicidal ideation        You were seen by David Barrera MD and Blanka Wing MD.      Follow-up Information     Follow up with Leona Matthews MD.    Specialty:  Family Practice    Why:  As needed    Contact information:    [facility]  [address]  [facility] [id number] [phone]          Discharge Instructions         Understanding Adjustment Disorders  Most people have stress in their lives, and sometimes you may have more than you can handle. You may find it hard to cope with a stressful event. As a result, you may become anxious and depressed. You might even get sick. These can be symptoms of an adjustment disorder. But you don t have to suffer. Ask your healthcare provider or mental health professional for help.    Common symptoms of an adjustment disorder    Hopelessness    Frequent crying    Depressed mood    Trembling or twitching    Fast, pounding, or fluttering heartbeat (palpitations)    Health problems    Withdrawal    Anxiety or tension   What is an adjustment disorder?  Adjustment disorders sometimes occur when life gets to be too much. They often appear within 3 months of a stressful time. The symptoms vary widely. You might pretend the stressful event never happened. Or you might think about it so much you can t eat or sleep. In most cases, your feelings may seem beyond your control.  What causes it?  The events that trigger an adjustment disorder vary from person to person. Adults may be troubled by work, money, or marriage problems. Teens are more likely bothered by school or  conflict with parents. They also may find it hard to cope with a divorce or sex. The death of a loved one can be especially hard to face. So can major life changes such as a move. Poverty or a lack of social skills may make matters worse.  What can be done?  Adjustment disorders can almost always be helped by therapy. You may feel relieved just to talk to someone. In some cases, only you and your therapist will meet. In others, your whole family may be involved. You might also join a group for people with this disorder. The support and concern of others can help you recover more quickly.  Date Last Reviewed: 1/1/2017 2000-2017 [employer]. [address] [place] [address], [place] [address]. All rights reserved. This information is not intended as a substitute for professional medical care. Always follow your healthcare professional's instructions.          Future Appointments        Provider Department Dept Phone Center    7/20/2017 10:45 AM April Shweta Reza NP [facility] [phone] Beny Paul         Review of your medicines      Our records show that you are taking the medicines listed below. If these are incorrect, please call your family doctor or clinic.        Dose / Directions Last dose taken    BENADRYL PO   Dose:  75 mg        Take 75 mg by mouth nightly as needed   Refills:  0        lisdexamfetamine 40 MG capsule   Commonly known as:  VYVANSE   Dose:  40 mg   Quantity:  30 capsule        Take 1 capsule (40 mg) by mouth every morning   Refills:  0        MAGNESIUM OXIDE PO   Dose:  500 mg        Take 500 mg by mouth   Refills:  0        MELATONIN PO   Dose:  10 mg        Take 10 mg by mouth At Bedtime   Refills:  0        sertraline 25 MG tablet   Commonly known as:  ZOLOFT   Dose:  75 mg   Quantity:  90 tablet        Take 3 tablets (75 mg) by mouth daily   Refills:  1                Procedures and tests performed during your visit     Acetaminophen level    Alcohol ethyl     CBC with platelets differential    CK total    Comprehensive metabolic panel    Drug Screen Urine (Range)    HCG qualitative urine    HCG quantitative pregnancy    Restraints Violent or Self-Destructive Adult (Age 18 or Older)    Salicylate level      Orders Needing Specimen Collection     None      Pending Results     No orders found from 7/7/2017 to 7/10/2017.            Pending Culture Results     No orders found from 7/7/2017 to 7/10/2017.            Thank you for choosing [facility]       Thank you for choosing [facility] for your care. Our goal is always to provide you with excellent care. Hearing back from our patients is one way we can continue to improve our services. Please take a few minutes to complete the written survey that you may receive in the mail after you visit with us. Thank you!        Practice Management [employer] Information     [employer] gives you secure access to your electronic health record. If you see a primary care provider, you can also send messages to your care team and make appointments. If you have questions, please call your primary care clinic.  If you do not have a primary care provider, please call [phone] and they will assist you.        Care EveryWhere ID     This is your Care EveryWhere ID. This could be used by other organizations to access your [facility] medical records  [id number]        Equal Access to Services     CESAR BEST : Becca So, kristy henao, ryann torres. So [facility] [phone].    ATENCIÓN: Si habla español, tiene a mcdermott disposición servicios gratuitos de asistencia lingüística. Llame al [phone].    We comply with applicable federal civil rights laws and [place] laws. We do not discriminate on the basis of race, color, national origin, age, disability sex, sexual orientation or gender identity.            After Visit Summary       This is your record. Keep this with you and show to your  community pharmacist(s) and doctor(s) at your next visit.

## 2017-07-09 NOTE — ED NOTES
"Pt yelling loudly, explained we have other pt's in the ED and she is disturbing and upsetting them. Pt states, \" I do not care about anyone else accept me.\"   "

## 2017-07-09 NOTE — ED NOTES
Patient arrived with [facility] and [facility]. Was reported that patient was uncooperative at her residence and [employer] went there and reported she took an Ativan pill and then they handcuffed her and had [facility] transport pt to the ED. On arrival patient was uncuffed. Non verbal, no response with sternal rub. Pupil are reactive.  RR 8-12 shallow.

## 2017-07-09 NOTE — ED NOTES
Pt states RN May speak to mother and update her on care provided and plan. Mother here and updated. Mom went to get pt clothing.,
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
## 2017-07-09 NOTE — ED NOTES
"Dr. Barrera in to exam pt. Pt states she wants \"my own Dr.\" Order for replacing IV and getting pt into bed per Dr. Barrera. Pt refused and resisted staff. VO for restraint obtained. Dr. Barrera notified face to face and order in [facility] was needed.  "

## 2017-07-09 NOTE — ED NOTES
"Control team arrived. Pt continues to stand at the bedside shielded with a blanket by staff refusing to lay down on stretcher, Explained we want to help her. Pt states, \"I do not want to be here.\" Reinforced we are here to help. Dr. Barrera in to talk with pt. Pt states, \"you are not my drGeronimo, I will not talk to you. I want someone I know.\"  Provider ordered IV to be replaced and pt to get on cot. Attempted to gain pt's cooperation to lay on the cot and rational for. Pt continued to vehemently refuse, hollering she wants more medication. Explained if she could not cooperate, we would have to put her in restraints. Pt continued to adamently refuse to get on stretcher and allow IV to be replaced. Stating she wants to leave.   "

## 2017-07-09 NOTE — ED NOTES
Staci from DEC called following DEC assessment. Recommends Discharge with safety plan being signed. States she feels pt can be safe at home, is willing to sign a safety plan, has appointments scheduled with mental health providers this Tuesday and July 20th, April Reza. [facility] pt told her that she has had thoughts with no plan of harming self over the last couple months. Provider unavailable at this time to take  Call. Staci will send the safety plan, would like a call back regarding the thoughts of the provider for discharging pt. [phone].

## 2017-07-09 NOTE — ED NOTES
Pt pulled IV out and threw it across the room. Crying. Cussed and swore at staff. Pressure dressing applied by [employer].

## 2017-07-09 NOTE — ED NOTES
Pt restraints off. Pt cooperative and contracts to be safe.  Addendum: Restraints removed by Security. Pt's skin intact under restraints.

## 2017-07-09 NOTE — ED NOTES
Pt kicking out and swinging at staff during restraint placement. Reinforced that we were trying to help her and would remove them as soon as she can cooperate.

## 2017-07-09 NOTE — ED NOTES
Safety plan signed and given to pt. Verbalizes she will utilize #'s provided if needed and keep her F/u appts.

## 2017-07-09 NOTE — ED NOTES
Dr. Wayne notified of DEC report and pt's head ache. Orders obtained. Pt states she lives with her 6 y/o son in an apt.

## 2017-07-09 NOTE — ED NOTES
Right ankle, Right wrist, Left ankle, Left wrist, 4 siderail and Lap belt restraints initiated on patient on 7/9/2017 at 0804          Attending Physician Notified: MD ordered restraint (KRISHNA Argueta at 0804, placed in EPIC 0840),     Order received: Yes         Criteria explained to Patient   Restraint care Plan initiated: Pt educated on rational for restraints and behavior necessary for discontinuation of restraints.    Bibiana Hernández

## 2017-07-09 NOTE — ED NOTES
Pt mom called to get an updated on how her daughter is doing. Was notified Pt mom was informed that no medication information can be given. Pt mom stated that the police told her to call the ER for update. I informed her that she is sleeping. Pt mom stats that she will be here in a few hours because she live far away.  Was notified

## 2017-07-09 NOTE — ED NOTES
"Pt screaming, yelling help and lying supine on the stretcher with the sheet over her face. Yelling, \"Please give a F---- Tranquilizer\". States, \"I am going to get aggressive if we do not give her some more medication.\"  Explained medications had been given and it takes time to work. Pt jumped up off the stretcher and came at the  and the RN. Control Team called and [place] [employer]. Pt standing in room naked. Requested to assist putting a gown on. Pt refused. Said, \"I will run all over the hospital, I don't care.\" pt shielded with a bath blanket.    "

## 2017-07-09 NOTE — ED NOTES
"Patient pulled IV out and yelled \"I need some Morphine\" and threw the IV across the room.  Attempted to talk with patient and she was swearing at this writer. Asked patient about a suicidal text message to her mother, patient responded \" I'm not talking to you so don't even try\"  Tried to explained to patient that we are here to help.  Patient responded in a vulgar manner again no to talk with her.   "

## 2017-07-09 NOTE — ED PROVIDER NOTES
History     Chief Complaint   Patient presents with     Altered Mental Status     The history is provided by the patient.     Jannet San is a 27 year old female who brought by PD for alcohol intoxication, suicidal threats, pt very agitated, aggressive to PD, later became unresponsive, in ER pt became aggressive and agitated again , sedated in ER.     I have reviewed the Medications, Allergies, Past Medical and Surgical History, and Social History in the Epic system.      Review of Systems   Unable to perform ROS: Mental status change       Physical Exam   BP: 163/92  Pulse: 89  Temp: 97.1  F (36.2  C)  Resp: 16  SpO2: 100 %  Physical Exam   Constitutional: She appears well-developed and well-nourished.   HENT:   Head: Normocephalic and atraumatic.   Mouth/Throat: No oropharyngeal exudate.   Eyes: Conjunctivae are normal. Pupils are equal, round, and reactive to light.   Neck: Normal range of motion. Neck supple. No JVD present. No tracheal deviation present. No thyromegaly present.   Cardiovascular: Normal rate, regular rhythm, normal heart sounds and intact distal pulses.  Exam reveals no gallop and no friction rub.    No murmur heard.  Pulmonary/Chest: Effort normal and breath sounds normal. No stridor. No respiratory distress. She has no wheezes. She has no rales. She exhibits no tenderness.   Abdominal: Soft. Bowel sounds are normal. She exhibits no distension and no mass. There is no tenderness. There is no rebound and no guarding.   Musculoskeletal: Normal range of motion. She exhibits no edema or tenderness.   Lymphadenopathy:     She has no cervical adenopathy.   Neurological: She is alert.   Skin: Skin is warm and dry. No rash noted. No erythema. No pallor.   Psychiatric: She is agitated, aggressive, hyperactive and combative.   Nursing note and vitals reviewed.      ED Course     ED Course     Procedures               Labs Ordered and Resulted from Time of ED Arrival Up to the Time of Departure from  the ED   COMPREHENSIVE METABOLIC PANEL - Abnormal; Notable for the following:        Result Value    Chloride 112 (*)     Calcium 8.4 (*)     All other components within normal limits   ALCOHOL ETHYL - Abnormal; Notable for the following:     Ethanol g/dL 0.11 (*)     All other components within normal limits   DRUG SCREEN URINE (RANGE) - Abnormal; Notable for the following:     Amphetamine Qual Urine   (*)     Value: Positive   Cutoff for a positive amphetamine is greater than 500 ng/mL. This is an   unconfirmed screening result to be used for medical purposes only.      Cannabinoids Qual Urine   (*)     Value: Positive   Cutoff for a positive cannabinoid is greater than 50 ng/mL. This is an   unconfirmed screening result to be used for medical purposes only.      All other components within normal limits   ACETAMINOPHEN LEVEL - Abnormal; Notable for the following:     Acetaminophen Level   (*)     Value: <2  Therapeutic range: 10-20 mg/L      All other components within normal limits   CBC WITH PLATELETS DIFFERENTIAL   HCG QUANTITATIVE PREGNANCY   HCG QUALITATIVE URINE   CK TOTAL   SALICYLATE LEVEL       Assessments & Plan (with Medical Decision Making)   Alcohol abuse, suicidal threats( as per mother she texted threatening suicide)  Aggressive behavior, severe agitation  Pt sedated in ER    S/o to Dr Govea at the change of shift    Patient awake and DEC assessment done.  Staci feels comfortable sending her home with appointments set up, patient agrees to sign safety plan.  States she said she was suicidal due to being drunk and upset with her parents.  Is now cooperative, will sign contract for safety.    I have reviewed the nursing notes.    I have reviewed the findings, diagnosis, plan and need for follow up with the patient.      New Prescriptions    No medications on file       Final diagnoses:   Adjustment disorder with depressed mood   Suicidal ideation       7/9/2017   [facility]     Blanka Wing MD  07/09/17 1437       Blanka Wing MD  07/09/17 4350

## 2017-07-09 NOTE — ED NOTES
Patient crying but will not talk or open eyes to staff.  Eyes puffy from crying.  Spoke with PD and they did not see any empty pill bottles in her residence

## 2017-07-09 NOTE — DISCHARGE INSTRUCTIONS
Understanding Adjustment Disorders  Most people have stress in their lives, and sometimes you may have more than you can handle. You may find it hard to cope with a stressful event. As a result, you may become anxious and depressed. You might even get sick. These can be symptoms of an adjustment disorder. But you don t have to suffer. Ask your healthcare provider or mental health professional for help.    Common symptoms of an adjustment disorder    Hopelessness    Frequent crying    Depressed mood    Trembling or twitching    Fast, pounding, or fluttering heartbeat (palpitations)    Health problems    Withdrawal    Anxiety or tension   What is an adjustment disorder?  Adjustment disorders sometimes occur when life gets to be too much. They often appear within 3 months of a stressful time. The symptoms vary widely. You might pretend the stressful event never happened. Or you might think about it so much you can t eat or sleep. In most cases, your feelings may seem beyond your control.  What causes it?  The events that trigger an adjustment disorder vary from person to person. Adults may be troubled by work, money, or marriage problems. Teens are more likely bothered by school or conflict with parents. They also may find it hard to cope with a divorce or sex. The death of a loved one can be especially hard to face. So can major life changes such as a move. Poverty or a lack of social skills may make matters worse.  What can be done?  Adjustment disorders can almost always be helped by therapy. You may feel relieved just to talk to someone. In some cases, only you and your therapist will meet. In others, your whole family may be involved. You might also join a group for people with this disorder. The support and concern of others can help you recover more quickly.  Date Last Reviewed: 1/1/2017 2000-2017 [employer]. [address] [place] [address], [place] [address]. All rights reserved. This information is  not intended as a substitute for professional medical care. Always follow your healthcare professional's instructions.

## 2017-07-09 NOTE — ED NOTES
Pt placed in restraints. Continues to cuss and swear loudly. Explained as soon as she can cooperate and we know she is safe, the restraints will be removed.

## 2017-07-09 NOTE — ED NOTES
"Pt cussing and swearing at staff, stating we \"had better give her some F----- Morphine.\" cooperated for injections.   "

## 2017-07-09 NOTE — ED AVS SNAPSHOT
[facility]    [address] [address]    Phone:  [phone]                                       Jannet San   MRN: [id number]    Department:  [facility]   Date of Visit:  7/9/2017           After Visit Summary Signature Page     I have received my discharge instructions, and my questions have been answered. I have discussed any challenges I see with this plan with the nurse or doctor.    ..........................................................................................................................................  Patient/Patient Representative Signature      ..........................................................................................................................................  Patient Representative Print Name and Relationship to Patient    ..................................................               ................................................  Date                                            Time    ..........................................................................................................................................  Reviewed by Signature/Title    ...................................................              ..............................................  Date                                                            Time

## 2017-07-10 NOTE — ED NOTES
Pt states when questioned she only took vyvance this am. She did not take any ativan and does not have ativan PTA to the ED.

## 2017-07-11 ENCOUNTER — TELEPHONE (OUTPATIENT)
Dept: INTERVENTIONAL RADIOLOGY/VASCULAR | Facility: HOSPITAL | Age: 27
End: 2017-07-11

## 2017-07-11 NOTE — TELEPHONE ENCOUNTER
[unfilled]    PAIN MANAGEMENT POST CALL      Procedure: ONEAL TL l2-l3  Radiologist(s): Dr. Simba Pope  Date of Procedure: 6/28/2017    I responded to the patient's questions/concerns.    Pre-procedure pain score was: 5 (See pre-procedure score)  Post-procedure pain score as of today is: 6(Ask in call)   Percentage of pain reduction: 90% (Calculate from patients' post procedure response--do not expect the patient to calculate)     Where is the pain? Patient is having ovarian cyst issues but back is doing good. She is having muscle spasms that she thinks from the ovaries.  Is the pain radiating? No  Is this new pain? No    Patient would not like to pursue another injection at this time. Would like to get another injection of the same for this has been working for her.    Patient will contact provider, Leona Matthews if there are any issues and for the results.    Tierra Garrido

## 2017-07-13 NOTE — PROGRESS NOTES
Progress Note    Client Name: Jannet San  Date: June 22, 2017         Service Type: Individual      Session Start Time: 4:10  Session End Time: 5:05      Session Length: 55 minutes     Session #: 2     Attendees: Client attended alone     DSM-V Diagnoses:    Major Depressive Disorder   PTSD   Borderline Personality Disorder   Anxiety Disorder, unspecified   DA Date: 4/14/17 (outside agency DA)    Treatment Plan Due: 9/13/17  PHQ-9 :   PHQ-9 SCORE 6/13/2017 6/22/2017 6/22/2017   Total Score - - -   Total Score 10 13 11      MARY-7 :    MARY-7 SCORE 6/13/2017 6/22/2017 6/22/2017   Total Score 7 7 8           DATA      Progress Since Last Session (Related to Symptoms / Goals / Homework):   Symptoms: slight improvement    Homework: Completed in session     Current / Ongoing Stressors and Concerns:   Jannet explained that she has been having difficulties with her 5 year old son and her ex-. She discussed her belief that they undermine her parenting. Jannet also reported multiple interpersonal issues in her social interactions. She explained that it is difficult for her to keep friends.     Treatment Objective(s) Addressed in This Session:   Objective A (Client Action)                  Client will identify 5 CBT skills to use for reducing depressed mood.     Intervention:   Provided psycho-education on CBT, NATs. Educated Jannet on communication skills.     Response to Interventions:   Jannet stated that she understood this information.     ASSESSMENT: Current Emotional / Mental Status (status of significant symptoms):   Risk status (Self / Other harm or suicidal ideation)   Client denies current fears or concerns for personal safety.   Client reports the following current or recent suicidal ideation or behaviors: she reports fleeting thoughts that she would be better off dead, she denies any plan or intent.   Client denies current or recent homicidal ideation or  behaviors.   Client denies current or recent self injurious behavior or ideation.   Client denies other safety concerns.   A safety and risk management plan has not been developed at this time, however client was given the after-hours number / 911 should there be a change in any of these risk factors.     Appearance:   Appropriate    Eye Contact:   Good    Psychomotor Behavior: Normal    Attitude:   Cooperative  vague at times    Orientation:   All   Speech    Rate / Production: Normal     Volume:  Normal    Mood:    Normal   Affect:    Flat    Thought Content:  Clear    Thought Form:  Coherent  Circumstantial   Insight:    Poor         Medication Compliance:   Yes     Changes in Health Issues:   None reported     Chemical Use Review:   Substance Use: Chemical use reviewed, no active concerns identified      Tobacco Use: No current tobacco use.       Collateral Reports Completed:   Not Applicable    PLAN: (Client Tasks / Therapist Tasks / Other)  Jannet was asked to return for a follow up.    DAMARIS NoeSW

## 2017-07-14 ENCOUNTER — HOSPITAL ENCOUNTER (EMERGENCY)
Facility: HOSPITAL | Age: 27
Discharge: HOME | End: 2017-07-14
Attending: FAMILY MEDICINE | Admitting: FAMILY MEDICINE
Payer: COMMERCIAL

## 2017-07-14 VITALS
RESPIRATION RATE: 16 BRPM | SYSTOLIC BLOOD PRESSURE: 123 MMHG | OXYGEN SATURATION: 100 % | HEART RATE: 118 BPM | DIASTOLIC BLOOD PRESSURE: 89 MMHG

## 2017-07-14 DIAGNOSIS — R10.2 PELVIC PAIN IN FEMALE: ICD-10-CM

## 2017-07-14 LAB
ALBUMIN SERPL-MCNC: 4.3 G/DL (ref 3.4–5)
ALBUMIN UR-MCNC: NEGATIVE MG/DL
ALP SERPL-CCNC: 72 U/L (ref 40–150)
ALT SERPL W P-5'-P-CCNC: 28 U/L (ref 0–50)
AMPHETAMINES UR QL SCN: ABNORMAL
ANION GAP SERPL CALCULATED.3IONS-SCNC: 5 MMOL/L (ref 3–14)
APPEARANCE UR: CLEAR
AST SERPL W P-5'-P-CCNC: 17 U/L (ref 0–45)
BACTERIA #/AREA URNS HPF: ABNORMAL /HPF
BARBITURATES UR QL: ABNORMAL
BASOPHILS # BLD AUTO: 0.1 10E9/L (ref 0–0.2)
BASOPHILS NFR BLD AUTO: 0.8 %
BENZODIAZ UR QL: ABNORMAL
BILIRUB SERPL-MCNC: 0.4 MG/DL (ref 0.2–1.3)
BILIRUB UR QL STRIP: NEGATIVE
BUN SERPL-MCNC: 10 MG/DL (ref 7–30)
CALCIUM SERPL-MCNC: 9.4 MG/DL (ref 8.5–10.1)
CANNABINOIDS UR QL SCN: ABNORMAL
CHLORIDE SERPL-SCNC: 108 MMOL/L (ref 94–109)
CO2 SERPL-SCNC: 26 MMOL/L (ref 20–32)
COCAINE UR QL: ABNORMAL
COLOR UR AUTO: ABNORMAL
CREAT SERPL-MCNC: 0.63 MG/DL (ref 0.52–1.04)
DIFFERENTIAL METHOD BLD: NORMAL
EOSINOPHIL # BLD AUTO: 0.2 10E9/L (ref 0–0.7)
EOSINOPHIL NFR BLD AUTO: 2.1 %
ERYTHROCYTE [DISTWIDTH] IN BLOOD BY AUTOMATED COUNT: 13.1 % (ref 10–15)
GFR SERPL CREATININE-BSD FRML MDRD: NORMAL ML/MIN/1.7M2
GLUCOSE SERPL-MCNC: 88 MG/DL (ref 70–99)
GLUCOSE UR STRIP-MCNC: NEGATIVE MG/DL
HCG UR QL: NEGATIVE
HCT VFR BLD AUTO: 41 % (ref 35–47)
HGB BLD-MCNC: 14.4 G/DL (ref 11.7–15.7)
HGB UR QL STRIP: NEGATIVE
IMM GRANULOCYTES # BLD: 0 10E9/L (ref 0–0.4)
IMM GRANULOCYTES NFR BLD: 0.3 %
KETONES UR STRIP-MCNC: NEGATIVE MG/DL
LEUKOCYTE ESTERASE UR QL STRIP: NEGATIVE
LYMPHOCYTES # BLD AUTO: 2.3 10E9/L (ref 0.8–5.3)
LYMPHOCYTES NFR BLD AUTO: 28.3 %
MCH RBC QN AUTO: 30.4 PG (ref 26.5–33)
MCHC RBC AUTO-ENTMCNC: 35.1 G/DL (ref 31.5–36.5)
MCV RBC AUTO: 87 FL (ref 78–100)
METHADONE UR QL SCN: ABNORMAL
MONOCYTES # BLD AUTO: 0.5 10E9/L (ref 0–1.3)
MONOCYTES NFR BLD AUTO: 6.3 %
NEUTROPHILS # BLD AUTO: 5 10E9/L (ref 1.6–8.3)
NEUTROPHILS NFR BLD AUTO: 62.2 %
NITRATE UR QL: NEGATIVE
NRBC # BLD AUTO: 0 10*3/UL
NRBC BLD AUTO-RTO: 0 /100
OPIATES UR QL SCN: ABNORMAL
PCP UR QL SCN: ABNORMAL
PH UR STRIP: 7 PH (ref 4.7–8)
PLATELET # BLD AUTO: 236 10E9/L (ref 150–450)
POTASSIUM SERPL-SCNC: 3.9 MMOL/L (ref 3.4–5.3)
PROT SERPL-MCNC: 7.4 G/DL (ref 6.8–8.8)
RBC # BLD AUTO: 4.73 10E12/L (ref 3.8–5.2)
RBC #/AREA URNS AUTO: 0 /HPF (ref 0–2)
SODIUM SERPL-SCNC: 139 MMOL/L (ref 133–144)
SP GR UR STRIP: 1 (ref 1–1.03)
SQUAMOUS #/AREA URNS AUTO: 3 /HPF (ref 0–1)
URN SPEC COLLECT METH UR: ABNORMAL
UROBILINOGEN UR STRIP-MCNC: NORMAL MG/DL (ref 0–2)
WBC # BLD AUTO: 8 10E9/L (ref 4–11)
WBC #/AREA URNS AUTO: <1 /HPF (ref 0–2)

## 2017-07-14 PROCEDURE — 81025 URINE PREGNANCY TEST: CPT | Performed by: FAMILY MEDICINE

## 2017-07-14 PROCEDURE — 85025 COMPLETE CBC W/AUTO DIFF WBC: CPT | Performed by: FAMILY MEDICINE

## 2017-07-14 PROCEDURE — 99284 EMERGENCY DEPT VISIT MOD MDM: CPT | Mod: 25

## 2017-07-14 PROCEDURE — 76856 US EXAM PELVIC COMPLETE: CPT | Mod: TC

## 2017-07-14 PROCEDURE — 81001 URINALYSIS AUTO W/SCOPE: CPT | Performed by: FAMILY MEDICINE

## 2017-07-14 PROCEDURE — 80053 COMPREHEN METABOLIC PANEL: CPT | Performed by: FAMILY MEDICINE

## 2017-07-14 PROCEDURE — 36415 COLL VENOUS BLD VENIPUNCTURE: CPT | Performed by: FAMILY MEDICINE

## 2017-07-14 PROCEDURE — 80307 DRUG TEST PRSMV CHEM ANLYZR: CPT | Performed by: FAMILY MEDICINE

## 2017-07-14 PROCEDURE — 99285 EMERGENCY DEPT VISIT HI MDM: CPT | Performed by: FAMILY MEDICINE

## 2017-07-14 PROCEDURE — 76830 TRANSVAGINAL US NON-OB: CPT | Mod: TC

## 2017-07-14 RX ORDER — CYCLOBENZAPRINE HCL 10 MG
10 TABLET ORAL 3 TIMES DAILY PRN
Qty: 5 TABLET | Refills: 0 | Status: SHIPPED | OUTPATIENT
Start: 2017-07-14 | End: 2017-07-20

## 2017-07-14 ASSESSMENT — PATIENT HEALTH QUESTIONNAIRE - PHQ9: SUM OF ALL RESPONSES TO PHQ QUESTIONS 1-9: 11

## 2017-07-14 ASSESSMENT — ANXIETY QUESTIONNAIRES: GAD7 TOTAL SCORE: 8
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
## 2017-07-14 NOTE — ED NOTES
All discharge instructions and avs discussed with patient.  Pt able to verbalize home care, follow up and medication management.  All questions answered.  Pt refused vitals.  All belongings sent home

## 2017-07-14 NOTE — ED AVS SNAPSHOT
[facility]    [address] [address]    Phone:  [phone]                                       Jannet San   MRN: [id number]    Department:  [facility]   Date of Visit:  7/14/2017           After Visit Summary Signature Page     I have received my discharge instructions, and my questions have been answered. I have discussed any challenges I see with this plan with the nurse or doctor.    ..........................................................................................................................................  Patient/Patient Representative Signature      ..........................................................................................................................................  Patient Representative Print Name and Relationship to Patient    ..................................................               ................................................  Date                                            Time    ..........................................................................................................................................  Reviewed by Signature/Title    ...................................................              ..............................................  Date                                                            Time

## 2017-07-14 NOTE — ED AVS SNAPSHOT
[facility]    [address] [address]    Phone:  [phone]                                       Jannet San   MRN: [id number]    Department[facility]  [facility]   Date of Visit:  7/14/2017           Patient Information     Date Of Birth          1990        Your diagnoses for this visit were:     Pelvic pain in female        You were seen by Danyell Pearson MD.      Follow-up Information     Follow up with Leona Matthews MD.    Specialty:  Family Practice    Contact information:    [facility] [place]  [address]  [facility] [id number] [phone]        Discharge References/Attachments     PELVIC PAIN, UNKNOWN CAUSE (ENGLISH)      Future Appointments        Provider Department Dept Phone Center    7/27/2017 10:45 AM April Shweta Reza NP [facility] [phone] [facility]      ED Discharge Orders     OB/GYN REFERRAL       Your provider has referred you to:  [facility]    Please be aware that coverage of these services is subject to the terms and limitations of your health insurance plan.  Call member services at your health plan with any benefit or coverage questions.      Please bring the following with you to your appointment:    (1) Any X-Rays, CTs or MRIs which have been performed.  Contact the facility where they were done to arrange for  prior to your scheduled appointment.   (2) List of current medications   (3) This referral request   (4) Any documents/labs given to you for this referral                     Review of your medicines      START taking        Dose / Directions Last dose taken    cyclobenzaprine 10 MG tablet   Commonly known as:  FLEXERIL   Dose:  10 mg   Quantity:  5 tablet        Take 1 tablet (10 mg) by mouth 3 times daily as needed for muscle spasms   Refills:  0          Our records show that you are taking the medicines listed below. If these are incorrect, please call your family doctor or clinic.         Dose / Directions Last dose taken    BENADRYL PO   Dose:  75 mg        Take 75 mg by mouth nightly as needed   Refills:  0        lisdexamfetamine 40 MG capsule   Commonly known as:  VYVANSE   Dose:  40 mg   Quantity:  30 capsule        Take 1 capsule (40 mg) by mouth every morning   Refills:  0        MAGNESIUM OXIDE PO   Dose:  500 mg        Take 500 mg by mouth   Refills:  0        MELATONIN PO   Dose:  10 mg        Take 10 mg by mouth At Bedtime   Refills:  0        sertraline 25 MG tablet   Commonly known as:  ZOLOFT   Dose:  75 mg   Quantity:  90 tablet        Take 3 tablets (75 mg) by mouth daily   Refills:  1        TYLENOL PO   Dose:  1000 mg        Take 1,000 mg by mouth   Refills:  0                Prescriptions were sent or printed at these locations (1 Prescription)                   [id number] - [place] - [address]   [address] [place] [address]    Telephone:  [phone]   Fax:  [phone]   Hours:                  E-Prescribed (1 of 1)         cyclobenzaprine (FLEXERIL) 10 MG tablet                Procedures and tests performed during your visit     CBC with platelets differential    Comprehensive metabolic panel    Drug Screen Urine (Range)    HCG qualitative urine    UA with Microscopic    US Pelvic Complete with Transvaginal      Orders Needing Specimen Collection     None      Pending Results     No orders found from 7/12/2017 to 7/15/2017.            Pending Culture Results     No orders found from 7/12/2017 to 7/15/2017.            Thank you for choosing [facility]       Thank you for choosing [facility] for your care. Our goal is always to provide you with excellent care. Hearing back from our patients is one way we can continue to improve our services. Please take a few minutes to complete the written survey that you may receive in the mail after you visit with us. Thank you!        [employer] Information     [employer] gives you secure access to your electronic  health record. If you see a primary care provider, you can also send messages to your care team and make appointments. If you have questions, please call your primary care clinic.  If you do not have a primary care provider, please call [phone] and they will assist you.        Care EveryWhere ID     This is your Care EveryWhere ID. This could be used by other organizations to access your [facility] medical records  [id number]        Equal Access to Services     CESAR BEST : Becca So, kristy henao, herrera bustamante, ryann nieto . So [facility] [phone].    ATENCIÓN: Si habla español, tiene a mcdermott disposición servicios gratuitos de asistencia lingüística. Llame al [phone].    We comply with applicable federal civil rights laws and [place] laws. We do not discriminate on the basis of race, color, national origin, age, disability sex, sexual orientation or gender identity.            After Visit Summary       This is your record. Keep this with you and show to your community pharmacist(s) and doctor(s) at your next visit.

## 2017-07-14 NOTE — ED NOTES
Pt reports to er with increased pelvic and back pain.  Pt has history of multiple cysts on both ovaries.  Pt reports generalize malaise.  Pt denies chest pain, sob, or recent fevers.  Pt rates pain 6/10 currently.  Pt reports mild burning sensation with urination.  Pt had recent pelvic exam end of June.  Pt denies any abnormal vaginal drainage.

## 2017-07-18 DIAGNOSIS — M54.40 LUMBAGO WITH SCIATICA, UNSPECIFIED SIDE: ICD-10-CM

## 2017-07-18 DIAGNOSIS — M47.817 LUMBOSACRAL SPONDYLOSIS WITHOUT MYELOPATHY: Primary | ICD-10-CM

## 2017-07-20 ENCOUNTER — OFFICE VISIT (OUTPATIENT)
Dept: OBGYN | Facility: OTHER | Age: 27
End: 2017-07-20
Attending: OBSTETRICS & GYNECOLOGY
Payer: COMMERCIAL

## 2017-07-20 VITALS
WEIGHT: 180 LBS | BODY MASS INDEX: 25.2 KG/M2 | HEART RATE: 76 BPM | SYSTOLIC BLOOD PRESSURE: 116 MMHG | DIASTOLIC BLOOD PRESSURE: 68 MMHG | HEIGHT: 71 IN

## 2017-07-20 DIAGNOSIS — R10.2 PELVIC PAIN IN FEMALE: Primary | ICD-10-CM

## 2017-07-20 PROCEDURE — 99203 OFFICE O/P NEW LOW 30 MIN: CPT | Performed by: OBSTETRICS & GYNECOLOGY

## 2017-07-20 PROCEDURE — 99214 OFFICE O/P EST MOD 30 MIN: CPT
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
## 2017-07-20 NOTE — MR AVS SNAPSHOT
After Visit Summary   7/20/2017    Jannet San    MRN: [id number]           Patient Information     Date Of Birth          1990        Visit Information        Provider Department      7/20/2017 10:00 AM Jero Olivarez MD [facility]        Today's Diagnoses     Pelvic pain in female    -  1      Care Instructions    Nothing to eat or drink after midnight prior to procedure.            Follow-ups after your visit        Your next 10 appointments already scheduled     Jul 26, 2017  8:15 AM CDT   (Arrive by 8:00 AM)   Office Visit with Bethany Kumar MD   [facility]    [address]  [facility] [address]   [phone]           Bring a current list of meds and any records pertaining to this visit.  For Physicals, please bring immunization records and any forms needing to be filled out.    Please arrive 15 minutes early to complete paperwork and register.            Jul 27, 2017 10:45 AM CDT   (Arrive by 10:30 AM)   Return Visit with Nellie Reza NP   [facility]    [address] [place] [id number] [phone]              Who to contact     If you have questions or need follow up information about today's clinic visit or your schedule please contact [facility] directly at [phone].  Normal or non-critical lab and imaging results will be communicated to you by MyChart, letter or phone within 4 business days after the clinic has received the results. If you do not hear from us within 7 days, please contact the clinic through MyChart or phone. If you have a critical or abnormal lab result, we will notify you by phone as soon as possible.  Submit refill requests through [email or web address] or call your pharmacy and they will forward the refill request to us. Please allow 3 business days for your refill to be completed.          Additional Information  "About Your Visit        MyChart Information     [email or web address] gives you secure access to your electronic health record. If you see a primary care provider, you can also send messages to your care team and make appointments. If you have questions, please call your primary care clinic.  If you do not have a primary care provider, please call [phone] and they will assist you.        Care EveryWhere ID     This is your Care EveryWhere ID. This could be used by other organizations to access your [facility] medical records  [id number]        Your Vitals Were     Pulse Height BMI (Body Mass Index)             76 5' 11\" (1.803 m) 25.1 kg/m2          Blood Pressure from Last 3 Encounters:   07/20/17 116/68   07/14/17 123/89   07/09/17 133/92    Weight from Last 3 Encounters:   07/20/17 180 lb (81.6 kg)   06/26/17 180 lb (81.6 kg)   06/22/17 180 lb (81.6 kg)              Today, you had the following     No orders found for display       Primary Care Provider Office Phone # Fax #    Leona Matthews [phone]       [facility] [place] [address]  [place] [address]        Equal Access to Services     CESAR BEST AH: Hadii deng ku hadasho Soomaali, waaxda luqadaha, qaybta kaalmada adeegyada, ryann stockton haykaylan татьяна an laleanna . So [facility] [phone].    ATENCIÓN: Si habla español, tiene a mcdermott disposición servicios gratuitos de asistencia lingüística. Llame al [phone].    We comply with applicable federal civil rights laws and [place] laws. We do not discriminate on the basis of race, color, national origin, age, disability sex, sexual orientation or gender identity.            Thank you!     Thank you for choosing [facility]  for your care. Our goal is always to provide you with excellent care. Hearing back from our patients is one way we can continue to improve our services. Please take a few minutes to complete the written survey that you may receive in the mail after your visit " with us. Thank you!             Your Updated Medication List - Protect others around you: Learn how to safely use, store and throw away your medicines at www.disposemymeds.org.          This list is accurate as of: 7/20/17  2:28 PM.  Always use your most recent med list.                   Brand Name Dispense Instructions for use Diagnosis    BENADRYL PO      Take 75 mg by mouth nightly as needed        lisdexamfetamine 40 MG capsule    VYVANSE    30 capsule    Take 1 capsule (40 mg) by mouth every morning    Attention deficit hyperactivity disorder (ADHD), predominantly inattentive type       MAGNESIUM OXIDE PO      Take 500 mg by mouth        MELATONIN PO      Take 10 mg by mouth At Bedtime        sertraline 25 MG tablet    ZOLOFT    90 tablet    Take 3 tablets (75 mg) by mouth daily    Bipolar 2 disorder (H)       TYLENOL PO      Take 1,000 mg by mouth

## 2017-07-20 NOTE — NURSING NOTE
"Chief Complaint   Patient presents with     RECHECK     from ER-pelvic pain       Initial /68  Pulse 76  Ht 5' 11\" (1.803 m)  Wt 180 lb (81.6 kg)  BMI 25.1 kg/m2 Estimated body mass index is 25.1 kg/(m^2) as calculated from the following:    Height as of this encounter: 5' 11\" (1.803 m).    Weight as of this encounter: 180 lb (81.6 kg).  Medication Reconciliation: complete   Juliana Carson      "

## 2017-07-26 ENCOUNTER — OFFICE VISIT (OUTPATIENT)
Dept: FAMILY MEDICINE | Facility: OTHER | Age: 27
End: 2017-07-26
Attending: FAMILY MEDICINE
Payer: COMMERCIAL

## 2017-07-26 VITALS
HEART RATE: 80 BPM | DIASTOLIC BLOOD PRESSURE: 78 MMHG | SYSTOLIC BLOOD PRESSURE: 118 MMHG | OXYGEN SATURATION: 99 % | TEMPERATURE: 98.6 F | WEIGHT: 175 LBS | HEIGHT: 71 IN | BODY MASS INDEX: 24.5 KG/M2

## 2017-07-26 DIAGNOSIS — Z01.818 PREOP GENERAL PHYSICAL EXAM: Primary | ICD-10-CM

## 2017-07-26 DIAGNOSIS — M25.571 ACUTE RIGHT ANKLE PAIN: ICD-10-CM

## 2017-07-26 DIAGNOSIS — Z71.89 ACP (ADVANCE CARE PLANNING): Chronic | ICD-10-CM

## 2017-07-26 DIAGNOSIS — R10.2 PELVIC PAIN IN FEMALE: Primary | ICD-10-CM

## 2017-07-26 LAB — HCG UR QL: NEGATIVE

## 2017-07-26 PROCEDURE — 99213 OFFICE O/P EST LOW 20 MIN: CPT

## 2017-07-26 PROCEDURE — 73610 X-RAY EXAM OF ANKLE: CPT | Mod: TC,RT

## 2017-07-26 PROCEDURE — 81025 URINE PREGNANCY TEST: CPT | Mod: ZL | Performed by: FAMILY MEDICINE

## 2017-07-26 PROCEDURE — 99214 OFFICE O/P EST MOD 30 MIN: CPT | Performed by: FAMILY MEDICINE

## 2017-07-26 ASSESSMENT — PAIN SCALES - GENERAL: PAINLEVEL: MODERATE PAIN (4)

## 2017-07-26 NOTE — MR AVS SNAPSHOT
After Visit Summary   7/26/2017    Jannet San    MRN: [id number]           Patient Information     Date Of Birth          1990        Visit Information        Provider Department      7/26/2017 8:15 AM Bethany Kumar MD [facility]        Today's Diagnoses     Preop general physical exam    -  1    ACP (advance care planning)        Acute right ankle pain          Care Instructions    Will call with xray results.  Fitted with brace today.  Ice, elevate.  Follow up if not improving.  See handout.  Before Your Surgery      Call your surgeon if there is any change in your health. This includes signs of a cold or flu (such as a sore throat, runny nose, cough, rash or fever).    Do not smoke, drink alcohol or take over the counter medicine (unless your surgeon or primary care doctor tells you to) for the 24 hours before and after surgery.    If you take prescribed drugs: Follow your doctor s orders about which medicines to take and which to stop until after surgery.    Eating and drinking prior to surgery: follow the instructions from your surgeon    Take a shower or bath the night before surgery. Use the soap your surgeon gave you to gently clean your skin. If you do not have soap from your surgeon, use your regular soap. Do not shave or scrub the surgery site.  Wear clean pajamas and have clean sheets on your bed.           Follow-ups after your visit        Your next 10 appointments already scheduled     Jul 27, 2017 10:45 AM CDT   (Arrive by 10:30 AM)   Return Visit with Nellie Reza NP   [facility] [place] ([facility] [place] )    [address] [place] [address]  [place] [id number] [phone]              Who to contact     If you have questions or need follow up information about today's clinic visit or your schedule please contact [facility] [place] directly at [phone].  Normal or non-critical lab and imaging results will be  "communicated to you by [employer], letter or phone within 4 business days after the clinic has received the results. If you do not hear from us within 7 days, please contact the clinic through [employer] or phone. If you have a critical or abnormal lab result, we will notify you by phone as soon as possible.  Submit refill requests through [employer] or call your pharmacy and they will forward the refill request to us. Please allow 3 business days for your refill to be completed.          Additional Information About Your Visit        [employer] Information     [employer] gives you secure access to your electronic health record. If you see a primary care provider, you can also send messages to your care team and make appointments. If you have questions, please call your primary care clinic.  If you do not have a primary care provider, please call [phone] and they will assist you.        Care EveryWhere ID     This is your Care EveryWhere ID. This could be used by other organizations to access your [facility] medical records  [id number]        Your Vitals Were     Pulse Temperature Height Pulse Oximetry BMI (Body Mass Index)       80 98.6  F (37  C) (Tympanic) 5' 11\" (1.803 m) 99% 24.41 kg/m2        Blood Pressure from Last 3 Encounters:   07/26/17 118/78   07/20/17 116/68   07/14/17 123/89    Weight from Last 3 Encounters:   07/26/17 175 lb (79.4 kg)   07/20/17 180 lb (81.6 kg)   06/26/17 180 lb (81.6 kg)              We Performed the Following     HCG qualitative urine     XR ANKLE RT G/E 3 VW (Clinic Performed)          Today's Medication Changes          These changes are accurate as of: 7/26/17  9:05 AM.  If you have any questions, ask your nurse or doctor.               Start taking these medicines.        Dose/Directions    order for DME   Used for:  Acute right ankle pain   Started by:  Bethany Kumar MD        Right ASO brace - right ankle   Quantity:  1 Units   Refills:  0            Where to get your medicines "      Some of these will need a paper prescription and others can be bought over the counter.  Ask your nurse if you have questions.     Bring a paper prescription for each of these medications     order for DME                Primary Care Provider Office Phone # Fax #    Leona Matthews [phone]       [facility] [place] [address]  [facility] [address]        Equal Access to Services     [facility] : Hadii aad ku hadasho Soomaali, waaxda luqadaha, qaybta kaalmada adeegyada, waxay idiin hayaan adeeg kharash la'aan ah. So [facility] [phone].    ATENCIÓN: Si habla español, tiene a mcdermott disposición servicios gratuitos de asistencia lingüística. Llame al [phone].    We comply with applicable federal civil rights laws and [place] laws. We do not discriminate on the basis of race, color, national origin, age, disability sex, sexual orientation or gender identity.            Thank you!     [facility]  [facility]. Our goal is always to provide you with excellent care. Hearing back from our patients is one way we can continue to improve our services. Please take a few minutes to complete the written survey that you may receive in the mail after your visit with us. Thank you!             Your Updated Medication List - Protect others around you: Learn how to safely use, store and throw away your medicines at www.disposemymeds.org.          This list is accurate as of: 7/26/17  9:05 AM.  Always use your most recent med list.                   Brand Name Dispense Instructions for use Diagnosis    BENADRYL PO      Take 75 mg by mouth nightly as needed        lisdexamfetamine 40 MG capsule    VYVANSE    30 capsule    Take 1 capsule (40 mg) by mouth every morning    Attention deficit hyperactivity disorder (ADHD), predominantly inattentive type       MAGNESIUM OXIDE PO      Take 500 mg by mouth        MELATONIN PO      Take 10 mg by mouth At Bedtime        order  for DME     1 Units    Right ASO brace - right ankle    Acute right ankle pain       sertraline 25 MG tablet    ZOLOFT    90 tablet    Take 3 tablets (75 mg) by mouth daily    Bipolar 2 disorder (H)       TYLENOL PO      Take 1,000 mg by mouth
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
## 2017-07-26 NOTE — PATIENT INSTRUCTIONS
Will call with xray results.  Fitted with brace today.  Ice, elevate.  Follow up if not improving.  See handout.  Before Your Surgery      Call your surgeon if there is any change in your health. This includes signs of a cold or flu (such as a sore throat, runny nose, cough, rash or fever).    Do not smoke, drink alcohol or take over the counter medicine (unless your surgeon or primary care doctor tells you to) for the 24 hours before and after surgery.    If you take prescribed drugs: Follow your doctor s orders about which medicines to take and which to stop until after surgery.    Eating and drinking prior to surgery: follow the instructions from your surgeon    Take a shower or bath the night before surgery. Use the soap your surgeon gave you to gently clean your skin. If you do not have soap from your surgeon, use your regular soap. Do not shave or scrub the surgery site.  Wear clean pajamas and have clean sheets on your bed.

## 2017-07-26 NOTE — NURSING NOTE
"Chief Complaint   Patient presents with     Pre-Op Exam       Initial /78 (BP Location: Right arm, Patient Position: Chair, Cuff Size: Adult Regular)  Pulse 80  Temp 98.6  F (37  C) (Tympanic)  Ht 5' 11\" (1.803 m)  Wt 175 lb (79.4 kg)  SpO2 99%  BMI 24.41 kg/m2 Estimated body mass index is 24.41 kg/(m^2) as calculated from the following:    Height as of this encounter: 5' 11\" (1.803 m).    Weight as of this encounter: 175 lb (79.4 kg).  Medication Reconciliation: complete     DAVID AGUILAR      "

## 2017-07-27 DIAGNOSIS — F90.0 ATTENTION DEFICIT HYPERACTIVITY DISORDER (ADHD), PREDOMINANTLY INATTENTIVE TYPE: ICD-10-CM

## 2017-07-27 RX ORDER — LISDEXAMFETAMINE DIMESYLATE 40 MG/1
40 CAPSULE ORAL EVERY MORNING
Qty: 30 CAPSULE | Refills: 0 | OUTPATIENT
Start: 2017-07-27
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
## 2017-07-27 NOTE — TELEPHONE ENCOUNTER
Reason for call:  Medication    1. Medication Name? lisdexamfetamine (VYVANSE) 40 MG capsule  2. Is this request for a refill? Yes  3. What Pharmacy do you use? [facility]  4. Have you contacted your pharmacy? No    5. If yes, when?  (Please note that the turn-around-time for prescriptions is 72 business hours; I am sending your request at this time. SEND TO  [facility]  )  Description: Pt states she left a message with [facility] staff about this refill, but is now requesting her primary provider to see if she could sign off on a refill request 07/27/2017.  Nurse please advise.   Was an appointment offered for this a call? No   Preferred method for responding to this messageTelephone Call: [phone]  If we cannot reach you directly, may we leave a detailed response at the number you provided? Yes  Can this message wait until your PCP/Provider returns if not available today? Not applicable, PCP is in today

## 2017-07-27 NOTE — TELEPHONE ENCOUNTER
Vyvanse  Last visit: 6.22.17 with April Libia  Last refill: 6.22.17 #30     No provider is in with psych and patient is requesting that primary fill this. Please advise if you will do this or we will route to psych and it can be addressed tomorrow. Thank you

## 2017-07-28 RX ORDER — LISDEXAMFETAMINE DIMESYLATE 40 MG/1
40 CAPSULE ORAL EVERY MORNING
Qty: 30 CAPSULE | Refills: 0 | Status: SHIPPED | OUTPATIENT
Start: 2017-07-28 | End: 2017-08-17

## 2017-07-28 NOTE — TELEPHONE ENCOUNTER
Patient notified Rx Vyvanse 40 mg dated for 7-28-17 walked to [facility].

## 2017-08-03 ENCOUNTER — HOSPITAL ENCOUNTER (EMERGENCY)
Facility: HOSPITAL | Age: 27
Discharge: HOME | End: 2017-08-03
Attending: NURSE PRACTITIONER | Admitting: NURSE PRACTITIONER
Payer: COMMERCIAL

## 2017-08-03 ENCOUNTER — OFFICE VISIT (OUTPATIENT)
Dept: PSYCHIATRY | Facility: OTHER | Age: 27
End: 2017-08-03
Attending: NURSE PRACTITIONER
Payer: COMMERCIAL

## 2017-08-03 VITALS
DIASTOLIC BLOOD PRESSURE: 84 MMHG | HEART RATE: 73 BPM | HEIGHT: 71 IN | TEMPERATURE: 97.3 F | SYSTOLIC BLOOD PRESSURE: 124 MMHG | WEIGHT: 175 LBS | BODY MASS INDEX: 24.5 KG/M2

## 2017-08-03 VITALS
OXYGEN SATURATION: 95 % | SYSTOLIC BLOOD PRESSURE: 128 MMHG | DIASTOLIC BLOOD PRESSURE: 88 MMHG | TEMPERATURE: 97.9 F | RESPIRATION RATE: 18 BRPM

## 2017-08-03 DIAGNOSIS — F31.81 BIPOLAR 2 DISORDER (H): ICD-10-CM

## 2017-08-03 DIAGNOSIS — F90.0 ATTENTION DEFICIT HYPERACTIVITY DISORDER (ADHD), PREDOMINANTLY INATTENTIVE TYPE: ICD-10-CM

## 2017-08-03 DIAGNOSIS — K04.7 DENTAL INFECTION: ICD-10-CM

## 2017-08-03 PROCEDURE — 64400 NJX AA&/STRD TRIGEMINAL NRV: CPT | Performed by: NURSE PRACTITIONER

## 2017-08-03 PROCEDURE — 64400 NJX AA&/STRD TRIGEMINAL NRV: CPT

## 2017-08-03 PROCEDURE — 99212 OFFICE O/P EST SF 10 MIN: CPT | Mod: 25

## 2017-08-03 PROCEDURE — 40000268 ZZH STATISTIC NO CHARGES

## 2017-08-03 PROCEDURE — 99214 OFFICE O/P EST MOD 30 MIN: CPT | Performed by: NURSE PRACTITIONER

## 2017-08-03 RX ORDER — SERTRALINE HYDROCHLORIDE 100 MG/1
100 TABLET, FILM COATED ORAL DAILY
Qty: 30 TABLET | Refills: 1 | Status: SHIPPED | OUTPATIENT
Start: 2017-08-03 | End: 2017-09-15

## 2017-08-03 RX ORDER — AMOXICILLIN 500 MG/1
500 CAPSULE ORAL 3 TIMES DAILY
Qty: 21 CAPSULE | Refills: 0 | Status: SHIPPED | OUTPATIENT
Start: 2017-08-03 | End: 2017-08-10

## 2017-08-03 ASSESSMENT — ANXIETY QUESTIONNAIRES
2. NOT BEING ABLE TO STOP OR CONTROL WORRYING: NEARLY EVERY DAY
7. FEELING AFRAID AS IF SOMETHING AWFUL MIGHT HAPPEN: NEARLY EVERY DAY
6. BECOMING EASILY ANNOYED OR IRRITABLE: NEARLY EVERY DAY
IF YOU CHECKED OFF ANY PROBLEMS ON THIS QUESTIONNAIRE, HOW DIFFICULT HAVE THESE PROBLEMS MADE IT FOR YOU TO DO YOUR WORK, TAKE CARE OF THINGS AT HOME, OR GET ALONG WITH OTHER PEOPLE: VERY DIFFICULT
GAD7 TOTAL SCORE: 20
3. WORRYING TOO MUCH ABOUT DIFFERENT THINGS: NEARLY EVERY DAY
5. BEING SO RESTLESS THAT IT IS HARD TO SIT STILL: MORE THAN HALF THE DAYS
1. FEELING NERVOUS, ANXIOUS, OR ON EDGE: NEARLY EVERY DAY

## 2017-08-03 ASSESSMENT — ENCOUNTER SYMPTOMS
APPETITE CHANGE: 0
FEVER: 0
FACIAL SWELLING: 0
NUMBNESS: 0
DIARRHEA: 0
TROUBLE SWALLOWING: 0
PSYCHIATRIC NEGATIVE: 1
COUGH: 0
DYSURIA: 0
CHILLS: 0
VOMITING: 0
ACTIVITY CHANGE: 0
NAUSEA: 0

## 2017-08-03 ASSESSMENT — PATIENT HEALTH QUESTIONNAIRE - PHQ9: 5. POOR APPETITE OR OVEREATING: NEARLY EVERY DAY

## 2017-08-03 ASSESSMENT — PAIN SCALES - GENERAL: PAINLEVEL: MILD PAIN (3)

## 2017-08-03 NOTE — PROGRESS NOTES
PSYCHIATRY CLINIC PROGRESS NOTE       She was in the ER mid July for texting her mother SI statements she was intoxicawtedMBA of 0.11. Denies any SI. She did have UDS completed. Positive for thc. Sleeping poorly. Taking vyvanse has not effected her sleep. She will get a few hours per night. She is not pressurred or disorganized. She does appear a bit anxious though not manic. She is stressed about money and her son. Her ex is going to try to get full custody of her son. She is interested in the PHP program though will need to have her son get into school first as [facility] has no one to watch him. She is not crying daily though when she talks about issues she still cries. She does state the zoloft helps.     Numbness in face at times. Numbness in fingertips and toes. Hx fibromyalgia. No family hx MS. Feels tired almost all of the time. No focal neuro symptoms that are observed.   MEDICAL ROS-  intermittent numbness and pelvic pressure.  MEDICAL / SURGICAL HISTORY pregnant [if applicable]--no               Patient Active Problem List   Diagnosis     Depression     Lumbago     PTSD (post-traumatic stress disorder)     Migraine     Drug use disorder     Bilateral low back pain without sciatica     Bilateral low back pain with left-sided sciatica     Bipolar II disorder (H)     Fibromyalgia     Lump or mass in breast     Mastodynia     Encounter for gynecological examination without abnormal finding     Encounter for surveillance of contraceptives       ALLERGY   Bee pollen  MEDICATIONS               Prescription Medications as of 8/3/2017             lisdexamfetamine (VYVANSE) 40 MG capsule Take 1 capsule (40 mg) by mouth every morning    order for DME Right ASO brace - right ankle    Acetaminophen (TYLENOL PO) Take 1,000 mg by mouth    sertraline (ZOLOFT) 25 MG tablet Take 3 tablets (75 mg) by mouth daily    MELATONIN PO Take 10 mg by mouth At Bedtime    MAGNESIUM OXIDE PO Take 500 mg by mouth    DiphenhydrAMINE HCl  "(BENADRYL PO) Take 75 mg by mouth nightly as needed      Facility Administered Medications as of 8/3/2017             lidocaine 1 % injection 10 mL 10 mLs by Other route once    betamethasone acet & sod phos (CELESTONE) injection 12 mg 2 mLs (12 mg) by EPIDURAL route once              DRUG INTERACTION CHECK was unremarkable.  VITALS      /84 (BP Location: Left arm, Patient Position: Sitting, Cuff Size: Adult Regular)  Pulse 73  Temp 97.3  F (36.3  C) (Tympanic)  Ht 5' 11\" (1.803 m)  Wt 175 lb (79.4 kg)  BMI 24.41 kg/m2    PHQ9        PHQ-9 SCORE 6/22/2017 6/22/2017 8/3/2017   Total Score - - -   Total Score 13 11 14                 LABS        None needed at this appt      MENTAL STATUS EXAM          Appearance:  awake, alert and adequately groomed  Attitude:  cooperative  Eye Contact:  good  Mood:  tearful at times.   Affect:  intensity is exaggerated  Speech:  clear, coherent  Psychomotor Behavior:  no evidence of tardive dyskinesia, dystonia, or tics  Thought Process:  logical and goal oriented  Associations:  no loose associations  Thought Content:  no evidence of suicidal ideation or homicidal ideation and no evidence of psychotic thought  Insight:  fair  Judgment:  fair  Oriented to:  time, person, and place  Attention Span and Concentration:  fair  Recent and Remote Memory:  limited per report  Fund of Knowledge: appropriate  Muscle Strength and Tone: normal  Gait and Station: Normal                        DIAGNOSES     ptsd   Borderline PD  Avoidant traits  Mdd, recurrent, severe  adhd          PLAN    1) MEDICATIONS:      Increase zoloft to 100 mg         2) THERAPY: is going to cancel with reyna and see jesse ferguson      3) LABS: None needed at this appt  4) PT MONITOR [call for probs]: anxiety  5) REFERRALS [CD, medical, other]: None  6)  RTC: 2 weeks           "

## 2017-08-03 NOTE — ED AVS SNAPSHOT
[facility]    [address]    [place] [address]    Phone:  [phone]                                       Jannet San   MRN: [id number]    Department:  [facility]   Date of Visit:  8/3/2017           Patient Information     Date Of Birth          1990        Your diagnoses for this visit were:     Dental infection        You were seen by Cori Conti NP.      Follow-up Information     Follow up with Leona Matthews MD.    Specialty:  Family Practice    Why:  As needed, If symptoms worsen    Contact information:    [facility] [place]  [address]  [place] [address] [phone]          Follow up with [facility].    Specialty:  EMERGENCY MEDICINE    Why:  As needed, If symptoms worsen    Contact information:    [address]  [place] [address] [phone]    Additional information:    From [facility]: Take [address]. Turn left at [address]. Turn left at the first stoplight on [address]. At the first stop sign, take a right onto [address]. Take a left into the parking lot and continue through until you reach the North enterance of the building.       From [place]: Take [address]. Take the [address] ramp towards [place]. Turn left onto [address]. Take a slight right onto [address]. Turn left at the first stoplight on [address]. At the first stop sign, take a right onto [address]. Take a left into the parking lot and continue through until you reach the North enterance of the building.       From [place]: Take [address]. Take a right at [address]. At the first stop sign, take a right onto [address]. Take a left into the parking lot and continue through until you reach the North enterance of the building.         Discharge Instructions       Take antibiotics as ordered.   Eat a yogurt a day while taking antibiotics.   Take  tylenol and or ibuprofen for pain. Follow dosing on package.   Follow up with dentist as scheduled.   Follow up with PCP with any increase in symptoms or concerns.   Return to urgent care or emergency department with any increase in symptoms or concerns.     Discharge References/Attachments     ABSCESS, DENTAL (ENGLISH)      Future Appointments        Provider Department Dept Phone Center    8/17/2017 1:30 PM April Shweta Reza NP [facility] [phone] [facility]    10/20/2017 4:30 PM Melina Benson NP [facility] [phone] [place]         Review of your medicines      START taking        Dose / Directions Last dose taken    amoxicillin 500 MG capsule   Commonly known as:  AMOXIL   Dose:  500 mg   Quantity:  21 capsule        Take 1 capsule (500 mg) by mouth 3 times daily for 7 days   Refills:  0          Our records show that you are taking the medicines listed below. If these are incorrect, please call your family doctor or clinic.        Dose / Directions Last dose taken    BENADRYL PO   Dose:  75 mg        Take 75 mg by mouth nightly as needed   Refills:  0        lisdexamfetamine 40 MG capsule   Commonly known as:  VYVANSE   Dose:  40 mg   Quantity:  30 capsule        Take 1 capsule (40 mg) by mouth every morning   Refills:  0        MAGNESIUM OXIDE PO   Dose:  500 mg        Take 500 mg by mouth   Refills:  0        MELATONIN PO   Dose:  10 mg        Take 10 mg by mouth At Bedtime   Refills:  0        order for DME   Quantity:  1 Units        Right ASO brace - right ankle   Refills:  0        sertraline 100 MG tablet   Commonly known as:  ZOLOFT   Dose:  100 mg   Quantity:  30 tablet        Take 1 tablet (100 mg) by mouth daily   Refills:  1        TYLENOL PO   Dose:  1000 mg        Take 1,000 mg by mouth   Refills:  0                Prescriptions were sent or printed at these locations (1 Prescription)                   [facility] - [place], [place] - [address]   [address] [address]    Telephone:  [phone]   Fax:  [phone]   Hours:                  E-Prescribed (1 of 1)         amoxicillin (AMOXIL) 500 MG capsule                Orders Needing Specimen Collection     None      Pending Results     No orders found from 8/1/2017 to 8/4/2017.            Pending Culture Results     No orders found from 8/1/2017 to 8/4/2017.            Thank you for choosing [facility]       Thank you for choosing [facility] for your care. Our goal is always to provide you with excellent care. Hearing back from our patients is one way we can continue to improve our services. Please take a few minutes to complete the written survey that you may receive in the mail after you visit with us. Thank you!        [employer] Information     [employer] gives you secure access to your electronic health record. If you see a primary care provider, you can also send messages to your care team and make appointments. If you have questions, please call your primary care clinic.  If you do not have a primary care provider, please call [phone] and they will assist you.        Care EveryWhere ID     This is your Care EveryWhere ID. This could be used by other organizations to access your [facility] medical records  [id number]        Equal Access to Services     CESAR BEST AH: Becca So, kristy henao, herrera bustamante, ryann hartmann. So [facility] [phone].    ATENCIÓN: Si habla español, tiene a mcdermott disposición servicios gratuitos de asistencia lingüística. Llame al [phone].    We comply with applicable federal civil rights laws and [place] laws. We do not discriminate on the basis of race, color, national origin, age, disability sex, sexual orientation or gender identity.            After Visit Summary       This is your record. Keep this with you and show to your community pharmacist(s) and doctor(s) at your next visit.

## 2017-08-03 NOTE — DISCHARGE INSTRUCTIONS
Take antibiotics as ordered.   Eat a yogurt a day while taking antibiotics.   Take tylenol and or ibuprofen for pain. Follow dosing on package.   Follow up with dentist as scheduled.   Follow up with PCP with any increase in symptoms or concerns.   Return to urgent care or emergency department with any increase in symptoms or concerns.

## 2017-08-03 NOTE — ED AVS SNAPSHOT
[facility]    [address] [address]    Phone:  [phone]                                       Jannet San   MRN: [id number]    Department:  [facility]   Date of Visit:  8/3/2017           After Visit Summary Signature Page     I have received my discharge instructions, and my questions have been answered. I have discussed any challenges I see with this plan with the nurse or doctor.    ..........................................................................................................................................  Patient/Patient Representative Signature      ..........................................................................................................................................  Patient Representative Print Name and Relationship to Patient    ..................................................               ................................................  Date                                            Time    ..........................................................................................................................................  Reviewed by Signature/Title    ...................................................              ..............................................  Date                                                            Time

## 2017-08-03 NOTE — ED PROVIDER NOTES
History     Chief Complaint   Patient presents with     Dental Pain     rt dental pain     The history is provided by the patient. No  was used.     Jannet San is a 27 year old female who presents with right lower dental pain that started 3-4 days ago. She has done salt water rinses with mild effectiveness. She's taken tylenol and ibuprofen with mild effectiveness. Denies injury or trauma to mouth or teeth. Denies fever, chills, or night sweats. Eating and drinking well. Bowel and bladder are working well. No antibiotic use in the past 30 days. She has a scheduled dental appointment in [place] on Sept 12th. She is on the waiting list at [facility] and is number ten on the list.     I have reviewed the Medications, Allergies, Past Medical and Surgical History, and Social History in the Epic system.      Review of Systems   Constitutional: Negative for activity change, appetite change, chills and fever.   HENT: Positive for dental problem. Negative for facial swelling and trouble swallowing.    Respiratory: Negative for cough.    Gastrointestinal: Negative for diarrhea, nausea and vomiting.   Genitourinary: Negative for dysuria.   Skin: Negative for rash.   Neurological: Negative for numbness.   Psychiatric/Behavioral: Negative.        Physical Exam   BP: 128/88  Heart Rate: 77  Temp: 97.9  F (36.6  C)  Resp: 18  SpO2: 95 %  Physical Exam   Constitutional: She is oriented to person, place, and time. She appears well-developed and well-nourished. No distress.   HENT:   Head: Normocephalic.   Right Ear: External ear normal.   Left Ear: External ear normal.   Mouth/Throat: Uvula is midline, oropharynx is clear and moist and mucous membranes are normal. No trismus in the jaw. Dental abscesses present. No uvula swelling or dental caries. No oropharyngeal exudate.       TTP to tooth #30 and erythema along gumline.    Neck: Normal range of motion. Neck supple.   Cardiovascular: Normal  rate, regular rhythm and normal heart sounds.    No murmur heard.  Pulmonary/Chest: Effort normal and breath sounds normal. No respiratory distress. She has no wheezes. She has no rales.   Abdominal: Soft. She exhibits no distension.   Musculoskeletal: Normal range of motion.   Lymphadenopathy:     She has no cervical adenopathy.   Neurological: She is alert and oriented to person, place, and time.   Skin: Skin is warm and dry. No rash noted. She is not diaphoretic.   Psychiatric: She has a normal mood and affect. Her behavior is normal.   Nursing note and vitals reviewed.      ED Course     ED Course     Right inferior alveolar nerve block.  Performed by: RAÚL WAGGONER  Authorized by: RAÚL WAGGONER   Consent: Verbal consent obtained.  Risks and benefits: risks, benefits and alternatives were discussed  Consent given by: patient  Patient understanding: patient states understanding of the procedure being performed  Patient identity confirmed: verbally with patient  Local anesthesia used: yes    Anesthesia:  Local anesthesia used: yes  Local Anesthetic: bupivacaine 0.5% with epinephrine  Anesthetic total (ml): 1.8 ml.    Sedation:  Patient sedated: no  Patient tolerance: Patient tolerated the procedure well with no immediate complications        Assessments & Plan (with Medical Decision Making)     Discussed plan of care. She verbalized understanding. All questions answered.     I have reviewed the nursing notes.    I have reviewed the findings, diagnosis, plan and need for follow up with the patient.  Discharged in stable condition.     Discharge Medication List as of 8/3/2017  4:22 PM      START taking these medications    Details   amoxicillin (AMOXIL) 500 MG capsule Take 1 capsule (500 mg) by mouth 3 times daily for 7 days, Disp-21 capsule, R-0, E-Prescribe             Final diagnoses:   Dental infection     Take antibiotics as ordered.   Eat a yogurt a day while taking antibiotics.   Take tylenol and  or ibuprofen for pain. Follow dosing on package.   Follow up with dentist as scheduled.   Follow up with PCP with any increase in symptoms or concerns.   Return to urgent care or emergency department with any increase in symptoms or concerns.     ELMER Baxter  8/3/2017  3:56 PM  URGENT CARE CLINIC       Cori Conti NP  08/03/17 0113

## 2017-08-03 NOTE — MR AVS SNAPSHOT
After Visit Summary   8/3/2017    Jannet San    MRN: [id number]           Patient Information     Date Of Birth          1990        Visit Information        Provider Department      8/3/2017 2:00 PM Nellie Reza NP [facility]        Today's Diagnoses     Attention deficit hyperactivity disorder (ADHD), predominantly inattentive type        Bipolar 2 disorder (H)           Follow-ups after your visit        Your next 10 appointments already scheduled     Aug 09, 2017   Procedure with Kayden Evans MD   [facility] ([facility] )    [address] [place] [address] [phone]              Who to contact     If you have questions or need follow up information about today's clinic visit or your schedule please contact [facility] directly at [phone].  Normal or non-critical lab and imaging results will be communicated to you by MyChart, letter or phone within 4 business days after the clinic has received the results. If you do not hear from us within 7 days, please contact the clinic through MyChart or phone. If you have a critical or abnormal lab result, we will notify you by phone as soon as possible.  Submit refill requests through [employer] or call your pharmacy and they will forward the refill request to us. Please allow 3 business days for your refill to be completed.          Additional Information About Your Visit        MyChart Information     [employer] gives you secure access to your electronic health record. If you see a primary care provider, you can also send messages to your care team and make appointments. If you have questions, please call your primary care clinic.  If you do not have a primary care provider, please call [phone] and they will assist you.        Care EveryWhere ID     This is your Care EveryWhere ID. This could be used by other organizations to access your [facility]  "records  [id number]        Your Vitals Were     Pulse Temperature Height BMI (Body Mass Index)          73 97.3  F (36.3  C) (Tympanic) 5' 11\" (1.803 m) 24.41 kg/m2         Blood Pressure from Last 3 Encounters:   08/03/17 124/84   07/26/17 118/78   07/20/17 116/68    Weight from Last 3 Encounters:   08/03/17 175 lb (79.4 kg)   07/26/17 175 lb (79.4 kg)   07/20/17 180 lb (81.6 kg)              Today, you had the following     No orders found for display         Today's Medication Changes          These changes are accurate as of: 8/3/17  3:04 PM.  If you have any questions, ask your nurse or doctor.               These medicines have changed or have updated prescriptions.        Dose/Directions    sertraline 100 MG tablet   Commonly known as:  ZOLOFT   This may have changed:    - medication strength  - how much to take   Used for:  Bipolar 2 disorder (H)   Changed by:  Nellie Reza NP        Dose:  100 mg   Take 1 tablet (100 mg) by mouth daily   Quantity:  30 tablet   Refills:  1            Where to get your medicines      These medications were sent to [facility] [place]  [address]  [address] [place] [address]     Phone:  [phone]     sertraline 100 MG tablet                Primary Care Provider Office Phone # Fax #    Leona Matthews [phone]       [facility] [place] [address] [address]        Equal Access to Services     [facility] NASIR AH: Hadii deng jacobsono Soverónica, waaxda luqadaha, qaybta kaalmada adeegyada, ryann kenney adepepper hartmann. So [facility] [phone].    ATENCIÓN: Si habla español, tiene a mcdermott disposición servicios gratuitos de asistencia lingüística. Llame al [phone].    We comply with applicable federal civil rights laws and [place] laws. We do not discriminate on the basis of race, color, national origin, age, disability sex, sexual orientation or gender identity.            Thank you!     " Thank you for [facility]  for your care. Our goal is always to provide you with excellent care. Hearing back from our patients is one way we can continue to improve our services. Please take a few minutes to complete the written survey that you may receive in the mail after your visit with us. Thank you!             Your Updated Medication List - Protect others around you: Learn how to safely use, store and throw away your medicines at www.disposemymeds.org.          This list is accurate as of: 8/3/17  3:04 PM.  Always use your most recent med list.                   Brand Name Dispense Instructions for use Diagnosis    BENADRYL PO      Take 75 mg by mouth nightly as needed        lisdexamfetamine 40 MG capsule    VYVANSE    30 capsule    Take 1 capsule (40 mg) by mouth every morning    Attention deficit hyperactivity disorder (ADHD), predominantly inattentive type       MAGNESIUM OXIDE PO      Take 500 mg by mouth        MELATONIN PO      Take 10 mg by mouth At Bedtime        order for DME     1 Units    Right ASO brace - right ankle    Acute right ankle pain       sertraline 100 MG tablet    ZOLOFT    30 tablet    Take 1 tablet (100 mg) by mouth daily    Bipolar 2 disorder (H)       TYLENOL PO      Take 1,000 mg by mouth

## 2017-08-03 NOTE — NURSING NOTE
"Chief Complaint   Patient presents with     RECHECK     Mental health.       Initial /84 (BP Location: Left arm, Patient Position: Sitting, Cuff Size: Adult Regular)  Pulse 73  Temp 97.3  F (36.3  C) (Tympanic)  Ht 5' 11\" (1.803 m)  Wt 175 lb (79.4 kg)  BMI 24.41 kg/m2 Estimated body mass index is 24.41 kg/(m^2) as calculated from the following:    Height as of this encounter: 5' 11\" (1.803 m).    Weight as of this encounter: 175 lb (79.4 kg).  Medication Reconciliation: complete     OLGA KITCHEN      "

## 2017-08-04 ASSESSMENT — ANXIETY QUESTIONNAIRES: GAD7 TOTAL SCORE: 20

## 2017-08-04 ASSESSMENT — PATIENT HEALTH QUESTIONNAIRE - PHQ9: SUM OF ALL RESPONSES TO PHQ QUESTIONS 1-9: 14

## 2017-08-09 ENCOUNTER — HOSPITAL ENCOUNTER (EMERGENCY)
Facility: HOSPITAL | Age: 27
Discharge: HOME | End: 2017-08-09
Attending: INTERNAL MEDICINE | Admitting: INTERNAL MEDICINE
Payer: COMMERCIAL

## 2017-08-09 VITALS
SYSTOLIC BLOOD PRESSURE: 121 MMHG | OXYGEN SATURATION: 98 % | RESPIRATION RATE: 16 BRPM | HEART RATE: 72 BPM | TEMPERATURE: 98.5 F | DIASTOLIC BLOOD PRESSURE: 73 MMHG

## 2017-08-09 DIAGNOSIS — K13.79 MOUTH SORE: ICD-10-CM

## 2017-08-09 DIAGNOSIS — K08.89 PAIN, DENTAL: ICD-10-CM

## 2017-08-09 PROCEDURE — 25000132 ZZH RX MED GY IP 250 OP 250 PS 637: Performed by: INTERNAL MEDICINE

## 2017-08-09 PROCEDURE — 99283 EMERGENCY DEPT VISIT LOW MDM: CPT

## 2017-08-09 PROCEDURE — 99283 EMERGENCY DEPT VISIT LOW MDM: CPT | Performed by: INTERNAL MEDICINE

## 2017-08-09 RX ORDER — NAPROXEN 500 MG/1
500 TABLET ORAL ONCE
Status: COMPLETED | OUTPATIENT
Start: 2017-08-09 | End: 2017-08-09

## 2017-08-09 RX ORDER — NAPROXEN 500 MG/1
500 TABLET ORAL 2 TIMES DAILY WITH MEALS
Qty: 6 TABLET | Refills: 0 | Status: SHIPPED | OUTPATIENT
Start: 2017-08-09 | End: 2017-08-12

## 2017-08-09 RX ADMIN — NAPROXEN 500 MG: 500 TABLET ORAL at 02:15

## 2017-08-09 ASSESSMENT — ENCOUNTER SYMPTOMS
FLANK PAIN: 0
ABDOMINAL DISTENTION: 0
FEVER: 0
VOMITING: 0
SHORTNESS OF BREATH: 0
ARTHRALGIAS: 0
ANAL BLEEDING: 0
PALPITATIONS: 0
NUMBNESS: 0
VOICE CHANGE: 0
MYALGIAS: 0
NECK STIFFNESS: 0
WOUND: 0
ABDOMINAL PAIN: 0
LIGHT-HEADEDNESS: 0
HEMATURIA: 0
DIAPHORESIS: 0
WHEEZING: 0
COLOR CHANGE: 0
DYSURIA: 0
BACK PAIN: 0
CONFUSION: 0
CHILLS: 0
NAUSEA: 0
DIZZINESS: 0
HEADACHES: 0
BLOOD IN STOOL: 0
COUGH: 0
CHEST TIGHTNESS: 0
NECK PAIN: 0

## 2017-08-09 NOTE — DISCHARGE INSTRUCTIONS
"   *DENTAL PAIN    A crack or cavity in the tooth, which exposes the sensitive inner area of the tooth can cause tooth pain. An infection in the gum or the root of the tooth can cause pain and swelling. The pain is often made worse by drinking hot or cold fluids, or biting on hard foods. Pain may spread from the tooth to the ear or jaw on the same side.  HOME CARE:  1. Avoid hot and cold foods and liquids since your tooth may be sensitive to temperature changes.  2. If your tooth is chipped or cracked, or if there is a large open cavity, apply OIL OF CLOVES (available over-the-counter in drug stores) directly to the tooth to reduce pain. Some pharmacies carry an over-the-counter \"toothache kit.\" This contains a paste, which can be applied over the exposed tooth to decrease sensitivity. This is only a temporary solution. See a dentist as soon as possible to fix the tooth.  3. A cold pack on your jaw over the sore area may help reduce pain.  4. You may use acetaminophen (Tylenol) 650-1000 mg every 6 hours or ibuprofen (Motrin, Advil) 600 mg every 6-8 hours with food to control pain, if you are able to take these medicines. [ NOTE: If you have chronic liver or kidney disease or ever had a stomach ulcer or GI bleeding, talk with your doctor before using these medicines.]  5. If you have signs of an infection, an antibiotic will be given. Take it as directed.  FOLLOW-UP as directed with a dentist. Your pain may go away with the treatment given. However, only a dentist can fully evaluate and treat the cause and prevent the pain from coming back again.  TOOTHACHE IS A SIGN OF DISEASE IN YOUR TOOTH AND SHOULD BE EXAMINED AND TREATED BY A DENTIST.  GET PROMPT MEDICAL ATTENTION if any of the following occur:    Your face becomes swollen or red    Pain worsens or spreads to the neck    Fever over 101  F (38.3  C)    Unusual drowsiness; headache or stiff neck; weakness or fainting    Pus drains from the tooth    Difficulty " swallowing or breathing       8051-1698 [facility], [address] [place] [address], [place] [address]. All rights reserved. This information is not intended as a substitute for professional medical care. Always follow your healthcare professional's instructions.

## 2017-08-09 NOTE — ED AVS SNAPSHOT
[facility]    [address] [address]    Phone:  [phone]                                       Jannet San   MRN: [id number]    Department:  [facility]   Date of Visit:  8/9/2017           After Visit Summary Signature Page     I have received my discharge instructions, and my questions have been answered. I have discussed any challenges I see with this plan with the nurse or doctor.    ..........................................................................................................................................  Patient/Patient Representative Signature      ..........................................................................................................................................  Patient Representative Print Name and Relationship to Patient    ..................................................               ................................................  Date                                            Time    ..........................................................................................................................................  Reviewed by Signature/Title    ...................................................              ..............................................  Date                                                            Time

## 2017-08-09 NOTE — ED NOTES
Pt given discharge papers in hill outside room and left.  Does not rate pain. Steady gait to the ED doors.

## 2017-08-09 NOTE — ED AVS SNAPSHOT
" [facility]    [address] [address]    Phone:  [phone]                                       Jannet San   MRN: [id number]    Department:  [facility]   Date of Visit:  8/9/2017           Patient Information     Date Of Birth          1990        Your diagnoses for this visit were:     Mouth sore     Pain, dental        You were seen by David Barrera MD.      Follow-up Information     [facility] Center, [facility]    Contact information:    [address] [address] [phone]          Discharge Instructions          *DENTAL PAIN    A crack or cavity in the tooth, which exposes the sensitive inner area of the tooth can cause tooth pain. An infection in the gum or the root of the tooth can cause pain and swelling. The pain is often made worse by drinking hot or cold fluids, or biting on hard foods. Pain may spread from the tooth to the ear or jaw on the same side.  HOME CARE:  1. Avoid hot and cold foods and liquids since your tooth may be sensitive to temperature changes.  2. If your tooth is chipped or cracked, or if there is a large open cavity, apply OIL OF CLOVES (available over-the-counter in drug stores) directly to the tooth to reduce pain. Some pharmacies carry an over-the-counter \"toothache kit.\" This contains a paste, which can be applied over the exposed tooth to decrease sensitivity. This is only a temporary solution. See a dentist as soon as possible to fix the tooth.  3. A cold pack on your jaw over the sore area may help reduce pain.  4. You may use acetaminophen (Tylenol) 650-1000 mg every 6 hours or ibuprofen (Motrin, Advil) 600 mg every 6-8 hours with food to control pain, if you are able to take these medicines. [ NOTE: If you have chronic liver or kidney disease or ever had a stomach ulcer or GI bleeding, talk with your doctor before using these medicines.]  5. If you have signs of an infection, an antibiotic will be given. " Take it as directed.  FOLLOW-UP as directed with a dentist. Your pain may go away with the treatment given. However, only a dentist can fully evaluate and treat the cause and prevent the pain from coming back again.  TOOTHACHE IS A SIGN OF DISEASE IN YOUR TOOTH AND SHOULD BE EXAMINED AND TREATED BY A DENTIST.  GET PROMPT MEDICAL ATTENTION if any of the following occur:    Your face becomes swollen or red    Pain worsens or spreads to the neck    Fever over 101  F (38.3  C)    Unusual drowsiness; headache or stiff neck; weakness or fainting    Pus drains from the tooth    Difficulty swallowing or breathing       [id number] [place] [address]. All rights reserved. This information is not intended as a substitute for professional medical care. Always follow your healthcare professional's instructions.      Future Appointments        Provider Department Dept Phone Center    8/17/2017 1:30 PM April Shweta Reza NP [facility] [phone] [facility]    10/20/2017 4:30 PM Melina Benson NP [facility] [phone] [place]         Review of your medicines      START taking        Dose / Directions Last dose taken    naproxen 500 MG tablet   Commonly known as:  NAPROSYN   Dose:  500 mg   Quantity:  6 tablet        Take 1 tablet (500 mg) by mouth 2 times daily (with meals) for 3 days   Refills:  0          Our records show that you are taking the medicines listed below. If these are incorrect, please call your family doctor or clinic.        Dose / Directions Last dose taken    amoxicillin 500 MG capsule   Commonly known as:  AMOXIL   Dose:  500 mg   Quantity:  21 capsule        Take 1 capsule (500 mg) by mouth 3 times daily for 7 days   Refills:  0        BENADRYL PO   Dose:  75 mg        Take 75 mg by mouth nightly as needed   Refills:  0        IBUPROFEN PO   Dose:  600 mg        Take 600 mg by mouth   Refills:  0        lisdexamfetamine 40 MG  capsule   Commonly known as:  VYVANSE   Dose:  40 mg   Quantity:  30 capsule        Take 1 capsule (40 mg) by mouth every morning   Refills:  0        MAGNESIUM OXIDE PO   Dose:  500 mg        Take 500 mg by mouth   Refills:  0        MELATONIN PO   Dose:  10 mg        Take 10 mg by mouth At Bedtime   Refills:  0        order for DME   Quantity:  1 Units        Right ASO brace - right ankle   Refills:  0        sertraline 100 MG tablet   Commonly known as:  ZOLOFT   Dose:  100 mg   Quantity:  30 tablet        Take 1 tablet (100 mg) by mouth daily   Refills:  1        TYLENOL PO   Dose:  1000 mg        Take 1,000 mg by mouth   Refills:  0                Prescriptions were sent or printed at these locations (1 Prescription)                   [facility] - [place], [place] - [address] AT [facility] of [facility] [address], [place] [address]    Telephone:  [phone]   Fax:  [phone]   Hours:                  Printed at Department/Unit printer (1 of 1)         naproxen (NAPROSYN) 500 MG tablet                Orders Needing Specimen Collection     None      Pending Results     No orders found from 8/7/2017 to 8/10/2017.            Pending Culture Results     No orders found from 8/7/2017 to 8/10/2017.            Thank you for choosing [facility]       Thank you for choosing [facility] for your care. Our goal is always to provide you with excellent care. Hearing back from our patients is one way we can continue to improve our services. Please take a few minutes to complete the written survey that you may receive in the mail after you visit with us. Thank you!        [facility] Information     [facility] gives you secure access to your electronic health record. If you see a primary care provider, you can also send messages to your care team and make appointments. If you have questions, please call your primary care clinic.  If you do not have a primary care provider, please call [phone] and they  will assist you.        Care EveryWhere ID     This is your Care EveryWhere ID. This could be used by other organizations to access your [facility] medical records  [id number]        Equal Access to Services     CESAR BSET : Becca So, kristy henao, ryann torres. So [facility] [phone].    ATENCIÓN: Si habla español, tiene a mcdermott disposición servicios gratuitos de asistencia lingüística. Llame al [phone].    We comply with applicable federal civil rights laws and [place] laws. We do not discriminate on the basis of race, color, national origin, age, disability sex, sexual orientation or gender identity.            After Visit Summary       This is your record. Keep this with you and show to your community pharmacist(s) and doctor(s) at your next visit.

## 2017-08-16 ENCOUNTER — SURGERY (OUTPATIENT)
Age: 27
End: 2017-08-16

## 2017-08-16 ENCOUNTER — HOSPITAL ENCOUNTER (OUTPATIENT)
Facility: HOSPITAL | Age: 27
Discharge: HOME | End: 2017-08-16
Attending: OBSTETRICS & GYNECOLOGY | Admitting: OBSTETRICS & GYNECOLOGY
Payer: COMMERCIAL

## 2017-08-16 ENCOUNTER — ANESTHESIA (OUTPATIENT)
Dept: SURGERY | Facility: HOSPITAL | Age: 27
End: 2017-08-16
Payer: COMMERCIAL

## 2017-08-16 ENCOUNTER — ANESTHESIA EVENT (OUTPATIENT)
Dept: SURGERY | Facility: HOSPITAL | Age: 27
End: 2017-08-16
Payer: COMMERCIAL

## 2017-08-16 VITALS
TEMPERATURE: 97.9 F | SYSTOLIC BLOOD PRESSURE: 131 MMHG | OXYGEN SATURATION: 98 % | RESPIRATION RATE: 16 BRPM | WEIGHT: 176 LBS | BODY MASS INDEX: 24.55 KG/M2 | DIASTOLIC BLOOD PRESSURE: 82 MMHG

## 2017-08-16 DIAGNOSIS — G89.18 POST-OP PAIN: Primary | ICD-10-CM

## 2017-08-16 LAB — HCG UR QL: NEGATIVE

## 2017-08-16 PROCEDURE — 25000128 H RX IP 250 OP 636: Performed by: ANESTHESIOLOGY

## 2017-08-16 PROCEDURE — 25000128 H RX IP 250 OP 636: Performed by: NURSE ANESTHETIST, CERTIFIED REGISTERED

## 2017-08-16 PROCEDURE — 37000009 ZZH ANESTHESIA TECHNICAL FEE, EACH ADDTL 15 MIN: Performed by: OBSTETRICS & GYNECOLOGY

## 2017-08-16 PROCEDURE — 36000052 ZZH SURGERY LEVEL 2 EA 15 ADDTL MIN: Performed by: OBSTETRICS & GYNECOLOGY

## 2017-08-16 PROCEDURE — 01999 UNLISTED ANES PROCEDURE: CPT | Performed by: NURSE ANESTHETIST, CERTIFIED REGISTERED

## 2017-08-16 PROCEDURE — 27210794 ZZH OR GENERAL SUPPLY STERILE: Performed by: OBSTETRICS & GYNECOLOGY

## 2017-08-16 PROCEDURE — 25000566 ZZH SEVOFLURANE, EA 15 MIN: Performed by: ANESTHESIOLOGY

## 2017-08-16 PROCEDURE — 49320 DIAG LAPARO SEPARATE PROC: CPT | Performed by: OBSTETRICS & GYNECOLOGY

## 2017-08-16 PROCEDURE — 58661 LAPAROSCOPY REMOVE ADNEXA: CPT | Performed by: ANESTHESIOLOGY

## 2017-08-16 PROCEDURE — 40000305 ZZH STATISTIC PRE PROC ASSESS I: Performed by: OBSTETRICS & GYNECOLOGY

## 2017-08-16 PROCEDURE — 37000008 ZZH ANESTHESIA TECHNICAL FEE, 1ST 30 MIN: Performed by: OBSTETRICS & GYNECOLOGY

## 2017-08-16 PROCEDURE — 25000125 ZZHC RX 250: Performed by: NURSE ANESTHETIST, CERTIFIED REGISTERED

## 2017-08-16 PROCEDURE — 25000128 H RX IP 250 OP 636: Performed by: OBSTETRICS & GYNECOLOGY

## 2017-08-16 PROCEDURE — 36000050 ZZH SURGERY LEVEL 2 1ST 30 MIN: Performed by: OBSTETRICS & GYNECOLOGY

## 2017-08-16 PROCEDURE — 71000014 ZZH RECOVERY PHASE 1 LEVEL 2 FIRST HR: Performed by: OBSTETRICS & GYNECOLOGY

## 2017-08-16 PROCEDURE — 25000125 ZZHC RX 250: Performed by: ANESTHESIOLOGY

## 2017-08-16 PROCEDURE — 71000027 ZZH RECOVERY PHASE 2 EACH 15 MINS: Performed by: OBSTETRICS & GYNECOLOGY

## 2017-08-16 PROCEDURE — 25000125 ZZHC RX 250: Performed by: OBSTETRICS & GYNECOLOGY

## 2017-08-16 PROCEDURE — 81025 URINE PREGNANCY TEST: CPT | Performed by: ANESTHESIOLOGY

## 2017-08-16 PROCEDURE — 25000132 ZZH RX MED GY IP 250 OP 250 PS 637: Performed by: NURSE ANESTHETIST, CERTIFIED REGISTERED

## 2017-08-16 PROCEDURE — 27110028 ZZH OR GENERAL SUPPLY NON-STERILE: Performed by: OBSTETRICS & GYNECOLOGY

## 2017-08-16 PROCEDURE — 25000132 ZZH RX MED GY IP 250 OP 250 PS 637

## 2017-08-16 RX ORDER — ONDANSETRON 2 MG/ML
4 INJECTION INTRAMUSCULAR; INTRAVENOUS EVERY 30 MIN PRN
Status: DISCONTINUED | OUTPATIENT
Start: 2017-08-16 | End: 2017-08-16 | Stop reason: HOSPADM

## 2017-08-16 RX ORDER — OXYCODONE HYDROCHLORIDE 5 MG/1
5 TABLET ORAL EVERY 4 HOURS PRN
Qty: 20 TABLET | Refills: 0 | Status: SHIPPED | OUTPATIENT
Start: 2017-08-16 | End: 2017-08-24

## 2017-08-16 RX ORDER — LIDOCAINE HYDROCHLORIDE 20 MG/ML
INJECTION, SOLUTION INFILTRATION; PERINEURAL PRN
Status: DISCONTINUED | OUTPATIENT
Start: 2017-08-16 | End: 2017-08-16

## 2017-08-16 RX ORDER — SODIUM CHLORIDE, SODIUM LACTATE, POTASSIUM CHLORIDE, CALCIUM CHLORIDE 600; 310; 30; 20 MG/100ML; MG/100ML; MG/100ML; MG/100ML
INJECTION, SOLUTION INTRAVENOUS CONTINUOUS
Status: DISCONTINUED | OUTPATIENT
Start: 2017-08-16 | End: 2017-08-16 | Stop reason: HOSPADM

## 2017-08-16 RX ORDER — NALOXONE HYDROCHLORIDE 0.4 MG/ML
.1-.4 INJECTION, SOLUTION INTRAMUSCULAR; INTRAVENOUS; SUBCUTANEOUS
Status: DISCONTINUED | OUTPATIENT
Start: 2017-08-16 | End: 2017-08-16 | Stop reason: HOSPADM

## 2017-08-16 RX ORDER — SCOLOPAMINE TRANSDERMAL SYSTEM 1 MG/1
1 PATCH, EXTENDED RELEASE TRANSDERMAL ONCE
Status: COMPLETED | OUTPATIENT
Start: 2017-08-16 | End: 2017-08-16

## 2017-08-16 RX ORDER — DEXAMETHASONE SODIUM PHOSPHATE 10 MG/ML
INJECTION, SOLUTION INTRAMUSCULAR; INTRAVENOUS PRN
Status: DISCONTINUED | OUTPATIENT
Start: 2017-08-16 | End: 2017-08-16

## 2017-08-16 RX ORDER — HYDROMORPHONE HYDROCHLORIDE 1 MG/ML
.3-.5 INJECTION, SOLUTION INTRAMUSCULAR; INTRAVENOUS; SUBCUTANEOUS EVERY 10 MIN PRN
Status: DISCONTINUED | OUTPATIENT
Start: 2017-08-16 | End: 2017-08-16 | Stop reason: HOSPADM

## 2017-08-16 RX ORDER — CEFAZOLIN SODIUM 2 G/100ML
2 INJECTION, SOLUTION INTRAVENOUS
Status: COMPLETED | OUTPATIENT
Start: 2017-08-16 | End: 2017-08-16

## 2017-08-16 RX ORDER — LABETALOL HYDROCHLORIDE 5 MG/ML
10 INJECTION, SOLUTION INTRAVENOUS
Status: DISCONTINUED | OUTPATIENT
Start: 2017-08-16 | End: 2017-08-16 | Stop reason: HOSPADM

## 2017-08-16 RX ORDER — PROMETHAZINE HYDROCHLORIDE 25 MG/ML
12.5 INJECTION, SOLUTION INTRAMUSCULAR; INTRAVENOUS
Status: DISCONTINUED | OUTPATIENT
Start: 2017-08-16 | End: 2017-08-16 | Stop reason: HOSPADM

## 2017-08-16 RX ORDER — DEXAMETHASONE SODIUM PHOSPHATE 4 MG/ML
4 INJECTION, SOLUTION INTRA-ARTICULAR; INTRALESIONAL; INTRAMUSCULAR; INTRAVENOUS; SOFT TISSUE EVERY 10 MIN PRN
Status: DISCONTINUED | OUTPATIENT
Start: 2017-08-16 | End: 2017-08-16 | Stop reason: HOSPADM

## 2017-08-16 RX ORDER — PROPOFOL 10 MG/ML
INJECTION, EMULSION INTRAVENOUS PRN
Status: DISCONTINUED | OUTPATIENT
Start: 2017-08-16 | End: 2017-08-16

## 2017-08-16 RX ORDER — HYDRALAZINE HYDROCHLORIDE 20 MG/ML
2.5-5 INJECTION INTRAMUSCULAR; INTRAVENOUS EVERY 10 MIN PRN
Status: DISCONTINUED | OUTPATIENT
Start: 2017-08-16 | End: 2017-08-16 | Stop reason: HOSPADM

## 2017-08-16 RX ORDER — MEPERIDINE HYDROCHLORIDE 25 MG/ML
12.5 INJECTION INTRAMUSCULAR; INTRAVENOUS; SUBCUTANEOUS
Status: DISCONTINUED | OUTPATIENT
Start: 2017-08-16 | End: 2017-08-16 | Stop reason: HOSPADM

## 2017-08-16 RX ORDER — OXYCODONE HYDROCHLORIDE 5 MG/1
TABLET ORAL
Status: COMPLETED
Start: 2017-08-16 | End: 2017-08-16

## 2017-08-16 RX ORDER — FENTANYL CITRATE 50 UG/ML
INJECTION, SOLUTION INTRAMUSCULAR; INTRAVENOUS PRN
Status: DISCONTINUED | OUTPATIENT
Start: 2017-08-16 | End: 2017-08-16

## 2017-08-16 RX ORDER — FENTANYL CITRATE 50 UG/ML
25-50 INJECTION, SOLUTION INTRAMUSCULAR; INTRAVENOUS
Status: DISCONTINUED | OUTPATIENT
Start: 2017-08-16 | End: 2017-08-16 | Stop reason: HOSPADM

## 2017-08-16 RX ORDER — ALBUTEROL SULFATE 0.83 MG/ML
2.5 SOLUTION RESPIRATORY (INHALATION) EVERY 4 HOURS PRN
Status: DISCONTINUED | OUTPATIENT
Start: 2017-08-16 | End: 2017-08-16 | Stop reason: HOSPADM

## 2017-08-16 RX ORDER — LIDOCAINE HYDROCHLORIDE 20 MG/ML
INJECTION, SOLUTION INFILTRATION; PERINEURAL PRN
Status: DISCONTINUED | OUTPATIENT
Start: 2017-08-16 | End: 2017-08-16 | Stop reason: HOSPADM

## 2017-08-16 RX ORDER — ONDANSETRON 4 MG/1
4 TABLET, ORALLY DISINTEGRATING ORAL EVERY 30 MIN PRN
Status: DISCONTINUED | OUTPATIENT
Start: 2017-08-16 | End: 2017-08-16 | Stop reason: HOSPADM

## 2017-08-16 RX ORDER — ALBUTEROL SULFATE 90 UG/1
AEROSOL, METERED RESPIRATORY (INHALATION) PRN
Status: DISCONTINUED | OUTPATIENT
Start: 2017-08-16 | End: 2017-08-16

## 2017-08-16 RX ORDER — GLYCOPYRROLATE 0.2 MG/ML
INJECTION, SOLUTION INTRAMUSCULAR; INTRAVENOUS PRN
Status: DISCONTINUED | OUTPATIENT
Start: 2017-08-16 | End: 2017-08-16

## 2017-08-16 RX ORDER — IBUPROFEN 800 MG/1
800 TABLET, FILM COATED ORAL EVERY 8 HOURS PRN
Qty: 40 TABLET | Refills: 1 | Status: SHIPPED | OUTPATIENT
Start: 2017-08-16 | End: 2017-10-03

## 2017-08-16 RX ORDER — ONDANSETRON 2 MG/ML
INJECTION INTRAMUSCULAR; INTRAVENOUS PRN
Status: DISCONTINUED | OUTPATIENT
Start: 2017-08-16 | End: 2017-08-16

## 2017-08-16 RX ORDER — KETOROLAC TROMETHAMINE 30 MG/ML
30 INJECTION, SOLUTION INTRAMUSCULAR; INTRAVENOUS EVERY 6 HOURS PRN
Status: DISCONTINUED | OUTPATIENT
Start: 2017-08-16 | End: 2017-08-16 | Stop reason: HOSPADM

## 2017-08-16 RX ADMIN — MIDAZOLAM HYDROCHLORIDE 2 MG: 1 INJECTION, SOLUTION INTRAMUSCULAR; INTRAVENOUS at 07:58

## 2017-08-16 RX ADMIN — PROPOFOL 150 MG: 10 INJECTION, EMULSION INTRAVENOUS at 08:08

## 2017-08-16 RX ADMIN — LIDOCAINE HYDROCHLORIDE 10 ML: 20 INJECTION, SOLUTION INFILTRATION; PERINEURAL at 08:48

## 2017-08-16 RX ADMIN — FENTANYL CITRATE 50 MCG: 50 INJECTION, SOLUTION INTRAMUSCULAR; INTRAVENOUS at 09:12

## 2017-08-16 RX ADMIN — FENTANYL CITRATE 100 MCG: 50 INJECTION, SOLUTION INTRAMUSCULAR; INTRAVENOUS at 07:58

## 2017-08-16 RX ADMIN — ONDANSETRON 4 MG: 2 INJECTION INTRAMUSCULAR; INTRAVENOUS at 08:50

## 2017-08-16 RX ADMIN — GLYCOPYRROLATE 0.1 MG: 0.2 INJECTION, SOLUTION INTRAMUSCULAR; INTRAVENOUS at 08:38

## 2017-08-16 RX ADMIN — SCOPALAMINE 1 PATCH: 1 PATCH, EXTENDED RELEASE TRANSDERMAL at 07:18

## 2017-08-16 RX ADMIN — ALBUTEROL SULFATE 2 PUFF: 90 AEROSOL, METERED RESPIRATORY (INHALATION) at 08:15

## 2017-08-16 RX ADMIN — Medication 100 MG: at 08:08

## 2017-08-16 RX ADMIN — DEXAMETHASONE SODIUM PHOSPHATE 10 MG: 10 INJECTION, SOLUTION INTRAMUSCULAR; INTRAVENOUS at 08:20

## 2017-08-16 RX ADMIN — CEFAZOLIN SODIUM 2 G: 2 INJECTION, SOLUTION INTRAVENOUS at 07:58

## 2017-08-16 RX ADMIN — SODIUM CHLORIDE, POTASSIUM CHLORIDE, SODIUM LACTATE AND CALCIUM CHLORIDE 1000 ML: 600; 310; 30; 20 INJECTION, SOLUTION INTRAVENOUS at 07:25

## 2017-08-16 RX ADMIN — OXYCODONE HYDROCHLORIDE 5 MG: 5 TABLET ORAL at 10:28

## 2017-08-16 RX ADMIN — ROCURONIUM BROMIDE 10 MG: 10 INJECTION INTRAVENOUS at 08:08

## 2017-08-16 RX ADMIN — LIDOCAINE HYDROCHLORIDE 40 MG: 20 INJECTION, SOLUTION INFILTRATION; PERINEURAL at 08:08

## 2017-08-16 NOTE — ADDENDUM NOTE
Addendum  created 08/16/17 1344 by Jass Holland APRN CRNA    Anesthesia Event edited, Anesthesia Intra Meds edited, Charge Capture section accepted, Procedure Event Log accessed, Sign clinical note

## 2017-08-16 NOTE — DISCHARGE SUMMARY
Discharge Summary    Jannet San MRN# [id number]   YOB: 1990 Age: 27 year old     Date of Admission:  8/16/2017  Date of Discharge:  8/16/2017  Admitting Physician:  Kayden Evans MD  Discharge Physician:  Kayden Evans MD  Discharging Service:  Obstetrics and Gynecology     Home clinic: [facility]  Primary Provider: Leona Matthews          Admission Diagnoses:   PELVIC PAIN IN FEMALE          Discharge Diagnosis:   Patient Active Problem List    Diagnosis Date Noted     ACP (advance care planning) 07/26/2017     Priority: Medium     Advance Care Planning 7/26/2017: ACP Review of Chart / Resources Provided:  Reviewed chart for advance care plan.  Jannet San has no plan or code status on file. Discussed available resources and provided with information.   Added by DAVID AGUILAR             Encounter for surveillance of contraceptives 04/19/2017     Priority: Medium     Nexplanon removal on 4/19/17.       Encounter for gynecological examination without abnormal finding 02/08/2017     Priority: Medium     Lump or mass in breast 02/06/2017     Priority: Medium     Right breast       Mastodynia 02/06/2017     Priority: Medium     Right breast       Fibromyalgia 12/24/2015     Priority: Medium     Bipolar II disorder (H) 12/03/2015     Priority: Medium     Bilateral low back pain with left-sided sciatica 07/20/2015     Priority: Medium     Bilateral low back pain without sciatica 05/18/2015     Priority: Medium     PTSD (post-traumatic stress disorder) 06/07/2012     Priority: Medium     Migraine 06/07/2012     Priority: Medium     Problem list name updated by automated process. Provider to review       Drug use disorder 06/07/2012     Priority: Medium     in remission       Lumbago 04/24/2008     Priority: Medium     Depression 02/15/2008     Priority: Medium                Discharge Disposition:   Discharged to home           Condition on Discharge:   Discharge  condition: Good   Discharge vitals: Blood pressure 110/61, temperature 97.5  F (36.4  C), temperature source Temporal, resp. rate 16, weight 79.8 kg (176 lb), SpO2 99 %, not currently breastfeeding.     Code status on discharge: Full Code           Procedures / Labs / Imaging:   Diagnostic laparoscopy and irrigation of pelvis          Medications Prior to Admission:     Prescriptions Prior to Admission   Medication Sig Dispense Refill Last Dose     IBUPROFEN PO Take 600 mg by mouth   Past Week at Unknown time     sertraline (ZOLOFT) 100 MG tablet Take 1 tablet (100 mg) by mouth daily 30 tablet 1 8/16/2017 at 0620     lisdexamfetamine (VYVANSE) 40 MG capsule Take 1 capsule (40 mg) by mouth every morning 30 capsule 0 8/15/2017 at Unknown time     Acetaminophen (TYLENOL PO) Take 1,000 mg by mouth   8/15/2017 at Unknown time     MELATONIN PO Take 10 mg by mouth At Bedtime   Past Week at Unknown time     MAGNESIUM OXIDE PO Take 500 mg by mouth   Past Week at Unknown time     DiphenhydrAMINE HCl (BENADRYL PO) Take 75 mg by mouth nightly as needed   Past Week at Unknown time     order for DME Right ASO brace - right ankle 1 Units 0 Unknown at Unknown time             Discharge Medications:     Current Discharge Medication List      START taking these medications    Details   oxyCODONE (ROXICODONE) 5 MG IR tablet Take 1 tablet (5 mg) by mouth every 4 hours as needed for pain maximum 10 tablet(s) per day  Qty: 20 tablet, Refills: 0    Associated Diagnoses: Post-op pain      !! ibuprofen (ADVIL/MOTRIN) 800 MG tablet Take 1 tablet (800 mg) by mouth every 8 hours as needed for moderate pain  Qty: 40 tablet, Refills: 1    Associated Diagnoses: Post-op pain       !! - Potential duplicate medications found. Please discuss with provider.      CONTINUE these medications which have NOT CHANGED    Details   !! IBUPROFEN PO Take 600 mg by mouth      sertraline (ZOLOFT) 100 MG tablet Take 1 tablet (100 mg) by mouth daily  Qty: 30  tablet, Refills: 1    Associated Diagnoses: Bipolar 2 disorder (H)      lisdexamfetamine (VYVANSE) 40 MG capsule Take 1 capsule (40 mg) by mouth every morning  Qty: 30 capsule, Refills: 0    Associated Diagnoses: Attention deficit hyperactivity disorder (ADHD), predominantly inattentive type      Acetaminophen (TYLENOL PO) Take 1,000 mg by mouth      MELATONIN PO Take 10 mg by mouth At Bedtime      MAGNESIUM OXIDE PO Take 500 mg by mouth      DiphenhydrAMINE HCl (BENADRYL PO) Take 75 mg by mouth nightly as needed      order for DME Right ASO brace - right ankle  Qty: 1 Units, Refills: 0    Associated Diagnoses: Acute right ankle pain       !! - Potential duplicate medications found. Please discuss with provider.                Consultations:   No consultations were requested during this admission             Brief History of Illness:   This patient was a 27 year old female who presented with Pelvic Pain          Hospital Course:   Patient Active Problem List   Diagnosis     Depression     Lumbago     PTSD (post-traumatic stress disorder)     Migraine     Drug use disorder     Bilateral low back pain without sciatica     Bilateral low back pain with left-sided sciatica     Bipolar II disorder (H)     Fibromyalgia     Lump or mass in breast     Mastodynia     Encounter for gynecological examination without abnormal finding     Encounter for surveillance of contraceptives     ACP (advance care planning)                  Significant Results:                Pending Results:   None           Discharge Instructions and Follow-Up:   Discharge diet: Regular   Discharge activity: Activity as tolerated   Discharge follow-up: Follow up with Dr. Evans in 1 weeks   Outpatient therapy: None    Home Care agency: None    Supplies and equipment: None   Lines and drains: None    Wound care: None   Other instructions: None      Home with oxycodone 5 mg q 4 hourly prn pain  And Ibuprofen 800 mg tid prn pain

## 2017-08-16 NOTE — IP AVS SNAPSHOT
MRN:[id number]                      After Visit Summary   8/16/2017    Jannet San    MRN: [id number]           Thank you!     Thank you for choosing [facility] for your care. Our goal is always to provide you with excellent care. Hearing back from our patients is one way we can continue to improve our services. Please take a few minutes to complete the written survey that you may receive in the mail after you visit with us. Thank you!        Patient Information     Date Of Birth          1990        About your hospital stay     You were admitted on:  August 16, 2017 You last received care in the:  [facility] Preop/Phase II    You were discharged on:  August 16, 2017        Reason for your hospital stay       Laparoscopy for diagnosis of pelvic pain:     Diagnosis ovulation pain with ruptured left corpus luteum ovarian cyst with  Small amount of blood in the pelvis.  No endometriosis seen.                  Who to Call     For medical emergencies, please call 911.  For non-urgent questions about your medical care, please call your primary care provider or clinic, [phone]  For questions related to your surgery, please call your surgery clinic        Attending Provider     Provider Specialty    Kayden Evans MD OB/Gyn       Primary Care Provider Office Phone # Fax #    Leona Matthews [phone]      After Care Instructions     Activity       Your activity upon discharge: activity as tolerated            Diet       Follow this diet upon discharge: Regular diet            Wound care and dressings       Instructions to care for your wound at home: keep wound clean and dry.  Wounds may be washed with water and dried.                  Follow-up Appointments     Follow-up and recommended labs and tests        See Dr. Evans in the clinic in one week for post op. Follow up visit.                  Your next 10 appointments already scheduled     Aug 17, 2017  1:30 PM CDT   (Arrive by  1:15 PM)   Return Visit with Nellie Reza NP   [facility] [place] ([facility] [place] )    [address]  [place] [address]   [phone]            Aug 24, 2017  3:15 PM CDT   (Arrive by 3:00 PM)   Post Op with Kayden Evans MD   [facility] [place] ([facility] [place] )    [address]  [place] [address]   [phone]            Oct 20, 2017  4:30 PM CDT   (Arrive by 4:15 PM)   Return Visit with Melina Benson NP   [facility] [place] ([facility] [place] )    [address]  [place] [address]   [phone]              Further instructions from your care team         Treating a Ruptured Ovarian Cyst  An ovarian cyst is a fluid-filled sac that forms on or inside an ovary. In some cases, the cyst can break open (rupture). A ruptured cyst may be treated in several ways. You may just need to keep track of your symptoms. You may need to take pain medicine. In other cases, a cyst may need surgery.  Understanding ovarian cysts  An ovarian cyst can develop for different reasons. Most ovarian cysts are harmless. Treatment of a ruptured ovarian cyst depends on whether it is regular or complex. A regular cyst is a simple fluid-filled sac. A complex cyst may have solid areas, bumps on the surface, or several areas filled with fluid.  A cyst that ruptures may cause no symptoms, or only mild symptoms such as pain. Ruptured cysts that cause mild symptoms can often be managed with pain medicines.  In some cases, a ruptured cyst can cause more severe symptoms. These can include pain in the lower belly (abdomen) and bleeding. Symptoms like these need treatment right away.  Diagnosing a ruptured ovarian cyst  Your healthcare provider or an obstetrics and gynecology (ob-gyn) doctor will diagnose the condition. Your healthcare provider will ask about your medical history and your symptoms. Be sure to tell the provider if you know that you  have an ovarian cyst. You will also have a physical exam. This will likely include a pelvic exam.  If your healthcare provider thinks you may have a ruptured cyst, you may need tests. These tests can help rule out other possible causes of your symptoms, such as an ectopic pregnancy, appendicitis, or a kidney stone. These tests may include:    Ultrasound. This test uses sound waves to view the size, shape, and location of the cyst.    Pregnancy test. This is done to check if pregnancy may be the cause of the cyst.    Blood tests. These check for low iron in the blood (anemia). They also check for infection.    Urine test. This looks for other possible causes of your pain.    Vaginal culture. This is done to check for a pelvic infection.  CT scan. This uses a series of X-rays and a computer to create a detailed picture of the area.  You may need more tests to rule out other possible causes of your symptoms.  Treating a ruptured ovarian cyst  Many women have ovarian cysts that are not complex. A ruptured cyst that is not complex can be treated with pain medicine. You may be told to watch your symptoms over time. In some cases, you may need to have follow-up ultrasound tests. You may not need any other treatment.  If the cyst is complex, you may need different care. This type of cyst may cause:    Blood loss that causes low blood pressure or fast heart rate    Fever    Signs of possible cancer  If you have a complex ruptured ovarian cyst, you may need care in the hospital. Your treatment may include:    IV (intravenous) fluids to replace lost fluid    Careful monitoring of your heart rate and other vital signs    Monitoring of your red blood cell level (hematocrit) to check the blood s ability to carry oxygen    Repeated ultrasounds to check for bleeding into your belly (abdomen)    Surgery for a worsening medical condition or to check for cancer  If you need surgery, your doctor may use a minimally invasive method.  This is also known as laparoscopy. The doctor makes small incisions in your abdomen while you are under anesthesia. A tiny lighted camera and other small tools are put through these incisions. The doctor controls the bleeding and removes any blood clots or fluid. He or she may then remove the cyst or your entire ovary. The tools are then removed. The incisions are closed and bandaged. If the doctor does not use laparoscopy, the surgery will be done with larger incisions.  Talk with your doctor about what type of treatment will work best for you.  Follow-up care  You and your medical team will make a follow-up plan that makes the most sense for you.  If your ruptured ovarian cyst is not complex, you will likely continue your care at home. You can use pain medicines as needed. Your pain should go away in a few days. Let your healthcare provider know right away if your pain gets worse, you feel dizzy, or you have new symptoms. Follow up with your healthcare provider if you need imaging or blood tests.  If you have a complex ruptured ovarian cyst, you may need to stay in the hospital for 1 or more days. If your cyst is no longer bleeding, you may be able to go home. You can use pain medicines as needed. You may need follow-up imaging tests to make sure that your bleeding has stopped.  In rare cases, a ruptured ovarian cyst is caused by cancer. This will need careful follow-up treatment from a healthcare provider who specializes in cancer care. You may need surgery and other treatments.  Some women have more than 1 ovarian cyst. You can work with your healthcare provider to plan treatment for multiple cysts. A cyst that has not ruptured may need to be watched over time. In other cases, you may need to have the cyst removed with surgery.  If you need surgery for your cyst, your healthcare provider will tell you how to get ready for it.  When to call your healthcare provider  Call your healthcare provider right away if you  have any of these:    Sudden, sharp abdominal  or pelvic pain    Pain along with nausea and vomiting   Date Last Reviewed: 2/1/2017 2000-2017 [employer]. [address] [place] [address], [place] [address]. All rights reserved. This information is not intended as a substitute for professional medical care. Always follow your healthcare professional's instructions.          Post-Anesthesia Patient Instructions    IMMEDIATELY FOLLOWING SURGERY:  Do not drive or operate machinery for the first twenty four hours after surgery.  Do not make any important decisions for twenty four hours after surgery or while taking narcotic pain medications or sedatives.  If you develop intractable nausea and vomiting or a severe headache please notify your doctor immediately.    FOLLOW-UP:  Please make an appointment with your surgeon as instructed. You do not need to follow up with anesthesia unless specifically instructed to do so.    WOUND CARE INSTRUCTIONS (if applicable):  Keep a dry clean dressing on the anesthesia/puncture wound site if there is drainage.  Once the wound has quit draining you may leave it open to air.  Generally you should leave the bandage intact for twenty four hours unless there is drainage.  If the epidural site drains for more than 36-48 hours please call the anesthesia department.    QUESTIONS?:  Please feel free to call your physician or the hospital  if you have any questions, and they will be happy to assist you.           Remove the scopolamine patch behind your right ear after 24 hours after application.   After removing the patch, wash your hands and the area behind your ear thoroughly with soap and water.   The patch will still contain some medicine after use.   To avoid accidental contact or ingestion by children or pets, fold the used patch in half with the sticky side together and throw away in the trash out of the reach of children and pets.       Pending Results     No orders  found from 8/14/2017 to 8/17/2017.            Admission Information     Date & Time Provider Department Dept. Phone    8/16/2017 Kayden Evans MD HI Preop/Phase [phone]      Your Vitals Were     Blood Pressure Temperature Respirations Weight Pulse Oximetry BMI (Body Mass Index)    114/80 97.9  F (36.6  C) (Temporal) 16 79.8 kg (176 lb) 97% 24.55 kg/m2      MyChart Information     [employer] gives you secure access to your electronic health record. If you see a primary care provider, you can also send messages to your care team and make appointments. If you have questions, please call your primary care clinic.  If you do not have a primary care provider, please call [phone] and they will assist you.        Care EveryWhere ID     This is your Care EveryWhere ID. This could be used by other organizations to access your [facility] medical records  [id number]        Equal Access to Services     CESAR BEST : Becca So, kristy henao, herrera bustamante, ryann hartmann. So [facility] [phone].    ATENCIÓN: Si habla español, tiene a mcdermott disposición servicios gratuitos de asistencia lingüística. Llminna al [phone].    We comply with applicable federal civil rights laws and [place] laws. We do not discriminate on the basis of race, color, national origin, age, disability sex, sexual orientation or gender identity.               Review of your medicines      START taking        Dose / Directions    oxyCODONE 5 MG IR tablet   Commonly known as:  ROXICODONE   Used for:  Post-op pain        Dose:  5 mg   Take 1 tablet (5 mg) by mouth every 4 hours as needed for pain maximum 10 tablet(s) per day   Quantity:  20 tablet   Refills:  0         CONTINUE these medicines which may have CHANGED, or have new prescriptions. If we are uncertain of the size of tablets/capsules you have at home, strength may be listed as something that might have changed.        Dose /  Directions    * IBUPROFEN PO   This may have changed:  Another medication with the same name was added. Make sure you understand how and when to take each.        Dose:  600 mg   Take 600 mg by mouth   Refills:  0       * ibuprofen 800 MG tablet   Commonly known as:  ADVIL/MOTRIN   This may have changed:  You were already taking a medication with the same name, and this prescription was added. Make sure you understand how and when to take each.   Used for:  Post-op pain        Dose:  800 mg   Take 1 tablet (800 mg) by mouth every 8 hours as needed for moderate pain   Quantity:  40 tablet   Refills:  1       * Notice:  This list has 2 medication(s) that are the same as other medications prescribed for you. Read the directions carefully, and ask your doctor or other care provider to review them with you.      CONTINUE these medicines which have NOT CHANGED        Dose / Directions    BENADRYL PO        Dose:  75 mg   Take 75 mg by mouth nightly as needed   Refills:  0       lisdexamfetamine 40 MG capsule   Commonly known as:  VYVANSE   Used for:  Attention deficit hyperactivity disorder (ADHD), predominantly inattentive type        Dose:  40 mg   Take 1 capsule (40 mg) by mouth every morning   Quantity:  30 capsule   Refills:  0       MAGNESIUM OXIDE PO        Dose:  500 mg   Take 500 mg by mouth   Refills:  0       MELATONIN PO        Dose:  10 mg   Take 10 mg by mouth At Bedtime   Refills:  0       order for DME   Used for:  Acute right ankle pain        Right ASO brace - right ankle   Quantity:  1 Units   Refills:  0       sertraline 100 MG tablet   Commonly known as:  ZOLOFT   Used for:  Bipolar 2 disorder (H)        Dose:  100 mg   Take 1 tablet (100 mg) by mouth daily   Quantity:  30 tablet   Refills:  1       TYLENOL PO        Dose:  1000 mg   Take 1,000 mg by mouth   Refills:  0            Where to get your medicines      These medications were sent to [facility] [place] [place] - [address]   [address] [place] [address]     Phone:  [phone]     ibuprofen 800 MG tablet         Some of these will need a paper prescription and others can be bought over the counter. Ask your nurse if you have questions.     Bring a paper prescription for each of these medications     oxyCODONE 5 MG IR tablet                Protect others around you: Learn how to safely use, store and throw away your medicines at www.disposemymeds.org.             Medication List: This is a list of all your medications and when to take them. Check marks below indicate your daily home schedule. Keep this list as a reference.      Medications           Morning Afternoon Evening Bedtime As Needed    BENADRYL PO   Take 75 mg by mouth nightly as needed                                * IBUPROFEN PO   Take 600 mg by mouth                                * ibuprofen 800 MG tablet   Commonly known as:  ADVIL/MOTRIN   Take 1 tablet (800 mg) by mouth every 8 hours as needed for moderate pain                                lisdexamfetamine 40 MG capsule   Commonly known as:  VYVANSE   Take 1 capsule (40 mg) by mouth every morning                                MAGNESIUM OXIDE PO   Take 500 mg by mouth                                MELATONIN PO   Take 10 mg by mouth At Bedtime                                order for DME   Right ASO brace - right ankle                                oxyCODONE 5 MG IR tablet   Commonly known as:  ROXICODONE   Take 1 tablet (5 mg) by mouth every 4 hours as needed for pain maximum 10 tablet(s) per day                                sertraline 100 MG tablet   Commonly known as:  ZOLOFT   Take 1 tablet (100 mg) by mouth daily                                TYLENOL PO   Take 1,000 mg by mouth                                * Notice:  This list has 2 medication(s) that are the same as other medications prescribed for you. Read the directions carefully, and ask your doctor or other care provider to review them with  you.

## 2017-08-16 NOTE — OP NOTE
[facility] Gynecology Brief Operative Note     Pre-operative diagnosis: PELVIC PAIN IN FEMALE   Post-operative diagnosis: Ruptured Left corpus luteum cyst with hemoperitoneum   Procedure: Laparoscopy and irrigation of pelvis / evacuation of hemoperitoneum   Surgeon: Kayden Evans MD   Assistant(s): None   Anesthesia: General Endotracheal Anesthesia  Anesthetist:  Wu Garcia CRNA   Estimated blood loss: 5 ml   Total IV fluids: (See anesthesia record)   Blood transfusion: No transfusion was given during surgery   Total urine output: (See anesthesia record)   Drains: None   Specimens: None   Findings: Ruptured left corpus luteum cyst with small amount of blood in the pelvis   Complications: None   Condition: Stable   Comments: See typed  operative report for full details     Detailed operative report:  Risks and benefits of surgery was discussed with the patient including bleeding, infection, injury to the bowel, bladder,ureters, nerves  Deep vein thrombosis, embolism and anesthesia risks.  She accepted all risks and benefits of surgery.    The patient was placed in the lithotomy position and a general anesthetic with endotracheal intubation was administered.  A plastic intrauterine cannula was placed into the uterus for manipulation of the uterus  A subumbilical semilunar incision was made and a pneumoperitoneum was created with a Verris needle.  A 5 mm trochar and cannular was inserted into the subumbilical incision.  A stab wound was made in the left lower quadrant and another 5 mm trochar and cannula introduced.  The pelvis was visualized.  Findings:  The uterus was normal .  There was a transverse scar in the lower uterine segment probably from a previous  section.  The left ovary had a hemorrhagic corpus luteum cyst about 3 cm.   There was about 20 ml of blood in the pelvis.  The right and left ovaries were normal size and no endometriosis was seen.  The right and left  fallopian tubes were normal  The Pouch of [place] was normal with no endometriosis  No adhesions were seen in the pelvis.  The appendix was retrocecal and normal  The gall bladder and liver was normal.    The pelvis was irrigated with saline to evacuate the hemoperitoneum.  5 ml of 1% lidocaine was placed in the pelvis for analgesia  The pneumoperitoneum was evacuated and the skin incisions were closed with subcuticular 3 '0' Vicril  1% lidocaine was injected into the skin for analgesia.  No complications  Blood loss 5 ml
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
## 2017-08-16 NOTE — DISCHARGE INSTRUCTIONS
Treating a Ruptured Ovarian Cyst  An ovarian cyst is a fluid-filled sac that forms on or inside an ovary. In some cases, the cyst can break open (rupture). A ruptured cyst may be treated in several ways. You may just need to keep track of your symptoms. You may need to take pain medicine. In other cases, a cyst may need surgery.  Understanding ovarian cysts  An ovarian cyst can develop for different reasons. Most ovarian cysts are harmless. Treatment of a ruptured ovarian cyst depends on whether it is regular or complex. A regular cyst is a simple fluid-filled sac. A complex cyst may have solid areas, bumps on the surface, or several areas filled with fluid.  A cyst that ruptures may cause no symptoms, or only mild symptoms such as pain. Ruptured cysts that cause mild symptoms can often be managed with pain medicines.  In some cases, a ruptured cyst can cause more severe symptoms. These can include pain in the lower belly (abdomen) and bleeding. Symptoms like these need treatment right away.  Diagnosing a ruptured ovarian cyst  Your healthcare provider or an obstetrics and gynecology (ob-gyn) doctor will diagnose the condition. Your healthcare provider will ask about your medical history and your symptoms. Be sure to tell the provider if you know that you have an ovarian cyst. You will also have a physical exam. This will likely include a pelvic exam.  If your healthcare provider thinks you may have a ruptured cyst, you may need tests. These tests can help rule out other possible causes of your symptoms, such as an ectopic pregnancy, appendicitis, or a kidney stone. These tests may include:    Ultrasound. This test uses sound waves to view the size, shape, and location of the cyst.    Pregnancy test. This is done to check if pregnancy may be the cause of the cyst.    Blood tests. These check for low iron in the blood (anemia). They also check for infection.    Urine test. This looks for other possible causes of  your pain.    Vaginal culture. This is done to check for a pelvic infection.  CT scan. This uses a series of X-rays and a computer to create a detailed picture of the area.  You may need more tests to rule out other possible causes of your symptoms.  Treating a ruptured ovarian cyst  Many women have ovarian cysts that are not complex. A ruptured cyst that is not complex can be treated with pain medicine. You may be told to watch your symptoms over time. In some cases, you may need to have follow-up ultrasound tests. You may not need any other treatment.  If the cyst is complex, you may need different care. This type of cyst may cause:    Blood loss that causes low blood pressure or fast heart rate    Fever    Signs of possible cancer  If you have a complex ruptured ovarian cyst, you may need care in the hospital. Your treatment may include:    IV (intravenous) fluids to replace lost fluid    Careful monitoring of your heart rate and other vital signs    Monitoring of your red blood cell level (hematocrit) to check the blood s ability to carry oxygen    Repeated ultrasounds to check for bleeding into your belly (abdomen)    Surgery for a worsening medical condition or to check for cancer  If you need surgery, your doctor may use a minimally invasive method. This is also known as laparoscopy. The doctor makes small incisions in your abdomen while you are under anesthesia. A tiny lighted camera and other small tools are put through these incisions. The doctor controls the bleeding and removes any blood clots or fluid. He or she may then remove the cyst or your entire ovary. The tools are then removed. The incisions are closed and bandaged. If the doctor does not use laparoscopy, the surgery will be done with larger incisions.  Talk with your doctor about what type of treatment will work best for you.  Follow-up care  You and your medical team will make a follow-up plan that makes the most sense for you.  If your  ruptured ovarian cyst is not complex, you will likely continue your care at home. You can use pain medicines as needed. Your pain should go away in a few days. Let your healthcare provider know right away if your pain gets worse, you feel dizzy, or you have new symptoms. Follow up with your healthcare provider if you need imaging or blood tests.  If you have a complex ruptured ovarian cyst, you may need to stay in the hospital for 1 or more days. If your cyst is no longer bleeding, you may be able to go home. You can use pain medicines as needed. You may need follow-up imaging tests to make sure that your bleeding has stopped.  In rare cases, a ruptured ovarian cyst is caused by cancer. This will need careful follow-up treatment from a healthcare provider who specializes in cancer care. You may need surgery and other treatments.  Some women have more than 1 ovarian cyst. You can work with your healthcare provider to plan treatment for multiple cysts. A cyst that has not ruptured may need to be watched over time. In other cases, you may need to have the cyst removed with surgery.  If you need surgery for your cyst, your healthcare provider will tell you how to get ready for it.  When to call your healthcare provider  Call your healthcare provider right away if you have any of these:    Sudden, sharp abdominal  or pelvic pain    Pain along with nausea and vomiting   Date Last Reviewed: 2/1/2017 2000-2017 [employer]. [address] [place] [address]. All rights reserved. This information is not intended as a substitute for professional medical care. Always follow your healthcare professional's instructions.          Post-Anesthesia Patient Instructions    IMMEDIATELY FOLLOWING SURGERY:  Do not drive or operate machinery for the first twenty four hours after surgery.  Do not make any important decisions for twenty four hours after surgery or while taking narcotic pain medications or sedatives.   If you develop intractable nausea and vomiting or a severe headache please notify your doctor immediately.    FOLLOW-UP:  Please make an appointment with your surgeon as instructed. You do not need to follow up with anesthesia unless specifically instructed to do so.    WOUND CARE INSTRUCTIONS (if applicable):  Keep a dry clean dressing on the anesthesia/puncture wound site if there is drainage.  Once the wound has quit draining you may leave it open to air.  Generally you should leave the bandage intact for twenty four hours unless there is drainage.  If the epidural site drains for more than 36-48 hours please call the anesthesia department.    QUESTIONS?:  Please feel free to call your physician or the hospital  if you have any questions, and they will be happy to assist you.           Remove the scopolamine patch behind your right ear after 24 hours after application.   After removing the patch, wash your hands and the area behind your ear thoroughly with soap and water.   The patch will still contain some medicine after use.   To avoid accidental contact or ingestion by children or pets, fold the used patch in half with the sticky side together and throw away in the trash out of the reach of children and pets.

## 2017-08-16 NOTE — IP AVS SNAPSHOT
[facility] Preop/Phase II    [address] [address]    Phone:  [phone]                                       After Visit Summary   8/16/2017    Jannet San    MRN: [id number]           After Visit Summary Signature Page     I have received my discharge instructions, and my questions have been answered. I have discussed any challenges I see with this plan with the nurse or doctor.    ..........................................................................................................................................  Patient/Patient Representative Signature      ..........................................................................................................................................  Patient Representative Print Name and Relationship to Patient    ..................................................               ................................................  Date                                            Time    ..........................................................................................................................................  Reviewed by Signature/Title    ...................................................              ..............................................  Date                                                            Time

## 2017-08-16 NOTE — BRIEF OP NOTE
[facility] Gynecology Brief Operative Note    Pre-operative diagnosis: PELVIC PAIN IN FEMALE   Post-operative diagnosis: Ruptured Left corpus luteum cyst with hemoperitoneum     Procedure: Laparoscopy and irrigation of pelvis / evacuation of hemoperitoneum   Surgeon: Kayden Evans MD   Assistant(s): None   Anesthesia: General Endotracheal Anesthesia   Estimated blood loss: 5 ml   Total IV fluids: (See anesthesia record)   Blood transfusion: No transfusion was given during surgery   Total urine output: (See anesthesia record)   Drains: None   Specimens: None   Findings: Ruptured left corpus luteum cyst with small amount of blood in the pelvis   Complications: None   Condition: Stable   Comments: See typed  operative report for full details

## 2017-08-16 NOTE — ANESTHESIA POSTPROCEDURE EVALUATION
Patient: Jannet San    Procedure(s):  DIAGNOSTIC LAPAROSCOPY WITH IRRIGATION OF PELVIS - Wound Class: II-Clean Contaminated    Diagnosis:PELVIC PAIN IN FEMALE  Diagnosis Additional Information: No value filed.    Anesthesia Type:  General, ETT    Note:  Anesthesia Post Evaluation    Patient location during evaluation: Phase 2, PACU and Bedside  Patient participation: Able to fully participate in evaluation  Level of consciousness: awake and alert  Pain management: adequate  Airway patency: patent  Cardiovascular status: acceptable  Respiratory status: acceptable  Hydration status: stable  PONV: none     Anesthetic complications: None          Last vitals:  Vitals:    08/16/17 1003 08/16/17 1015 08/16/17 1030   BP:  125/76 131/82   Resp:  16 16   Temp:      SpO2: 97% 98%          Electronically Signed By: Vinod Verma MD  August 16, 2017  11:01 AM

## 2017-08-17 ENCOUNTER — OFFICE VISIT (OUTPATIENT)
Dept: PSYCHIATRY | Facility: OTHER | Age: 27
End: 2017-08-17
Attending: NURSE PRACTITIONER
Payer: COMMERCIAL

## 2017-08-17 VITALS
HEIGHT: 71 IN | DIASTOLIC BLOOD PRESSURE: 66 MMHG | TEMPERATURE: 98.1 F | HEART RATE: 51 BPM | WEIGHT: 175 LBS | BODY MASS INDEX: 24.5 KG/M2 | SYSTOLIC BLOOD PRESSURE: 110 MMHG

## 2017-08-17 DIAGNOSIS — F90.0 ATTENTION DEFICIT HYPERACTIVITY DISORDER (ADHD), PREDOMINANTLY INATTENTIVE TYPE: ICD-10-CM

## 2017-08-17 PROCEDURE — 99214 OFFICE O/P EST MOD 30 MIN: CPT | Performed by: NURSE PRACTITIONER

## 2017-08-17 PROCEDURE — 99212 OFFICE O/P EST SF 10 MIN: CPT

## 2017-08-17 RX ORDER — LISDEXAMFETAMINE DIMESYLATE 30 MG/1
30 CAPSULE ORAL 2 TIMES DAILY
Qty: 60 CAPSULE | Refills: 0 | Status: SHIPPED | OUTPATIENT
Start: 2017-08-17 | End: 2017-09-13

## 2017-08-17 RX ORDER — PRAZOSIN HYDROCHLORIDE 1 MG/1
CAPSULE ORAL
Qty: 60 CAPSULE | Refills: 0 | Status: SHIPPED | OUTPATIENT
Start: 2017-08-17 | End: 2017-09-15

## 2017-08-17 ASSESSMENT — ANXIETY QUESTIONNAIRES
7. FEELING AFRAID AS IF SOMETHING AWFUL MIGHT HAPPEN: SEVERAL DAYS
3. WORRYING TOO MUCH ABOUT DIFFERENT THINGS: SEVERAL DAYS
5. BEING SO RESTLESS THAT IT IS HARD TO SIT STILL: SEVERAL DAYS
IF YOU CHECKED OFF ANY PROBLEMS ON THIS QUESTIONNAIRE, HOW DIFFICULT HAVE THESE PROBLEMS MADE IT FOR YOU TO DO YOUR WORK, TAKE CARE OF THINGS AT HOME, OR GET ALONG WITH OTHER PEOPLE: SOMEWHAT DIFFICULT
6. BECOMING EASILY ANNOYED OR IRRITABLE: SEVERAL DAYS
GAD7 TOTAL SCORE: 8
1. FEELING NERVOUS, ANXIOUS, OR ON EDGE: SEVERAL DAYS
2. NOT BEING ABLE TO STOP OR CONTROL WORRYING: SEVERAL DAYS

## 2017-08-17 ASSESSMENT — PATIENT HEALTH QUESTIONNAIRE - PHQ9
SUM OF ALL RESPONSES TO PHQ QUESTIONS 1-9: 12
5. POOR APPETITE OR OVEREATING: MORE THAN HALF THE DAYS

## 2017-08-17 ASSESSMENT — PAIN SCALES - GENERAL: PAINLEVEL: MODERATE PAIN (4)

## 2017-08-17 NOTE — PATIENT INSTRUCTIONS
Increase vyvanse to 30 mg twice a day.   Start minipress 1 mg for two nights then increase to 2 mg: this is for nightmares
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
## 2017-08-17 NOTE — MR AVS SNAPSHOT
After Visit Summary   8/17/2017    Jannet San    MRN: [id number]           Patient Information     Date Of Birth          1990        Visit Information        Provider Department      8/17/2017 1:30 PM Nellie Reza NP [facility]        Today's Diagnoses     Attention deficit hyperactivity disorder (ADHD), predominantly inattentive type          Care Instructions    Increase vyvanse to 30 mg twice a day.   Start minipress 1 mg for two nights then increase to 2 mg: this is for nightmares          Follow-ups after your visit        Your next 10 appointments already scheduled     Aug 24, 2017  3:15 PM CDT   (Arrive by 3:00 PM)   Post Op with Kayden Evans MD   [facility] [place] [facility]    [address] [facility] [address]   [phone]            Oct 20, 2017  4:30 PM CDT   (Arrive by 4:15 PM)   Return Visit with Melina Benson NP   [facility] [place] )    [address] [place] [address]  [place] [address] [phone]              Who to contact     If you have questions or need follow up information about today's clinic visit or your schedule please contact [facility] directly at [phone].  Normal or non-critical lab and imaging results will be communicated to you by MyChart, letter or phone within 4 business days after the clinic has received the results. If you do not hear from us within 7 days, please contact the clinic through MyChart or phone. If you have a critical or abnormal lab result, we will notify you by phone as soon as possible.  Submit refill requests through [employer] or call your pharmacy and they will forward the refill request to us. Please allow 3 business days for your refill to be completed.          Additional Information About Your Visit        MyChart Information     [employer] gives you secure access to your electronic health record. If you see a primary  "care provider, you can also send messages to your care team and make appointments. If you have questions, please call your primary care clinic.  If you do not have a primary care provider, please call [phone] and they will assist you.        Care EveryWhere ID     This is your Care EveryWhere ID. This could be used by other organizations to access your [facility] medical records  [id number]        Your Vitals Were     Pulse Temperature Height BMI (Body Mass Index)          51 98.1  F (36.7  C) (Tympanic) 1.803 m (5' 11\") 24.41 kg/m2         Blood Pressure from Last 3 Encounters:   08/17/17 110/66   08/16/17 131/82   08/09/17 121/73    Weight from Last 3 Encounters:   08/17/17 79.4 kg (175 lb)   08/16/17 79.8 kg (176 lb)   08/03/17 79.4 kg (175 lb)              Today, you had the following     No orders found for display         Today's Medication Changes          These changes are accurate as of: 8/17/17  2:12 PM.  If you have any questions, ask your nurse or doctor.               Start taking these medicines.        Dose/Directions    prazosin 1 MG capsule   Commonly known as:  MINIPRESS   Used for:  Attention deficit hyperactivity disorder (ADHD), predominantly inattentive type   Started by:  Nellie Reza NP        Take 1 mg for two nights then increase   Quantity:  60 capsule   Refills:  0         These medicines have changed or have updated prescriptions.        Dose/Directions    ibuprofen 800 MG tablet   Commonly known as:  ADVIL/MOTRIN   This may have changed:  Another medication with the same name was removed. Continue taking this medication, and follow the directions you see here.   Used for:  Post-op pain        Dose:  800 mg   Take 1 tablet (800 mg) by mouth every 8 hours as needed for moderate pain   Quantity:  40 tablet   Refills:  1       lisdexamfetamine 30 MG capsule   Commonly known as:  VYVANSE   This may have changed:    - medication strength  - how much to take  - when to take " this   Used for:  Attention deficit hyperactivity disorder (ADHD), predominantly inattentive type   Changed by:  Nellie Reza NP        Dose:  30 mg   Take 1 capsule (30 mg) by mouth 2 times daily   Quantity:  60 capsule   Refills:  0            Where to get your medicines      These medications were sent to [facility] - [place], [place] - [address]  [address] [facility] [address]     Phone:  [phone]     prazosin 1 MG capsule         Some of these will need a paper prescription and others can be bought over the counter.  Ask your nurse if you have questions.     Bring a paper prescription for each of these medications     lisdexamfetamine 30 MG capsule                Primary Care Provider Office Phone # Fax #    Leona Matthews [phone]       [facility] [address]  [facility] [address]        Equal Access to Services     [facility] CenterHARDIK : Hadii deng ku hadasho Soomaali, waaxda luqadaha, qaybta kaalmada adeegyada, waxay idiin hayaan татьяна nieto . So [facility] [phone].    ATENCIÓN: Si habla español, tiene a mcdermott disposición servicios gratuitos de asistencia lingüística. Llame al [phone].    We comply with applicable federal civil rights laws and [place] laws. We do not discriminate on the basis of race, color, national origin, age, disability sex, sexual orientation or gender identity.            Thank you!     Thank you for choosing [facility]  for your care. Our goal is always to provide you with excellent care. Hearing back from our patients is one way we can continue to improve our services. Please take a few minutes to complete the written survey that you may receive in the mail after your visit with us. Thank you!             Your Updated Medication List - Protect others around you: Learn how to safely use, store and throw away your medicines at www.disposemymeds.org.          This list is accurate as of: 8/17/17   2:12 PM.  Always use your most recent med list.                   Brand Name Dispense Instructions for use Diagnosis    BENADRYL PO      Take 75 mg by mouth nightly as needed        ibuprofen 800 MG tablet    ADVIL/MOTRIN    40 tablet    Take 1 tablet (800 mg) by mouth every 8 hours as needed for moderate pain    Post-op pain       lisdexamfetamine 30 MG capsule    VYVANSE    60 capsule    Take 1 capsule (30 mg) by mouth 2 times daily    Attention deficit hyperactivity disorder (ADHD), predominantly inattentive type       MAGNESIUM OXIDE PO      Take 500 mg by mouth        MELATONIN PO      Take 10 mg by mouth At Bedtime        order for DME     1 Units    Right ASO brace - right ankle    Acute right ankle pain       oxyCODONE 5 MG IR tablet    ROXICODONE    20 tablet    Take 1 tablet (5 mg) by mouth every 4 hours as needed for pain maximum 10 tablet(s) per day    Post-op pain       prazosin 1 MG capsule    MINIPRESS    60 capsule    Take 1 mg for two nights then increase    Attention deficit hyperactivity disorder (ADHD), predominantly inattentive type       sertraline 100 MG tablet    ZOLOFT    30 tablet    Take 1 tablet (100 mg) by mouth daily    Bipolar 2 disorder (H)       TYLENOL PO      Take 1,000 mg by mouth

## 2017-08-17 NOTE — NURSING NOTE
"Chief Complaint   Patient presents with     RECHECK     mental health.  Increase in Zoloft is working.  Patient feels that Vyvanse wearing off around 5 pm       Initial /66 (BP Location: Right arm, Patient Position: Sitting, Cuff Size: Adult Regular)  Pulse 51  Temp 98.1  F (36.7  C) (Tympanic)  Ht 5' 11\" (1.803 m)  Wt 175 lb (79.4 kg)  BMI 24.41 kg/m2 Estimated body mass index is 24.41 kg/(m^2) as calculated from the following:    Height as of this encounter: 5' 11\" (1.803 m).    Weight as of this encounter: 175 lb (79.4 kg).  Medication Reconciliation: cori KITCHEN      "

## 2017-08-18 ASSESSMENT — ANXIETY QUESTIONNAIRES: GAD7 TOTAL SCORE: 8

## 2017-08-18 NOTE — ADDENDUM NOTE
Addendum  created 08/18/17 0938 by aJss Holland APRN CRNA    Anesthesia Event deleted, Anesthesia Event edited, Procedure Event Log accessed

## 2017-08-24 ENCOUNTER — OFFICE VISIT (OUTPATIENT)
Dept: OBGYN | Facility: OTHER | Age: 27
End: 2017-08-24
Attending: OBSTETRICS & GYNECOLOGY
Payer: COMMERCIAL

## 2017-08-24 VITALS
TEMPERATURE: 98.8 F | BODY MASS INDEX: 24.64 KG/M2 | SYSTOLIC BLOOD PRESSURE: 117 MMHG | DIASTOLIC BLOOD PRESSURE: 86 MMHG | HEIGHT: 71 IN | WEIGHT: 176 LBS | HEART RATE: 106 BPM | RESPIRATION RATE: 18 BRPM | OXYGEN SATURATION: 99 %

## 2017-08-24 DIAGNOSIS — N94.0 OVULATION PAIN: Primary | ICD-10-CM

## 2017-08-24 PROCEDURE — 99024 POSTOP FOLLOW-UP VISIT: CPT | Performed by: OBSTETRICS & GYNECOLOGY

## 2017-08-24 PROCEDURE — 99212 OFFICE O/P EST SF 10 MIN: CPT

## 2017-08-24 ASSESSMENT — PAIN SCALES - GENERAL: PAINLEVEL: MILD PAIN (3)

## 2017-08-24 NOTE — NURSING NOTE
"Chief Complaint   Patient presents with     Surgical Followup      laparoscopy       Initial /86 (BP Location: Left arm, Patient Position: Chair, Cuff Size: Adult Large)  Pulse 106  Temp 98.8  F (37.1  C) (Tympanic)  Resp 18  Ht 5' 11\" (1.803 m)  Wt 176 lb (79.8 kg)  SpO2 99%  BMI 24.55 kg/m2 Estimated body mass index is 24.55 kg/(m^2) as calculated from the following:    Height as of this encounter: 5' 11\" (1.803 m).    Weight as of this encounter: 176 lb (79.8 kg).  Medication Reconciliation: complete   Pamela M Lechevalier LPN      "

## 2017-08-24 NOTE — MR AVS SNAPSHOT
After Visit Summary   8/24/2017    Jannet San    MRN: [id number]           Patient Information     Date Of Birth          1990        Visit Information        Provider Department      8/24/2017 3:15 PM Kayden Evans MD [facility] [place]        Today's Diagnoses     Ovulation pain    -  1      Care Instructions      At your visit today, we discussed your risk for falls and preventive options.    Fall Prevention  Falls often occur due to slipping, tripping or losing your balance. Millions of people fall every year and injure themselves. Here are ways to reduce your risk of falling again.    Think about your fall, was there anything that caused your fall that can be fixed, removed, or replaced?    Make your home safe by keeping walkways clear of objects you may trip over.    Use non-slip pads under rugs. Do not use area rugs or small throw rugs.    Use non-slip mats in bathtubs and showers.    Install handrails and lights on staircases.    Do not walk in poorly lit areas.    Do not stand on chairs or wobbly ladders.    Use caution when reaching overhead or looking upward. This position can cause a loss of balance.    Be sure your shoes fit properly, have non-slip bottoms and are in good condition.     Wear shoes both inside and out. Avoid going barefoot or wearing slippers.    Be cautious when going up and down stairs, curbs, and when walking on uneven sidewalks.    If your balance is poor, consider using a cane or walker.    If your fall was related to alcohol use, stop or limit alcohol intake.     If your fall was related to use of sleeping medicines, talk to your doctor about this. You may need to reduce your dosage at bedtime if you awaken during the night to go to the bathroom.      To reduce the need for nighttime bathroom trips:    Avoid drinking fluids for several hours before going to bed    Empty your bladder before going to bed    Men can keep a urinal at the bedside    Stay  as active as you can. Balance, flexibility, strength, and endurance all come from exercise. They all play a role in preventing falls. Ask your healthcare provider which types of activity are right for you.    Get your vision checked on a regular basis.    If you have pets, know where they are before you stand up or walk so you don't trip over them.    Use night lights.  Date Last Reviewed: 11/5/2015 2000-2017 [employer]. [address] [place] [address], [place] [address]. All rights reserved. This information is not intended as a substitute for professional medical care. Always follow your healthcare professional's instructions.                Follow-ups after your visit        Follow-up notes from your care team     Return in about 3 months (around 12/8/2017) for Routine Visit.      Your next 10 appointments already scheduled     Aug 31, 2017  1:30 PM CDT   (Arrive by 1:15 PM)   Return Visit with Nellie Reza NP   [facility] ([facility] )    [address] [place] [id number] [phone]            Oct 20, 2017  4:30 PM CDT   (Arrive by 4:15 PM)   Return Visit with Melina Benson NP   [facility] ([facility] )    [address] [place] [id number] [phone]              Who to contact     If you have questions or need follow up information about today's clinic visit or your schedule please contact [facility] directly at [phone].  Normal or non-critical lab and imaging results will be communicated to you by MyChart, letter or phone within 4 business days after the clinic has received the results. If you do not hear from us within 7 days, please contact the clinic through MyChart or phone. If you have a critical or abnormal lab result, we will notify you by phone as soon as possible.  Submit refill requests through [employer] or call your pharmacy and they will forward the refill request  "to us. Please allow 3 business days for your refill to be completed.          Additional Information About Your Visit        eedenhart Information     [employer] gives you secure access to your electronic health record. If you see a primary care provider, you can also send messages to your care team and make appointments. If you have questions, please call your primary care clinic.  If you do not have a primary care provider, please call [phone] and they will assist you.        Care EveryWhere ID     This is your Care EveryWhere ID. This could be used by other organizations to access your [facility] medical records  [id number]        Your Vitals Were     Pulse Temperature Respirations Height Pulse Oximetry BMI (Body Mass Index)    106 98.8  F (37.1  C) (Tympanic) 18 5' 11\" (1.803 m) 99% 24.55 kg/m2       Blood Pressure from Last 3 Encounters:   08/24/17 117/86   08/17/17 110/66   08/16/17 131/82    Weight from Last 3 Encounters:   08/24/17 176 lb (79.8 kg)   08/17/17 175 lb (79.4 kg)   08/16/17 176 lb (79.8 kg)              Today, you had the following     No orders found for display         Today's Medication Changes          These changes are accurate as of: 8/24/17 11:59 PM.  If you have any questions, ask your nurse or doctor.               Start taking these medicines.        Dose/Directions    norethindrone-mestranol 1-50 MG-MCG Tabs   Commonly known as:  NORINYL1-50/ORTHO NOVUM 1-50   Used for:  Ovulation pain   Started by:  Kayden Evans MD        Dose:  1 tablet   Take 1 tablet by mouth daily for 365 doses   Quantity:  84 tablet   Refills:  3            Where to get your medicines      These medications were sent to [facility] - [address]  [address] [place] [address]     Phone:  [phone]     norethindrone-mestranol 1-50 MG-MCG Tabs                Primary Care Provider Office Phone # Fax #    Leona Matthews [phone]       [facility] " [place] [address]  [facility] [address]        Equal Access to Services     JIMENABARBARA NASIR : Hadii aad ku hadaylao Soomaali, waaxda luqadaha, qaybta kaalmada adecaden, ryann stockton sarahadam ewingpepper an emilia hartmann. So [facility] [phone].    ATENCIÓN: Si habla español, tiene a mcdermott disposición servicios gratuitos de asistencia lingüística. Llame al [phone].    We comply with applicable federal civil rights laws and [place] laws. We do not discriminate on the basis of race, color, national origin, age, disability sex, sexual orientation or gender identity.            Thank you!     Thank you for choosing [facility]  for your care. Our goal is always to provide you with excellent care. Hearing back from our patients is one way we can continue to improve our services. Please take a few minutes to complete the written survey that you may receive in the mail after your visit with us. Thank you!             Your Updated Medication List - Protect others around you: Learn how to safely use, store and throw away your medicines at www.disposemymeds.org.          This list is accurate as of: 8/24/17 11:59 PM.  Always use your most recent med list.                   Brand Name Dispense Instructions for use Diagnosis    BENADRYL PO      Take 75 mg by mouth nightly as needed        ibuprofen 800 MG tablet    ADVIL/MOTRIN    40 tablet    Take 1 tablet (800 mg) by mouth every 8 hours as needed for moderate pain    Post-op pain       lisdexamfetamine 30 MG capsule    VYVANSE    60 capsule    Take 1 capsule (30 mg) by mouth 2 times daily    Attention deficit hyperactivity disorder (ADHD), predominantly inattentive type       MAGNESIUM OXIDE PO      Take 500 mg by mouth        MELATONIN PO      Take 10 mg by mouth At Bedtime        norethindrone-mestranol 1-50 MG-MCG Tabs    NORINYL1-50/ORTHO NOVUM 1-50    84 tablet    Take 1 tablet by mouth daily for 365 doses    Ovulation pain       order for DME     1 Units    Right  ASO brace - right ankle    Acute right ankle pain       prazosin 1 MG capsule    MINIPRESS    60 capsule    Take 1 mg for two nights then increase    Attention deficit hyperactivity disorder (ADHD), predominantly inattentive type       sertraline 100 MG tablet    ZOLOFT    30 tablet    Take 1 tablet (100 mg) by mouth daily    Bipolar 2 disorder (H)       TYLENOL PO      Take 1,000 mg by mouth

## 2017-08-25 NOTE — PROGRESS NOTES
S:   No complaints following laparoscopy for pelvic pain         She has had this pelvic pain off and on for one year.         Nexplanon did not seem to help.         Ruptured left corpus luteum cyst found with hemoperitoneum.                    Discussed with her whether she would like suppression of ovulation to control her ovulation pain  And she is agreeable to try this.    O:    Not in any acute distress today          In good spirits          Afebrile          No pallor          Laparoscopic incisions have healed well and are not infected    A:     Ovulation pain           Has breakthrough bleeding with lower dose contraceptive pills.    P:     Try contraceptive pills with 50 micrograms of estrogen since she has breakthrough bleeding with  Lower dose contraceptive pills    Rx                    norethindrone-mestranol (NORINYL1-50/ORTHO NOVUM 1-50) 1-50 MG-MCG TABS  Take 1 tablet by mouth daily for 365 doses, Disp-84 tablet, R-3, E-Prescribe   Take active pills only for 9 weeks one tablet daily continuously (three packs) and do not take the inactive pills. Then stop for one week then take active pills daily For 9 weeks and cycle as such for one year.     Follow up with Dr. Evans in December 2017

## 2017-08-30 ENCOUNTER — HOSPITAL ENCOUNTER (EMERGENCY)
Facility: HOSPITAL | Age: 27
Discharge: HOME | End: 2017-08-30
Attending: PHYSICIAN ASSISTANT | Admitting: PHYSICIAN ASSISTANT
Payer: COMMERCIAL

## 2017-08-30 VITALS
SYSTOLIC BLOOD PRESSURE: 113 MMHG | RESPIRATION RATE: 16 BRPM | TEMPERATURE: 100.2 F | DIASTOLIC BLOOD PRESSURE: 70 MMHG | OXYGEN SATURATION: 99 %

## 2017-08-30 DIAGNOSIS — N10 ACUTE PYELONEPHRITIS: ICD-10-CM

## 2017-08-30 LAB
ALBUMIN SERPL-MCNC: 3.4 G/DL (ref 3.4–5)
ALBUMIN UR-MCNC: 10 MG/DL
ALP SERPL-CCNC: 91 U/L (ref 40–150)
ALT SERPL W P-5'-P-CCNC: 22 U/L (ref 0–50)
ANION GAP SERPL CALCULATED.3IONS-SCNC: 8 MMOL/L (ref 3–14)
APPEARANCE UR: ABNORMAL
AST SERPL W P-5'-P-CCNC: 10 U/L (ref 0–45)
BACTERIA #/AREA URNS HPF: ABNORMAL /HPF
BASOPHILS # BLD AUTO: 0 10E9/L (ref 0–0.2)
BASOPHILS NFR BLD AUTO: 0.1 %
BILIRUB SERPL-MCNC: 0.9 MG/DL (ref 0.2–1.3)
BILIRUB UR QL STRIP: NEGATIVE
BUN SERPL-MCNC: 9 MG/DL (ref 7–30)
CALCIUM SERPL-MCNC: 8.5 MG/DL (ref 8.5–10.1)
CHLORIDE SERPL-SCNC: 102 MMOL/L (ref 94–109)
CO2 SERPL-SCNC: 24 MMOL/L (ref 20–32)
COLOR UR AUTO: YELLOW
CREAT SERPL-MCNC: 0.79 MG/DL (ref 0.52–1.04)
CRP SERPL-MCNC: 168 MG/L (ref 0–8)
DIFFERENTIAL METHOD BLD: ABNORMAL
EOSINOPHIL # BLD AUTO: 0 10E9/L (ref 0–0.7)
EOSINOPHIL NFR BLD AUTO: 0 %
ERYTHROCYTE [DISTWIDTH] IN BLOOD BY AUTOMATED COUNT: 12.8 % (ref 10–15)
GFR SERPL CREATININE-BSD FRML MDRD: 87 ML/MIN/1.7M2
GLUCOSE SERPL-MCNC: 104 MG/DL (ref 70–99)
GLUCOSE UR STRIP-MCNC: NEGATIVE MG/DL
HCG UR QL: NEGATIVE
HCT VFR BLD AUTO: 33.8 % (ref 35–47)
HGB BLD-MCNC: 11.9 G/DL (ref 11.7–15.7)
HGB UR QL STRIP: ABNORMAL
IMM GRANULOCYTES # BLD: 0.1 10E9/L (ref 0–0.4)
IMM GRANULOCYTES NFR BLD: 0.8 %
KETONES UR STRIP-MCNC: NEGATIVE MG/DL
LACTATE SERPL-SCNC: 1.9 MMOL/L (ref 0.4–2)
LEUKOCYTE ESTERASE UR QL STRIP: ABNORMAL
LYMPHOCYTES # BLD AUTO: 0.8 10E9/L (ref 0.8–5.3)
LYMPHOCYTES NFR BLD AUTO: 4.7 %
MCH RBC QN AUTO: 30.5 PG (ref 26.5–33)
MCHC RBC AUTO-ENTMCNC: 35.2 G/DL (ref 31.5–36.5)
MCV RBC AUTO: 87 FL (ref 78–100)
MONOCYTES # BLD AUTO: 1.1 10E9/L (ref 0–1.3)
MONOCYTES NFR BLD AUTO: 6.3 %
MUCOUS THREADS #/AREA URNS LPF: PRESENT /LPF
NEUTROPHILS # BLD AUTO: 15.2 10E9/L (ref 1.6–8.3)
NEUTROPHILS NFR BLD AUTO: 88.1 %
NITRATE UR QL: NEGATIVE
NRBC # BLD AUTO: 0 10*3/UL
NRBC BLD AUTO-RTO: 0 /100
PH UR STRIP: 6 PH (ref 4.7–8)
PLATELET # BLD AUTO: 163 10E9/L (ref 150–450)
POTASSIUM SERPL-SCNC: 3.1 MMOL/L (ref 3.4–5.3)
PROT SERPL-MCNC: 6.9 G/DL (ref 6.8–8.8)
RBC # BLD AUTO: 3.9 10E12/L (ref 3.8–5.2)
RBC #/AREA URNS AUTO: 8 /HPF (ref 0–2)
SODIUM SERPL-SCNC: 134 MMOL/L (ref 133–144)
SOURCE: ABNORMAL
SP GR UR STRIP: 1.01 (ref 1–1.03)
SQUAMOUS #/AREA URNS AUTO: 6 /HPF (ref 0–1)
TRANS CELLS #/AREA URNS HPF: 2 /HPF (ref 0–1)
UROBILINOGEN UR STRIP-MCNC: NORMAL MG/DL (ref 0–2)
WBC # BLD AUTO: 17.2 10E9/L (ref 4–11)
WBC #/AREA URNS AUTO: >182 /HPF (ref 0–2)
WBC CLUMPS #/AREA URNS HPF: PRESENT /HPF

## 2017-08-30 PROCEDURE — 85025 COMPLETE CBC W/AUTO DIFF WBC: CPT | Performed by: FAMILY MEDICINE

## 2017-08-30 PROCEDURE — 96365 THER/PROPH/DIAG IV INF INIT: CPT

## 2017-08-30 PROCEDURE — 83605 ASSAY OF LACTIC ACID: CPT | Performed by: PHYSICIAN ASSISTANT

## 2017-08-30 PROCEDURE — 81025 URINE PREGNANCY TEST: CPT | Performed by: PHYSICIAN ASSISTANT

## 2017-08-30 PROCEDURE — 87086 URINE CULTURE/COLONY COUNT: CPT | Performed by: PHYSICIAN ASSISTANT

## 2017-08-30 PROCEDURE — 96361 HYDRATE IV INFUSION ADD-ON: CPT

## 2017-08-30 PROCEDURE — 96375 TX/PRO/DX INJ NEW DRUG ADDON: CPT

## 2017-08-30 PROCEDURE — 87040 BLOOD CULTURE FOR BACTERIA: CPT | Performed by: PHYSICIAN ASSISTANT

## 2017-08-30 PROCEDURE — 87186 SC STD MICRODIL/AGAR DIL: CPT | Performed by: PHYSICIAN ASSISTANT

## 2017-08-30 PROCEDURE — 80053 COMPREHEN METABOLIC PANEL: CPT | Performed by: FAMILY MEDICINE

## 2017-08-30 PROCEDURE — 99284 EMERGENCY DEPT VISIT MOD MDM: CPT | Performed by: PHYSICIAN ASSISTANT

## 2017-08-30 PROCEDURE — 99285 EMERGENCY DEPT VISIT HI MDM: CPT | Mod: 25

## 2017-08-30 PROCEDURE — 87088 URINE BACTERIA CULTURE: CPT | Performed by: PHYSICIAN ASSISTANT

## 2017-08-30 PROCEDURE — 86140 C-REACTIVE PROTEIN: CPT | Performed by: FAMILY MEDICINE

## 2017-08-30 PROCEDURE — 74176 CT ABD & PELVIS W/O CONTRAST: CPT | Mod: TC

## 2017-08-30 PROCEDURE — 25000128 H RX IP 250 OP 636: Performed by: PHYSICIAN ASSISTANT

## 2017-08-30 PROCEDURE — 36415 COLL VENOUS BLD VENIPUNCTURE: CPT | Performed by: PHYSICIAN ASSISTANT

## 2017-08-30 PROCEDURE — 81001 URINALYSIS AUTO W/SCOPE: CPT | Performed by: PHYSICIAN ASSISTANT

## 2017-08-30 RX ORDER — HYDROMORPHONE HYDROCHLORIDE 1 MG/ML
0.5 INJECTION, SOLUTION INTRAMUSCULAR; INTRAVENOUS; SUBCUTANEOUS
Status: COMPLETED | OUTPATIENT
Start: 2017-08-30 | End: 2017-08-30

## 2017-08-30 RX ORDER — CEFTRIAXONE SODIUM 2 G/50ML
2 INJECTION, SOLUTION INTRAVENOUS ONCE
Status: COMPLETED | OUTPATIENT
Start: 2017-08-30 | End: 2017-08-30

## 2017-08-30 RX ORDER — CIPROFLOXACIN 500 MG/1
500 TABLET, FILM COATED ORAL 2 TIMES DAILY
Qty: 14 TABLET | Refills: 0 | Status: SHIPPED | OUTPATIENT
Start: 2017-08-30 | End: 2017-09-06

## 2017-08-30 RX ORDER — SODIUM CHLORIDE, SODIUM LACTATE, POTASSIUM CHLORIDE, CALCIUM CHLORIDE 600; 310; 30; 20 MG/100ML; MG/100ML; MG/100ML; MG/100ML
1000 INJECTION, SOLUTION INTRAVENOUS CONTINUOUS
Status: DISCONTINUED | OUTPATIENT
Start: 2017-08-30 | End: 2017-08-30 | Stop reason: HOSPADM

## 2017-08-30 RX ORDER — HYDROCODONE BITARTRATE AND ACETAMINOPHEN 5; 325 MG/1; MG/1
1-2 TABLET ORAL EVERY 4 HOURS PRN
Qty: 15 TABLET | Refills: 0 | Status: SHIPPED | OUTPATIENT
Start: 2017-08-30 | End: 2017-09-05

## 2017-08-30 RX ORDER — LIDOCAINE 40 MG/G
CREAM TOPICAL
Status: DISCONTINUED | OUTPATIENT
Start: 2017-08-30 | End: 2017-08-30 | Stop reason: HOSPADM

## 2017-08-30 RX ORDER — ONDANSETRON 2 MG/ML
4 INJECTION INTRAMUSCULAR; INTRAVENOUS EVERY 30 MIN PRN
Status: DISCONTINUED | OUTPATIENT
Start: 2017-08-30 | End: 2017-08-30 | Stop reason: HOSPADM

## 2017-08-30 RX ORDER — KETOROLAC TROMETHAMINE 30 MG/ML
30 INJECTION, SOLUTION INTRAMUSCULAR; INTRAVENOUS ONCE
Status: COMPLETED | OUTPATIENT
Start: 2017-08-30 | End: 2017-08-30

## 2017-08-30 RX ADMIN — SODIUM CHLORIDE, POTASSIUM CHLORIDE, SODIUM LACTATE AND CALCIUM CHLORIDE 1000 ML: 600; 310; 30; 20 INJECTION, SOLUTION INTRAVENOUS at 12:05

## 2017-08-30 RX ADMIN — KETOROLAC TROMETHAMINE 30 MG: 30 INJECTION, SOLUTION INTRAMUSCULAR; INTRAVENOUS at 12:16

## 2017-08-30 RX ADMIN — HYDROMORPHONE HYDROCHLORIDE 0.5 MG: 1 INJECTION, SOLUTION INTRAMUSCULAR; INTRAVENOUS; SUBCUTANEOUS at 12:47

## 2017-08-30 RX ADMIN — SODIUM CHLORIDE, POTASSIUM CHLORIDE, SODIUM LACTATE AND CALCIUM CHLORIDE 1000 ML: 600; 310; 30; 20 INJECTION, SOLUTION INTRAVENOUS at 12:47

## 2017-08-30 RX ADMIN — ONDANSETRON 4 MG: 2 INJECTION INTRAMUSCULAR; INTRAVENOUS at 12:17

## 2017-08-30 RX ADMIN — CEFTRIAXONE SODIUM 2 G: 2 INJECTION, SOLUTION INTRAVENOUS at 12:49

## 2017-08-30 ASSESSMENT — ENCOUNTER SYMPTOMS
CONSTIPATION: 0
DIFFICULTY URINATING: 0
CHEST TIGHTNESS: 0
VOMITING: 0
FEVER: 1
SHORTNESS OF BREATH: 0
ACTIVITY CHANGE: 1
NAUSEA: 1
BACK PAIN: 0
DYSURIA: 0
MYALGIAS: 1
COUGH: 0
FATIGUE: 1
FREQUENCY: 1
PHOTOPHOBIA: 0
APPETITE CHANGE: 1
DIARRHEA: 0
FLANK PAIN: 1
CHILLS: 1
ABDOMINAL PAIN: 1

## 2017-08-30 NOTE — ED NOTES
patient comes in with flank pain, reports that  she was having symptoms of UTI's. Fevers started yesterday. Pain started today.  Patient is alert and ornt.

## 2017-08-30 NOTE — ED AVS SNAPSHOT
[facility]    [address] [place] [address]    Phone:  [phone]                                       Jannet San   MRN: [id number]    Department:  [facility]   Date of Visit:  8/30/2017           Patient Information     Date Of Birth          1990        Your diagnoses for this visit were:     Acute pyelonephritis        You were seen by Florence Wheat PA-C.      Follow-up Information     Follow up with Leona Matthews MD.    Specialty:  Family Practice    Contact information:    [facility] [place]  [address]  [place] [address] [phone]          Follow up with [facility].    Specialty:  EMERGENCY MEDICINE    Contact information:    [address]  [place] [address] [phone]    Additional information:    From [facility]: Take [address]. Turn left at [address]. Turn left at the first stoplight on [address]. At the first stop sign, take a right onto [address]. Take a left into the parking lot and continue through until you reach the North enterance of the building.       From [place]: Take [address]. Take the [address] ramp towards [place]. Turn left onto [address]. Take a slight right onto [address]. Turn left at the first stoplight on [address]. At the first stop sign, take a right onto [address]. Take a left into the parking lot and continue through until you reach the North enterance of the building.       From [place]: Take [address]. Take a right at [address]. At the first stop sign, take a right onto [address]. Take a left into the parking lot and continue through until you reach the North enterance of the building.         Discharge Instructions       Rest and stay hydrated.      Please follow-up in the clinic next week.     Please return HERE for ANY worsening symptoms, vomiting, or ANY other concerns or  questions.     Discharge References/Attachments     PYELONEPHRITIS, FEMALE (ADULT) (ENGLISH)    PYELONEPHRITIS, DISCHARGE INSTRUCTIONS FOR (ENGLISH)      Future Appointments        Provider Department Dept Phone Center    8/31/2017 1:30 PM April Shweta Reza NP [facility] [place] [phone] [place]    10/20/2017 4:30 PM Melina Benson NP [facility] [place] [phone] [place]         Review of your medicines      START taking        Dose / Directions Last dose taken    ciprofloxacin 500 MG tablet   Commonly known as:  CIPRO   Dose:  500 mg   Quantity:  14 tablet        Take 1 tablet (500 mg) by mouth 2 times daily for 7 days   Refills:  0        HYDROcodone-acetaminophen 5-325 MG per tablet   Commonly known as:  NORCO   Dose:  1-2 tablet   Quantity:  15 tablet        Take 1-2 tablets by mouth every 4 hours as needed for moderate to severe pain   Refills:  0          Our records show that you are taking the medicines listed below. If these are incorrect, please call your family doctor or clinic.        Dose / Directions Last dose taken    BENADRYL PO   Dose:  75 mg        Take 75 mg by mouth nightly as needed   Refills:  0        ibuprofen 800 MG tablet   Commonly known as:  ADVIL/MOTRIN   Dose:  800 mg   Quantity:  40 tablet        Take 1 tablet (800 mg) by mouth every 8 hours as needed for moderate pain   Refills:  1        lisdexamfetamine 30 MG capsule   Commonly known as:  VYVANSE   Dose:  30 mg   Quantity:  60 capsule        Take 1 capsule (30 mg) by mouth 2 times daily   Refills:  0        MAGNESIUM OXIDE PO   Dose:  500 mg        Take 500 mg by mouth   Refills:  0        MELATONIN PO   Dose:  10 mg        Take 10 mg by mouth At Bedtime   Refills:  0        norethindrone-mestranol 1-50 MG-MCG Tabs   Commonly known as:  NORINYL1-50/ORTHO NOVUM 1-50   Dose:  1 tablet   Quantity:  84 tablet        Take 1 tablet by mouth daily for 365 doses   Refills:  3        order for DME    Quantity:  1 Units        Right ASO brace - right ankle   Refills:  0        prazosin 1 MG capsule   Commonly known as:  MINIPRESS   Quantity:  60 capsule        Take 1 mg for two nights then increase   Refills:  0        sertraline 100 MG tablet   Commonly known as:  ZOLOFT   Dose:  100 mg   Quantity:  30 tablet        Take 1 tablet (100 mg) by mouth daily   Refills:  1        TYLENOL PO   Dose:  1000 mg        Take 1,000 mg by mouth   Refills:  0                Prescriptions were sent or printed at these locations (2 Prescriptions)                   [facility] - [address]   [address] [place] [address]    Telephone:  [phone]   Fax:  [phone]   Hours:                  E-Prescribed (1 of 2)         ciprofloxacin (CIPRO) 500 MG tablet                 Printed at Department/Unit printer (1 of 2)         HYDROcodone-acetaminophen (NORCO) 5-325 MG per tablet                Procedures and tests performed during your visit     Procedure/Test Number of Times Performed    Blood culture 1    CBC with platelets differential 1    CRP inflammation 1    CT Abdomen Pelvis w/o Contrast (stone protocol) 1    Cardiac Continuous Monitoring 1    Comprehensive metabolic panel 1    Draw and hold 3    HCG qualitative urine 1    Lactic acid 1    Peripheral IV catheter 1    Peripheral IV: Standard 1    Pulse oximetry nursing 1    UA reflex to Microscopic 1    Urine Culture 1      Orders Needing Specimen Collection     None      Pending Results     Date and Time Order Name Status Description    8/30/2017 1312 Urine Culture In process     8/30/2017 1312 CT Abdomen Pelvis w/o Contrast (stone protocol) Preliminary             Pending Culture Results     Date and Time Order Name Status Description    8/30/2017 1312 Urine Culture In process             Thank you for choosing [facility]       Thank you for choosing [facility] for your care. Our goal is always to provide you with excellent care.  Hearing back from our patients is one way we can continue to improve our services. Please take a few minutes to complete the written survey that you may receive in the mail after you visit with us. Thank you!        [employer] Information     [employer] gives you secure access to your electronic health record. If you see a primary care provider, you can also send messages to your care team and make appointments. If you have questions, please call your primary care clinic.  If you do not have a primary care provider, please call [phone] and they will assist you.        Care EveryWhere ID     This is your Care EveryWhere ID. This could be used by other organizations to access your [facility] medical records  [id number]        Equal Access to Services     CESAR BEST : Becca So, kristy henao, herrera bustamante, ryann hartmann. So [facility] [phone].    ATENCIÓN: Si habla español, tiene a mcdermott disposición servicios gratuitos de asistencia lingüística. Llame al [phone].    We comply with applicable federal civil rights laws and [place] laws. We do not discriminate on the basis of race, color, national origin, age, disability sex, sexual orientation or gender identity.            After Visit Summary       This is your record. Keep this with you and show to your community pharmacist(s) and doctor(s) at your next visit.

## 2017-08-30 NOTE — ED NOTES
D/c instructions reviewed with patient. Patient ate crackers and drank soda tolerated well. Cipro e-scribed to pharmacy of choice.  RX for Norco given and will fill at pharmacy of choice.  Encouraged to return with new or worsening symptoms. No questions or concerns. S.O to drive patient home

## 2017-08-30 NOTE — ED NOTES
Patient reports Toradol didn't relieve any pain.  Patient requesting additional pain medication. Ka updated

## 2017-08-30 NOTE — DISCHARGE INSTRUCTIONS
Rest and stay hydrated.      Please follow-up in the clinic next week.     Please return HERE for ANY worsening symptoms, vomiting, or ANY other concerns or questions.

## 2017-08-30 NOTE — ED AVS SNAPSHOT
[facility]    [address] [address]    Phone:  [phone]                                       Jannet San   MRN: [id number]    Department:  [facility]   Date of Visit:  8/30/2017           After Visit Summary Signature Page     I have received my discharge instructions, and my questions have been answered. I have discussed any challenges I see with this plan with the nurse or doctor.    ..........................................................................................................................................  Patient/Patient Representative Signature      ..........................................................................................................................................  Patient Representative Print Name and Relationship to Patient    ..................................................               ................................................  Date                                            Time    ..........................................................................................................................................  Reviewed by Signature/Title    ...................................................              ..............................................  Date                                                            Time
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
## 2017-09-01 LAB
BACTERIA SPEC CULT: ABNORMAL
BACTERIA SPEC CULT: ABNORMAL
SPECIMEN SOURCE: ABNORMAL

## 2017-09-01 NOTE — PROGRESS NOTES
CT Abdomen Pelvis w/o Contrast (stone protocol) report routed to PCP, Dr. GABBY Matthews.  Impression - edema in Gerota's fascia on the right.  Discharged home and advised close follow-up.

## 2017-09-02 RX ORDER — NITROFURANTOIN 25; 75 MG/1; MG/1
100 CAPSULE ORAL 2 TIMES DAILY
Qty: 14 CAPSULE | Refills: 0 | Status: SHIPPED | OUTPATIENT
Start: 2017-09-02 | End: 2017-09-05

## 2017-09-02 RX ORDER — NITROFURANTOIN 25; 75 MG/1; MG/1
100 CAPSULE ORAL 2 TIMES DAILY
Qty: 14 CAPSULE | Refills: 0 | Status: SHIPPED | OUTPATIENT
Start: 2017-09-02 | End: 2017-09-14

## 2017-09-02 NOTE — PROGRESS NOTES
DATE:  9/1/2017   TIME OF RECEIPT FROM LAB:  1500  LAB TEST:  Urine Culture  LAB VALUE:  >100,000 colonies/mL Escherichia coli ESBL  RESULTS GIVEN WITH READ-BACK TO (PROVIDER):  BERE Hardy  TIME LAB VALUE REPORTED TO PROVIDER:  1600    Resistant to Cipro prescribed for patient, results to MARIANA Anaya for review and further orders..

## 2017-09-02 NOTE — ED PROVIDER NOTES
Culture shows resistance to cipro, sensitive to macrobid upon culture. Will start macrobid. Ordered to William Quijano.   Donnie Anaya PA-C   9/2/2017   1:12 PM       Donnie Anaya PA  09/02/17 3489

## 2017-09-05 ENCOUNTER — OFFICE VISIT (OUTPATIENT)
Dept: FAMILY MEDICINE | Facility: OTHER | Age: 27
End: 2017-09-05
Attending: FAMILY MEDICINE
Payer: COMMERCIAL

## 2017-09-05 ENCOUNTER — TELEPHONE (OUTPATIENT)
Dept: FAMILY MEDICINE | Facility: OTHER | Age: 27
End: 2017-09-05

## 2017-09-05 VITALS
HEART RATE: 95 BPM | RESPIRATION RATE: 16 BRPM | OXYGEN SATURATION: 99 % | WEIGHT: 175 LBS | TEMPERATURE: 98.3 F | HEIGHT: 71 IN | SYSTOLIC BLOOD PRESSURE: 112 MMHG | DIASTOLIC BLOOD PRESSURE: 64 MMHG | BODY MASS INDEX: 24.5 KG/M2

## 2017-09-05 DIAGNOSIS — N10 ACUTE PYELONEPHRITIS: Primary | ICD-10-CM

## 2017-09-05 DIAGNOSIS — F33.2 SEVERE EPISODE OF RECURRENT MAJOR DEPRESSIVE DISORDER, WITHOUT PSYCHOTIC FEATURES (H): ICD-10-CM

## 2017-09-05 DIAGNOSIS — F43.10 PTSD (POST-TRAUMATIC STRESS DISORDER): ICD-10-CM

## 2017-09-05 DIAGNOSIS — R79.89 ELEVATED LIVER FUNCTION TESTS: ICD-10-CM

## 2017-09-05 LAB
ALBUMIN SERPL-MCNC: 2.8 G/DL (ref 3.4–5)
ALP SERPL-CCNC: 284 U/L (ref 40–150)
ALT SERPL W P-5'-P-CCNC: 111 U/L (ref 0–50)
ANION GAP SERPL CALCULATED.3IONS-SCNC: 8 MMOL/L (ref 3–14)
AST SERPL W P-5'-P-CCNC: 43 U/L (ref 0–45)
BACTERIA SPEC CULT: NORMAL
BACTERIA SPEC CULT: NORMAL
BASOPHILS # BLD AUTO: 0.1 10E9/L (ref 0–0.2)
BASOPHILS NFR BLD AUTO: 0.5 %
BILIRUB SERPL-MCNC: 0.4 MG/DL (ref 0.2–1.3)
BUN SERPL-MCNC: 11 MG/DL (ref 7–30)
CALCIUM SERPL-MCNC: 8.7 MG/DL (ref 8.5–10.1)
CHLORIDE SERPL-SCNC: 104 MMOL/L (ref 94–109)
CO2 SERPL-SCNC: 24 MMOL/L (ref 20–32)
CREAT SERPL-MCNC: 0.67 MG/DL (ref 0.52–1.04)
DIFFERENTIAL METHOD BLD: ABNORMAL
EOSINOPHIL # BLD AUTO: 0.1 10E9/L (ref 0–0.7)
EOSINOPHIL NFR BLD AUTO: 0.8 %
ERYTHROCYTE [DISTWIDTH] IN BLOOD BY AUTOMATED COUNT: 13.4 % (ref 10–15)
GFR SERPL CREATININE-BSD FRML MDRD: >90 ML/MIN/1.7M2
GLUCOSE SERPL-MCNC: 99 MG/DL (ref 70–99)
HCT VFR BLD AUTO: 34.3 % (ref 35–47)
HGB BLD-MCNC: 11.8 G/DL (ref 11.7–15.7)
IMM GRANULOCYTES # BLD: 0.6 10E9/L (ref 0–0.4)
IMM GRANULOCYTES NFR BLD: 4.9 %
LACTATE SERPL-SCNC: 0.6 MMOL/L (ref 0.4–2)
LYMPHOCYTES # BLD AUTO: 2.4 10E9/L (ref 0.8–5.3)
LYMPHOCYTES NFR BLD AUTO: 21.8 %
MCH RBC QN AUTO: 30.2 PG (ref 26.5–33)
MCHC RBC AUTO-ENTMCNC: 34.4 G/DL (ref 31.5–36.5)
MCV RBC AUTO: 88 FL (ref 78–100)
MONOCYTES # BLD AUTO: 0.9 10E9/L (ref 0–1.3)
MONOCYTES NFR BLD AUTO: 8.1 %
NEUTROPHILS # BLD AUTO: 7.1 10E9/L (ref 1.6–8.3)
NEUTROPHILS NFR BLD AUTO: 63.9 %
NRBC # BLD AUTO: 0 10*3/UL
NRBC BLD AUTO-RTO: 0 /100
PLATELET # BLD AUTO: 274 10E9/L (ref 150–450)
POTASSIUM SERPL-SCNC: 4.2 MMOL/L (ref 3.4–5.3)
PROT SERPL-MCNC: 7 G/DL (ref 6.8–8.8)
RBC # BLD AUTO: 3.91 10E12/L (ref 3.8–5.2)
SODIUM SERPL-SCNC: 136 MMOL/L (ref 133–144)
SPECIMEN SOURCE: NORMAL
SPECIMEN SOURCE: NORMAL
WBC # BLD AUTO: 11.2 10E9/L (ref 4–11)

## 2017-09-05 PROCEDURE — 36415 COLL VENOUS BLD VENIPUNCTURE: CPT | Mod: ZL | Performed by: FAMILY MEDICINE

## 2017-09-05 PROCEDURE — 80053 COMPREHEN METABOLIC PANEL: CPT | Mod: ZL | Performed by: FAMILY MEDICINE

## 2017-09-05 PROCEDURE — 85025 COMPLETE CBC W/AUTO DIFF WBC: CPT | Mod: ZL | Performed by: FAMILY MEDICINE

## 2017-09-05 PROCEDURE — 99214 OFFICE O/P EST MOD 30 MIN: CPT | Performed by: FAMILY MEDICINE

## 2017-09-05 PROCEDURE — 99212 OFFICE O/P EST SF 10 MIN: CPT

## 2017-09-05 PROCEDURE — 83605 ASSAY OF LACTIC ACID: CPT | Mod: ZL | Performed by: FAMILY MEDICINE

## 2017-09-05 RX ORDER — HYDROCODONE BITARTRATE AND ACETAMINOPHEN 5; 325 MG/1; MG/1
1-2 TABLET ORAL EVERY 4 HOURS PRN
Qty: 15 TABLET | Refills: 0 | Status: SHIPPED | OUTPATIENT
Start: 2017-09-05 | End: 2017-10-03

## 2017-09-05 ASSESSMENT — PAIN SCALES - GENERAL: PAINLEVEL: SEVERE PAIN (6)

## 2017-09-05 NOTE — TELEPHONE ENCOUNTER
Please schedule patient for date/time: can work into schedule on 9/14/17 at 8:45.    Have patient go to ER/Urgent Care Center. Urgent Care hours are 9:30 am to 8 pm, open 7 days a week. No.    Provider will call patient.No.    Other:

## 2017-09-05 NOTE — NURSING NOTE
"Chief Complaint   Patient presents with     Hospital F/U     ER on 8/30/2017. follow up for flank pain and kidney infection. Currently taking antibiotics. Cipro. Still having 6/10 pain in the ribs and lower back. Having a hard time breathing. Having a fever up until yesterday.         Initial /64 (BP Location: Right arm, Patient Position: Chair, Cuff Size: Adult Regular)  Pulse 95  Temp 98.3  F (36.8  C) (Tympanic)  Resp 16  Ht 5' 11\" (1.803 m)  Wt 175 lb (79.4 kg)  SpO2 99%  Breastfeeding? No  BMI 24.41 kg/m2 Estimated body mass index is 24.41 kg/(m^2) as calculated from the following:    Height as of this encounter: 5' 11\" (1.803 m).    Weight as of this encounter: 175 lb (79.4 kg).  Medication Reconciliation: complete   Karen Smith LPN    "

## 2017-09-05 NOTE — TELEPHONE ENCOUNTER
11:50 AM    Reason for Call: OVERBOOK    Patient is having the following symptoms: needs follow up had to cancel for the 12th due to conflict appointment for na .    The patient is requesting an appointment for next week  with Dr Leona Matthews.    Was an appointment offered for this call? Yes pt stated she can't wait until 10/03/17  If yes : Appointment type              Date    Preferred method for responding to this message: Telephone Call  What is your phone number ?    If we cannot reach you directly, may we leave a detailed response at the number you provided? Yes    Can this message wait until your PCP/provider returns, if unavailable today?     Leonela Kaye
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
## 2017-09-06 NOTE — TELEPHONE ENCOUNTER
Please let Jannet know that we should have her return just for labs to recheck her liver tests about 2 weeks after she finishes her antibiotic. Orders are in lab, she doesn't have to see me

## 2017-09-13 DIAGNOSIS — F90.0 ATTENTION DEFICIT HYPERACTIVITY DISORDER (ADHD), PREDOMINANTLY INATTENTIVE TYPE: ICD-10-CM

## 2017-09-13 NOTE — TELEPHONE ENCOUNTER
Vyvanse      Last Written Prescription Date: 8/17/17  Last Fill Quantity: 60,  # refills: 0   Last Office Visit with [facility], [facility] or [facility] prescribing provider: 9/5/17                                         Next 5 appointments (look out 90 days)     Sep 14, 2017  8:45 AM CDT   (Arrive by 8:30 AM)   SHORT with Leona Matthews MD   [facility] ([facility]    [address] [place] [address]  [place] [address]   [phone]            Oct 20, 2017  4:30 PM CDT   (Arrive by 4:15 PM)   Return Visit with Melina Benson NP   [facility] ([facility]    [address] [place] [address]  [place] [id number] [phone]

## 2017-09-14 ENCOUNTER — OFFICE VISIT (OUTPATIENT)
Dept: FAMILY MEDICINE | Facility: OTHER | Age: 27
End: 2017-09-14
Attending: FAMILY MEDICINE
Payer: COMMERCIAL

## 2017-09-14 VITALS
TEMPERATURE: 97.9 F | BODY MASS INDEX: 23.8 KG/M2 | WEIGHT: 170 LBS | SYSTOLIC BLOOD PRESSURE: 118 MMHG | OXYGEN SATURATION: 99 % | DIASTOLIC BLOOD PRESSURE: 62 MMHG | HEART RATE: 77 BPM | RESPIRATION RATE: 16 BRPM | HEIGHT: 71 IN

## 2017-09-14 DIAGNOSIS — R79.89 ELEVATED LFTS: Primary | ICD-10-CM

## 2017-09-14 DIAGNOSIS — N12 PYELONEPHRITIS: ICD-10-CM

## 2017-09-14 DIAGNOSIS — F31.81 BIPOLAR II DISORDER (H): ICD-10-CM

## 2017-09-14 DIAGNOSIS — F43.10 POSTTRAUMATIC STRESS DISORDER: ICD-10-CM

## 2017-09-14 PROBLEM — Z83.49 FAMILY HISTORY OF HEMOCHROMATOSIS: Status: ACTIVE | Noted: 2017-09-14

## 2017-09-14 LAB
ALBUMIN SERPL-MCNC: 3 G/DL (ref 3.4–5)
ALP SERPL-CCNC: 131 U/L (ref 40–150)
ALT SERPL W P-5'-P-CCNC: 48 U/L (ref 0–50)
ANION GAP SERPL CALCULATED.3IONS-SCNC: 6 MMOL/L (ref 3–14)
AST SERPL W P-5'-P-CCNC: 19 U/L (ref 0–45)
BILIRUB SERPL-MCNC: 0.3 MG/DL (ref 0.2–1.3)
BUN SERPL-MCNC: 14 MG/DL (ref 7–30)
CALCIUM SERPL-MCNC: 9 MG/DL (ref 8.5–10.1)
CHLORIDE SERPL-SCNC: 108 MMOL/L (ref 94–109)
CO2 SERPL-SCNC: 26 MMOL/L (ref 20–32)
CREAT SERPL-MCNC: 0.66 MG/DL (ref 0.52–1.04)
FERRITIN SERPL-MCNC: 117 NG/ML (ref 12–150)
GFR SERPL CREATININE-BSD FRML MDRD: >90 ML/MIN/1.7M2
GLUCOSE SERPL-MCNC: 84 MG/DL (ref 70–99)
POTASSIUM SERPL-SCNC: 4.3 MMOL/L (ref 3.4–5.3)
PROT SERPL-MCNC: 7.3 G/DL (ref 6.8–8.8)
SODIUM SERPL-SCNC: 140 MMOL/L (ref 133–144)

## 2017-09-14 PROCEDURE — 82728 ASSAY OF FERRITIN: CPT | Mod: ZL | Performed by: FAMILY MEDICINE

## 2017-09-14 PROCEDURE — 36415 COLL VENOUS BLD VENIPUNCTURE: CPT | Mod: ZL | Performed by: FAMILY MEDICINE

## 2017-09-14 PROCEDURE — 99212 OFFICE O/P EST SF 10 MIN: CPT

## 2017-09-14 PROCEDURE — 80053 COMPREHEN METABOLIC PANEL: CPT | Mod: ZL | Performed by: FAMILY MEDICINE

## 2017-09-14 PROCEDURE — 99214 OFFICE O/P EST MOD 30 MIN: CPT | Performed by: FAMILY MEDICINE

## 2017-09-14 ASSESSMENT — PAIN SCALES - GENERAL: PAINLEVEL: MILD PAIN (3)

## 2017-09-14 NOTE — MR AVS SNAPSHOT
After Visit Summary   9/14/2017    Jannet San    MRN: [id number]           Patient Information     Date Of Birth          1990        Visit Information        Provider Department      9/14/2017 8:45 AM Leona Matthews MD [facility]        Today's Diagnoses     Elevated LFTs    -  1    Pyelonephritis        PTSD (post-traumatic stress disorder)        Bipolar II disorder (H)           Follow-ups after your visit        Your next 10 appointments already scheduled     Oct 20, 2017  4:30 PM CDT   (Arrive by 4:15 PM)   Return Visit with Melina Benson NP   [facility] ([facility] )    [address] [place] [id number] [phone]              Who to contact     If you have questions or need follow up information about today's clinic visit or your schedule please contact [facility] directly at [phone].  Normal or non-critical lab and imaging results will be communicated to you by MyChart, letter or phone within 4 business days after the clinic has received the results. If you do not hear from us within 7 days, please contact the clinic through [employer] or phone. If you have a critical or abnormal lab result, we will notify you by phone as soon as possible.  Submit refill requests through [employer] or call your pharmacy and they will forward the refill request to us. Please allow 3 business days for your refill to be completed.          Additional Information About Your Visit        MyChart Information     [employer] gives you secure access to your electronic health record. If you see a primary care provider, you can also send messages to your care team and make appointments. If you have questions, please call your primary care clinic.  If you do not have a primary care provider, please call [phone] and they will assist you.        Care EveryWhere ID     This is your Care EveryWhere ID. This could be used by other  "organizations to access your [place] medical records  [id number]        Your Vitals Were     Pulse Temperature Respirations Height Pulse Oximetry BMI (Body Mass Index)    77 97.9  F (36.6  C) (Tympanic) 16 5' 11\" (1.803 m) 99% 23.71 kg/m2       Blood Pressure from Last 3 Encounters:   09/14/17 118/62   09/05/17 112/64   08/30/17 113/70    Weight from Last 3 Encounters:   09/14/17 170 lb (77.1 kg)   09/05/17 175 lb (79.4 kg)   08/24/17 176 lb (79.8 kg)              We Performed the Following     Comprehensive metabolic panel     Ferritin        Primary Care Provider Office Phone # Fax #    Leona Matthews [phone]       [facility] [place] [address]  [facility] [address]        Equal Access to Services     [facility] Hospital[facility] : Hadii deng ku hadasho Soomaali, waaxda luqadaha, qaybta kaalmada adeegyada, ryann morain hayyola nieto . So [facility] [phone].    ATENCIÓN: Si habla español, tiene a mcdermott disposición servicios gratuitos de asistencia lingüística. Llame al [phone].    We comply with applicable federal civil rights laws and [place] laws. We do not discriminate on the basis of race, color, national origin, age, disability sex, sexual orientation or gender identity.            Thank you!     Thank you for choosing [facility]  for your care. Our goal is always to provide you with excellent care. Hearing back from our patients is one way we can continue to improve our services. Please take a few minutes to complete the written survey that you may receive in the mail after your visit with us. Thank you!             Your Updated Medication List - Protect others around you: Learn how to safely use, store and throw away your medicines at www.disposemymeds.org.          This list is accurate as of: 9/14/17  9:55 AM.  Always use your most recent med list.                   Brand Name Dispense Instructions for use Diagnosis    BENADRYL PO      Take 75 mg by mouth " nightly as needed        HYDROcodone-acetaminophen 5-325 MG per tablet    NORCO    15 tablet    Take 1-2 tablets by mouth every 4 hours as needed for moderate to severe pain    Acute pyelonephritis       ibuprofen 800 MG tablet    ADVIL/MOTRIN    40 tablet    Take 1 tablet (800 mg) by mouth every 8 hours as needed for moderate pain    Post-op pain       lisdexamfetamine 30 MG capsule    VYVANSE    60 capsule    Take 1 capsule (30 mg) by mouth 2 times daily    Attention deficit hyperactivity disorder (ADHD), predominantly inattentive type       MAGNESIUM OXIDE PO      Take 500 mg by mouth        MELATONIN PO      Take 10 mg by mouth At Bedtime        norethindrone-mestranol 1-50 MG-MCG Tabs    NORINYL1-50/ORTHO NOVUM 1-50    84 tablet    Take 1 tablet by mouth daily for 365 doses    Ovulation pain       order for DME     1 Units    Right ASO brace - right ankle    Acute right ankle pain       prazosin 1 MG capsule    MINIPRESS    60 capsule    Take 1 mg for two nights then increase    Attention deficit hyperactivity disorder (ADHD), predominantly inattentive type       sertraline 100 MG tablet    ZOLOFT    30 tablet    Take 1 tablet (100 mg) by mouth daily    Bipolar 2 disorder (H)       TYLENOL PO      Take 1,000 mg by mouth

## 2017-09-14 NOTE — NURSING NOTE
"Chief Complaint   Patient presents with     Infection     Kidney infection follow up. Still having some pain in her left rib cage. Back pain has improved.        Initial /62 (BP Location: Right arm, Patient Position: Chair, Cuff Size: Adult Regular)  Pulse 77  Temp 97.9  F (36.6  C) (Tympanic)  Resp 16  Ht 5' 11\" (1.803 m)  Wt 170 lb (77.1 kg)  SpO2 99%  BMI 23.71 kg/m2 Estimated body mass index is 23.71 kg/(m^2) as calculated from the following:    Height as of this encounter: 5' 11\" (1.803 m).    Weight as of this encounter: 170 lb (77.1 kg).  Medication Reconciliation: complete   Karen Smith LPN    "

## 2017-09-15 ENCOUNTER — TELEPHONE (OUTPATIENT)
Dept: PSYCHIATRY | Facility: OTHER | Age: 27
End: 2017-09-15

## 2017-09-15 ENCOUNTER — HOSPITAL ENCOUNTER (OUTPATIENT)
Dept: BEHAVIORAL HEALTH | Facility: HOSPITAL | Age: 27
Discharge: HOME | End: 2017-09-15
Attending: PSYCHIATRY & NEUROLOGY | Admitting: PSYCHIATRY & NEUROLOGY
Payer: COMMERCIAL

## 2017-09-15 DIAGNOSIS — F90.0 ATTENTION DEFICIT HYPERACTIVITY DISORDER (ADHD), PREDOMINANTLY INATTENTIVE TYPE: ICD-10-CM

## 2017-09-15 DIAGNOSIS — F31.81 BIPOLAR 2 DISORDER (H): ICD-10-CM

## 2017-09-15 PROCEDURE — 90791 PSYCH DIAGNOSTIC EVALUATION: CPT | Performed by: SOCIAL WORKER

## 2017-09-15 RX ORDER — LISDEXAMFETAMINE DIMESYLATE 30 MG/1
CAPSULE ORAL
Qty: 60 CAPSULE | Refills: 0 | Status: SHIPPED | OUTPATIENT
Start: 2017-09-15 | End: 2017-10-09

## 2017-09-15 RX ORDER — LISDEXAMFETAMINE DIMESYLATE 30 MG/1
CAPSULE ORAL
Qty: 60 CAPSULE | Refills: 0 | Status: SHIPPED | OUTPATIENT
Start: 2017-09-15 | End: 2017-09-15

## 2017-09-15 RX ORDER — PRAZOSIN HYDROCHLORIDE 2 MG/1
CAPSULE ORAL
Qty: 30 CAPSULE | Refills: 1 | Status: SHIPPED | OUTPATIENT
Start: 2017-09-15 | End: 2018-01-26

## 2017-09-15 RX ORDER — SERTRALINE HYDROCHLORIDE 100 MG/1
100 TABLET, FILM COATED ORAL DAILY
Qty: 30 TABLET | Refills: 3 | Status: SHIPPED | OUTPATIENT
Start: 2017-09-15 | End: 2017-12-06

## 2017-09-15 ASSESSMENT — ANXIETY QUESTIONNAIRES
GAD7 TOTAL SCORE: 10
1. FEELING NERVOUS, ANXIOUS, OR ON EDGE: MORE THAN HALF THE DAYS
7. FEELING AFRAID AS IF SOMETHING AWFUL MIGHT HAPPEN: SEVERAL DAYS
2. NOT BEING ABLE TO STOP OR CONTROL WORRYING: SEVERAL DAYS
6. BECOMING EASILY ANNOYED OR IRRITABLE: NEARLY EVERY DAY
3. WORRYING TOO MUCH ABOUT DIFFERENT THINGS: SEVERAL DAYS
5. BEING SO RESTLESS THAT IT IS HARD TO SIT STILL: SEVERAL DAYS
IF YOU CHECKED OFF ANY PROBLEMS ON THIS QUESTIONNAIRE, HOW DIFFICULT HAVE THESE PROBLEMS MADE IT FOR YOU TO DO YOUR WORK, TAKE CARE OF THINGS AT HOME, OR GET ALONG WITH OTHER PEOPLE: VERY DIFFICULT

## 2017-09-15 ASSESSMENT — PATIENT HEALTH QUESTIONNAIRE - PHQ9
SUM OF ALL RESPONSES TO PHQ QUESTIONS 1-9: 10
5. POOR APPETITE OR OVEREATING: SEVERAL DAYS
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
## 2017-09-15 NOTE — PLAN OF CARE
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 home with services.    Jannet reports the desire to join PHP to \"Learning to cope with my reality and learning to be grateful for what I have\".  Patient stated that her symptoms have resulted in the following functional impairments: childcare / parenting, chronic disease management, health maintenance, money management, organization, relationship(s) and social interactions.  In her relationship with peers, family with parents and financial as she is really struggling with bills.  Also lots of stress in parenting and co-parenting with her ex-.  Lots of stress with winter coming as she states she really struggles in the winter.    Jannet reports her depression and anxiety have really been interfering in her life - she reports having less confidence, less optimism about life, very irritable, less energy, everything she does seem harder, worries about things she can't control, projects negative on everyone, it is harder for her to be around people but is lonely and bored, no motivation, increased suicidal ideation.    Referral to another professional/service is not indicated at this time.  " Will address this more in her discharge planning goal.  ARM (Adult Rehabilitative Mental Health Services) to rehabilitate the areas of functional impairments - she does report currently having an [facility] worker.    It is my professional opinion that patient:     1. Has symptoms of mental illness that impair function in the following areas:       Mental health symptoms, Mental health service needs, Securing or maintaining employment, Securing or maintaining education, Interpersonal skills, Community integration, Medical health, Obtaining / maintaining financial assistance, Using transportation and Use of drugs or alcohol     2. Rehabilitative mental health services would reduce symptoms to allow regulated, restored or improved functioning.  Maintain stability, function, preventing risk of significant functional decompensation or more restrictive service setting.      3. Has the cognitive capacity to benefit from rehabilitative mental health techniques and methods.    Will have Jannet sign a release as needed..    I certify that Partial Hospitalization (PHP) services are medically necessary to improve or maintain the client's condition and functional level and to prevent relapse or hospitalization.  PHP services will be provided in lieu of psychiatric hospitalization, no less intensive level of care would be sufficient to provide the medically necessary treatment the client requires.      Initial Individualized Treatment Plan:      I. The areas of treatment focus identified are:  Stabilize mental health status/personal safety      II.  Goals and treatment strategies include:         A. Client will report on their:  Ability to maintain safety and/or destructive impulses by talking to staff as needed and actively engaging in safety planning.  Need for more information, support and/or assistance.  Efforts to improve reality orientation by discussing these needs in groups  Increase or change in symptoms          B. Client  will identify skills to:  Improve interpersonal relationships by learning adaptive communication and coping skills  Promote constructive management of emotions (examples: depression, anxiety, anger) by developing effective cognitive/behavioral coping skills  Begin to interrupt negative and ruminating thought patterns.          C. Client will agree to:  Be responsible to prioritize and work goals, including regular attendance as scheduled, and ask for assistance as needed.  Abstinence from alcohol, illicit drugs ( incl. synthetics), gambling; i.e. behaviors that will inhibit their ability to utilize and benefit from the program.  Process with the group attempts to interrupt thoughts and use of coping skills.                D. This client will report on cultural, racial, health or spiritual issues that impact their wellbeing     Assessment of this client's strengths includes:  Asking for help, open to programming, has some insight into her situation      Specific Issues(s):  Decrease interference of her depression and anxiety in multiple life areas - decrease SI.     Intervention:  Increase use of coping skills and ability to interrupt negative thinking.          E. Client agrees to:  Partner with the treatment team to identify, prioritize and work on goals.  Be responsible for active participation in the treatment plan, work with assigned treatment team, regularly attend groups, and ask for help as needed.  Maintain personal safety by: going to the ED if not feeling she can keep herself safe  Refrain from use of alcohol, illicit drugs (including synthetics) gambling - and any behaviors that restrict getting the full benefit from the program, for the time they are in the program, and be willing to report and problem-solve difficulties with staff.      Date Plan Started: 9/19/17      Date Plan Reviewed: 9/25/17      Admit Date/Time: 9/19/17    Yes, the patient has been informed that any other mental health professional  providing mental health services to me will need access to this Diagnostic Assessment in order to develop a treatment plan and receive payment.    Bibiana Barrera, DAMARISSW

## 2017-09-16 ASSESSMENT — ANXIETY QUESTIONNAIRES: GAD7 TOTAL SCORE: 10

## 2017-09-18 NOTE — PROGRESS NOTES
Jannet San would be at reasonable risk of requiring inpatient hospitalization in the absence of partial hospitalization.

## 2017-09-19 ENCOUNTER — HOSPITAL ENCOUNTER (OUTPATIENT)
Dept: BEHAVIORAL HEALTH | Facility: HOSPITAL | Age: 27
End: 2017-09-19
Attending: PSYCHIATRY & NEUROLOGY
Payer: COMMERCIAL

## 2017-09-19 NOTE — PROGRESS NOTES
"Group Therapy Progress Notes     Client Initial Individualized Goals for Treatment:     Improve interpersonal relationships by learning adaptive communication and coping skills  Promote constructive management of emotions (examples: depression, anxiety, anger) by developing effective cognitive/behavioral coping skills  Begin to interrupt negative and ruminating thought patterns.       Area of Treatment Focus:  Symptom Stabilization and Management  Personal Safety    Therapeutic Interventions/Treatment Strategies:    Engage in safety planning when indicated  Facilitate increased self awareness  Teach adaptive coping skills and communication skills    Response to Treatment Strategies:  Accepted Feedback, Gave Feedback and Listened and Focused on Goals    Name of Groups:  Mindfulness: [facility]; WHAT Skills    Progress Note    During [place] [facility] group, Jannet presented as calm, attentive, responsive and engaged. She presents as amenable to learning the skills and has grasped the 3 C's - I didn't cause it, I can't cure it, I can't control it. She expressed some \"trepidation\" regarding having more people in the group tomorrow. Her reasoning is that \"I am so empathetic and don't want to get so involved in their pain\". Discussed applying the 3 C's.    During WHAT skills group, Jannet practiced using Just Like Me skill and also 3 Good Things regarding her ex  as well as 3 Good Things about herself\":\" I'm smart; good mom; hard working\". She also practiced \"Just Like Me\".  Discussed applying Present Moment skill regarding negative relationship with her ex .      Is this a Weekly Review of the Progress on the Treatment Plan?  YES    Are Treatment Plan Goals being addressed?  YES      Are Treatment Plan Strategies to Address Goals Effective?  YES      Are there any current contracts in place?  YES           "

## 2017-09-19 NOTE — TREATMENT PLAN
Initial Treatment Plan     Name: Jannet San MRN: [id number]    : 1990 Sex: female    Initial Individualized Treatment Plan:    I. The areas of treatment focus identified are:  Stabilize mental health status/personal safety       II.  Goals and treatment strategies include:         A. Client will report on their:  Ability to maintain safety and/or destructive impulses by talking to staff as needed and actively engaging in safety planning.  Need for more information, support and/or assistance.  Efforts to improve reality orientation by discussing these needs in groups  Increase or change in symptoms           B. Client will identify skills to:  Improve interpersonal relationships by learning adaptive communication and coping skills  Promote constructive management of emotions (examples: depression, anxiety, anger) by developing effective cognitive/behavioral coping skills  Begin to interrupt negative and ruminating thought patterns.           C. Client will agree to:  Be responsible to prioritize and work goals, including regular attendance as scheduled, and ask for assistance as needed.  Abstinence from alcohol, illicit drugs ( incl. synthetics), gambling; i.e. behaviors that will inhibit their ability to utilize and benefit from the program.  Process with the group attempts to interrupt thoughts and use of coping skills.            D. This client will report on cultural, racial, health or spiritual issues that impact their wellbeing                           Assessment of this client's strengths includes:  Asking for help, open to programming, has some insight into her situation                            Specific Issues(s):  Decrease interference of her depression and anxiety in multiple life areas - decrease SI.                           Intervention:  Increase use of coping skills and ability to interrupt negative thinking.  These services will include group therapy and OT group therapy daily and  individual therapy and medications management as needed.             E. Client agrees to:  Partner with the treatment team to identify, prioritize and work on goals.  Be responsible for active participation in the treatment plan, work with assigned treatment team, regularly attend groups, and ask for help as needed.  Maintain personal safety by: going to the ED if not feeling she can keep herself safe  Refrain from use of alcohol, illicit drugs (including synthetics) gambling - and any behaviors that restrict getting the full benefit from the program, for the time they are in the program, and be willing to report and problem-solve difficulties with staff.        Date Plan Started:               9/19/17        Date Plan Reviewed: 9/25/17        Admit Date/Time:                9/19/17                              Team Members Contributing to Plan:  Rosana Barrera, [facility]  Shi Bhat, [facility] Center[facility]JIT Leary  Reviewed by Dr. Nilsa Hawkins

## 2017-09-19 NOTE — PROGRESS NOTES
"Group Therapy Progress Notes     Client Initial Individualized Goals for Treatment: Improve interpersonal relationships by learning adaptive communication and coping skills  Promote constructive management of emotions (examples: depression, anxiety, anger) by developing effective  cognitive/behavioral coping skills  Begin to interrupt negative and ruminating thought patterns.    Area of Treatment Focus:  Personal Safety    Therapeutic Interventions/Treatment Strategies:  Engage in safety planning when indicated  Facilitate increased self awareness  Teach adaptive coping skills and communication skills    Response to Treatment Strategies:  Accepted Feedback, Gave Feedback and Listened and Focused on Goals    Name of Groups:  Psychotherapy and Wrap Up    Progress Note  In psychotherapy group, we allowed Jannet to introduce herself and share with the group whatever she wanted to share -  She did note that she has lots of stress and is hoping to increase her emotion regulation, has limited supports and at times, feels like she would be better off if she was not here - not actively suicidal but feels hopeless - hoping to shift these thinking patterns.    In Wrap Up group, Jannet reviewed her take away from today - \"just like me\" - new to her - thinks it can help with frustration with others - talked more about relationships and why she  - good feedback to a peer who is struggling in a relationship.  Reviewed evening plans and safety issues.  Jannet remains appropriate for PHP.       Is this a Weekly Review of the Progress on the Treatment Plan?  NO    Are Treatment Plan Goals being addressed?  YES      Are Treatment Plan Strategies to Address Goals Effective?  YES      Are there any current contracts in place?  NO             "

## 2017-09-19 NOTE — PROGRESS NOTES
Group Therapy Progress Notes     Client Initial Individualized Goals for Treatment: Promote constructive management of emotions (examples: depression, anxiety, anger) by developing effective  cognitive/behavioral coping skills  Begin to interrupt negative and ruminating thought patterns.    Area of Treatment Focus:  Cultural/ Racial / Health / Spiritual    Therapeutic Interventions/Treatment Strategies:  Facilitate increased self awareness  Provide education regarding pain managment    Response to Treatment Strategies:  Accepted Feedback, Gave Feedback and Listened and Focused on Goals    Name of Groups:  pain managmeent    Progress Note  Jannet presents to OT group at participatory and focused.  Jannet states that pain is an issue for her but recognizes that if she can keep busy she can distract herself from the pain and remain functional.  Jannet was educated in pain management tips and techniques and participated in guided balance and stretching activities for managing pain.        Is this a Weekly Review of the Progress on the Treatment Plan?  NO    Are Treatment Plan Goals being addressed?  YES      Are Treatment Plan Strategies to Address Goals Effective?  YES      Are there any current contracts in place?  NO

## 2017-09-20 ENCOUNTER — HOSPITAL ENCOUNTER (OUTPATIENT)
Dept: BEHAVIORAL HEALTH | Facility: HOSPITAL | Age: 27
End: 2017-09-20
Attending: PSYCHIATRY & NEUROLOGY
Payer: COMMERCIAL

## 2017-09-20 PROCEDURE — 99215 OFFICE O/P EST HI 40 MIN: CPT | Performed by: NURSE PRACTITIONER

## 2017-09-20 NOTE — PROGRESS NOTES
Group Therapy Progress Notes     Client Initial Individualized Goals for Treatment: : Improve interpersonal relationships by learning adaptive communication and coping skills  Promote constructive management of emotions (examples: depression, anxiety, anger) by developing effective  cognitive/behavioral coping skills  Begin to interrupt negative and ruminating thought patterns.    Area of Treatment Focus:  Cultural/ Racial / Health / Spiritual    Therapeutic Interventions/Treatment Strategies:  Facilitate increased self awareness  Provide education regarding exercise    Response to Treatment Strategies:  Accepted Feedback, Gave Feedback and Listened and Focused on Goals    Name of Groups:  Exercise    Progress Note  Jannet presents to OT group today as bright and participatory.  States that her exercise primarily includes house cleaning and walking with her son.  States that she would like to do more. Jannet also talks about playing disc golf and playing in tournaments.  Jannet was educated in the benefits of exercise and was guided through an upper extremity exercise program.        Is this a Weekly Review of the Progress on the Treatment Plan?  NO    Are Treatment Plan Goals being addressed?  YES      Are Treatment Plan Strategies to Address Goals Effective?  YES      Are there any current contracts in place?  NO
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
## 2017-09-20 NOTE — PROGRESS NOTES
Team consultation to review patient's progress and treatment plan.  The patient remains appropriate for the PHP program.  Partial Hospitalization (PHP) services continue to be medically necessary to improve or maintain the client's condition and functional level and to prevent relapse or hospitalization.  PHP services will be provided in lieu of psychiatric hospitalization, no less intensive level of care would be sufficient to provide the medically necessary treatment the client requires.  Jannet met with the NP today to complete her intake process.

## 2017-09-20 NOTE — PROGRESS NOTES
Group Therapy Progress Notes     Client Initial Individualized Goals for Treatment: Improve interpersonal relationships by learning adaptive communication and coping skills  Promote constructive management of emotions (examples: depression, anxiety, anger) by developing effective  cognitive/behavioral coping skills  Begin to interrupt negative and ruminating thought patterns.    Area of Treatment Focus:  Personal Safety    Therapeutic Interventions/Treatment Strategies:  Engage in safety planning when indicated  Facilitate increased self awareness  Teach adaptive coping skills and communication skills    Response to Treatment Strategies:  Accepted Feedback, Gave Feedback and Listened and Focused on Goals    Name of Groups:  Psychotherapy and Wrap Up    Progress Note  Jannet had a therapy appointment this morning so missed psychotherapy group.    In Wrap Up group, Jannet reviewed her take away from today - reviewed I and the not I - self development - discussed a new concept of co-creating   Reviewed evening plans and safety issues.  Jannet remains appropriate for PHP.   Vs. Pro-creating.  Projecting on others from the past - forgiveness of behaviors.  For tonight will practice staying in the present moment, dealing with emotions in the moment and not letting them build up.  Reviewed evening plans and safety issues - Jannet remains appropriate for PHP.    Is this a Weekly Review of the Progress on the Treatment Plan?  NO    Are Treatment Plan Goals being addressed?  YES      Are Treatment Plan Strategies to Address Goals Effective?  YES      Are there any current contracts in place?  NO

## 2017-09-20 NOTE — PROGRESS NOTES
"Group Therapy Progress Notes     Client Initial Individualized Goals for Treatment:     Improve interpersonal relationships by learning adaptive communication and coping skills  Promote constructive management of emotions (examples: depression, anxiety, anger) by developing effective cognitive/behavioral coping skills  Begin to interrupt negative and ruminating thought patterns.      Area of Treatment Focus:  Symptom Stabilization and Management  Personal Safety    Therapeutic Interventions/Treatment Strategies:  Engage in safety planning when indicated  Facilitate increased self awareness  Teach adaptive coping skills and communication skills    Response to Treatment Strategies:  Accepted Feedback, Gave Feedback and Listened and Focused on Goals    Name of Groups:  Self Development    Progress Note    Leela was at an appointment during Self Development group.    During the afternoon Self-Development group, Jannet presented as calm, participatory.She has not been in communication with her parents as they tend to side with her ex spouse as well as make regular suggestions about her life. Discussed 3 C's and Forgiveness regarding her ex and parents. She acknowledging living in words her  spoke when she requested a divorce,\" Nobody will want you and if they do, it's just for sex\". \"This is what's happening\". Discussed Present Moment and 3 Good Things to interrupt negative thinking. She reports moderately amenable to applying \"Hello Emotion\" but can apply a 3rd Person voice in her head to use this skill.   Jannet recognized that yesterday in group she gave advice to a peer regarding a relationship and \"I should not have done that as they have to work on their own stuff\".      Is this a Weekly Review of the Progress on the Treatment Plan?  NO    Are Treatment Plan Goals being addressed?  YES      Are Treatment Plan Strategies to Address Goals Effective?  YES      Are there any current contracts in " place?  NO

## 2017-09-21 ENCOUNTER — HOSPITAL ENCOUNTER (OUTPATIENT)
Dept: BEHAVIORAL HEALTH | Facility: HOSPITAL | Age: 27
End: 2017-09-21
Attending: PSYCHIATRY & NEUROLOGY
Payer: COMMERCIAL

## 2017-09-21 NOTE — PROGRESS NOTES
Group Therapy Progress Notes     Client Initial Individualized Goals for Treatment: Improve interpersonal relationships by learning adaptive communication and coping skills  Promote constructive management of emotions (examples: depression, anxiety, anger) by developing effective  cognitive/behavioral coping skills  Begin to interrupt negative and ruminating thought patterns.    Area of Treatment Focus:  Personal Safety    Therapeutic Interventions/Treatment Strategies:  Engage in safety planning when indicated  Facilitate increased self awareness  Teach adaptive coping skills and communication skills    Response to Treatment Strategies:  Accepted Feedback, Gave Feedback and Listened and Focused on Goals    Name of Groups:  Psychotherapy and Wrap Up    Progress Note  Jannet did not show for psychotherapy group - when staff called her, she said she was waiting for her [employer] worker and would be to program shortly.    In Wrap Up group, Jannet reviewed her take away from today - reviewed dialectic thinking, middle path, non-judgmental.  Discussed some issues with her ex - helped her reframe things into a more positive light - needed to help her interrupt the negative thinking several times - encouraged her to practice interrupting the thoughts - Reviewed evening plans and safety issues - Jannet remains appropriate for PHP.    Is this a Weekly Review of the Progress on the Treatment Plan?  NO    Are Treatment Plan Goals being addressed?  YES      Are Treatment Plan Strategies to Address Goals Effective?  YES      Are there any current contracts in place?  NO

## 2017-09-21 NOTE — PROGRESS NOTES
Group Therapy Progress Notes     Client Initial Individualized Goals for Treatment: Improve interpersonal relationships by learning adaptive communication and coping skills  Promote constructive management of emotions (examples: depression, anxiety, anger) by developing effective  cognitive/behavioral coping skills  Begin to interrupt negative and ruminating thought patterns.    Area of Treatment Focus:  Cultural/ Racial / Health / Spiritual    Therapeutic Interventions/Treatment Strategies:  Provide education regarding nutrition    Response to Treatment Strategies:  Accepted Feedback, Gave Feedback and Listened and Focused on Goals    Name of Groups:  Nutrition    Progress Note  Jannet came in late to OT group. She noted that she was late due to a battery connection being loose and her care not starting. Provided handouts on the link between nutrition and mental/physical health. Discussed go, slow and whoa foods. Education provided about portion distortions and being aware what a true portion is. Jannet notes she likes to go to the [employer] market and buy her produce there. Education via handout on eating healthy with a tight budget. Discussed mom's meals and Jannet notes she is going to ask her  about this program.        Is this a Weekly Review of the Progress on the Treatment Plan?  NO    Are Treatment Plan Goals being addressed?  YES      Are Treatment Plan Strategies to Address Goals Effective?  YES      Are there any current contracts in place?  NO

## 2017-09-21 NOTE — PROGRESS NOTES
"Group Therapy Progress Notes     Client Initial Individualized Goals for Treatment:    Improve interpersonal relationships by learning adaptive communication and coping skills  Promote constructive management of emotions (examples: depression, anxiety, anger) by developing effective cognitive/behavioral coping skills  Begin to interrupt negative and ruminating thought patterns.       Area of Treatment Focus:  Symptom Stabilization and Management  Personal Safety    Therapeutic Interventions/Treatment Strategies:  Engage in safety planning when indicated  Facilitate increased self awareness  Teach adaptive coping skills and communication skills    Response to Treatment Strategies:  Accepted Feedback, Gave Feedback and Listened and Focused on Goals    Name of Groups:  Mindfulness\" Dialectical/Validation; Communication    Progress Note    Jannet was at an appointment during Mindfulness group.    During Communication group, Jannet presented as relaxed, participatory and responsive when staff curtailed an elaborate response with redirection to a peer. She had an ambiguous response regarding her communication style being assertive, and also passive, then said \"I think I am aggressive\". She acknowledges being invalidated by her ex. Discussed 3 C's and 3 Good Things as well as applying \"I feel\" Statements..She identified having a challenge: about being imperfect as she likes everything perfect; Having me time; and having difficulty changing her situation.      Is this a Weekly Review of the Progress on the Treatment Plan?  NO    Are Treatment Plan Goals being addressed?  YES      Are Treatment Plan Strategies to Address Goals Effective?  YES      Are there any current contracts in place?  NO           "

## 2017-09-22 ENCOUNTER — HOSPITAL ENCOUNTER (OUTPATIENT)
Dept: BEHAVIORAL HEALTH | Facility: HOSPITAL | Age: 27
End: 2017-09-22
Attending: PSYCHIATRY & NEUROLOGY
Payer: COMMERCIAL

## 2017-09-22 NOTE — PROGRESS NOTES
Group Therapy Progress Notes     Client Initial Individualized Goals for Treatment:     Improve interpersonal relationships by learning adaptive communication and coping skills  Promote constructive management of emotions (examples: depression, anxiety, anger) by developing effective cognitive/behavioral coping skills  Begin to interrupt negative and ruminating thought patterns.       Area of Treatment Focus:  Symptom Stabilization and Management  Personal Safety    Therapeutic Interventions/Treatment Strategies  Engage in safety planning when indicated  Facilitate increased self awareness  Teach adaptive coping skills and communication skills    Response to Treatment Strategies:  Accepted Feedback, Gave Feedback and Listened and Focused on Goals    Name of Groups:  Wrap Up    Progress Note    Pt did not attend WRAP Up group as she left after the 9am Psychotherapy group due to a complaint regarding her stomach.        Is this a Weekly Review of the Progress on the Treatment Plan?  NO    Are Treatment Plan Goals being addressed?  YES      Are Treatment Plan Strategies to Address Goals Effective?  YES      Are there any current contracts in place?  NO

## 2017-09-26 NOTE — PROGRESS NOTES
Group Therapy Progress Notes     Client Initial Individualized Goals for Treatment:  Improve interpersonal relationships by learning adaptive communication and coping skills. Promote constructive management of emotions (examples: depression, anxiety, anger) by developing effective cognitive/behavioral coping skills. Begin to interrupt negative and ruminating thought patterns.    Area of Treatment Focus:  Symptom Stabilization and Management    Therapeutic Interventions/Treatment Strategies:  Assist clients in establishing / strengthening support network  Assess / reassess for appropriate therapy program involvement, encourage participation in therapies  Assess / reassess level of potential for harm to self or others  Teach adaptive coping skills and communication skills    Response to Treatment Strategies:  Gave Feedback and Listened and Focused on Goals    Name of Groups:  Psychotherapy    Progress Note  Jannet came in a bit late for group.  She focused on how she utilized mindfulness skills during a situation that she had last night.  Jannet was fairly dismissive of suggestions that group facilitators would give her and was quick to give an excuse as to why these suggestions would not work. Focused on problem solving skills and her and now.       Is this a Weekly Review of the Progress on the Treatment Plan?  YES    Are Treatment Plan Goals being addressed?  YES      Are Treatment Plan Strategies to Address Goals Effective?  YES      Are there any current contracts in place?  NO

## 2017-09-29 ENCOUNTER — HOSPITAL ENCOUNTER (EMERGENCY)
Facility: HOSPITAL | Age: 27
Discharge: HOME | End: 2017-09-29
Attending: NURSE PRACTITIONER | Admitting: NURSE PRACTITIONER
Payer: COMMERCIAL

## 2017-09-29 VITALS
OXYGEN SATURATION: 99 % | TEMPERATURE: 98.1 F | RESPIRATION RATE: 16 BRPM | DIASTOLIC BLOOD PRESSURE: 101 MMHG | SYSTOLIC BLOOD PRESSURE: 157 MMHG

## 2017-09-29 DIAGNOSIS — M79.644 THUMB PAIN, RIGHT: ICD-10-CM

## 2017-09-29 PROCEDURE — 99212 OFFICE O/P EST SF 10 MIN: CPT

## 2017-09-29 PROCEDURE — 99213 OFFICE O/P EST LOW 20 MIN: CPT | Performed by: NURSE PRACTITIONER

## 2017-09-29 RX ORDER — NAPROXEN 500 MG/1
500 TABLET ORAL 2 TIMES DAILY WITH MEALS
Qty: 16 TABLET | Refills: 0 | Status: SHIPPED | OUTPATIENT
Start: 2017-09-29 | End: 2017-10-07

## 2017-09-29 ASSESSMENT — ENCOUNTER SYMPTOMS
TROUBLE SWALLOWING: 0
DYSURIA: 0
DIARRHEA: 0
PSYCHIATRIC NEGATIVE: 1
CHILLS: 0
ACTIVITY CHANGE: 1
NAUSEA: 0
COUGH: 0
FEVER: 0
APPETITE CHANGE: 0
NUMBNESS: 1
VOMITING: 0

## 2017-09-29 NOTE — ED AVS SNAPSHOT
[facility]    [address]    [place] [address]    Phone:  [phone]                                       Jannet San   MRN: [id number]    Department:  [facility]   Date of Visit:  9/29/2017           Patient Information     Date Of Birth          1990        Your diagnoses for this visit were:     Thumb pain, right Over use injury versus carpal tunel syndrome.       You were seen by Cori Conti NP.      Follow-up Information     Follow up with Leona Matthews MD.    Specialty:  Family Practice    Why:  As needed, If symptoms worsen    Contact information:    [facility] [place]  [address]  [place] [address] [phone]          Follow up with [facility].    Specialty:  EMERGENCY MEDICINE    Why:  As needed, If symptoms worsen    Contact information:    [address] [place] [address] [phone]    Additional information:    From [facility]: Take [address]. Turn left at [address]. Turn left at the first stoplight on [address]. At the first stop sign, take a right onto [address]. Take a left into the parking lot and continue through until you reach the North enterance of the building.       From [place]: Take [address]. Take the [address] ramp towards [place]. Turn left onto [address]. Take a slight right onto [address]. Turn left at the first stoplight on [address]. At the first stop sign, take a right onto [address]. Take a left into the parking lot and continue through until you reach the North enterance of the building.       From [place]: Take [address]. Take a right at [address]. At the first stop sign, take a right onto [address]. Take a left into the parking lot and continue through until you reach the North enterance of the building.         Discharge Instructions       Take Naprosyn as ordered. Take with  food. Do NOT take other NSAIDS (Ibuprofen, Aspirin, etc..) when taking.   Take tylenol as needed for pain. Follow dosing on package.   Elevate right hand.   Apply ice to right thumb/wrist for 20 minutes every 1-2 hours. Protect skin.   Wear splint as directed.   Follow up with PCP if symptoms persist.   Return to urgent care or emergency department with any increase in symptoms or concerns.     Discharge References/Attachments     R.I.C.E. (ENGLISH)    CARPAL TUNNEL SYNDROME (ENGLISH)      Future Appointments        Provider Department Dept Phone Center    10/2/2017 9:00 AM Parital Treatment Schedule [facility] [phone] [facility]    10/3/2017 9:00 AM Parital Treatment Schedule [facility] [phone] [facility]    10/4/2017 9:00 AM Parital Treatment Schedule [facility] [phone] [facility]    10/5/2017 9:00 AM Parital Treatment Schedule [facility] Partial [phone] [facility]    10/6/2017 9:00 AM Parital Treatment Schedule [facility] [phone] [facility]    10/9/2017 9:00 AM Parital Treatment Schedule [facility] Partial [phone] [facility]    10/20/2017 4:30 PM Melina Benson NP [facility] [phone] [facility]         Review of your medicines      START taking        Dose / Directions Last dose taken    naproxen 500 MG tablet   Commonly known as:  NAPROSYN   Dose:  500 mg   Quantity:  16 tablet        Take 1 tablet (500 mg) by mouth 2 times daily (with meals) for 8 days   Refills:  0          Our records show that you are taking the medicines listed below. If these are incorrect, please call your family doctor or clinic.        Dose / Directions Last dose taken    BENADRYL PO   Dose:  75 mg        Take 75 mg by mouth nightly as needed   Refills:  0        HYDROcodone-acetaminophen 5-325 MG per tablet   Commonly known as:  NORCO   Dose:  1-2 tablet   Quantity:  15 tablet        Take 1-2 tablets by mouth every 4 hours as needed for moderate to severe pain   Refills:   0        ibuprofen 800 MG tablet   Commonly known as:  ADVIL/MOTRIN   Dose:  800 mg   Quantity:  40 tablet        Take 1 tablet (800 mg) by mouth every 8 hours as needed for moderate pain   Refills:  1        lisdexamfetamine 30 MG capsule   Commonly known as:  VYVANSE   Quantity:  60 capsule        TAKE 1 CAPSULE BY MOUTH 2 TIMES A DAY   Refills:  0        MAGNESIUM OXIDE PO   Dose:  500 mg        Take 500 mg by mouth   Refills:  0        MELATONIN PO   Dose:  10 mg        Take 10 mg by mouth At Bedtime   Refills:  0        norethindrone-mestranol 1-50 MG-MCG Tabs   Commonly known as:  NORINYL1-50/ORTHO NOVUM 1-50   Dose:  1 tablet   Quantity:  84 tablet        Take 1 tablet by mouth daily for 365 doses   Refills:  3        order for DME   Quantity:  1 Units        Right ASO brace - right ankle   Refills:  0        prazosin 2 MG capsule   Commonly known as:  MINIPRESS   Quantity:  30 capsule        Take 1 mg for two nights then increase   Refills:  1        sertraline 100 MG tablet   Commonly known as:  ZOLOFT   Dose:  100 mg   Quantity:  30 tablet        Take 1 tablet (100 mg) by mouth daily   Refills:  3        TYLENOL PO   Dose:  1000 mg        Take 1,000 mg by mouth   Refills:  0                Prescriptions were sent or printed at these locations (1 Prescription)                   [facility] [place] [facility] - [address]   [address] [address]    Telephone:  [phone]   Fax:  [phone]   Hours:                  E-Prescribed (1 of 1)         naproxen (NAPROSYN) 500 MG tablet                Orders Needing Specimen Collection     None      Pending Results     No orders found from 9/27/2017 to 9/30/2017.            Pending Culture Results     No orders found from 9/27/2017 to 9/30/2017.            Thank you for choosing [facility]       Thank you for choosing [facility] for your care. Our goal is always to provide you with excellent care. Hearing back from our patients is one way  we can continue to improve our services. Please take a few minutes to complete the written survey that you may receive in the mail after you visit with us. Thank you!        [facility] Information     [employer] gives you secure access to your electronic health record. If you see a primary care provider, you can also send messages to your care team and make appointments. If you have questions, please call your primary care clinic.  If you do not have a primary care provider, please call [phone] and they will assist you.        Care EveryWhere ID     This is your Care EveryWhere ID. This could be used by other organizations to access your [facility] medical records  [id number]        Equal Access to Services     CESAR BEST : Becca So, kristy henao, herrera bustamante, ryann nieto . So [facility] [phone].    ATENCIÓN: Si habla español, tiene a mcdermott disposición servicios gratuitos de asistencia lingüística. Llame al [phone].    We comply with applicable federal civil rights laws and [place] laws. We do not discriminate on the basis of race, color, national origin, age, disability, sex, sexual orientation, or gender identity.            After Visit Summary       This is your record. Keep this with you and show to your community pharmacist(s) and doctor(s) at your next visit.

## 2017-09-29 NOTE — DISCHARGE INSTRUCTIONS
Take Naprosyn as ordered. Take with food. Do NOT take other NSAIDS (Ibuprofen, Aspirin, etc..) when taking.   Take tylenol as needed for pain. Follow dosing on package.   Elevate right hand.   Apply ice to right thumb/wrist for 20 minutes every 1-2 hours. Protect skin.   Wear splint as directed.   Follow up with PCP if symptoms persist.   Return to urgent care or emergency department with any increase in symptoms or concerns.

## 2017-09-29 NOTE — ED PROVIDER NOTES
History     Chief Complaint   Patient presents with     carpel tunnel     rt hand notes tingling thumb since cutting with scissors x 1 hour last friday     The history is provided by the patient. No  was used.     Jannte San is a 27 year old female who presents with right hand pain with tingling in right thumb that started 1 week ago after she was cutting up vegetables for an hour straight. Denies injury or trauma to right wrist/hand. She's taken ibuprofen with mild effectiveness. She is ambidextrous. Denies fever, chills, or night sweats. Eating and drinking well. Bowel and bladder are working well.     I have reviewed the Medications, Allergies, Past Medical and Surgical History, and Social History in the Epic system.    Review of Systems   Constitutional: Positive for activity change. Negative for appetite change, chills and fever.   HENT: Negative for trouble swallowing.    Respiratory: Negative for cough.    Gastrointestinal: Negative for diarrhea, nausea and vomiting.   Genitourinary: Negative for dysuria.   Musculoskeletal:        Right hand/thumb discomfort.    Skin: Negative for rash.   Neurological: Positive for numbness.        Numbness and tingling to right hand with radiation of pain to right elbow.    Psychiatric/Behavioral: Negative.        Physical Exam   BP: (!) 157/101  Heart Rate: 83  Temp: 98.1  F (36.7  C)  Resp: 16  SpO2: 99 %  Physical Exam   Constitutional: She is oriented to person, place, and time. She appears well-developed and well-nourished. No distress.   HENT:   Head: Normocephalic.   Mouth/Throat: Oropharynx is clear and moist.   Neck: Normal range of motion. Neck supple.   Cardiovascular: Normal rate, regular rhythm, normal heart sounds and intact distal pulses.    No murmur heard.  Pulmonary/Chest: Effort normal. No respiratory distress. She has no wheezes. She has no rales.   Abdominal: Soft. She exhibits no distension.   Musculoskeletal: She exhibits  tenderness. She exhibits no edema or deformity.   CMS and ROM intact to right upper extremity. Right radial pulse +2. Tenderness to right mid thumb that extends to the tip. +Tinel's sign. +Phalen's test.    Lymphadenopathy:     She has no cervical adenopathy.   Neurological: She is alert and oriented to person, place, and time.   Skin: Skin is warm and dry. No rash noted. She is not diaphoretic. No erythema.   No erythema, rash, bruising, or warmth to the touch to right wrist/hand.    Psychiatric: She has a normal mood and affect. Her behavior is normal.   Nursing note and vitals reviewed.      ED Course     ED Course     Procedures      Assessments & Plan (with Medical Decision Making)     Exam is consistent with an over use injury versus CTS. She will wear brace and follow up with PCP if symptoms persist.    Discussed plan of care. She verbalized understanding. All questions answered.     I have reviewed the nursing notes.    I have reviewed the findings, diagnosis, plan and need for follow up with the patient.  Discharged in stable condition.     New Prescriptions    NAPROXEN (NAPROSYN) 500 MG TABLET    Take 1 tablet (500 mg) by mouth 2 times daily (with meals) for 8 days       Final diagnoses:   Thumb pain, right - Over use injury versus carpal tunel syndrome.     Take Naprosyn as ordered. Take with food. Do NOT take other NSAIDS (Ibuprofen, Aspirin, etc..) when taking.   Take tylenol as needed for pain. Follow dosing on package.   Elevate right hand.   Apply ice to right thumb/wrist for 20 minutes every 1-2 hours. Protect skin.   Wear splint as directed. Important to wear at night.   Follow up with PCP if symptoms persist.   Return to urgent care or emergency department with any increase in symptoms or concerns.     ELMER Baxter  9/29/2017  12:59 PM  URGENT CARE CLINIC  ]     Cori Conti NP  09/29/17 [id number]

## 2017-09-29 NOTE — ED AVS SNAPSHOT
[facility]    [address] [address]    Phone:  [phone]                                       Jannet San   MRN: [id number]    Department:  [facility]   Date of Visit:  9/29/2017           After Visit Summary Signature Page     I have received my discharge instructions, and my questions have been answered. I have discussed any challenges I see with this plan with the nurse or doctor.    ..........................................................................................................................................  Patient/Patient Representative Signature      ..........................................................................................................................................  Patient Representative Print Name and Relationship to Patient    ..................................................               ................................................  Date                                            Time    ..........................................................................................................................................  Reviewed by Signature/Title    ...................................................              ..............................................  Date                                                            Time

## 2017-09-29 NOTE — ED NOTES
Patient presents with tingling in RT hand X 7 days.  Patient was cutting last Friday for about an hour and that is when it started.

## 2017-10-03 ENCOUNTER — TELEPHONE (OUTPATIENT)
Dept: FAMILY MEDICINE | Facility: OTHER | Age: 27
End: 2017-10-03

## 2017-10-03 ENCOUNTER — OFFICE VISIT (OUTPATIENT)
Dept: FAMILY MEDICINE | Facility: OTHER | Age: 27
End: 2017-10-03
Attending: FAMILY MEDICINE
Payer: COMMERCIAL

## 2017-10-03 VITALS
BODY MASS INDEX: 24.36 KG/M2 | TEMPERATURE: 97.6 F | DIASTOLIC BLOOD PRESSURE: 60 MMHG | WEIGHT: 174 LBS | OXYGEN SATURATION: 100 % | SYSTOLIC BLOOD PRESSURE: 130 MMHG | HEIGHT: 71 IN | HEART RATE: 86 BPM

## 2017-10-03 DIAGNOSIS — T63.441A BEE STING REACTION, ACCIDENTAL OR UNINTENTIONAL, INITIAL ENCOUNTER: Primary | ICD-10-CM

## 2017-10-03 PROCEDURE — 99212 OFFICE O/P EST SF 10 MIN: CPT

## 2017-10-03 PROCEDURE — 99213 OFFICE O/P EST LOW 20 MIN: CPT | Performed by: FAMILY MEDICINE

## 2017-10-03 RX ORDER — EPINEPHRINE 0.3 MG/.3ML
0.3 INJECTION SUBCUTANEOUS PRN
Qty: 0.6 ML | Refills: 3 | Status: SHIPPED | OUTPATIENT
Start: 2017-10-03 | End: 2018-01-08

## 2017-10-03 RX ORDER — METHYLPREDNISOLONE 4 MG
TABLET, DOSE PACK ORAL
Qty: 21 TABLET | Refills: 0 | Status: SHIPPED | OUTPATIENT
Start: 2017-10-03 | End: 2018-01-26

## 2017-10-03 ASSESSMENT — ANXIETY QUESTIONNAIRES
5. BEING SO RESTLESS THAT IT IS HARD TO SIT STILL: MORE THAN HALF THE DAYS
7. FEELING AFRAID AS IF SOMETHING AWFUL MIGHT HAPPEN: MORE THAN HALF THE DAYS
3. WORRYING TOO MUCH ABOUT DIFFERENT THINGS: MORE THAN HALF THE DAYS
6. BECOMING EASILY ANNOYED OR IRRITABLE: MORE THAN HALF THE DAYS
2. NOT BEING ABLE TO STOP OR CONTROL WORRYING: MORE THAN HALF THE DAYS
IF YOU CHECKED OFF ANY PROBLEMS ON THIS QUESTIONNAIRE, HOW DIFFICULT HAVE THESE PROBLEMS MADE IT FOR YOU TO DO YOUR WORK, TAKE CARE OF THINGS AT HOME, OR GET ALONG WITH OTHER PEOPLE: VERY DIFFICULT
1. FEELING NERVOUS, ANXIOUS, OR ON EDGE: MORE THAN HALF THE DAYS
GAD7 TOTAL SCORE: 14

## 2017-10-03 ASSESSMENT — PAIN SCALES - GENERAL: PAINLEVEL: MODERATE PAIN (4)

## 2017-10-03 ASSESSMENT — PATIENT HEALTH QUESTIONNAIRE - PHQ9
SUM OF ALL RESPONSES TO PHQ QUESTIONS 1-9: 11
5. POOR APPETITE OR OVEREATING: MORE THAN HALF THE DAYS

## 2017-10-03 NOTE — MR AVS SNAPSHOT
After Visit Summary   10/3/2017    Jannet San    MRN: [id number]           Patient Information     Date Of Birth          1990        Visit Information        Provider Department      10/3/2017 11:30 AM Leona Matthews MD [facility]        Today's Diagnoses     Bee sting reaction, accidental or unintentional, initial encounter    -  1       Follow-ups after your visit        Your next 10 appointments already scheduled     Oct 20, 2017  4:30 PM CDT   (Arrive by 4:15 PM)   Return Visit with Melina Benson NP   [facility] ([facility] [place] )    [address] [place] [address]  [place] [address] [phone]              Who to contact     If you have questions or need follow up information about today's clinic visit or your schedule please contact [facility] directly at [phone].  Normal or non-critical lab and imaging results will be communicated to you by MyChart, letter or phone within 4 business days after the clinic has received the results. If you do not hear from us within 7 days, please contact the clinic through MyChart or phone. If you have a critical or abnormal lab result, we will notify you by phone as soon as possible.  Submit refill requests through [employer] or call your pharmacy and they will forward the refill request to us. Please allow 3 business days for your refill to be completed.          Additional Information About Your Visit        MyChart Information     [employer] gives you secure access to your electronic health record. If you see a primary care provider, you can also send messages to your care team and make appointments. If you have questions, please call your primary care clinic.  If you do not have a primary care provider, please call [phone] and they will assist you.        Care EveryWhere ID     This is your Care EveryWhere ID. This could be used by other organizations to access your [facility]  "records  [id number]        Your Vitals Were     Pulse Temperature Height Pulse Oximetry BMI (Body Mass Index)       86 97.6  F (36.4  C) (Tympanic) 5' 11\" (1.803 m) 100% 24.27 kg/m2        Blood Pressure from Last 3 Encounters:   10/03/17 130/60   09/29/17 (!) 157/101   09/14/17 118/62    Weight from Last 3 Encounters:   10/03/17 174 lb (78.9 kg)   09/14/17 170 lb (77.1 kg)   09/05/17 175 lb (79.4 kg)              Today, you had the following     No orders found for display         Today's Medication Changes          These changes are accurate as of: 10/3/17  9:25 PM.  If you have any questions, ask your nurse or doctor.               Start taking these medicines.        Dose/Directions    EPINEPHrine 0.3 MG/0.3ML injection 2-pack   Commonly known as:  EPIPEN 2-CONCHIS   Used for:  Bee sting reaction, accidental or unintentional, initial encounter   Started by:  Leona Matthews MD        Dose:  0.3 mg   Inject 0.3 mLs (0.3 mg) into the muscle as needed for anaphylaxis   Quantity:  0.6 mL   Refills:  3       methylPREDNISolone 4 MG tablet   Commonly known as:  MEDROL DOSEPAK   Used for:  Bee sting reaction, accidental or unintentional, initial encounter   Started by:  Leona Matthews MD        Follow package instructions   Quantity:  21 tablet   Refills:  0            Where to get your medicines      These medications were sent to [facility]  [address]  [address] [place] [address]     Phone:  [phone]     EPINEPHrine 0.3 MG/0.3ML injection 2-pack    methylPREDNISolone 4 MG tablet                Primary Care Provider Office Phone # Fax #    Leona Matthews [phone]       [facility] [address]  [place] [address]        Equal Access to Services     [facility] NASIR : Becca So, waneto luqadaha, qaybta kaalryann lord . [facility] [phone].    ATENCIÓN: Si clemente garrison " mcdermott disposición servicios gratuitos de asistencia lingüística. Beatrice miguel [phone].    We comply with applicable federal civil rights laws and [place] laws. We do not discriminate on the basis of race, color, national origin, age, disability, sex, sexual orientation, or gender identity.            Thank you!     [facility] you for [facility]  [facility] your [facility]. Our goal is always to provide you with excellent care. Hearing back from our patients is one way we can continue to improve our services. Please take a few minutes to complete the written survey that you may receive in the mail after your visit with us. Thank you!             Your Updated Medication List - Protect others around you: Learn how to safely use, store and throw away your medicines at www.disposemymeds.org.          This list is accurate as of: 10/3/17  9:25 PM.  Always use your most recent med list.                   Brand Name Dispense Instructions for use Diagnosis    BENADRYL PO      Take 75 mg by mouth nightly as needed        EPINEPHrine 0.3 MG/0.3ML injection 2-pack    EPIPEN 2-CONCHIS    0.6 mL    Inject 0.3 mLs (0.3 mg) into the muscle as needed for anaphylaxis    Bee sting reaction, accidental or unintentional, initial encounter       lisdexamfetamine 30 MG capsule    VYVANSE    60 capsule    TAKE 1 CAPSULE BY MOUTH 2 TIMES A DAY    Attention deficit hyperactivity disorder (ADHD), predominantly inattentive type       MAGNESIUM OXIDE PO      Take 500 mg by mouth        MELATONIN PO      Take 10 mg by mouth At Bedtime        methylPREDNISolone 4 MG tablet    MEDROL DOSEPAK    21 tablet    Follow package instructions    Bee sting reaction, accidental or unintentional, initial encounter       naproxen 500 MG tablet    NAPROSYN    16 tablet    Take 1 tablet (500 mg) by mouth 2 times daily (with meals) for 8 days        norethindrone-mestranol 1-50 MG-MCG Tabs    NORINYL1-50/ORTHO NOVUM 1-50    84 tablet    Take 1 tablet by mouth  daily for 365 doses    Ovulation pain       prazosin 2 MG capsule    MINIPRESS    30 capsule    Take 1 mg for two nights then increase    Attention deficit hyperactivity disorder (ADHD), predominantly inattentive type       sertraline 100 MG tablet    ZOLOFT    30 tablet    Take 1 tablet (100 mg) by mouth daily    Bipolar 2 disorder (H)       TYLENOL PO      Take 1,000 mg by mouth

## 2017-10-03 NOTE — TELEPHONE ENCOUNTER
8:15 AM    Reason for Call: OVERBOOK    Patient is having the following symptoms: Bee stings/red and swollen for 4 days.    The patient is requesting an appointment for mid to late morning  with Dr ELIO Matthews.    Was an appointment offered for this call? Yes  If yes : Appointment type short              Date 10/03/17    Preferred method for responding to this message: Telephone Call  What is your phone number ?    If we cannot reach you directly, may we leave a detailed response at the number you provided? Yes    Can this message wait until your PCP/provider returns, if unavailable today? Not applicable    Rubi Roman

## 2017-10-03 NOTE — NURSING NOTE
"Chief Complaint   Patient presents with     Insect Bites     Bee sting, still swollen x4 days.       Initial /60 (BP Location: Right arm, Patient Position: Chair, Cuff Size: Adult Regular)  Pulse 86  Temp 97.6  F (36.4  C) (Tympanic)  Ht 5' 11\" (1.803 m)  Wt 174 lb (78.9 kg)  SpO2 100%  BMI 24.27 kg/m2 Estimated body mass index is 24.27 kg/(m^2) as calculated from the following:    Height as of this encounter: 5' 11\" (1.803 m).    Weight as of this encounter: 174 lb (78.9 kg).  Medication Reconciliation: complete   VIKASH MATIAS      "

## 2017-10-04 ASSESSMENT — ANXIETY QUESTIONNAIRES: GAD7 TOTAL SCORE: 14
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
## 2017-10-09 DIAGNOSIS — F90.0 ATTENTION DEFICIT HYPERACTIVITY DISORDER (ADHD), PREDOMINANTLY INATTENTIVE TYPE: ICD-10-CM

## 2017-10-10 NOTE — TELEPHONE ENCOUNTER
vyvanse 30 mg      Last Written Prescription Date: 9-15-17  Last Fill Quantity: 60,  # refills: 0   Last Office Visit with [facility], [facility] or [facility] prescribing provider: 10-3-17                                         Next 5 appointments (look out 90 days)     Oct 20, 2017  4:30 PM CDT   (Arrive by 4:15 PM)   Return Visit with Melina Benson NP   [facility] ([facility] [place] )    [facility] [address] [place] [phone]

## 2017-10-11 NOTE — PROGRESS NOTES
"[facility]  Psychiatric Progress Note      Impression:   Jannet San is a 27 year old   female who presents to [facility] after referral by PCP. She will be seen for medication management. Information is obtained from the patient and brief review of records.    Jannet reports that overall things are \"going okay\". She started the PHP program this week and states that the program has been \"sort of\" helpful so far. She reports that she has been struggling with anxiety during the day, particularly at the end of the day when her son gets off the bus. She notes that her medications are working \"okay\". States that she has been on Zoloft for a few months and notes that she is \"not able to cry\" since taking it. She notes that she is unsure whether this is good or bad, as she feels that crying can be a way to process her emotions. She notes that sleep has \"always been an issue\", though did improve some with Prazosin. She states that her dreams are not as vivid and she is not feeling quite as anxious at night. We discussed having her trial an increase of this for the next few days and then can decide with the practitioner next week whether this will be continued. She notes that she has extra pills at home, so will not need an increase called in to her pharmacy today. She denies any suicidal ideation. At the end of our visit she notes that she has a stomach ache and states that she intends to go home. She does state that she will continue with the program however to work on coping skills and reduction of symptoms.     Educated regarding medication indications, risks, benefits, side effects, contraindications and possible interactions. Verbally expressed understanding.        DIagnoses:   Major depressive disorder, recurrent, severe with anxious distress  Borderline personality disorder (by history)      Attestation:  Patient has been seen and evaluated by me,  Melina Benson NP          Interim " History:   The patient's care was discussed with the treatment team and chart notes were reviewed.          Medications:     Current Outpatient Prescriptions Ordered in Epic   Medication     methylPREDNISolone (MEDROL DOSEPAK) 4 MG tablet     EPINEPHrine (EPIPEN 2-CONCHIS) 0.3 MG/0.3ML injection 2-pack     prazosin (MINIPRESS) 2 MG capsule     sertraline (ZOLOFT) 100 MG tablet     lisdexamfetamine (VYVANSE) 30 MG capsule     norethindrone-mestranol (NORINYL1-50/ORTHO NOVUM 1-50) 1-50 MG-MCG TABS     Acetaminophen (TYLENOL PO)     MELATONIN PO     MAGNESIUM OXIDE PO     DiphenhydrAMINE HCl (BENADRYL PO)     Current Facility-Administered Medications Ordered in Epic   Medication Dose Route Frequency Last Rate Last Dose     lidocaine 1 % injection 10 mL  10 mL Other Once         betamethasone acet & sod phos (CELESTONE) injection 12 mg  12 mg EPIDURAL Once              10 point ROS reviewed: reports disrupted sleep       Allergies:     Allergies   Allergen Reactions     Bee Pollen Swelling     Throat              Psychiatric Examination:   There were no vitals taken for this visit.  Weight is 0 lbs 0 oz  There is no height or weight on file to calculate BMI.    Appearance:  awake, alert, adequately groomed and appeared as age stated  Attitude:  cooperative  Eye Contact:  good  Mood:  anxious  Affect:  mood congruent  Speech:  clear, coherent  Psychomotor Behavior:  no evidence of tardive dyskinesia, dystonia, or tics  Thought Process:  logical, linear and goal oriented  Associations:  no loose associations  Thought Content:  no evidence of suicidal ideation or homicidal ideation and no evidence of psychotic thought  Insight:  fair  Judgment:  fair  Oriented to:  time, person, and place  Attention Span and Concentration:  fair  Recent and Remote Memory:  intact  Fund of Knowledge: appropriate  Muscle Strength and Tone: normal  Gait and Station: Normal           Labs:   No labs ordered today           Plan:   She would like  to trial an increase in Prazosin at , she does have extra pills at home so will not need new prescription at this time.   Continue PHP programming as scheduled  Follow-up with me outpatient in the clinic as scheduled

## 2017-10-16 RX ORDER — LISDEXAMFETAMINE DIMESYLATE 30 MG/1
CAPSULE ORAL
Qty: 60 CAPSULE | Refills: 0 | Status: SHIPPED | OUTPATIENT
Start: 2017-10-16 | End: 2017-11-09

## 2017-10-16 NOTE — TELEPHONE ENCOUNTER
Okay, filled she sees Melina Benson 10/20/17 . CJ can you run this script up to [facility] please. Thanks!

## 2017-11-09 DIAGNOSIS — F90.0 ATTENTION DEFICIT HYPERACTIVITY DISORDER (ADHD), PREDOMINANTLY INATTENTIVE TYPE: ICD-10-CM

## 2017-11-09 NOTE — TELEPHONE ENCOUNTER
gustavo      Last Written Prescription Date: 10/16/17  Last Fill Quantity: 60,  # refills: 0   Last Office Visit with [facility], [facility] or [facility] prescribing provider: 10/3/17

## 2017-11-10 RX ORDER — LISDEXAMFETAMINE DIMESYLATE 30 MG/1
CAPSULE ORAL
Qty: 60 CAPSULE | Refills: 0 | Status: SHIPPED | OUTPATIENT
Start: 2017-11-10 | End: 2017-12-06

## 2017-11-14 RX ORDER — CIPROFLOXACIN 500 MG/1
TABLET, FILM COATED ORAL
Qty: 14 TABLET | Refills: 0 | OUTPATIENT
Start: 2017-11-14

## 2017-11-14 NOTE — TELEPHONE ENCOUNTER
Cipro  Last office visit: 10/3/17  Last refill: Not on current medication list.  No associated diagnosis.  Medication pended.  Please advise.  Thank you.

## 2017-11-15 ENCOUNTER — OFFICE VISIT (OUTPATIENT)
Dept: PSYCHIATRY | Facility: OTHER | Age: 27
End: 2017-11-15
Attending: NURSE PRACTITIONER
Payer: COMMERCIAL

## 2017-11-15 VITALS
WEIGHT: 174 LBS | HEART RATE: 86 BPM | OXYGEN SATURATION: 98 % | SYSTOLIC BLOOD PRESSURE: 128 MMHG | HEIGHT: 70 IN | TEMPERATURE: 98.2 F | DIASTOLIC BLOOD PRESSURE: 68 MMHG | BODY MASS INDEX: 24.91 KG/M2 | RESPIRATION RATE: 16 BRPM

## 2017-11-15 DIAGNOSIS — F33.1 MAJOR DEPRESSIVE DISORDER, RECURRENT EPISODE, MODERATE (H): Primary | ICD-10-CM

## 2017-11-15 PROCEDURE — 99213 OFFICE O/P EST LOW 20 MIN: CPT | Performed by: NURSE PRACTITIONER

## 2017-11-15 PROCEDURE — 99212 OFFICE O/P EST SF 10 MIN: CPT

## 2017-11-15 ASSESSMENT — PAIN SCALES - GENERAL: PAINLEVEL: NO PAIN (0)

## 2017-11-15 NOTE — NURSING NOTE
"Chief Complaint   Patient presents with     Establish Care     PTSD     Depression       Initial /68 (BP Location: Left arm, Patient Position: Sitting, Cuff Size: Adult Regular)  Pulse 86  Temp 98.2  F (36.8  C) (Tympanic)  Resp 16  Ht 5' 9.5\" (1.765 m)  Wt 174 lb (78.9 kg)  SpO2 98%  BMI 25.33 kg/m2 Estimated body mass index is 25.33 kg/(m^2) as calculated from the following:    Height as of this encounter: 5' 9.5\" (1.765 m).    Weight as of this encounter: 174 lb (78.9 kg).  Medication Reconciliation: complete   Gunjan Angel MA  "
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
## 2017-11-15 NOTE — MR AVS SNAPSHOT
"              After Visit Summary   11/15/2017    Jannet San    MRN: [id number]           Patient Information     Date Of Birth          1990        Visit Information        Provider Department      11/15/2017 11:00 AM Arianne Olmos APRN CNP [facility]        Today's Diagnoses     Major depressive disorder, recurrent episode, moderate (H)    -  1       Follow-ups after your visit        Who to contact     If you have questions or need follow up information about today's clinic visit or your schedule please contact [facility] directly at [phone].  Normal or non-critical lab and imaging results will be communicated to you by [employer], letter or phone within 4 business days after the clinic has received the results. If you do not hear from us within 7 days, please contact the clinic through [employer] or phone. If you have a critical or abnormal lab result, we will notify you by phone as soon as possible.  Submit refill requests through [employer] or call your pharmacy and they will forward the refill request to us. Please allow 3 business days for your refill to be completed.          Additional Information About Your Visit        MyChart Information     [employer] gives you secure access to your electronic health record. If you see a primary care provider, you can also send messages to your care team and make appointments. If you have questions, please call your primary care clinic.  If you do not have a primary care provider, please call [phone] and they will assist you.        Care EveryWhere ID     This is your Care EveryWhere ID. This could be used by other organizations to access your [facility] medical records  [id number]        Your Vitals Were     Pulse Temperature Respirations Height Pulse Oximetry BMI (Body Mass Index)    86 98.2  F (36.8  C) (Tympanic) 16 5' 9.5\" (1.765 m) 98% 25.33 kg/m2       Blood Pressure from Last 3 Encounters:   11/15/17 128/68   10/03/17 " 130/60   09/29/17 (!) 157/101    Weight from Last 3 Encounters:   11/15/17 174 lb (78.9 kg)   10/03/17 174 lb (78.9 kg)   09/14/17 170 lb (77.1 kg)              Today, you had the following     No orders found for display       Primary Care Provider Office Phone # Fax #    Leona Matthews [phone]       [facility] [place] [address]  [place] [address]        Equal Access to Services     BARBARA BEST : Hadii aad ku hadasho Soomaali, waaxda luqadaha, qaybta kaalmada adeegyada, waxay idiin hayaan adeeg khcamilo nieto . So [facility] [phone].    ATENCIÓN: Si habla español, tiene a mcdermott disposición servicios gratuitos de asistencia lingüística. Llame al [phone].    We comply with applicable federal civil rights laws and [place] laws. We do not discriminate on the basis of race, color, national origin, age, disability, sex, sexual orientation, or gender identity.            Thank you!     [facility] you for [facility]  [facility] your [facility]. Our goal is always to provide you with excellent care. Hearing back from our patients is one way we can continue to improve our services. Please take a few minutes to complete the written survey that you may receive in the mail after your visit with us. Thank you!             Your Updated Medication List - Protect others around you: Learn how to safely use, store and throw away your medicines at www.disposemymeds.org.          This list is accurate as of: 11/15/17 11:56 AM.  Always use your most recent med list.                   Brand Name Dispense Instructions for use Diagnosis    BENADRYL PO      Take 75 mg by mouth nightly as needed        EPINEPHrine 0.3 MG/0.3ML injection 2-pack    EPIPEN 2-CONCHIS    0.6 mL    Inject 0.3 mLs (0.3 mg) into the muscle as needed for anaphylaxis    Bee sting reaction, accidental or unintentional, initial encounter       MAGNESIUM OXIDE PO      Take 500 mg by mouth        MELATONIN PO      Take 10 mg by mouth  At Bedtime        methylPREDNISolone 4 MG tablet    MEDROL DOSEPAK    21 tablet    Follow package instructions    Bee sting reaction, accidental or unintentional, initial encounter       norethindrone-mestranol 1-50 MG-MCG Tabs    NORINYL1-50/ORTHO NOVUM 1-50    84 tablet    Take 1 tablet by mouth daily for 365 doses    Ovulation pain       prazosin 2 MG capsule    MINIPRESS    30 capsule    Take 1 mg for two nights then increase    Attention deficit hyperactivity disorder (ADHD), predominantly inattentive type       sertraline 100 MG tablet    ZOLOFT    30 tablet    Take 1 tablet (100 mg) by mouth daily    Bipolar 2 disorder (H)       TYLENOL PO      Take 1,000 mg by mouth        VYVANSE 30 MG capsule   Generic drug:  lisdexamfetamine     60 capsule    TAKE 1 CAPSULE BY MOUTH 2 TIMES A DAY    Attention deficit hyperactivity disorder (ADHD), predominantly inattentive type

## 2017-12-06 ENCOUNTER — MYC REFILL (OUTPATIENT)
Dept: PSYCHIATRY | Facility: OTHER | Age: 27
End: 2017-12-06

## 2017-12-06 ENCOUNTER — MYC REFILL (OUTPATIENT)
Dept: FAMILY MEDICINE | Facility: OTHER | Age: 27
End: 2017-12-06

## 2017-12-06 DIAGNOSIS — F31.81 BIPOLAR 2 DISORDER (H): ICD-10-CM

## 2017-12-06 DIAGNOSIS — F90.0 ATTENTION DEFICIT HYPERACTIVITY DISORDER (ADHD), PREDOMINANTLY INATTENTIVE TYPE: ICD-10-CM

## 2017-12-07 RX ORDER — LISDEXAMFETAMINE DIMESYLATE 30 MG/1
CAPSULE ORAL
Qty: 60 CAPSULE | Refills: 0 | Status: SHIPPED | OUTPATIENT
Start: 2017-12-07 | End: 2018-01-08

## 2017-12-07 NOTE — TELEPHONE ENCOUNTER
Vyvanse  Last office visit: 10/3/17  Last refill: 11/10/17 #60, 0 R.  Medication pended.  Thank you.

## 2017-12-08 ENCOUNTER — TELEPHONE (OUTPATIENT)
Dept: FAMILY MEDICINE | Facility: OTHER | Age: 27
End: 2017-12-08

## 2017-12-08 RX ORDER — SERTRALINE HYDROCHLORIDE 100 MG/1
100 TABLET, FILM COATED ORAL DAILY
Qty: 30 TABLET | Refills: 3 | Status: SHIPPED | OUTPATIENT
Start: 2017-12-08 | End: 2018-02-04

## 2017-12-08 NOTE — TELEPHONE ENCOUNTER
Message from [employer]:  Original authorizing provider: Nellie Reza NP Raquel VALERIA San would like a refill of the following medications:  sertraline (ZOLOFT) 100 MG tablet [Nellie Reza NP]    Preferred pharmacy: [facility], [place] - [address]    Comment:      Medication renewals requested in this message routed to other providers:  VYVANSE 30 MG capsule [Tang Law MD]

## 2018-01-08 ENCOUNTER — MYC REFILL (OUTPATIENT)
Dept: FAMILY MEDICINE | Facility: OTHER | Age: 28
End: 2018-01-08

## 2018-01-08 ENCOUNTER — MYC REFILL (OUTPATIENT)
Dept: OBGYN | Facility: OTHER | Age: 28
End: 2018-01-08

## 2018-01-08 DIAGNOSIS — T63.441A BEE STING REACTION, ACCIDENTAL OR UNINTENTIONAL, INITIAL ENCOUNTER: ICD-10-CM

## 2018-01-08 DIAGNOSIS — F90.0 ATTENTION DEFICIT HYPERACTIVITY DISORDER (ADHD), PREDOMINANTLY INATTENTIVE TYPE: ICD-10-CM

## 2018-01-08 DIAGNOSIS — N94.0 OVULATION PAIN: ICD-10-CM

## 2018-01-08 RX ORDER — EPINEPHRINE 0.3 MG/.3ML
0.3 INJECTION SUBCUTANEOUS PRN
Qty: 0.6 ML | Refills: 3 | Status: SHIPPED | OUTPATIENT
Start: 2018-01-08 | End: 2020-05-04

## 2018-01-08 RX ORDER — LISDEXAMFETAMINE DIMESYLATE 30 MG/1
CAPSULE ORAL
Qty: 60 CAPSULE | Refills: 0 | Status: SHIPPED | OUTPATIENT
Start: 2018-01-08 | End: 2018-02-04

## 2018-01-08 NOTE — LETTER
[facility] [place]  [address]  [place] [address]  [phone]        January 9, 2018    Jannet San  [address] [place] [address]              Dear Jannet San    This is to remind you that you are due for a follow up before your next refill of medication.    You may call our office at [phone] to schedule an appointment.    Please disregard this notice if you have already had your labs drawn or made an appointment.        Sincerely,        Leona Matthews MD

## 2018-01-09 NOTE — TELEPHONE ENCOUNTER
Message from [employer]:  Original authorizing provider: Kayden Evans MD Rakathleen CHAVEZGeronimo San would like a refill of the following medications:  norethindrone-mestranol (NORINYL1-50/ORTHO NOVUM 1-50) 1-50 MG-MCG TABS [Kayden Evans MD]    Preferred pharmacy: [facility] [address]    Comment:      Medication renewals requested in this message routed to other providers:  sertraline (ZOLOFT) 100 MG tablet [Nellie Reza NP]  EPINEPHrine (EPIPEN 2-CONCHIS) 0.3 MG/0.3ML injection 2-pack [Leona Matthews MD]  lisdexamfetamine (VYVANSE) 30 MG capsule [Leona Matthews MD]

## 2018-01-26 ENCOUNTER — OFFICE VISIT (OUTPATIENT)
Dept: PSYCHIATRY | Facility: OTHER | Age: 28
End: 2018-01-26
Attending: NURSE PRACTITIONER
Payer: COMMERCIAL

## 2018-01-26 VITALS
BODY MASS INDEX: 25.47 KG/M2 | TEMPERATURE: 97.8 F | SYSTOLIC BLOOD PRESSURE: 130 MMHG | WEIGHT: 175 LBS | HEART RATE: 89 BPM | DIASTOLIC BLOOD PRESSURE: 80 MMHG

## 2018-01-26 DIAGNOSIS — F43.10 POSTTRAUMATIC STRESS DISORDER: Primary | ICD-10-CM

## 2018-01-26 PROCEDURE — G0463 HOSPITAL OUTPT CLINIC VISIT: HCPCS

## 2018-01-26 PROCEDURE — 99213 OFFICE O/P EST LOW 20 MIN: CPT | Performed by: NURSE PRACTITIONER

## 2018-01-26 ASSESSMENT — PAIN SCALES - GENERAL: PAINLEVEL: NO PAIN (0)

## 2018-01-26 ASSESSMENT — ANXIETY QUESTIONNAIRES
3. WORRYING TOO MUCH ABOUT DIFFERENT THINGS: MORE THAN HALF THE DAYS
GAD7 TOTAL SCORE: 10
IF YOU CHECKED OFF ANY PROBLEMS ON THIS QUESTIONNAIRE, HOW DIFFICULT HAVE THESE PROBLEMS MADE IT FOR YOU TO DO YOUR WORK, TAKE CARE OF THINGS AT HOME, OR GET ALONG WITH OTHER PEOPLE: SOMEWHAT DIFFICULT
7. FEELING AFRAID AS IF SOMETHING AWFUL MIGHT HAPPEN: MORE THAN HALF THE DAYS
5. BEING SO RESTLESS THAT IT IS HARD TO SIT STILL: SEVERAL DAYS
1. FEELING NERVOUS, ANXIOUS, OR ON EDGE: SEVERAL DAYS
6. BECOMING EASILY ANNOYED OR IRRITABLE: SEVERAL DAYS
2. NOT BEING ABLE TO STOP OR CONTROL WORRYING: SEVERAL DAYS

## 2018-01-26 ASSESSMENT — PATIENT HEALTH QUESTIONNAIRE - PHQ9: 5. POOR APPETITE OR OVEREATING: MORE THAN HALF THE DAYS

## 2018-01-26 NOTE — MR AVS SNAPSHOT
After Visit Summary   1/26/2018    Jannet San    MRN: [id number]           Patient Information     Date Of Birth          1990        Visit Information        Provider Department      1/26/2018 2:00 PM Melina Benson NP [facility] [place]        Today's Diagnoses     PTSD (post-traumatic stress disorder)    -  1      Care Instructions    Continue current medications  Follow-up with me in 2 months          Follow-ups after your visit        Follow-up notes from your care team     Return in about 2 months (around 3/26/2018) for Routine Visit.      Your next 10 appointments already scheduled     Mar 23, 2018  1:30 PM CDT   (Arrive by 1:15 PM)   Return Visit with Melina Benson NP   [facility] [place] ([facility] [place] )    [address] [place] [address]  [place] [address] [phone]              Who to contact     If you have questions or need follow up information about today's clinic visit or your schedule please contact [facility] directly at [phone].  Normal or non-critical lab and imaging results will be communicated to you by [employer], letter or phone within 4 business days after the clinic has received the results. If you do not hear from us within 7 days, please contact the clinic through [employer] or phone. If you have a critical or abnormal lab result, we will notify you by phone as soon as possible.  Submit refill requests through [employer] or call your pharmacy and they will forward the refill request to us. Please allow 3 business days for your refill to be completed.          Additional Information About Your Visit        MyChart Information     [employer] gives you secure access to your electronic health record. If you see a primary care provider, you can also send messages to your care team and make appointments. If you have questions, please call your primary care clinic.  If you do not have a primary care provider, please call  [phone] and they will assist you.        Care EveryWhere ID     This is your Care EveryWhere ID. This could be used by other organizations to access your [facility] medical records  [id number]        Your Vitals Were     Pulse Temperature BMI (Body Mass Index)             89 97.8  F (36.6  C) (Tympanic) 25.47 kg/m2          Blood Pressure from Last 3 Encounters:   01/26/18 130/80   11/15/17 128/68   10/03/17 130/60    Weight from Last 3 Encounters:   01/26/18 175 lb (79.4 kg)   11/15/17 174 lb (78.9 kg)   10/03/17 174 lb (78.9 kg)              Today, you had the following     No orders found for display       Primary Care Provider Office Phone # Fax #    Leona Matthews [phone] [address] [address]        Equal Access to Services     CESAR BEST : Hadii deng menendez hadasho Soherminiaali, waaxda luqadaha, qaybta kaalmada adeegyada, ryann nieto . So [facility] [phone].    ATENCIÓN: Si habla español, tiene a mcdermott disposición servicios gratuitos de asistencia lingüística. Beatrice al [phone].    We comply with applicable federal civil rights laws and [place] laws. We do not discriminate on the basis of race, color, national origin, age, disability, sex, sexual orientation, or gender identity.            Thank you!     Thank you for choosing [facility]  for your care. Our goal is always to provide you with excellent care. Hearing back from our patients is one way we can continue to improve our services. Please take a few minutes to complete the written survey that you may receive in the mail after your visit with us. Thank you!             Your Updated Medication List - Protect others around you: Learn how to safely use, store and throw away your medicines at www.disposemymeds.org.          This list is accurate as of 1/26/18 11:59 PM.  Always use your most recent med list.                   Brand Name Dispense Instructions for use  Diagnosis    BENADRYL PO      Take 75 mg by mouth nightly as needed        EPINEPHrine 0.3 MG/0.3ML injection 2-pack    EPIPEN 2-CONCHIS    0.6 mL    Inject 0.3 mLs (0.3 mg) into the muscle as needed for anaphylaxis    Bee sting reaction, accidental or unintentional, initial encounter       MAGNESIUM OXIDE PO      Take 500 mg by mouth        MELATONIN PO      Take 10 mg by mouth At Bedtime        norethindrone-mestranol 1-50 MG-MCG Tabs    NORINYL1-50/ORTHO NOVUM 1-50    84 tablet    Take 1 tablet by mouth daily    Ovulation pain       sertraline 100 MG tablet    ZOLOFT    30 tablet    Take 1 tablet (100 mg) by mouth daily    Bipolar 2 disorder (H)       TYLENOL PO      Take 1,000 mg by mouth

## 2018-01-26 NOTE — NURSING NOTE
"Chief Complaint   Patient presents with     RECHECK     mental health.  Patient stopped Minipress       Initial /80 (BP Location: Right arm, Patient Position: Sitting, Cuff Size: Adult Regular)  Pulse 89  Temp 97.8  F (36.6  C) (Tympanic)  Wt 175 lb (79.4 kg)  BMI 25.47 kg/m2 Estimated body mass index is 25.47 kg/(m^2) as calculated from the following:    Height as of 11/15/17: 5' 9.5\" (1.765 m).    Weight as of this encounter: 175 lb (79.4 kg).  Medication Reconciliation: complete     OLGA KITCHEN      "

## 2018-01-27 ASSESSMENT — PATIENT HEALTH QUESTIONNAIRE - PHQ9: SUM OF ALL RESPONSES TO PHQ QUESTIONS 1-9: 11

## 2018-01-27 ASSESSMENT — ANXIETY QUESTIONNAIRES: GAD7 TOTAL SCORE: 10

## 2018-02-04 ENCOUNTER — MYC REFILL (OUTPATIENT)
Dept: PSYCHIATRY | Facility: OTHER | Age: 28
End: 2018-02-04

## 2018-02-04 ENCOUNTER — MYC REFILL (OUTPATIENT)
Dept: FAMILY MEDICINE | Facility: OTHER | Age: 28
End: 2018-02-04

## 2018-02-04 DIAGNOSIS — F31.81 BIPOLAR 2 DISORDER (H): ICD-10-CM

## 2018-02-04 DIAGNOSIS — F90.0 ATTENTION DEFICIT HYPERACTIVITY DISORDER (ADHD), PREDOMINANTLY INATTENTIVE TYPE: ICD-10-CM

## 2018-02-04 NOTE — LETTER
[facility] [place]  [address] [place] [address]  [phone]        February 6, 2018    Jannet San  [address] [place] [address]              Dear Jannet San    This is to remind you that you are due for a medication follow up before next refill.    You may call our office at [phone] to schedule an appointment.    Please disregard this notice if you have already had your labs drawn or made an appointment.        Sincerely,        Leona Matthews MD

## 2018-02-05 RX ORDER — LISDEXAMFETAMINE DIMESYLATE 30 MG/1
CAPSULE ORAL
Qty: 60 CAPSULE | Refills: 0 | Status: SHIPPED | OUTPATIENT
Start: 2018-02-05 | End: 2018-03-13

## 2018-02-05 NOTE — TELEPHONE ENCOUNTER
Message from [facility]:  Leela High MA Mon Feb 5, 2018 7:32 AM        ----- Message -----   From: Jannet San   Sent: 2/4/2018 10:35 PM   To:  [facility]  Subject: Medication Renewal Request     Original authorizing provider: MD Jannet Webster would like a refill of the following medications:  lisdexamfetamine (VYVANSE) 30 MG capsule [Leona Matthews MD]    Preferred pharmacy: [facility] [address] [place]    Comment:      Medication renewals requested in this message routed to other providers:  sertraline (ZOLOFT) 100 MG tablet [April Shweta Reza NP]

## 2018-02-07 NOTE — TELEPHONE ENCOUNTER
"Please note.Called patient and she states,\"I just seen Marcelino with behavioral health and she will fill it from now on,I was just in,and I will be coming back\".Explained that PCP signed last Vyvanse RX and needed FU appointment.No appointment made at this time.  "

## 2018-02-23 NOTE — PROGRESS NOTES
"  PSYCHIATRY CLINIC PROGRESS NOTE       SUBJECTIVE / INTERIM HISTORY                                                                       Today it is my first time meeting with Jannet. She had been a previous patient of Nellie Reza NP. It appears that she saw a different CNP mid-November, though notes that she did not want to continue seeing this person because she \"felt judged\". She reports that things seem to be \"going pretty good\". She does report stress from caring for her 6 year old son. She states that she attended the [facility] program this past fall and felt that overall this was helpful. She reports that she has been taking Zoloft and Vyvanse, feels that this combination has been helpful in treating symptoms. She notes that she started the Zoloft last March and has been on the Vyvanse since this past summer. Had been taking Minipress though stopped this after it made her feel dizzy. She does note that her nightmares have improved and she now describes them as \"vivid dreams\". She is not looking for any medication changes at this time. Currently seeing Raheem Woody for therapy.    SYMPTOMS-  Reports depression and anxiety as \"mild\". States she is trying to sleep about 6 hours a night. Denies SI.      MEDICAL ROS-  Denies any current concerns  MEDICAL / SURGICAL HISTORY                pregnant [if applicable]--no     Patient Active Problem List   Diagnosis     Depression     Lumbago     PTSD (post-traumatic stress disorder)     Migraine     Drug use disorder     Bilateral low back pain without sciatica     Bilateral low back pain with left-sided sciatica     Bipolar II disorder (H)     Fibromyalgia     Lump or mass in breast     Mastodynia     Encounter for gynecological examination without abnormal finding     Encounter for surveillance of contraceptives     ACP (advance care planning)     Ovulation pain     Family history of hemochromatosis     ALLERGY   Bee pollen  MEDICATIONS                                 "                                                             Current Outpatient Prescriptions   Medication Sig     norethindrone-mestranol (NORINYL1-50/ORTHO NOVUM 1-50) 1-50 MG-MCG TABS Take 1 tablet by mouth daily     EPINEPHrine (EPIPEN 2-CONCHIS) 0.3 MG/0.3ML injection 2-pack Inject 0.3 mLs (0.3 mg) into the muscle as needed for anaphylaxis     sertraline (ZOLOFT) 100 MG tablet Take 1 tablet (100 mg) by mouth daily     Acetaminophen (TYLENOL PO) Take 1,000 mg by mouth     MELATONIN PO Take 10 mg by mouth At Bedtime     MAGNESIUM OXIDE PO Take 500 mg by mouth     DiphenhydrAMINE HCl (BENADRYL PO) Take 75 mg by mouth nightly as needed     lisdexamfetamine (VYVANSE) 30 MG capsule TAKE 1 CAPSULE BY MOUTH 2 TIMES A DAY     No current facility-administered medications for this visit.      Facility-Administered Medications Ordered in Other Visits   Medication     lidocaine 1 % injection 10 mL     betamethasone acet & sod phos (CELESTONE) injection 12 mg     DRUG INTERACTION CHECK was unremarkable.  VITALS   /80 (BP Location: Right arm, Patient Position: Sitting, Cuff Size: Adult Regular)  Pulse 89  Temp 97.8  F (36.6  C) (Tympanic)  Wt 175 lb (79.4 kg)  BMI 25.47 kg/m2     PHQ9                       PHQ-9 SCORE 9/15/2017 10/3/2017 1/26/2018   Total Score - - -   Total Score 10 11 11     LABS                                                                                                                         None ordered today  MENTAL STATUS EXAM                                                                                       Appearance:  awake, alert, adequately groomed and appeared as age stated  Attitude:  cooperative  Eye Contact:  good  Mood:  good  Affect:  appropriate and in normal range  Speech:  clear, coherent  Psychomotor Behavior:  no evidence of tardive dyskinesia, dystonia, or tics  Thought Process:  logical, linear and goal oriented  Associations:  no loose associations  Thought Content:  no  evidence of suicidal ideation or homicidal ideation and no evidence of psychotic thought  Insight:  fair  Judgment:  intact  Oriented to:  time, person, and place  Attention Span and Concentration:  intact  Recent and Remote Memory:  intact  Fund of Knowledge: appropriate  Muscle Strength and Tone: normal  Gait and Station: Normal      DIAGNOSES         Major depressive disorder, recurrent, moderate  R/o Bipolar disorder  PTSD  Borderline personality disorder (by history)  ADHD (by history)    PLAN                                                                                                                       1)  MEDICATIONS:         --Continue current medications. Zoloft 100 mg daily and Vyvanse 30 mg BID    2)  THERAPY:   No Change    3)  LABS:  None  4)  PT MONITOR [call for probs]: decline in mood, SI, side effects  5)  REFERRALS [CD, medical, other]:  None  6)  RTC:    2 months

## 2018-03-12 ENCOUNTER — MYC REFILL (OUTPATIENT)
Dept: PSYCHIATRY | Facility: OTHER | Age: 28
End: 2018-03-12

## 2018-03-12 ENCOUNTER — MYC REFILL (OUTPATIENT)
Dept: OBGYN | Facility: OTHER | Age: 28
End: 2018-03-12

## 2018-03-12 ENCOUNTER — MYC REFILL (OUTPATIENT)
Dept: FAMILY MEDICINE | Facility: OTHER | Age: 28
End: 2018-03-12

## 2018-03-12 DIAGNOSIS — F90.0 ATTENTION DEFICIT HYPERACTIVITY DISORDER (ADHD), PREDOMINANTLY INATTENTIVE TYPE: ICD-10-CM

## 2018-03-12 DIAGNOSIS — N94.0 OVULATION PAIN: ICD-10-CM

## 2018-03-12 DIAGNOSIS — F31.81 BIPOLAR 2 DISORDER (H): ICD-10-CM

## 2018-03-13 RX ORDER — LISDEXAMFETAMINE DIMESYLATE 30 MG/1
CAPSULE ORAL
Qty: 60 CAPSULE | Refills: 0 | OUTPATIENT
Start: 2018-03-13

## 2018-03-13 NOTE — TELEPHONE ENCOUNTER
[facility]  Original authorizing provider: Leona Matthews MD Raquel VALERIA San would like a refill of the following medications:  lisdexamfetamine (VYVANSE) 30 MG capsule [Leona Matthews MD]    Preferred pharmacy[facility]    Comment:      Medication renewals requested in this message routed to other providers:  sertraline (ZOLOFT) 100 MG tablet [April Shweta Reza NP]  norethindrone-mestranol (NORINYL1-50/ORTHO NOVUM 1-50) 1-50 MG-MCG TABS [Jero Olivarez MD]

## 2018-03-13 NOTE — TELEPHONE ENCOUNTER
[facility]:  Original authorizing provider: MD Jannet Montes would like a refill of the following medications:  norethindrone-mestranol (NORINYL1-50/ORTHO NOVUM 1-50) 1-50 MG-MCG TABS [Jero Olivarez MD]    Preferred pharmacy: [facility] [address]    Comment:      Medication renewals requested in this message routed to other providers:  sertraline (ZOLOFT) 100 MG tablet [Nellie Reza NP]  lisdexamfetamine (VYVANSE) 30 MG capsule [Leona Matthews MD]

## 2018-03-15 RX ORDER — LISDEXAMFETAMINE DIMESYLATE 30 MG/1
CAPSULE ORAL
Qty: 60 CAPSULE | Refills: 0 | Status: SHIPPED | OUTPATIENT
Start: 2018-03-15 | End: 2018-04-10

## 2018-03-15 NOTE — TELEPHONE ENCOUNTER
Message from [facility]:  Leela High MA Wed Mar 14, 2018 8:23 AM        ----- Message -----   From: Jannet San   Sent: 2/4/2018 10:35 PM   To: Ned Spears  Subject: Medication Renewal Request     Original authorizing provider: JESSICA Pike would like a refill of the following medications:  sertraline (ZOLOFT) 100 MG tablet [Nellie Reza NP]    Preferred pharmacy: [facility] [address] [place]    Comment:      Medication renewals requested in this message routed to other providers:  lisdexamfetamine (VYVANSE) 30 MG capsule [Leona Matthews MD]

## 2018-03-15 NOTE — TELEPHONE ENCOUNTER
Zoloft last filled on 12.8.17 #30 with 3 refills. Last office visit on 11.15.17. Medication pended.Thank you

## 2018-03-16 RX ORDER — SERTRALINE HYDROCHLORIDE 100 MG/1
100 TABLET, FILM COATED ORAL DAILY
Qty: 30 TABLET | Refills: 0 | Status: CANCELLED | OUTPATIENT
Start: 2018-03-16

## 2018-03-16 RX ORDER — SERTRALINE HYDROCHLORIDE 100 MG/1
100 TABLET, FILM COATED ORAL DAILY
Qty: 30 TABLET | Refills: 0 | Status: SHIPPED | OUTPATIENT
Start: 2018-03-16 | End: 2018-04-10

## 2018-03-16 NOTE — TELEPHONE ENCOUNTER
Message from [facility]:  Leela High MA Wed Mar 14, 2018 8:23 AM        ----- Message -----   From: Jannet San   Sent: 3/12/2018 8:26 AM   To: Ned Spears  Subject: Medication Renewal Request     Original authorizing provider: JESSICA Pike would like a refill of the following medications:  sertraline (ZOLOFT) 100 MG tablet [Nellie Reza NP]    Preferred pharmacy: [facility] [address] [place]    Comment:      Medication renewals requested in this message routed to other providers:  lisdexamfetamine (VYVANSE) 30 MG capsule [Leona Matthews MD]  norethindrone-mestranol (NORINYL1-50/ORTHO NOVUM 1-50) 1-50 MG-MCG TABS [Jero Olivarez MD]

## 2018-03-16 NOTE — TELEPHONE ENCOUNTER
Notified patient that Melina Benson printed her Vyvanse Rx and walked to [facility].

## 2018-03-23 ENCOUNTER — OFFICE VISIT (OUTPATIENT)
Dept: PSYCHIATRY | Facility: OTHER | Age: 28
End: 2018-03-23
Attending: NURSE PRACTITIONER
Payer: COMMERCIAL

## 2018-03-23 VITALS
HEART RATE: 91 BPM | TEMPERATURE: 98.3 F | DIASTOLIC BLOOD PRESSURE: 72 MMHG | WEIGHT: 180 LBS | BODY MASS INDEX: 26.2 KG/M2 | SYSTOLIC BLOOD PRESSURE: 128 MMHG

## 2018-03-23 DIAGNOSIS — F43.10 POSTTRAUMATIC STRESS DISORDER: ICD-10-CM

## 2018-03-23 DIAGNOSIS — F33.1 MAJOR DEPRESSIVE DISORDER, RECURRENT EPISODE, MODERATE (H): Primary | ICD-10-CM

## 2018-03-23 PROCEDURE — G0463 HOSPITAL OUTPT CLINIC VISIT: HCPCS

## 2018-03-23 PROCEDURE — 99213 OFFICE O/P EST LOW 20 MIN: CPT | Performed by: NURSE PRACTITIONER

## 2018-03-23 ASSESSMENT — ANXIETY QUESTIONNAIRES
GAD7 TOTAL SCORE: 14
2. NOT BEING ABLE TO STOP OR CONTROL WORRYING: MORE THAN HALF THE DAYS
5. BEING SO RESTLESS THAT IT IS HARD TO SIT STILL: MORE THAN HALF THE DAYS
1. FEELING NERVOUS, ANXIOUS, OR ON EDGE: MORE THAN HALF THE DAYS
6. BECOMING EASILY ANNOYED OR IRRITABLE: SEVERAL DAYS
IF YOU CHECKED OFF ANY PROBLEMS ON THIS QUESTIONNAIRE, HOW DIFFICULT HAVE THESE PROBLEMS MADE IT FOR YOU TO DO YOUR WORK, TAKE CARE OF THINGS AT HOME, OR GET ALONG WITH OTHER PEOPLE: VERY DIFFICULT
7. FEELING AFRAID AS IF SOMETHING AWFUL MIGHT HAPPEN: MORE THAN HALF THE DAYS
3. WORRYING TOO MUCH ABOUT DIFFERENT THINGS: MORE THAN HALF THE DAYS

## 2018-03-23 ASSESSMENT — PATIENT HEALTH QUESTIONNAIRE - PHQ9: 5. POOR APPETITE OR OVEREATING: NEARLY EVERY DAY

## 2018-03-23 ASSESSMENT — PAIN SCALES - GENERAL: PAINLEVEL: MILD PAIN (2)

## 2018-03-23 NOTE — PROGRESS NOTES
"  PSYCHIATRY CLINIC PROGRESS NOTE     SUBJECTIVE / INTERIM HISTORY                                                                       Jannet reports that overall things are \"going okay\". She notes that she was recently in [place] visiting family. She does report some stress related to her 6 year old son. She notes that he was sent home from school and suspended due to hitting others and inattentiveness in class. She notes that he is currently being treated for ADHD and they are continuing to adjust his medications, but she reports feeling frustrated with the whole situation. She also reports that her car is not working, which is adding to the stress. Overall she reports that her medications seem to be helping and does not feel that anything needs to be adjusted today. She denies any side effects from medication at this time.     SYMPTOMS- she reports mild depression and anxiety. States that sleep is \"so-so\". Is not experiencing nightmares as often, had stopped Prazosin after experiencing dizziness.Denies any suicidal ideation.       MEDICAL ROS-  Denies any current concerns  MEDICAL / SURGICAL HISTORY                pregnant [if applicable]--no     Patient Active Problem List   Diagnosis     Depression     Lumbago     PTSD (post-traumatic stress disorder)     Migraine     Drug use disorder     Bilateral low back pain without sciatica     Bilateral low back pain with left-sided sciatica     Bipolar II disorder (H)     Fibromyalgia     Lump or mass in breast     Mastodynia     Encounter for gynecological examination without abnormal finding     Encounter for surveillance of contraceptives     ACP (advance care planning)     Ovulation pain     Family history of hemochromatosis     ALLERGY   Bee pollen  MEDICATIONS                                                                                             Current Outpatient Prescriptions   Medication Sig     sertraline (ZOLOFT) 100 MG tablet Take 1 tablet (100 mg) by " mouth daily     lisdexamfetamine (VYVANSE) 30 MG capsule TAKE 1 CAPSULE BY MOUTH 2 TIMES A DAY     norethindrone-mestranol (NORINYL1-50/ORTHO NOVUM 1-50) 1-50 MG-MCG TABS Take 1 tablet by mouth daily     EPINEPHrine (EPIPEN 2-CONCHIS) 0.3 MG/0.3ML injection 2-pack Inject 0.3 mLs (0.3 mg) into the muscle as needed for anaphylaxis     Acetaminophen (TYLENOL PO) Take 1,000 mg by mouth     MELATONIN PO Take 10 mg by mouth At Bedtime     MAGNESIUM OXIDE PO Take 500 mg by mouth     DiphenhydrAMINE HCl (BENADRYL PO) Take 75 mg by mouth nightly as needed     No current facility-administered medications for this visit.      Facility-Administered Medications Ordered in Other Visits   Medication     lidocaine 1 % injection 10 mL     betamethasone acet & sod phos (CELESTONE) injection 12 mg     DRUG INTERACTION CHECK was unremarkable.  VITALS   /72 (BP Location: Right arm, Patient Position: Sitting, Cuff Size: Adult Regular)  Pulse 91  Temp 98.3  F (36.8  C) (Tympanic)  Wt 180 lb (81.6 kg)  BMI 26.2 kg/m2     PHQ9                       PHQ-9 SCORE 10/3/2017 1/26/2018 3/23/2018   Total Score - - -   Total Score 11 11 12     LABS                                                                                                                         None ordered today  MENTAL STATUS EXAM                                                                                       Appearance:  awake, alert, adequately groomed and appeared as age stated  Attitude:  cooperative  Eye Contact:  good  Mood:  good  Affect:  appropriate and in normal range  Speech:  clear, coherent  Psychomotor Behavior:  no evidence of tardive dyskinesia, dystonia, or tics  Thought Process:  logical, linear and goal oriented  Associations:  no loose associations  Thought Content:  no evidence of suicidal ideation or homicidal ideation and no evidence of psychotic thought  Insight:  good  Judgment:  intact  Oriented to:  time, person, and place  Attention  Span and Concentration:  intact  Recent and Remote Memory:  intact  Fund of Knowledge: appropriate  Muscle Strength and Tone: normal  Gait and Station: Normal      DIAGNOSES         Major depressive disorder, recurrent, moderate  R/o Bipolar disorder  PTSD  Borderline personality disorder (by history)  ADHD (by history)    PLAN                                                                                                                       1)  MEDICATIONS:         -- Continue Zoloft and Vyvanse    2)  THERAPY: Continue therapy with [facility]    3)  LABS:  None  4)  PT MONITOR [call for probs]: increase in depression, medication side effects  5)  REFERRALS [CD, medical, other]:  None  6)  RTC:    2 months

## 2018-03-23 NOTE — MR AVS SNAPSHOT
After Visit Summary   3/23/2018    Jannet San    MRN: [id number]           Patient Information     Date Of Birth          1990        Visit Information        Provider Department      3/23/2018 1:30 PM Melina Benson NP [facility]        Today's Diagnoses     Major depressive disorder, recurrent episode, moderate (H)    -  1    PTSD (post-traumatic stress disorder)          Care Instructions    Continue current medications  Individual therapy as scheduled  Follow-up with me in 2 months          Follow-ups after your visit        Follow-up notes from your care team     Return in about 2 months (around 5/23/2018) for Routine Visit.      Your next 10 appointments already scheduled     Jun 01, 2018  1:30 PM CDT   (Arrive by 1:15 PM)   Return Visit with Melina Benson NP   [facility] ([facility] )    [address] [place] [id number] [phone]              Who to contact     If you have questions or need follow up information about today's clinic visit or your schedule please [facility] directly at [phone].  Normal or non-critical lab and imaging results will be communicated to you by Brandlehart, letter or phone within 4 business days after the clinic has received the results. If you do not hear from us within 7 days, please contact the clinic through Brandlehart or phone. If you have a critical or abnormal lab result, we will notify you by phone as soon as possible.  Submit refill requests through Kextil or call your pharmacy and they will forward the refill request to us. Please allow 3 business days for your refill to be completed.          Additional Information About Your Visit        MyChart Information     Kextil gives you secure access to your electronic health record. If you see a primary care provider, you can also send messages to your care team and make appointments. If you have questions, please  call your primary care clinic.  If you do not have a primary care provider, please call [phone] and they will assist you.        Care EveryWhere ID     This is your Care EveryWhere ID. This could be used by other organizations to access your [facility] medical records  [id number]        Your Vitals Were     Pulse Temperature BMI (Body Mass Index)             91 98.3  F (36.8  C) (Tympanic) 26.2 kg/m2          Blood Pressure from Last 3 Encounters:   03/23/18 128/72   01/26/18 130/80   11/15/17 128/68    Weight from Last 3 Encounters:   03/23/18 180 lb (81.6 kg)   01/26/18 175 lb (79.4 kg)   11/15/17 174 lb (78.9 kg)              Today, you had the following     No orders found for display       Primary Care Provider Office Phone # Fax #    Leona Matthews [phone]       [address] [place], [place] [id number]        Equal Access to Services     CESAR BEST : Hadii aad ku hadasho Soomaali, waaxda luqadaha, qaybta kaalmada adeegyada, waxay morganin hayyola nieto . So [facility] [phone].    ATENCIÓN: Si habla español, tiene a mcdermott disposición servicios gratuitos de asistencia lingüística. Llame al [phone].    We comply with applicable federal civil rights laws and [place] laws. We do not discriminate on the basis of race, color, national origin, age, disability, sex, sexual orientation, or gender identity.            Thank you!     Thank you for choosing [facility]  for your care. Our goal is always to provide you with excellent care. Hearing back from our patients is one way we can continue to improve our services. Please take a few minutes to complete the written survey that you may receive in the mail after your visit with us. Thank you!             Your Updated Medication List - Protect others around you: Learn how to safely use, store and throw away your medicines at www.disposemymeds.org.          This list is accurate as of 3/23/18 11:59 PM.  Always use your  most recent med list.                   Brand Name Dispense Instructions for use Diagnosis    BENADRYL PO      Take 75 mg by mouth nightly as needed        EPINEPHrine 0.3 MG/0.3ML injection 2-pack    EPIPEN 2-CONCHIS    0.6 mL    Inject 0.3 mLs (0.3 mg) into the muscle as needed for anaphylaxis    Bee sting reaction, accidental or unintentional, initial encounter       lisdexamfetamine 30 MG capsule    VYVANSE    60 capsule    TAKE 1 CAPSULE BY MOUTH 2 TIMES A DAY    Attention deficit hyperactivity disorder (ADHD), predominantly inattentive type       MAGNESIUM OXIDE PO      Take 500 mg by mouth        MELATONIN PO      Take 10 mg by mouth At Bedtime        norethindrone-mestranol 1-50 MG-MCG Tabs    NORINYL1-50/ORTHO NOVUM 1-50    84 tablet    Take 1 tablet by mouth daily    Ovulation pain       sertraline 100 MG tablet    ZOLOFT    30 tablet    Take 1 tablet (100 mg) by mouth daily    Bipolar 2 disorder (H)       TYLENOL PO      Take 1,000 mg by mouth

## 2018-03-23 NOTE — NURSING NOTE
"Chief Complaint   Patient presents with     RECHECK     Mental health.       Initial /72 (BP Location: Right arm, Patient Position: Sitting, Cuff Size: Adult Regular)  Pulse 91  Temp 98.3  F (36.8  C) (Tympanic)  Wt 180 lb (81.6 kg)  BMI 26.2 kg/m2 Estimated body mass index is 26.2 kg/(m^2) as calculated from the following:    Height as of 11/15/17: 5' 9.5\" (1.765 m).    Weight as of this encounter: 180 lb (81.6 kg).  Medication Reconciliation: complete     OLGA KITCHEN      "

## 2018-03-24 ASSESSMENT — PATIENT HEALTH QUESTIONNAIRE - PHQ9: SUM OF ALL RESPONSES TO PHQ QUESTIONS 1-9: 12

## 2018-03-24 ASSESSMENT — ANXIETY QUESTIONNAIRES: GAD7 TOTAL SCORE: 14

## 2018-04-10 ENCOUNTER — MYC REFILL (OUTPATIENT)
Dept: PSYCHIATRY | Facility: OTHER | Age: 28
End: 2018-04-10

## 2018-04-10 ENCOUNTER — MYC REFILL (OUTPATIENT)
Dept: OBGYN | Facility: OTHER | Age: 28
End: 2018-04-10

## 2018-04-10 DIAGNOSIS — F90.0 ATTENTION DEFICIT HYPERACTIVITY DISORDER (ADHD), PREDOMINANTLY INATTENTIVE TYPE: ICD-10-CM

## 2018-04-10 DIAGNOSIS — F31.81 BIPOLAR 2 DISORDER (H): ICD-10-CM

## 2018-04-10 DIAGNOSIS — N94.0 OVULATION PAIN: ICD-10-CM

## 2018-04-10 NOTE — TELEPHONE ENCOUNTER
[facility]  Original authorizing provider: JESSICA Colunga would like a refill of the following medications:  sertraline (ZOLOFT) 100 MG tablet [Melina Benson NP]  lisdexamfetamine (VYVANSE) 30 MG capsule [Melina Benson NP]    Preferred pharmacy: [facility] [address] [place]    Comment:      Medication renewals requested in this message routed to other providers:  norethindrone-mestranol (NORINYL1-50/ORTHO NOVUM 1-50) 1-50 MG-MCG TABS [Jero Olivarez MD]

## 2018-04-10 NOTE — TELEPHONE ENCOUNTER
Last visit: 3.23.18.   Last refills:   Vyvanse- 3.15.18 #60  Zoloft- 3.16.18 #30    PHQ-9 score:    PHQ-9 SCORE 3/23/2018   Total Score -   Total Score 12       Next 5 appointments (look out 90 days)     Jun 01, 2018  1:30 PM CDT   (Arrive by 1:15 PM)   Return Visit with Melina Benson NP   [facility] ([facility]    [facility] [address] [place] [id number] [phone]

## 2018-04-10 NOTE — TELEPHONE ENCOUNTER
[facility]  Original authorizing provider: MD Jannet Montes would like a refill of the following medications:  norethindrone-mestranol (NORINYL1-50/ORTHO NOVUM 1-50) 1-50 MG-MCG TABS [Jero Olivarez MD]    Preferred pharmacy: [facility] [address] [place]    Comment:      Medication renewals requested in this message routed to other providers:  sertraline (ZOLOFT) 100 MG tablet [Melina Benson NP]  lisdexamfetamine (VYVANSE) 30 MG capsule [Melina Benson NP]

## 2018-04-12 RX ORDER — LISDEXAMFETAMINE DIMESYLATE 30 MG/1
CAPSULE ORAL
Qty: 60 CAPSULE | Refills: 0 | Status: SHIPPED | OUTPATIENT
Start: 2018-04-12 | End: 2018-05-30

## 2018-04-12 RX ORDER — SERTRALINE HYDROCHLORIDE 100 MG/1
100 TABLET, FILM COATED ORAL DAILY
Qty: 30 TABLET | Refills: 2 | Status: SHIPPED | OUTPATIENT
Start: 2018-04-12 | End: 2018-06-06

## 2018-04-14 ENCOUNTER — HEALTH MAINTENANCE LETTER (OUTPATIENT)
Age: 28
End: 2018-04-14

## 2018-04-18 ENCOUNTER — TELEPHONE (OUTPATIENT)
Dept: OBGYN | Facility: OTHER | Age: 28
End: 2018-04-18

## 2018-04-18 NOTE — TELEPHONE ENCOUNTER
Please call pharmacy to see if any 50mcg estrogen BCP's available.  Ex Ovral-50, norethindrone-mestranol (NORINYL1-50/ORTHO NOVUM 1-50) 1-50 MG-MCG TABS

## 2018-04-18 NOTE — TELEPHONE ENCOUNTER
Please advise on NECON 1/50.  Pharmacy is stating no longer available and no equivalent available.    They need a new Rx for change in birth control.  Please advise.  Thank you.

## 2018-05-01 ENCOUNTER — HOSPITAL ENCOUNTER (EMERGENCY)
Facility: HOSPITAL | Age: 28
Discharge: HOME | End: 2018-05-01
Attending: NURSE PRACTITIONER | Admitting: NURSE PRACTITIONER
Payer: COMMERCIAL

## 2018-05-01 VITALS
DIASTOLIC BLOOD PRESSURE: 54 MMHG | SYSTOLIC BLOOD PRESSURE: 121 MMHG | OXYGEN SATURATION: 98 % | RESPIRATION RATE: 16 BRPM | TEMPERATURE: 97.6 F

## 2018-05-01 DIAGNOSIS — J06.9 VIRAL URI WITH COUGH: ICD-10-CM

## 2018-05-01 LAB
DEPRECATED S PYO AG THROAT QL EIA: NORMAL
FLUAV+FLUBV AG SPEC QL: NEGATIVE
FLUAV+FLUBV AG SPEC QL: NEGATIVE
SPECIMEN SOURCE: NORMAL
SPECIMEN SOURCE: NORMAL

## 2018-05-01 PROCEDURE — G0463 HOSPITAL OUTPT CLINIC VISIT: HCPCS

## 2018-05-01 PROCEDURE — 87081 CULTURE SCREEN ONLY: CPT | Performed by: FAMILY MEDICINE

## 2018-05-01 PROCEDURE — 99213 OFFICE O/P EST LOW 20 MIN: CPT | Performed by: NURSE PRACTITIONER

## 2018-05-01 PROCEDURE — 87880 STREP A ASSAY W/OPTIC: CPT | Performed by: FAMILY MEDICINE

## 2018-05-01 PROCEDURE — 87804 INFLUENZA ASSAY W/OPTIC: CPT | Mod: 59 | Performed by: NURSE PRACTITIONER

## 2018-05-01 ASSESSMENT — ENCOUNTER SYMPTOMS
WHEEZING: 0
TROUBLE SWALLOWING: 0
DYSURIA: 0
NAUSEA: 0
COUGH: 1
FEVER: 0
ABDOMINAL PAIN: 0
SHORTNESS OF BREATH: 0
PSYCHIATRIC NEGATIVE: 1
VOMITING: 0
WEAKNESS: 0
SORE THROAT: 1
APPETITE CHANGE: 0
DIARRHEA: 0
SINUS PAIN: 0
STRIDOR: 0
RHINORRHEA: 0
ACTIVITY CHANGE: 0
SINUS PRESSURE: 0
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
## 2018-05-01 NOTE — ED AVS SNAPSHOT
[facility]    [address]    [facility] [address]    Phone:  [phone]                                       Jannet San   MRN: [id number]    Department:  [facility]   Date of Visit:  5/1/2018           Patient Information     Date Of Birth          1990        Your diagnoses for this visit were:     Viral URI with cough        You were seen by Cori Conti NP.      Follow-up Information     Follow up with Leona Matthews MD.    Specialty:  Family Practice    Why:  As needed, If symptoms worsen    Contact information:    [address]  [place] [address] [phone]          Follow up with [facility].    Specialty:  EMERGENCY MEDICINE    Why:  As needed, If symptoms worsen    Contact information:    [address]  [place] [address] [phone]    Additional information:    From [facility]: Take [address]. Turn left at [address]. Turn left at the first stoplight on [address]. At the first stop sign, take a right onto [address]. Take a left into the parking lot and continue through until you reach the North enterance of the building.       From [place]: Take [address]. Take the [address] ramp towards [place]. Turn left onto [address]. Take a slight right onto [address]. Turn left at the first stoplight on [address]. At the first stop sign, take a right onto [address]. Take a left into the parking lot and continue through until you reach the North enterance of the building.       From [place]: Take [address]. Take a right at [address]. At the first stop sign, take a right onto [address]. Take a left into the parking lot and continue through until you reach the North enterance of the building.         Discharge Instructions       Take tylenol and/or ibuprofen for pain or fever. Follow dosing on package.   Increase fluid intake.    Rest.   Rapid strep is negative, if culture is positive we will call.   Follow up with PCP with any increase in symptoms or concerns.   Return to urgent care or emergency department with any increase in symptoms or concerns.     Discharge References/Attachments     URI, VIRAL, NO ABX (ADULT) (ENGLISH)      Your next 10 appointments already scheduled     May 04, 2018 11:30 AM CDT   (Arrive by 11:15 AM)   PHYSICAL with Nata Weathers NP   [facility]    [address]  [place] [address]   [phone]            Jun 01, 2018  1:30 PM CDT   (Arrive by 1:15 PM)   Return Visit with Melina Benson NP   [facility]    [address] [place] [address]   [phone]            Neville 15, 2018  2:00 PM CDT   (Arrive by 1:45 PM)   Return Visit with FLACO Gerardo CNP   [facility]    [address] [place] [address]   [phone]                 Review of your medicines      Our records show that you are taking the medicines listed below. If these are incorrect, please call your family doctor or clinic.        Dose / Directions Last dose taken    BENADRYL PO   Dose:  75 mg        Take 75 mg by mouth nightly as needed   Refills:  0        EPINEPHrine 0.3 MG/0.3ML injection 2-pack   Commonly known as:  EPIPEN 2-CONCHIS   Dose:  0.3 mg   Quantity:  0.6 mL        Inject 0.3 mLs (0.3 mg) into the muscle as needed for anaphylaxis   Refills:  3        lisdexamfetamine 30 MG capsule   Commonly known as:  VYVANSE   Quantity:  60 capsule        TAKE 1 CAPSULE BY MOUTH 2 TIMES A DAY   Refills:  0        MAGNESIUM OXIDE PO   Dose:  500 mg        Take 500 mg by mouth   Refills:  0        MELATONIN PO   Dose:  10 mg        Take 10 mg by mouth At Bedtime   Refills:  0        norethindrone-ethinyl estradiol 1-35 MG-MCG per tablet   Commonly known as:  ORTHO-NOVUM 1-35  TAB,NORTREL 1-35 TAB   Dose:  1 tablet        Take 1 tablet by mouth daily   Refills:  0        sertraline 100 MG tablet   Commonly known as:  ZOLOFT   Dose:  100 mg   Quantity:  30 tablet        Take 1 tablet (100 mg) by mouth daily   Refills:  2        TYLENOL PO   Dose:  1000 mg        Take 1,000 mg by mouth   Refills:  0                Procedures and tests performed during your visit     Beta strep group A culture    Influenza A/B antigen    Rapid strep screen      Orders Needing Specimen Collection     None      Pending Results     Date and Time Order Name Status Description    5/1/2018 1415 Beta strep group A culture In process             Pending Culture Results     Date and Time Order Name Status Description    5/1/2018 1415 Beta strep group A culture In process             Thank you for choosing [facility]       Thank you for choosing [facility] for your care. Our goal is always to provide you with excellent care. Hearing back from our patients is one way we can continue to improve our services. Please take a few minutes to complete the written survey that you may receive in the mail after you visit with us. Thank you!        [employer] Information     [employer] gives you secure access to your electronic health record. If you see a primary care provider, you can also send messages to your care team and make appointments. If you have questions, please call your primary care clinic.  If you do not have a primary care provider, please call [phone] and they will assist you.        Care EveryWhere ID     This is your Care EveryWhere ID. This could be used by other organizations to access your [facility] medical records  [id number]        Equal Access to Services     CESAR BEST : Becca So, kristy henao, herrera lopezalryann lord. So [facility] [phone].    ATENCIÓN: Si habla español, tiene a mcdermott disposición servicios gratuitos de asistencia  kayleen Muhammadminna al [phone].    We comply with applicable federal civil rights laws and [place] laws. We do not discriminate on the basis of race, color, national origin, age, disability, sex, sexual orientation, or gender identity.            After Visit Summary       This is your record. Keep this with you and show to your community pharmacist(s) and doctor(s) at your next visit.

## 2018-05-01 NOTE — DISCHARGE INSTRUCTIONS
Take tylenol and/or ibuprofen for pain or fever. Follow dosing on package.   Increase fluid intake.   Rest.   Rapid strep is negative, if culture is positive we will call.   Follow up with PCP with any increase in symptoms or concerns.   Return to urgent care or emergency department with any increase in symptoms or concerns.

## 2018-05-01 NOTE — ED AVS SNAPSHOT
[facility]    [address] [address]    Phone:  [phone]                                       Jannet San   MRN: [id number]    Department:  [facility]   Date of Visit:  5/1/2018           After Visit Summary Signature Page     I have received my discharge instructions, and my questions have been answered. I have discussed any challenges I see with this plan with the nurse or doctor.    ..........................................................................................................................................  Patient/Patient Representative Signature      ..........................................................................................................................................  Patient Representative Print Name and Relationship to Patient    ..................................................               ................................................  Date                                            Time    ..........................................................................................................................................  Reviewed by Signature/Title    ...................................................              ..............................................  Date                                                            Time

## 2018-05-03 LAB
BACTERIA SPEC CULT: NORMAL
SPECIMEN SOURCE: NORMAL

## 2018-05-21 ENCOUNTER — MYC REFILL (OUTPATIENT)
Dept: PSYCHIATRY | Facility: OTHER | Age: 28
End: 2018-05-21

## 2018-05-21 ENCOUNTER — HOSPITAL ENCOUNTER (EMERGENCY)
Facility: HOSPITAL | Age: 28
Discharge: HOME | End: 2018-05-21
Attending: NURSE PRACTITIONER | Admitting: NURSE PRACTITIONER
Payer: COMMERCIAL

## 2018-05-21 VITALS
RESPIRATION RATE: 16 BRPM | DIASTOLIC BLOOD PRESSURE: 75 MMHG | SYSTOLIC BLOOD PRESSURE: 93 MMHG | OXYGEN SATURATION: 99 % | HEART RATE: 71 BPM | TEMPERATURE: 98.2 F

## 2018-05-21 DIAGNOSIS — S99.921A INJURY OF TOENAIL OF RIGHT FOOT, INITIAL ENCOUNTER: ICD-10-CM

## 2018-05-21 DIAGNOSIS — L08.9 LOCAL INFECTION OF SKIN AND SUBCUTANEOUS TISSUE: ICD-10-CM

## 2018-05-21 DIAGNOSIS — F31.81 BIPOLAR 2 DISORDER (H): ICD-10-CM

## 2018-05-21 PROCEDURE — 99213 OFFICE O/P EST LOW 20 MIN: CPT | Performed by: NURSE PRACTITIONER

## 2018-05-21 PROCEDURE — G0463 HOSPITAL OUTPT CLINIC VISIT: HCPCS

## 2018-05-21 RX ORDER — SERTRALINE HYDROCHLORIDE 100 MG/1
100 TABLET, FILM COATED ORAL DAILY
Qty: 30 TABLET | Refills: 2 | Status: CANCELLED | OUTPATIENT
Start: 2018-05-21

## 2018-05-21 RX ORDER — CEPHALEXIN 500 MG/1
500 CAPSULE ORAL 2 TIMES DAILY
Qty: 14 CAPSULE | Refills: 0 | Status: SHIPPED | OUTPATIENT
Start: 2018-05-21 | End: 2019-02-12

## 2018-05-21 ASSESSMENT — ENCOUNTER SYMPTOMS: CONSTITUTIONAL NEGATIVE: 1

## 2018-05-21 NOTE — ED AVS SNAPSHOT
[facility]    [address] [address]    Phone:  [phone]                                       Jannet San   MRN: [id number]    Department:  [facility]   Date of Visit:  5/21/2018           After Visit Summary Signature Page     I have received my discharge instructions, and my questions have been answered. I have discussed any challenges I see with this plan with the nurse or doctor.    ..........................................................................................................................................  Patient/Patient Representative Signature      ..........................................................................................................................................  Patient Representative Print Name and Relationship to Patient    ..................................................               ................................................  Date                                            Time    ..........................................................................................................................................  Reviewed by Signature/Title    ...................................................              ..............................................  Date                                                            Time
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
## 2018-05-21 NOTE — DISCHARGE INSTRUCTIONS
Keep clean and dry.   Avoid peroxide or soaking your foot.   Apply a slight amount of antibiotic ointment and cover with a bandaid.   Do leave open to air when you are not active.   Take antibiotics as ordered.   Follow up with PCP in 3-4 days if not improving.

## 2018-05-21 NOTE — ED AVS SNAPSHOT
[facility]    [address] [address]    Phone:  [phone]                                       Jannet San   MRN: [id number]    Department:  [facility]   Date of Visit:  5/21/2018           Patient Information     Date Of Birth          1990        Your diagnoses for this visit were:     Injury of toenail of right foot, initial encounter     Local infection of skin and subcutaneous tissue        You were seen by Hoa Gupta NP.      Follow-up Information     Follow up with Leona Matthews MD. Schedule an appointment as soon as possible for a visit in 3 days.    Specialty:  Family Practice    Why:  if not improving    Contact information:    [address] [facility] [id number] [phone]          Discharge Instructions       Keep clean and dry.   Avoid peroxide or soaking your foot.   Apply a slight amount of antibiotic ointment and cover with a bandaid.   Do leave open to air when you are not active.   Take antibiotics as ordered.   Follow up with PCP in 3-4 days if not improving.     Your next 10 appointments already scheduled     Neville 15, 2018  2:00 PM CDT   (Arrive by 1:45 PM)   Return Visit with FLACO Gerardo [facility] ([facility] )    [address] [address] [phone]                 Review of your medicines      START taking        Dose / Directions Last dose taken    cephALEXin 500 MG capsule   Commonly known as:  KEFLEX   Dose:  500 mg   Quantity:  14 capsule        Take 1 capsule (500 mg) by mouth 2 times daily for 7 days   Refills:  0          Our records show that you are taking the medicines listed below. If these are incorrect, please call your family doctor or clinic.        Dose / Directions Last dose taken    BENADRYL PO   Dose:  75 mg        Take 75 mg by mouth nightly as needed   Refills:  0        EPINEPHrine 0.3 MG/0.3ML injection 2-pack   Commonly  known as:  EPIPEN 2-CONCHIS   Dose:  0.3 mg   Quantity:  0.6 mL        Inject 0.3 mLs (0.3 mg) into the muscle as needed for anaphylaxis   Refills:  3        lisdexamfetamine 30 MG capsule   Commonly known as:  VYVANSE   Quantity:  60 capsule        TAKE 1 CAPSULE BY MOUTH 2 TIMES A DAY   Refills:  0        MAGNESIUM OXIDE PO   Dose:  500 mg        Take 500 mg by mouth   Refills:  0        MELATONIN PO   Dose:  10 mg        Take 10 mg by mouth At Bedtime   Refills:  0        norethindrone-ethinyl estradiol 1-35 MG-MCG per tablet   Commonly known as:  ORTHO-NOVUM 1-35 TAB,NORTREL 1-35 TAB   Dose:  1 tablet        Take 1 tablet by mouth daily   Refills:  0        sertraline 100 MG tablet   Commonly known as:  ZOLOFT   Dose:  100 mg   Quantity:  30 tablet        Take 1 tablet (100 mg) by mouth daily   Refills:  2        TYLENOL PO   Dose:  1000 mg        Take 1,000 mg by mouth   Refills:  0                Prescriptions were sent or printed at these locations (1 Prescription)                   [facility]  [address]   [address] [place] [address]    Telephone:  [phone]   Fax:  [phone]   Hours:                  E-Prescribed (1 of 1)         cephALEXin (KEFLEX) 500 MG capsule                Orders Needing Specimen Collection     None      Pending Results     No orders found from 5/19/2018 to 5/22/2018.            Pending Culture Results     No orders found from 5/19/2018 to 5/22/2018.            Thank you for choosing [facility]       Thank you for choosing [facility] for your care. Our goal is always to provide you with excellent care. Hearing back from our patients is one way we can continue to improve our services. Please take a few minutes to complete the written survey that you may receive in the mail after you visit with us. Thank you!        [employer] Information     [employer] gives you secure access to your electronic health record. If you see a primary care provider, you can  also send messages to your care team and make appointments. If you have questions, please call your primary care clinic.  If you do not have a primary care provider, please call [phone] and they will assist you.        Care EveryWhere ID     This is your Care EveryWhere ID. This could be used by other organizations to access your [facility] medical records  [id number]        Equal Access to Services     CESAR BEST : Hadii deng So, waneto henao, qakaycee kaalmabreanna bustamante, ryann nieto . So [facility] [phone].    ATENCIÓN: Si habla español, tiene a mcdermott disposición servicios gratuitos de asistencia lingüística. Beatrice al [phone].    We comply with applicable federal civil rights laws and [place] laws. We do not discriminate on the basis of race, color, national origin, age, disability, sex, sexual orientation, or gender identity.            After Visit Summary       This is your record. Keep this with you and show to your community pharmacist(s) and doctor(s) at your next visit.

## 2018-05-30 DIAGNOSIS — F90.0 ATTENTION DEFICIT HYPERACTIVITY DISORDER (ADHD), PREDOMINANTLY INATTENTIVE TYPE: ICD-10-CM

## 2018-05-30 RX ORDER — LISDEXAMFETAMINE DIMESYLATE 30 MG/1
CAPSULE ORAL
Qty: 60 CAPSULE | Refills: 0 | Status: SHIPPED | OUTPATIENT
Start: 2018-05-30 | End: 2018-06-06

## 2018-05-30 NOTE — TELEPHONE ENCOUNTER
"Called Jannet this afternoon noted I am calling in regards to Vyvanse. I talked to patient care supervisor - they noted Jannet very frustrated as going on seeing third psychiatric provider, Vyvanse not getting filled and she notes she HAS been taking it all along whereas I indicated I realized later after I had replied to a refill request that wasn't documented in the [place]  that I have learned the [place]  is NOT [place] to [place] and thus is my mistake. Jannet noted \"so likely you've made other mistakes then huh?\". I replied, \"yes, I make mistakes including likely others since the [place]  isn't up to date.\" Jannet request bipolar be taken off her diagnoses list because she does not have that and her therapist she works with agrees and she has had psych testing and brought in. I noted I think would be helpful for Jannet to bring in at her initial appointment with Lia Edge and she notes it should already be in her chart and \"you guys have a  out for everything. You have all kinds of excuses.\" I noted I also get frustrated when I have tried to get things scanned into EMR, they aren't there, and I'm missing information. Jannet noted she did not feel I was taking her seriously as evidenced by my laughing. I noted I am NOT by any means laughing at her. Jannet then noted she does not feel I am helping and doesn't see how things are going to change just by this conversation for her, or for other patients who may in the future encounter something like this.    I repeated several times I am trying to help her and I understanding as I would be frustrated if I was cut off from medication. Jannet noted \"I would like if I could see you instead of Lia. You seem to be there the longest and have control.\" I noted yes I have been here for couple years however I'm not sure if I agree with being in most control per say..     End of conversation Jannet noted \"thank you\"  "

## 2018-06-05 ENCOUNTER — TELEPHONE (OUTPATIENT)
Dept: FAMILY MEDICINE | Facility: OTHER | Age: 28
End: 2018-06-05

## 2018-06-05 NOTE — TELEPHONE ENCOUNTER
10:12 AM    Reason for Call: OVERBOOK    Patient is having the following symptoms: Rash, elbows, knees, itchy, diarrhea   ( Gluten intolerance ) for  1 week    The patient is requesting an appointment for  This week   with Dr. Leona Matthews    Was an appointment offered for this call?  No    Preferred method for responding to this message: [phone]    If we cannot reach you directly, may we leave a detailed response at the number you provided?  Yes      Parul Blackmon

## 2018-06-05 NOTE — TELEPHONE ENCOUNTER
Please schedule patient for date/time: unable to see this week, we can work into our schedule next Monday 5/11/18 at 2:30    Have patient go to ER/Urgent Care Center. Urgent Care hours are 9:30 am to 8 pm, open 7 days a week. No.    Provider will call patient.No.    Other:

## 2018-06-06 ENCOUNTER — OFFICE VISIT (OUTPATIENT)
Dept: PSYCHIATRY | Facility: OTHER | Age: 28
End: 2018-06-06
Attending: NURSE PRACTITIONER
Payer: COMMERCIAL

## 2018-06-06 VITALS
WEIGHT: 180 LBS | TEMPERATURE: 98.5 F | BODY MASS INDEX: 26.2 KG/M2 | SYSTOLIC BLOOD PRESSURE: 126 MMHG | DIASTOLIC BLOOD PRESSURE: 60 MMHG | HEART RATE: 104 BPM

## 2018-06-06 DIAGNOSIS — F90.0 ATTENTION DEFICIT HYPERACTIVITY DISORDER (ADHD), PREDOMINANTLY INATTENTIVE TYPE: ICD-10-CM

## 2018-06-06 DIAGNOSIS — F31.81 BIPOLAR 2 DISORDER (H): ICD-10-CM

## 2018-06-06 PROCEDURE — 99214 OFFICE O/P EST MOD 30 MIN: CPT | Performed by: PSYCHIATRY & NEUROLOGY

## 2018-06-06 PROCEDURE — G0463 HOSPITAL OUTPT CLINIC VISIT: HCPCS

## 2018-06-06 RX ORDER — LISDEXAMFETAMINE DIMESYLATE 30 MG/1
30 CAPSULE ORAL 2 TIMES DAILY
Qty: 60 CAPSULE | Refills: 0 | Status: SHIPPED | OUTPATIENT
Start: 2018-07-26 | End: 2018-09-10

## 2018-06-06 RX ORDER — LISDEXAMFETAMINE DIMESYLATE 30 MG/1
CAPSULE ORAL
Qty: 60 CAPSULE | Refills: 0 | Status: SHIPPED | OUTPATIENT
Start: 2018-06-27 | End: 2018-06-06

## 2018-06-06 RX ORDER — LISDEXAMFETAMINE DIMESYLATE 30 MG/1
CAPSULE ORAL
Qty: 60 CAPSULE | Refills: 0 | Status: SHIPPED | OUTPATIENT
Start: 2018-06-27 | End: 2018-09-10

## 2018-06-06 RX ORDER — SERTRALINE HYDROCHLORIDE 100 MG/1
100 TABLET, FILM COATED ORAL DAILY
Qty: 30 TABLET | Refills: 6 | Status: SHIPPED | OUTPATIENT
Start: 2018-06-01 | End: 2018-12-12

## 2018-06-06 ASSESSMENT — PAIN SCALES - GENERAL: PAINLEVEL: NO PAIN (0)

## 2018-06-06 NOTE — PROGRESS NOTES
PSYCHIATRY CLINIC PROGRESS NOTE   20 minute medication management, more than 50% of time spent counseling patient on medications, medication side effects, symptom history and management   SUBJECTIVE / INTERIM HISTORY                                                                       Last visit: I last saw Jannet 2017, prior she was seeing April PGeronimo, then JOSÉ Summers Then Kamini AVI.  - current meds Zoloft & Vyvanse.   - is feeling more depressed right now but doesn't feel a good time to try med change / adjustment. Is working with [facility]y and feels they have a good rapport. Prior to with Jeff hadn't felt good rapport with therapists  - son Billy is 6  - parents are in [place]. They often come up for summer and will this summer as her brother is getting  in [place].    MEDICAL / SURGICAL HISTORY                pregnant [if applicable]--no     Patient Active Problem List   Diagnosis     Major depressive disorder, recurrent episode, moderate (H)     Lumbago     PTSD (post-traumatic stress disorder)     Migraine     Drug use disorder     Bilateral low back pain without sciatica     Bilateral low back pain with left-sided sciatica     Bipolar II disorder (H)     Fibromyalgia     Lump or mass in breast     Mastodynia     Encounter for gynecological examination without abnormal finding     Encounter for surveillance of contraceptives     ACP (advance care planning)     Ovulation pain     Family history of hemochromatosis     ALLERGY   Bee pollen  MEDICATIONS                                                                                             Current Outpatient Prescriptions   Medication Sig     DiphenhydrAMINE HCl (BENADRYL PO) Take 75 mg by mouth nightly as needed     lisdexamfetamine (VYVANSE) 30 MG capsule TAKE 1 CAPSULE BY MOUTH 2 TIMES A DAY     MAGNESIUM OXIDE PO Take 500 mg by mouth     MELATONIN PO Take 10 mg by mouth At Bedtime     norethindrone-ethinyl estradiol (ORTHO-NOVUM 1-35 TAB,NORTREL 1-35  TAB) 1-35 MG-MCG per tablet Take 1 tablet by mouth daily     sertraline (ZOLOFT) 100 MG tablet Take 1 tablet (100 mg) by mouth daily     Acetaminophen (TYLENOL PO) Take 1,000 mg by mouth     EPINEPHrine (EPIPEN 2-CONCHIS) 0.3 MG/0.3ML injection 2-pack Inject 0.3 mLs (0.3 mg) into the muscle as needed for anaphylaxis (Patient not taking: Reported on 6/6/2018)     No current facility-administered medications for this visit.      Facility-Administered Medications Ordered in Other Visits   Medication     betamethasone acet & sod phos (CELESTONE) injection 12 mg     lidocaine 1 % injection 10 mL       VITALS   /60 (BP Location: Right arm, Patient Position: Sitting, Cuff Size: Adult Regular)  Pulse 104  Temp 98.5  F (36.9  C) (Tympanic)  Wt 180 lb (81.6 kg)  BMI 26.2 kg/m2     LABS                                                                                                                           Results for orders placed or performed during the hospital encounter of 05/01/18   Rapid strep screen   Result Value Ref Range    Specimen Description Throat     Rapid Strep A Screen       NEGATIVE: No Group A streptococcal antigen detected by immunoassay, await culture report.   Influenza A/B antigen   Result Value Ref Range    Influenza A/B Agn Specimen Nasopharyngeal     Influenza A Negative NEG^Negative    Influenza B Negative NEG^Negative   Beta strep group A culture   Result Value Ref Range    Specimen Description Throat     Culture Micro No beta hemolytic Streptococcus Group A isolated        MENTAL STATUS EXAM                                                                                        Alert. Oriented to person, place, and date / time. Well groomed, calm, cooperative with good eye contact. No problems with speech or psychomotor behavior. Mood was described depressed and affect was congruent to speech content and full range - tearful. Thought process, including associations, was unremarkable and  thought content was devoid homicidal ideation and psychotic thought. + SI with no plan or intent. No hallucinations. Insight was good. Judgment was intact and adequate for safety. Fund of knowledge was intact. Pt demonstrates no obvious problems with attention, concentration, language, recent or remote memory although these were not formally tested.       DIAGNOSES      (Use of Axes system will continue, although absent from DSM 5)        Major depressive disorder, recurrent, moderate  ADHD  Borderline personality disorder    ASSESSMENT: Jannet San is a 27 yo with ADHD - works with [facility]y, had testing. Depression, anxiety. For today we agreed on continuing meds as are. She is struggling with depression however feels doesn't warrant a med change    PLAN                                                                                                                       1)  MEDICATIONS:    - continue Zoloft 100 mg daily and Vyvanse 30 mg twice daily last script 5/30/18, gave scripts for 6/27/18, 7/26/18    2)  THERAPY: works with Jeff Woody    3)  LABS:  [facility]  4)  PT MONITOR [call for probs]: worsening symptoms , SI/HI  5)  REFERRALS [CD, medical, other]:  None  6)  RTC: ~2 months

## 2018-06-06 NOTE — MR AVS SNAPSHOT
After Visit Summary   6/6/2018    Jannet San    MRN: [id number]           Patient Information     Date Of Birth          1990        Visit Information        Provider Department      6/6/2018 2:20 PM Nilsa Hawkins MD [facility]        Today's Diagnoses     Attention deficit hyperactivity disorder (ADHD), predominantly inattentive type        Bipolar 2 disorder (H)           Follow-ups after your visit        Your next 10 appointments already scheduled     Jun 11, 2018  2:30 PM CDT   (Arrive by 2:15 PM)   SHORT with Leona Matthews MD   [facility] [place] )    [address]  [place] [address]   [phone]            Sep 10, 2018 11:00 AM CDT   (Arrive by 10:45 AM)   Return Visit with Nilsa Hawkins MD   [facility] ([facility]    [address] [place] [address]  [place] [id number] [phone]              Who to contact     If you have questions or need follow up information about today's clinic visit or your schedule please contact [facility] directly at [phone].  Normal or non-critical lab and imaging results will be communicated to you by MyChart, letter or phone within 4 business days after the clinic has received the results. If you do not hear from us within 7 days, please contact the clinic through [email or web address] or phone. If you have a critical or abnormal lab result, we will notify you by phone as soon as possible.  Submit refill requests through [employer] or call your pharmacy and they will forward the refill request to us. Please allow 3 business days for your refill to be completed.          Additional Information About Your Visit        MyChart Information     [employer] gives you secure access to your electronic health record. If you see a primary care provider, you can also send messages to your care team and make appointments. If you have questions, please call your primary care  clinic.  If you do not have a primary care provider, please call [phone] and they will assist you.        Care EveryWhere ID     This is your Care EveryWhere ID. This could be used by other organizations to access your [facility] medical records  [id number]        Your Vitals Were     Pulse Temperature BMI (Body Mass Index)             104 98.5  F (36.9  C) (Tympanic) 26.2 kg/m2          Blood Pressure from Last 3 Encounters:   06/06/18 126/60   05/21/18 93/75   05/01/18 121/54    Weight from Last 3 Encounters:   06/06/18 180 lb (81.6 kg)   03/23/18 180 lb (81.6 kg)   01/26/18 175 lb (79.4 kg)              Today, you had the following     No orders found for display         Today's Medication Changes          These changes are accurate as of 6/6/18  3:28 PM.  If you have any questions, ask your nurse or doctor.               Start taking these medicines.        Dose/Directions    * lisdexamfetamine 30 MG capsule   Commonly known as:  VYVANSE   Used for:  Attention deficit hyperactivity disorder (ADHD), predominantly inattentive type   Started by:  Nilsa Hawkins MD        Start taking on:  6/27/2018   TAKE 1 CAPSULE BY MOUTH 2 TIMES A DAY   Quantity:  60 capsule   Refills:  0       * lisdexamfetamine 30 MG capsule   Commonly known as:  VYVANSE   Used for:  Attention deficit hyperactivity disorder (ADHD), predominantly inattentive type   Started by:  Nilsa Hawkins MD        Dose:  30 mg   Start taking on:  7/26/2018   Take 1 capsule (30 mg) by mouth 2 times daily   Quantity:  60 capsule   Refills:  0       * Notice:  This list has 2 medication(s) that are the same as other medications prescribed for you. Read the directions carefully, and ask your doctor or other care provider to review them with you.         Where to get your medicines      These medications were sent to [facility] [address]  [address] [address]     Phone:  [phone]     sertraline  100 MG tablet         Some of these will need a paper prescription and others can be bought over the counter.  Ask your nurse if you have questions.     Bring a paper prescription for each of these medications     lisdexamfetamine 30 MG capsule    lisdexamfetamine 30 MG capsule                Primary Care Provider Office Phone # Fax #    Leona Matthews [phone] [address] [address]        Equal Access to Services     CESAR BEST : Hadii aad ku hadasho Soomaali, waaxda luqadaha, qaybta kaalmada adeegyada, waxay idiin hayaan adeeg toshaselinash emilia . So [facility] [phone].    ATENCIÓN: Si habla espwolfgang, tiene a mcdermott disposición servicios gratuitos de asistencia lingüística. Llame al [phone].    We comply with applicable federal civil rights laws and [place] laws. We do not discriminate on the basis of race, color, national origin, age, disability, sex, sexual orientation, or gender identity.            Thank you!     Thank you for choosing [facility]  for your care. Our goal is always to provide you with excellent care. Hearing back from our patients is one way we can continue to improve our services. Please take a few minutes to complete the written survey that you may receive in the mail after your visit with us. Thank you!             Your Updated Medication List - Protect others around you: Learn how to safely use, store and throw away your medicines at www.disposemymeds.org.          This list is accurate as of 6/6/18  3:28 PM.  Always use your most recent med list.                   Brand Name Dispense Instructions for use Diagnosis    BENADRYL PO      Take 75 mg by mouth nightly as needed        EPINEPHrine 0.3 MG/0.3ML injection 2-pack    EPIPEN 2-CONCHIS    0.6 mL    Inject 0.3 mLs (0.3 mg) into the muscle as needed for anaphylaxis    Bee sting reaction, accidental or unintentional, initial encounter       * lisdexamfetamine 30 MG capsule   Start  taking on:  6/27/2018    VYVANSE    60 capsule    TAKE 1 CAPSULE BY MOUTH 2 TIMES A DAY    Attention deficit hyperactivity disorder (ADHD), predominantly inattentive type       * lisdexamfetamine 30 MG capsule   Start taking on:  7/26/2018    VYVANSE    60 capsule    Take 1 capsule (30 mg) by mouth 2 times daily    Attention deficit hyperactivity disorder (ADHD), predominantly inattentive type       MAGNESIUM OXIDE PO      Take 500 mg by mouth        MELATONIN PO      Take 10 mg by mouth At Bedtime        norethindrone-ethinyl estradiol 1-35 MG-MCG per tablet    ORTHO-NOVUM 1-35 TAB,NORTREL 1-35 TAB     Take 1 tablet by mouth daily        sertraline 100 MG tablet    ZOLOFT    30 tablet    Take 1 tablet (100 mg) by mouth daily    Bipolar 2 disorder (H)       TYLENOL PO      Take 1,000 mg by mouth        * Notice:  This list has 2 medication(s) that are the same as other medications prescribed for you. Read the directions carefully, and ask your doctor or other care provider to review them with you.

## 2018-06-11 ENCOUNTER — OFFICE VISIT (OUTPATIENT)
Dept: FAMILY MEDICINE | Facility: OTHER | Age: 28
End: 2018-06-11
Attending: FAMILY MEDICINE
Payer: COMMERCIAL

## 2018-06-11 ENCOUNTER — TELEPHONE (OUTPATIENT)
Dept: FAMILY MEDICINE | Facility: OTHER | Age: 28
End: 2018-06-11

## 2018-06-11 VITALS
OXYGEN SATURATION: 99 % | SYSTOLIC BLOOD PRESSURE: 128 MMHG | BODY MASS INDEX: 26.2 KG/M2 | DIASTOLIC BLOOD PRESSURE: 78 MMHG | TEMPERATURE: 99.2 F | HEART RATE: 101 BPM | RESPIRATION RATE: 19 BRPM | WEIGHT: 180 LBS

## 2018-06-11 DIAGNOSIS — K12.0 CANKER SORES ORAL: ICD-10-CM

## 2018-06-11 DIAGNOSIS — L20.82 FLEXURAL ECZEMA: Primary | ICD-10-CM

## 2018-06-11 PROCEDURE — G0463 HOSPITAL OUTPT CLINIC VISIT: HCPCS

## 2018-06-11 PROCEDURE — 99213 OFFICE O/P EST LOW 20 MIN: CPT | Performed by: FAMILY MEDICINE

## 2018-06-11 RX ORDER — TRIAMCINOLONE ACETONIDE 0.1 %
PASTE (GRAM) DENTAL 2 TIMES DAILY
Qty: 5 G | Refills: 1 | Status: SHIPPED | OUTPATIENT
Start: 2018-06-11 | End: 2022-02-11

## 2018-06-11 RX ORDER — BETAMETHASONE DIPROPIONATE 0.5 MG/G
CREAM TOPICAL
Qty: 50 G | Refills: 0 | Status: SHIPPED | OUTPATIENT
Start: 2018-06-11 | End: 2019-06-12

## 2018-06-11 RX ORDER — TRIAMCINOLONE ACETONIDE 1 MG/G
CREAM TOPICAL
Qty: 45 G | Refills: 1 | Status: SHIPPED | OUTPATIENT
Start: 2018-06-11 | End: 2018-06-11 | Stop reason: ALTCHOICE

## 2018-06-11 ASSESSMENT — ANXIETY QUESTIONNAIRES
7. FEELING AFRAID AS IF SOMETHING AWFUL MIGHT HAPPEN: SEVERAL DAYS
5. BEING SO RESTLESS THAT IT IS HARD TO SIT STILL: SEVERAL DAYS
4. TROUBLE RELAXING: NEARLY EVERY DAY
IF YOU CHECKED OFF ANY PROBLEMS ON THIS QUESTIONNAIRE, HOW DIFFICULT HAVE THESE PROBLEMS MADE IT FOR YOU TO DO YOUR WORK, TAKE CARE OF THINGS AT HOME, OR GET ALONG WITH OTHER PEOPLE: VERY DIFFICULT
2. NOT BEING ABLE TO STOP OR CONTROL WORRYING: SEVERAL DAYS
1. FEELING NERVOUS, ANXIOUS, OR ON EDGE: SEVERAL DAYS
6. BECOMING EASILY ANNOYED OR IRRITABLE: SEVERAL DAYS
3. WORRYING TOO MUCH ABOUT DIFFERENT THINGS: SEVERAL DAYS
GAD7 TOTAL SCORE: 9

## 2018-06-11 ASSESSMENT — PAIN SCALES - GENERAL: PAINLEVEL: NO PAIN (0)

## 2018-06-11 NOTE — NURSING NOTE
"Chief Complaint   Patient presents with     Derm Problem     Mouth Lesions       Initial /78  Pulse 101  Temp 99.2  F (37.3  C) (Tympanic)  Resp 19  Wt 180 lb (81.6 kg)  SpO2 99%  Breastfeeding? No  BMI 26.2 kg/m2 Estimated body mass index is 26.2 kg/(m^2) as calculated from the following:    Height as of 11/15/17: 5' 9.5\" (1.765 m).    Weight as of this encounter: 180 lb (81.6 kg).  Medication Reconciliation: complete    Marium Lee LPN    "

## 2018-06-11 NOTE — TELEPHONE ENCOUNTER
06/11/2018 - Received PA request from 's for triamcinolone paste.  Submitted thru [facility].  Waiting for response.  Tanisha Jo, [facility] Specialist.
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
## 2018-06-11 NOTE — MR AVS SNAPSHOT
After Visit Summary   6/11/2018    Jannet San    MRN: [id number]           Patient Information     Date Of Birth          1990        Visit Information        Provider Department      6/11/2018 2:30 PM Leona Matthews MD [facility]        Today's Diagnoses     Flexural eczema    -  1    Canker sores oral           Follow-ups after your visit        Follow-up notes from your care team     Return if symptoms worsen or fail to improve.      Your next 10 appointments already scheduled     Sep 10, 2018 11:00 AM CDT   (Arrive by 10:45 AM)   Return Visit with Nilsa Hawkins MD   [facility] ([facility] [place] )    [address]  [place] [address] [phone]              Who to contact     If you have questions or need follow up information about today's clinic visit or your schedule please contact [facility] directly at [phone].  Normal or non-critical lab and imaging results will be communicated to you by MyChart, letter or phone within 4 business days after the clinic has received the results. If you do not hear from us within 7 days, please contact the clinic through Intentivahart or phone. If you have a critical or abnormal lab result, we will notify you by phone as soon as possible.  Submit refill requests through Embly or call your pharmacy and they will forward the refill request to us. Please allow 3 business days for your refill to be completed.          Additional Information About Your Visit        MyChart Information     Embly gives you secure access to your electronic health record. If you see a primary care provider, you can also send messages to your care team and make appointments. If you have questions, please call your primary care clinic.  If you do not have a primary care provider, please call [phone] and they will assist you.        Care EveryWhere ID     This is your Care EveryWhere ID. This could be  used by other organizations to access your [facility] medical records  [id number]        Your Vitals Were     Pulse Temperature Respirations Pulse Oximetry Breastfeeding? BMI (Body Mass Index)    101 99.2  F (37.3  C) (Tympanic) 19 99% No 26.2 kg/m2       Blood Pressure from Last 3 Encounters:   06/11/18 128/78   06/06/18 126/60   05/21/18 93/75    Weight from Last 3 Encounters:   06/11/18 180 lb (81.6 kg)   06/06/18 180 lb (81.6 kg)   03/23/18 180 lb (81.6 kg)              Today, you had the following     No orders found for display         Today's Medication Changes          These changes are accurate as of 6/11/18  2:59 PM.  If you have any questions, ask your nurse or doctor.               Start taking these medicines.        Dose/Directions    augmented betamethasone dipropionate 0.05 % cream   Commonly known as:  DIPROLENE-AF   Used for:  Flexural eczema   Started by:  Leona Matthews MD        Apply sparingly to affected area  twice daily for 14 days.  Do not apply to face.   Quantity:  50 g   Refills:  0       triamcinolone 0.1 % paste   Commonly known as:  KENALOG   Used for:  Canker sores oral   Started by:  Leona Matthews MD        Take by mouth 2 times daily   Quantity:  5 g   Refills:  1            Where to get your medicines      These medications were sent to [facility] [place] [id number] [facility] [place] [address]     Phone:  [phone]     augmented betamethasone dipropionate 0.05 % cream    triamcinolone 0.1 % paste                Primary Care Provider Office Phone # Fax #    Leona Matthews [phone]       [address]  [place] [address]        Equal Access to Services     CESAR EBST AH: Becca So, kristy henao, herrera kaalmada ryann bustamante. So [facility] [phone].    ATENCIÓN: Si habla español, tiene a mcdermott disposición servicios gratuitos de asistencia lingüística. Llame al  [phone].    We comply with applicable federal civil rights laws and [place] laws. We do not discriminate on the basis of race, color, national origin, age, disability, sex, sexual orientation, or gender identity.            Thank you!     Thank you for choosing [facility]  for your care. Our goal is always to provide you with excellent care. Hearing back from our patients is one way we can continue to improve our services. Please take a few minutes to complete the written survey that you may receive in the mail after your visit with us. Thank you!             Your Updated Medication List - Protect others around you: Learn how to safely use, store and throw away your medicines at www.disposemymeds.org.          This list is accurate as of 6/11/18  2:59 PM.  Always use your most recent med list.                   Brand Name Dispense Instructions for use Diagnosis    augmented betamethasone dipropionate 0.05 % cream    DIPROLENE-AF    50 g    Apply sparingly to affected area  twice daily for 14 days.  Do not apply to face.    Flexural eczema       BENADRYL PO      Take 75 mg by mouth nightly as needed        EPINEPHrine 0.3 MG/0.3ML injection 2-pack    EPIPEN 2-CONCHIS    0.6 mL    Inject 0.3 mLs (0.3 mg) into the muscle as needed for anaphylaxis    Bee sting reaction, accidental or unintentional, initial encounter       * lisdexamfetamine 30 MG capsule   Start taking on:  6/27/2018    VYVANSE    60 capsule    TAKE 1 CAPSULE BY MOUTH 2 TIMES A DAY    Attention deficit hyperactivity disorder (ADHD), predominantly inattentive type       * lisdexamfetamine 30 MG capsule   Start taking on:  7/26/2018    VYVANSE    60 capsule    Take 1 capsule (30 mg) by mouth 2 times daily    Attention deficit hyperactivity disorder (ADHD), predominantly inattentive type       MAGNESIUM OXIDE PO      Take 500 mg by mouth        MELATONIN PO      Take 10 mg by mouth At Bedtime        norethindrone-ethinyl estradiol 1-35  MG-MCG per tablet    ORTHO-NOVUM 1-35 TAB,NORTREL 1-35 TAB     Take 1 tablet by mouth daily        sertraline 100 MG tablet    ZOLOFT    30 tablet    Take 1 tablet (100 mg) by mouth daily    Bipolar 2 disorder (H)       triamcinolone 0.1 % paste    KENALOG    5 g    Take by mouth 2 times daily    Canker sores oral       TYLENOL PO      Take 1,000 mg by mouth        * Notice:  This list has 2 medication(s) that are the same as other medications prescribed for you. Read the directions carefully, and ask your doctor or other care provider to review them with you.

## 2018-06-12 ASSESSMENT — PATIENT HEALTH QUESTIONNAIRE - PHQ9: SUM OF ALL RESPONSES TO PHQ QUESTIONS 1-9: 11

## 2018-06-12 ASSESSMENT — ANXIETY QUESTIONNAIRES: GAD7 TOTAL SCORE: 9

## 2018-06-12 NOTE — TELEPHONE ENCOUNTER
APPROVAL - 06/12/2018 - Received APPROVAL from [facility] for triamcinolone paste.  Approval dates:  05/12/18 thru 06/12/2019.  Pharmacy advised.  Forms scanned to Epic.  Tanisha Jo, [facility] Specialist.

## 2018-09-05 DIAGNOSIS — F90.0 ATTENTION DEFICIT HYPERACTIVITY DISORDER (ADHD), PREDOMINANTLY INATTENTIVE TYPE: Primary | ICD-10-CM

## 2018-09-06 NOTE — TELEPHONE ENCOUNTER
VYVANSE 30 MG capsule       Last Written Prescription Date:  7-  Last Fill Quantity: 60,   # refills: 0  Last Office Visit: 6-  Future Office visit:    Next 5 appointments (look out 90 days)     Sep 10, 2018 11:00 AM CDT   (Arrive by 10:45 AM)   Return [facility] with Nilsa Hawkins MD   [facility] )    [facility] [address] [place] [id number] [phone]

## 2018-09-07 RX ORDER — LISDEXAMFETAMINE DIMESYLATE 30 MG/1
CAPSULE ORAL
Qty: 60 CAPSULE | Refills: 0 | Status: SHIPPED | OUTPATIENT
Start: 2018-09-07 | End: 2018-11-28

## 2018-09-10 ENCOUNTER — OFFICE VISIT (OUTPATIENT)
Dept: PSYCHIATRY | Facility: OTHER | Age: 28
End: 2018-09-10
Attending: PSYCHIATRY & NEUROLOGY
Payer: COMMERCIAL

## 2018-09-10 VITALS
HEART RATE: 85 BPM | BODY MASS INDEX: 24.5 KG/M2 | SYSTOLIC BLOOD PRESSURE: 115 MMHG | TEMPERATURE: 98.4 F | DIASTOLIC BLOOD PRESSURE: 72 MMHG | HEIGHT: 71 IN | OXYGEN SATURATION: 99 % | WEIGHT: 175 LBS | RESPIRATION RATE: 18 BRPM

## 2018-09-10 DIAGNOSIS — F90.0 ATTENTION DEFICIT HYPERACTIVITY DISORDER (ADHD), PREDOMINANTLY INATTENTIVE TYPE: ICD-10-CM

## 2018-09-10 PROCEDURE — 99213 OFFICE O/P EST LOW 20 MIN: CPT | Performed by: PSYCHIATRY & NEUROLOGY

## 2018-09-10 PROCEDURE — G0463 HOSPITAL OUTPT CLINIC VISIT: HCPCS

## 2018-09-10 RX ORDER — LISDEXAMFETAMINE DIMESYLATE 30 MG/1
CAPSULE ORAL
Qty: 60 CAPSULE | Refills: 0 | Status: SHIPPED | OUTPATIENT
Start: 2018-10-05 | End: 2018-12-12

## 2018-09-10 RX ORDER — LISDEXAMFETAMINE DIMESYLATE 30 MG/1
30 CAPSULE ORAL 2 TIMES DAILY
Qty: 60 CAPSULE | Refills: 0 | Status: SHIPPED | OUTPATIENT
Start: 2018-11-02 | End: 2018-12-12

## 2018-09-10 RX ORDER — TRIAMCINOLONE ACETONIDE 1 MG/G
CREAM TOPICAL
COMMUNITY
Start: 2018-06-11 | End: 2018-12-12

## 2018-09-10 ASSESSMENT — ANXIETY QUESTIONNAIRES
5. BEING SO RESTLESS THAT IT IS HARD TO SIT STILL: SEVERAL DAYS
IF YOU CHECKED OFF ANY PROBLEMS ON THIS QUESTIONNAIRE, HOW DIFFICULT HAVE THESE PROBLEMS MADE IT FOR YOU TO DO YOUR WORK, TAKE CARE OF THINGS AT HOME, OR GET ALONG WITH OTHER PEOPLE: VERY DIFFICULT
1. FEELING NERVOUS, ANXIOUS, OR ON EDGE: MORE THAN HALF THE DAYS
2. NOT BEING ABLE TO STOP OR CONTROL WORRYING: MORE THAN HALF THE DAYS
GAD7 TOTAL SCORE: 11
6. BECOMING EASILY ANNOYED OR IRRITABLE: SEVERAL DAYS
7. FEELING AFRAID AS IF SOMETHING AWFUL MIGHT HAPPEN: SEVERAL DAYS
3. WORRYING TOO MUCH ABOUT DIFFERENT THINGS: MORE THAN HALF THE DAYS

## 2018-09-10 ASSESSMENT — PAIN SCALES - GENERAL: PAINLEVEL: NO PAIN (0)

## 2018-09-10 ASSESSMENT — PATIENT HEALTH QUESTIONNAIRE - PHQ9: 5. POOR APPETITE OR OVEREATING: MORE THAN HALF THE DAYS

## 2018-09-10 NOTE — MR AVS SNAPSHOT
"              After Visit Summary   9/10/2018    Jannet San    MRN: [id number]           Patient Information     Date Of Birth          1990        Visit Information        Provider Department      9/10/2018 11:00 AM [facility]        Today's Diagnoses     Attention deficit hyperactivity disorder (ADHD), predominantly inattentive type           Follow-ups after your visit        Who to contact     If you have questions or need follow up information about today's clinic visit or your schedule please contact [facility] directly at [phone].  Normal or non-critical lab and imaging results will be communicated to you by GameAccount Networkhart, letter or phone within 4 business days after the clinic has received the results. If you do not hear from us within 7 days, please contact the clinic through StoreDott or phone. If you have a critical or abnormal lab result, we will notify you by phone as soon as possible.  Submit refill requests through Smart Living Studios or call your pharmacy and they will forward the refill request to us. Please allow 3 business days for your refill to be completed.          Additional Information About Your Visit        MyChart Information     [employer] gives you secure access to your electronic health record. If you see a primary care provider, you can also send messages to your care team and make appointments. If you have questions, please call your primary care clinic.  If you do not have a primary care provider, please call [phone] and they will assist you.        Care EveryWhere ID     This is your Care EveryWhere ID. This could be used by other organizations to access your [facility] medical records  [id number]        Your Vitals Were     Pulse Temperature Respirations Height Pulse Oximetry BMI (Body Mass Index)    85 98.4  F (36.9  C) (Tympanic) 18 5' 11\" (1.803 m) 99% 24.41 kg/m2       Blood Pressure from Last 3 Encounters:   09/10/18 115/72 "   06/11/18 128/78   06/06/18 126/60    Weight from Last 3 Encounters:   09/10/18 175 lb (79.4 kg)   06/11/18 180 lb (81.6 kg)   06/06/18 180 lb (81.6 kg)              Today, you had the following     No orders found for display         Today's Medication Changes          These changes are accurate as of 9/10/18 11:37 AM.  If you have any questions, ask your nurse or doctor.               These medicines have changed or have updated prescriptions.        Dose/Directions    * VYVANSE 30 MG capsule   This may have changed:  Another medication with the same name was changed. Make sure you understand how and when to take each.   Used for:  Attention deficit hyperactivity disorder (ADHD), predominantly inattentive type   Generic drug:  lisdexamfetamine   Changed by:  Nilsa Hawkins MD        TAKE 1 CAPSULE BY MOUTH 2 TIMES A DAY   Quantity:  60 capsule   Refills:  0       * lisdexamfetamine 30 MG capsule   Commonly known as:  VYVANSE   This may have changed:  These instructions start on 10/5/2018. If you are unsure what to do until then, ask your doctor or other care provider.   Used for:  Attention deficit hyperactivity disorder (ADHD), predominantly inattentive type   Changed by:  Nilsa Hawkins MD        Start taking on:  10/5/2018   TAKE 1 CAPSULE BY MOUTH 2 TIMES A DAY   Quantity:  60 capsule   Refills:  0       * lisdexamfetamine 30 MG capsule   Commonly known as:  VYVANSE   This may have changed:  These instructions start on 11/2/2018. If you are unsure what to do until then, ask your doctor or other care provider.   Used for:  Attention deficit hyperactivity disorder (ADHD), predominantly inattentive type   Changed by:  Nilsa Hawkins MD        Dose:  30 mg   Start taking on:  11/2/2018   Take 1 capsule (30 mg) by mouth 2 times daily   Quantity:  60 capsule   Refills:  0       * Notice:  This list has 3 medication(s) that are the same as other medications prescribed for you. Read the directions carefully,  and ask your doctor or other care provider to review them with you.         Where to get your medicines      Some of these will need a paper prescription and others can be bought over the counter.  Ask your nurse if you have questions.     Bring a paper prescription for each of these medications     lisdexamfetamine 30 MG capsule    lisdexamfetamine 30 MG capsule                Primary Care Provider Office Phone # Fax #    Leona Matthews [phone] [address] [phone]        Equal Access to Services     CESAR BEST : Hadii aad ku hadasho Soomaali, waaxda luqadaha, qaybta kaalmada adeegyada, waxay idiin haykaylan татьяна nieto . So [facility] [phone].    ATENCIÓN: Si habla español, tiene a mcdermott disposición servicios gratuitos de asistencia lingüística. [facility] [phone].    We comply with applicable federal civil rights laws and [place] laws. We do not discriminate on the basis of race, color, national origin, age, disability, sex, sexual orientation, or gender identity.            Thank you!     Thank you for choosing [facility]  for your care. Our goal is always to provide you with excellent care. Hearing back from our patients is one way we can continue to improve our services. Please take a few minutes to complete the written survey that you may receive in the mail after your visit with us. Thank you!             Your Updated Medication List - Protect others around you: Learn how to safely use, store and throw away your medicines at www.disposemymeds.org.          This list is accurate as of 9/10/18 11:37 AM.  Always use your most recent med list.                   Brand Name Dispense Instructions for use Diagnosis    augmented betamethasone dipropionate 0.05 % cream    DIPROLENE-AF    50 g    Apply sparingly to affected area  twice daily for 14 days.  Do not apply to face.    Flexural eczema       BENADRYL PO      Take 75 mg by mouth nightly as  needed        EPINEPHrine 0.3 MG/0.3ML injection 2-pack    EPIPEN 2-CONCHIS    0.6 mL    Inject 0.3 mLs (0.3 mg) into the muscle as needed for anaphylaxis    Bee sting reaction, accidental or unintentional, initial encounter       MAGNESIUM OXIDE PO      Take 500 mg by mouth        MELATONIN PO      Take 10 mg by mouth At Bedtime        norethindrone-ethinyl estradiol 1-35 MG-MCG per tablet    ORTHO-NOVUM 1-35 TAB,NORTREL 1-35 TAB     Take 1 tablet by mouth daily        sertraline 100 MG tablet    ZOLOFT    30 tablet    Take 1 tablet (100 mg) by mouth daily    Bipolar 2 disorder (H)       triamcinolone 0.1 % cream    KENALOG          triamcinolone 0.1 % paste    KENALOG    5 g    Take by mouth 2 times daily    Canker sores oral       TYLENOL PO      Take 1,000 mg by mouth        * VYVANSE 30 MG capsule   Generic drug:  lisdexamfetamine     60 capsule    TAKE 1 CAPSULE BY MOUTH 2 TIMES A DAY    Attention deficit hyperactivity disorder (ADHD), predominantly inattentive type       * lisdexamfetamine 30 MG capsule   Start taking on:  10/5/2018    VYVANSE    60 capsule    TAKE 1 CAPSULE BY MOUTH 2 TIMES A DAY    Attention deficit hyperactivity disorder (ADHD), predominantly inattentive type       * lisdexamfetamine 30 MG capsule   Start taking on:  11/2/2018    VYVANSE    60 capsule    Take 1 capsule (30 mg) by mouth 2 times daily    Attention deficit hyperactivity disorder (ADHD), predominantly inattentive type       * Notice:  This list has 3 medication(s) that are the same as other medications prescribed for you. Read the directions carefully, and ask your doctor or other care provider to review them with you.

## 2018-09-10 NOTE — NURSING NOTE
"Chief Complaint   Patient presents with     Mental Health Problem     Follow up        Initial /72 (BP Location: Right arm, Patient Position: Sitting, Cuff Size: Adult Large)  Pulse 85  Temp 98.4  F (36.9  C) (Tympanic)  Resp 18  Ht 5' 11\" (1.803 m)  Wt 175 lb (79.4 kg)  SpO2 99%  BMI 24.41 kg/m2 Estimated body mass index is 24.41 kg/(m^2) as calculated from the following:    Height as of this encounter: 5' 11\" (1.803 m).    Weight as of this encounter: 175 lb (79.4 kg).  Medication Reconciliation: complete    Nancy Moreno LPN    "

## 2018-09-10 NOTE — PROGRESS NOTES
PSYCHIATRY CLINIC PROGRESS NOTE   20 minute medication management, more than 50% of time spent counseling patient on medications, medication side effects, symptom history and management   SUBJECTIVE / INTERIM HISTORY                                                                       Last visit: - continue Zoloft 100 mg daily and Vyvanse 30 mg twice daily last script 5/30/18, gave scripts for 6/27/18, 7/26/18  - trip went well  - current meds Zoloft & Vyvanse.   - son Billy is 6  - parents are in [place]. They often come up for summer and will this summer as her brother is getting  in [place].    MEDICAL / SURGICAL HISTORY                pregnant [if applicable]--no     Patient Active Problem List   Diagnosis     Major depressive disorder, recurrent episode, moderate (H)     Lumbago     PTSD (post-traumatic stress disorder)     Migraine     Drug use disorder     Bilateral low back pain without sciatica     Bilateral low back pain with left-sided sciatica     Bipolar II disorder (H)     Fibromyalgia     Lump or mass in breast     Mastodynia     Encounter for gynecological examination without abnormal finding     Encounter for surveillance of contraceptives     ACP (advance care planning)     Ovulation pain     Family history of hemochromatosis     ALLERGY   Bee pollen  MEDICATIONS                                                                                             Current Outpatient Prescriptions   Medication Sig     Acetaminophen (TYLENOL PO) Take 1,000 mg by mouth     augmented betamethasone dipropionate (DIPROLENE-AF) 0.05 % cream Apply sparingly to affected area  twice daily for 14 days.  Do not apply to face.     DiphenhydrAMINE HCl (BENADRYL PO) Take 75 mg by mouth nightly as needed     EPINEPHrine (EPIPEN 2-CONCHIS) 0.3 MG/0.3ML injection 2-pack Inject 0.3 mLs (0.3 mg) into the muscle as needed for anaphylaxis     lisdexamfetamine (VYVANSE) 30 MG capsule TAKE 1 CAPSULE BY MOUTH 2 TIMES A DAY      "lisdexamfetamine (VYVANSE) 30 MG capsule Take 1 capsule (30 mg) by mouth 2 times daily     norethindrone-ethinyl estradiol (ORTHO-NOVUM 1-35 TAB,NORTREL 1-35 TAB) 1-35 MG-MCG per tablet Take 1 tablet by mouth daily     sertraline (ZOLOFT) 100 MG tablet Take 1 tablet (100 mg) by mouth daily     triamcinolone (KENALOG) 0.1 % cream      triamcinolone (KENALOG) 0.1 % paste Take by mouth 2 times daily     VYVANSE 30 MG capsule TAKE 1 CAPSULE BY MOUTH 2 TIMES A DAY     MAGNESIUM OXIDE PO Take 500 mg by mouth     MELATONIN PO Take 10 mg by mouth At Bedtime     No current facility-administered medications for this visit.      Facility-Administered Medications Ordered in Other Visits   Medication     betamethasone acet & sod phos (CELESTONE) injection 12 mg     lidocaine 1 % injection 10 mL       VITALS   /72 (BP Location: Right arm, Patient Position: Sitting, Cuff Size: Adult Large)  Pulse 85  Temp 98.4  F (36.9  C) (Tympanic)  Resp 18  Ht 5' 11\" (1.803 m)  Wt 175 lb (79.4 kg)  SpO2 99%  BMI 24.41 kg/m2     LABS                                                                                                                           Results for orders placed or performed during the hospital encounter of 05/01/18   Rapid strep screen   Result Value Ref Range    Specimen Description Throat     Rapid Strep A Screen       NEGATIVE: No Group A streptococcal antigen detected by immunoassay, await culture report.   Influenza A/B antigen   Result Value Ref Range    Influenza A/B Agn Specimen Nasopharyngeal     Influenza A Negative NEG^Negative    Influenza B Negative NEG^Negative   Beta strep group A culture   Result Value Ref Range    Specimen Description Throat     Culture Micro No beta hemolytic Streptococcus Group A isolated        MENTAL STATUS EXAM                                                                                        Alert. Oriented to person, place, and date / time. Well groomed, calm, " cooperative with good eye contact. No problems with speech or psychomotor behavior. Mood was described depressed and affect was congruent to speech content and full range - tearful. Thought process, including associations, was unremarkable and thought content was devoid homicidal ideation and psychotic thought. + SI with no plan or intent. No hallucinations. Insight was good. Judgment was intact and adequate for safety. Fund of knowledge was intact. Pt demonstrates no obvious problems with attention, concentration, language, recent or remote memory although these were not formally tested.       DIAGNOSES      (Use of Axes system will continue, although absent from DSM 5)        Major depressive disorder, recurrent, moderate  ADHD  Borderline personality disorder    ASSESSMENT: Jannet San is a 29 yo with ADHD - works with [employer], had testing. Depression, anxiety. For today we agreed on continuing meds as are. She is struggling with depression however feels doesn't warrant a med change    PLAN                                                                                                                       1)  MEDICATIONS:    - continue Zoloft 100 mg daily and Vyvanse 30 mg twice daily and script for 9/7/18 , 10/5 and 11/2/18    2)  THERAPY: works with [employer]    3)  LABS:  [facility]  4)  PT MONITOR [call for probs]: worsening symptoms , SI/HI  5)  REFERRALS [CD, medical, other]:  None  6)  RTC: ~2 months

## 2018-09-11 ASSESSMENT — PATIENT HEALTH QUESTIONNAIRE - PHQ9: SUM OF ALL RESPONSES TO PHQ QUESTIONS 1-9: 12

## 2018-09-11 ASSESSMENT — ANXIETY QUESTIONNAIRES: GAD7 TOTAL SCORE: 11

## 2018-09-11 NOTE — PROGRESS NOTES
"Jannet San is a 26 year old female  Here today for irregular bleeding.  Request to have her Nexplanon removed.  Pt reports she will abstain from sex or use condoms until she decides what other method she would like to use for contraception.  Pt is considering a BTO.  Will refer as needed.      O:   /76 (BP Location: Left arm, Patient Position: Chair, Cuff Size: Adult Regular)  Pulse 67  Ht 5' 11\" (1.803 m)  Wt 185 lb (83.9 kg)  LMP 03/21/2017  SpO2 99%  BMI 25.8 kg/m2   Pleasant without acute distress.    Procedure:  Nexplanon removal  After obtaining consent from the patient the nexplanon was removed.  Her arm was prepped copiously with betadine and 1% lidocaine ~ 1 cc was injected into her arm over the nexplanon dakota.  A 0.8 cm incision was made over the previous scar and the nexplanon dakota was removed without difficulty. Pressure was placed over the incision and minimal bleeding was noted.  It was covered with 1/2 inch steri strips and the arm was bandaged with a kerlex guaze.  She will leave that on for at least 24 hrs.    She tolerated the procedure well.  No complications.     A:  Contraception    P:  Nexplanon removal  Return to office PRN      Greater than 20 minutes were spent in addition to the procedure face to face counseling this patient about different types of contraceptives, risks, benefits and effectiveness.  Reviewed/followed up on last visit concerning breast pain and mental health consult.    FLACO Macias, NIKI    " Patient notified Flu shots available.

## 2018-11-27 NOTE — TELEPHONE ENCOUNTER
Patient is requesting a refill of Vyvanse 40 mg.  Last visit with April Libia was 6-22-17.  Last fill was on 6-22-17 # 30.  Has been out x 5 days per patient. Uses 's [facility].  Thank you, please review and advise.   Your pt?

## 2018-11-28 DIAGNOSIS — F90.0 ATTENTION DEFICIT HYPERACTIVITY DISORDER (ADHD), PREDOMINANTLY INATTENTIVE TYPE: ICD-10-CM

## 2018-11-29 NOTE — TELEPHONE ENCOUNTER
VYVANSE 30 MG capsule    Last Written Prescription Date:  9/10/18  Last Fill Quantity: 60,   # refills: 0  Last Office Visit: 9/10/18  Future Office visit:    Next 5 appointments (look out 90 days)     Dec 12, 2018 10:40 AM CST   (Arrive by 10:25 AM)   Return Visit with Nilsa Hawkins MD   [facility]    [address] [place] [id number]   [phone]                   Routing refill request to provider for review/approval because:  Drug not on the [facility], P or [facility] refill protocol or controlled substance

## 2018-11-30 RX ORDER — LISDEXAMFETAMINE DIMESYLATE 30 MG/1
CAPSULE ORAL
Qty: 60 CAPSULE | Refills: 0 | Status: SHIPPED | OUTPATIENT
Start: 2018-11-30 | End: 2019-02-12

## 2018-11-30 NOTE — TELEPHONE ENCOUNTER
Rx vyvanse 30 mg dated for 11-30-18 walked to [facility].

## 2018-12-12 ENCOUNTER — OFFICE VISIT (OUTPATIENT)
Dept: PSYCHIATRY | Facility: OTHER | Age: 28
End: 2018-12-12
Attending: PSYCHIATRY & NEUROLOGY
Payer: COMMERCIAL

## 2018-12-12 VITALS
TEMPERATURE: 98.9 F | SYSTOLIC BLOOD PRESSURE: 148 MMHG | WEIGHT: 190 LBS | DIASTOLIC BLOOD PRESSURE: 94 MMHG | HEART RATE: 78 BPM | BODY MASS INDEX: 26.5 KG/M2

## 2018-12-12 DIAGNOSIS — F31.81 BIPOLAR 2 DISORDER (H): ICD-10-CM

## 2018-12-12 DIAGNOSIS — F90.2 ADHD (ATTENTION DEFICIT HYPERACTIVITY DISORDER), COMBINED TYPE: Primary | ICD-10-CM

## 2018-12-12 PROCEDURE — G0463 HOSPITAL OUTPT CLINIC VISIT: HCPCS

## 2018-12-12 PROCEDURE — 99213 OFFICE O/P EST LOW 20 MIN: CPT | Performed by: PSYCHIATRY & NEUROLOGY

## 2018-12-12 RX ORDER — LISDEXAMFETAMINE DIMESYLATE 30 MG/1
30 CAPSULE ORAL 2 TIMES DAILY
Qty: 60 CAPSULE | Refills: 0 | Status: SHIPPED | OUTPATIENT
Start: 2019-01-25 | End: 2019-03-12

## 2018-12-12 RX ORDER — LISDEXAMFETAMINE DIMESYLATE 30 MG/1
30 CAPSULE ORAL 2 TIMES DAILY
Qty: 60 CAPSULE | Refills: 0 | Status: SHIPPED | OUTPATIENT
Start: 2018-12-28 | End: 2019-02-12

## 2018-12-12 RX ORDER — LISDEXAMFETAMINE DIMESYLATE 30 MG/1
30 CAPSULE ORAL 2 TIMES DAILY
Qty: 60 CAPSULE | Refills: 0 | Status: SHIPPED | OUTPATIENT
Start: 2019-02-22 | End: 2019-03-12

## 2018-12-12 RX ORDER — SERTRALINE HYDROCHLORIDE 100 MG/1
100 TABLET, FILM COATED ORAL DAILY
Qty: 30 TABLET | Refills: 6 | Status: SHIPPED | OUTPATIENT
Start: 2018-12-12 | End: 2019-06-12

## 2018-12-12 ASSESSMENT — PAIN SCALES - GENERAL: PAINLEVEL: MILD PAIN (2)

## 2018-12-12 NOTE — PROGRESS NOTES
PSYCHIATRY CLINIC PROGRESS NOTE   20 minute medication management, more than 50% of time spent counseling patient on medications, medication side effects, symptom history and management   SUBJECTIVE / INTERIM HISTORY                                                                       Last visit: September '18 in which: continue Zoloft 100 mg daily and Vyvanse 30 mg twice daily and script for 9/7/18 , 10/5 and 11/2/18  - Billy has bad skin issues  - started working with [employer]  - current meds Zoloft & Vyvanse.   - son Billy is 6  - parents are in [place]. They often come up for summer and will this summer as her brother is getting  in [place].    MEDICAL / SURGICAL HISTORY                pregnant [if applicable]--no     Patient Active Problem List   Diagnosis     Major depressive disorder, recurrent episode, moderate (H)     Lumbago     PTSD (post-traumatic stress disorder)     Migraine     Drug use disorder     Bilateral low back pain without sciatica     Bilateral low back pain with left-sided sciatica     Bipolar II disorder (H)     Fibromyalgia     Lump or mass in breast     Mastodynia     Encounter for gynecological examination without abnormal finding     Encounter for surveillance of contraceptives     ACP (advance care planning)     Ovulation pain     Family history of hemochromatosis     ALLERGY   Bee pollen  MEDICATIONS                                                                                             Current Outpatient Medications   Medication Sig     Acetaminophen (TYLENOL PO) Take 1,000 mg by mouth     augmented betamethasone dipropionate (DIPROLENE-AF) 0.05 % cream Apply sparingly to affected area  twice daily for 14 days.  Do not apply to face.     DiphenhydrAMINE HCl (BENADRYL PO) Take 75 mg by mouth nightly as needed     EPINEPHrine (EPIPEN 2-CONCHIS) 0.3 MG/0.3ML injection 2-pack Inject 0.3 mLs (0.3 mg) into the muscle as needed for anaphylaxis     MAGNESIUM OXIDE PO Take 500  mg by mouth     MELATONIN PO Take 10 mg by mouth At Bedtime     norethindrone-ethinyl estradiol (ORTHO-NOVUM 1-35 TAB,NORTREL 1-35 TAB) 1-35 MG-MCG per tablet Take 1 tablet by mouth daily     sertraline (ZOLOFT) 100 MG tablet Take 1 tablet (100 mg) by mouth daily     triamcinolone (KENALOG) 0.1 % paste Take by mouth 2 times daily     VYVANSE 30 MG capsule TAKE 1 CAPSULE BY MOUTH TWICE DAILY     No current facility-administered medications for this visit.      Facility-Administered Medications Ordered in Other Visits   Medication     betamethasone acet & sod phos (CELESTONE) injection 12 mg     lidocaine 1 % injection 10 mL       VITALS   BP (!) 148/94 (BP Location: Right arm, Patient Position: Sitting, Cuff Size: Adult Regular)   Pulse 78   Temp 98.9  F (37.2  C) (Tympanic)   Wt 86.2 kg (190 lb)   BMI 26.50 kg/m       LABS                                                                                                                           Results for orders placed or performed during the hospital encounter of 05/01/18   Rapid strep screen   Result Value Ref Range    Specimen Description Throat     Rapid Strep A Screen       NEGATIVE: No Group A streptococcal antigen detected by immunoassay, await culture report.   Influenza A/B antigen   Result Value Ref Range    Influenza A/B Agn Specimen Nasopharyngeal     Influenza A Negative NEG^Negative    Influenza B Negative NEG^Negative   Beta strep group A culture   Result Value Ref Range    Specimen Description Throat     Culture Micro No beta hemolytic Streptococcus Group A isolated        MENTAL STATUS EXAM                                                                                        Alert. Oriented to person, place, and date / time. Well groomed, calm, cooperative with good eye contact. No problems with speech or psychomotor behavior. Mood was described depressed and affect was congruent to speech content and full range - tearful. Thought process,  including associations, was unremarkable and thought content was devoid homicidal ideation and psychotic thought. + SI with no plan or intent. No hallucinations. Insight was good. Judgment was intact and adequate for safety. Fund of knowledge was intact. Pt demonstrates no obvious problems with attention, concentration, language, recent or remote memory although these were not formally tested.       DIAGNOSES        Major depressive disorder, recurrent, moderate  ADHD  Borderline personality disorder    ASSESSMENT: Jannet San is a 27 yo with ADHD - was working with [employer], had testing. Depression, anxiety. For today we agreed on continuing meds as are. She is struggling with depression however feels doesn't warrant a med change    PLAN                                                                                                                       1)  MEDICATIONS:    - continue Zoloft 100 mg daily and Vyvanse 30 mg twice daily last script was 11/30/18 and gave scripts today 12/28/18, 1/25/19 and 2/22/19    2)  THERAPY: works with [employer]y    3)  LABS:  none  4)  PT MONITOR [call for probs]: worsening symptoms , SI/HI  5)  REFERRALS [CD, medical, other]:  None  6)  RTC: ~3 months

## 2018-12-24 ENCOUNTER — APPOINTMENT (OUTPATIENT)
Dept: GENERAL RADIOLOGY | Facility: HOSPITAL | Age: 28
End: 2018-12-24
Attending: NURSE PRACTITIONER
Payer: COMMERCIAL

## 2018-12-24 ENCOUNTER — HOSPITAL ENCOUNTER (EMERGENCY)
Facility: HOSPITAL | Age: 28
Discharge: HOME | End: 2018-12-24
Attending: NURSE PRACTITIONER | Admitting: NURSE PRACTITIONER
Payer: COMMERCIAL

## 2018-12-24 VITALS
HEART RATE: 95 BPM | DIASTOLIC BLOOD PRESSURE: 88 MMHG | TEMPERATURE: 98.5 F | BODY MASS INDEX: 26.6 KG/M2 | OXYGEN SATURATION: 97 % | HEIGHT: 71 IN | WEIGHT: 190 LBS | RESPIRATION RATE: 18 BRPM | SYSTOLIC BLOOD PRESSURE: 159 MMHG

## 2018-12-24 DIAGNOSIS — J98.01 BRONCHOSPASM: ICD-10-CM

## 2018-12-24 DIAGNOSIS — J20.9 ACUTE BRONCHITIS: ICD-10-CM

## 2018-12-24 DIAGNOSIS — Z87.891 PERSONAL HISTORY OF TOBACCO USE, PRESENTING HAZARDS TO HEALTH: ICD-10-CM

## 2018-12-24 DIAGNOSIS — J20.9 ACUTE BRONCHITIS, UNSPECIFIED ORGANISM: ICD-10-CM

## 2018-12-24 PROCEDURE — 71046 X-RAY EXAM CHEST 2 VIEWS: CPT | Mod: TC

## 2018-12-24 PROCEDURE — 99213 OFFICE O/P EST LOW 20 MIN: CPT | Mod: Z6 | Performed by: NURSE PRACTITIONER

## 2018-12-24 PROCEDURE — 94640 AIRWAY INHALATION TREATMENT: CPT

## 2018-12-24 PROCEDURE — 25000125 ZZHC RX 250: Performed by: NURSE PRACTITIONER

## 2018-12-24 PROCEDURE — G0463 HOSPITAL OUTPT CLINIC VISIT: HCPCS | Mod: 25

## 2018-12-24 RX ORDER — IPRATROPIUM BROMIDE AND ALBUTEROL SULFATE 2.5; .5 MG/3ML; MG/3ML
3 SOLUTION RESPIRATORY (INHALATION) ONCE
Status: COMPLETED | OUTPATIENT
Start: 2018-12-24 | End: 2018-12-24

## 2018-12-24 RX ORDER — IPRATROPIUM BROMIDE AND ALBUTEROL SULFATE .5; 3 MG/3ML; MG/3ML
3 SOLUTION RESPIRATORY (INHALATION)
Status: DISCONTINUED | OUTPATIENT
Start: 2018-12-24 | End: 2018-12-24 | Stop reason: HOSPADM

## 2018-12-24 RX ORDER — IPRATROPIUM BROMIDE AND ALBUTEROL SULFATE 2.5; .5 MG/3ML; MG/3ML
1 SOLUTION RESPIRATORY (INHALATION) EVERY 4 HOURS PRN
Qty: 1 BOX | Refills: 0 | Status: SHIPPED | OUTPATIENT
Start: 2018-12-24 | End: 2019-02-12

## 2018-12-24 RX ADMIN — IPRATROPIUM BROMIDE AND ALBUTEROL SULFATE 3 ML: .5; 3 SOLUTION RESPIRATORY (INHALATION) at 17:01

## 2018-12-24 RX ADMIN — IPRATROPIUM BROMIDE AND ALBUTEROL SULFATE 3 ML: .5; 3 SOLUTION RESPIRATORY (INHALATION) at 17:37

## 2018-12-24 ASSESSMENT — ENCOUNTER SYMPTOMS
CHILLS: 1
SORE THROAT: 1
VOMITING: 0
WHEEZING: 1
ABDOMINAL PAIN: 0
ARTHRALGIAS: 0
DIARRHEA: 0
SHORTNESS OF BREATH: 1
NAUSEA: 0
APPETITE CHANGE: 0
FEVER: 1
HEADACHES: 1
FATIGUE: 1
MYALGIAS: 0
COUGH: 1

## 2018-12-24 ASSESSMENT — MIFFLIN-ST. JEOR: SCORE: 1687.96
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
## 2018-12-24 NOTE — ED TRIAGE NOTES
States has had intermittent fever/cough/runny nose and SOB since last Thursday. Has not seen her provider for sx's till now.

## 2018-12-24 NOTE — ED AVS SNAPSHOT
[facility]  [address] [address]  Phone:  [phone]                                    Jannet San   MRN: [id number]    Department:  [facility]   Date of Visit:  12/24/2018           After Visit Summary Signature Page    I have received my discharge instructions, and my questions have been answered. I have discussed any challenges I see with this plan with the nurse or doctor.    ..........................................................................................................................................  Patient/Patient Representative Signature      ..........................................................................................................................................  Patient Representative Print Name and Relationship to Patient    ..................................................               ................................................  Date                                   Time    ..........................................................................................................................................  Reviewed by Signature/Title    ...................................................              ..............................................  Date                                               Time          22EPIC Rev 08/18

## 2019-02-12 ENCOUNTER — OFFICE VISIT (OUTPATIENT)
Dept: FAMILY MEDICINE | Facility: OTHER | Age: 29
End: 2019-02-12
Attending: FAMILY MEDICINE
Payer: COMMERCIAL

## 2019-02-12 VITALS
SYSTOLIC BLOOD PRESSURE: 118 MMHG | RESPIRATION RATE: 18 BRPM | DIASTOLIC BLOOD PRESSURE: 60 MMHG | HEART RATE: 80 BPM | OXYGEN SATURATION: 98 % | BODY MASS INDEX: 28.45 KG/M2 | TEMPERATURE: 98.1 F | WEIGHT: 204 LBS

## 2019-02-12 DIAGNOSIS — M94.0 COSTOCHONDRITIS: Primary | ICD-10-CM

## 2019-02-12 DIAGNOSIS — J06.9 VIRAL URI: ICD-10-CM

## 2019-02-12 PROCEDURE — 99213 OFFICE O/P EST LOW 20 MIN: CPT | Performed by: FAMILY MEDICINE

## 2019-02-12 PROCEDURE — G0463 HOSPITAL OUTPT CLINIC VISIT: HCPCS

## 2019-02-12 RX ORDER — IBUPROFEN 600 MG/1
600 TABLET, FILM COATED ORAL EVERY 8 HOURS PRN
Qty: 60 TABLET | Refills: 0 | Status: SHIPPED | OUTPATIENT
Start: 2019-02-12 | End: 2020-03-18

## 2019-02-12 ASSESSMENT — ANXIETY QUESTIONNAIRES
3. WORRYING TOO MUCH ABOUT DIFFERENT THINGS: SEVERAL DAYS
7. FEELING AFRAID AS IF SOMETHING AWFUL MIGHT HAPPEN: SEVERAL DAYS
GAD7 TOTAL SCORE: 9
1. FEELING NERVOUS, ANXIOUS, OR ON EDGE: SEVERAL DAYS
2. NOT BEING ABLE TO STOP OR CONTROL WORRYING: SEVERAL DAYS
4. TROUBLE RELAXING: NEARLY EVERY DAY
IF YOU CHECKED OFF ANY PROBLEMS ON THIS QUESTIONNAIRE, HOW DIFFICULT HAVE THESE PROBLEMS MADE IT FOR YOU TO DO YOUR WORK, TAKE CARE OF THINGS AT HOME, OR GET ALONG WITH OTHER PEOPLE: SOMEWHAT DIFFICULT
6. BECOMING EASILY ANNOYED OR IRRITABLE: SEVERAL DAYS
5. BEING SO RESTLESS THAT IT IS HARD TO SIT STILL: SEVERAL DAYS

## 2019-02-12 ASSESSMENT — PAIN SCALES - GENERAL: PAINLEVEL: NO PAIN (0)

## 2019-02-12 ASSESSMENT — PATIENT HEALTH QUESTIONNAIRE - PHQ9: SUM OF ALL RESPONSES TO PHQ QUESTIONS 1-9: 6

## 2019-02-12 NOTE — LETTER
My Depression Action Plan  Name: Jannet San   Date of Birth 1990  Date: 2/12/2019    My doctor: Leona Matthews   My clinic: [facility] [place]  [address]  [place] [address]  [phone]          GREEN    ZONE   Good Control    What it looks like:     Things are going generally well. You have normal up s and down s. You may even feel depressed from time to time, but bad moods usually last less than a day.   What you need to do:  1. Continue to care for yourself (see self care plan)  2. Check your depression survival kit and update it as needed  3. Follow your physician s recommendations including any medication.  4. Do not stop taking medication unless you consult with your physician first.           YELLOW         ZONE Getting Worse    What it looks like:     Depression is starting to interfere with your life.     It may be hard to get out of bed; you may be starting to isolate yourself from others.    Symptoms of depression are starting to last most all day and this has happened for several days.     You may have suicidal thoughts but they are not constant.   What you need to do:     1. Call your care team, your response to treatment will improve if you keep your care team informed of your progress. Yellow periods are signs an adjustment may need to be made.     2. Continue your self-care, even if you have to fake it!    3. Talk to someone in your support network    4. Open up your depression survival kit           RED    ZONE Medical Alert - Get Help    What it looks like:     Depression is seriously interfering with your life.     You may experience these or other symptoms: You can t get out of bed most days, can t work or engage in other necessary activities, you have trouble taking care of basic hygiene, or basic responsibilities, thoughts of suicide or death that will not go away, self-injurious behavior.     What you need to do:  1. Call your care team and request  a same-day appointment. If they are not available (weekends or after hours) call your local crisis line, emergency room or 911.            Depression Self Care Plan / Survival Kit    Self-Care for Depression  Here s the deal. Your body and mind are really not as separate as most people think.  What you do and think affects how you feel and how you feel influences what you do and think. This means if you do things that people who feel good do, it will help you feel better.  Sometimes this is all it takes.  There is also a place for medication and therapy depending on how severe your depression is, so be sure to consult with your medical provider and/ or Behavioral Health Consultant if your symptoms are worsening or not improving.     In order to better manage my stress, I will:    Exercise  Get some form of exercise, every day. This will help reduce pain and release endorphins, the  feel good  chemicals in your brain. This is almost as good as taking antidepressants!  This is not the same as joining a gym and then never going! (they count on that by the way ) It can be as simple as just going for a walk or doing some gardening, anything that will get you moving.      Hygiene   Maintain good hygiene (Get out of bed in the morning, Make your bed, Brush your teeth, Take a shower, and Get dressed like you were going to work, even if you are unemployed).  If your clothes don't fit try to get ones that do.    Diet  I will strive to eat foods that are good for me, drink plenty of water, and avoid excessive sugar, caffeine, alcohol, and other mood-altering substances.  Some foods that are helpful in depression are: complex carbohydrates, B vitamins, flaxseed, fish or fish oil, fresh fruits and vegetables.    Psychotherapy  I agree to participate in Individual Therapy (if recommended).    Medication  If prescribed medications, I agree to take them.  Missing doses can result in serious side effects.  I understand that drinking  alcohol, or other illicit drug use, may cause potential side effects.  I will not stop my medication abruptly without first discussing it with my provider.    Staying Connected With Others  I will stay in touch with my friends, family members, and my primary care provider/team.    Use your imagination  Be creative.  We all have a creative side; it doesn t matter if it s oil painting, sand castles, or mud pies! This will also kick up the endorphins.    Witness Beauty  (AKA stop and smell the roses) Take a look outside, even in mid-winter. Notice colors, textures. Watch the squirrels and birds.     Service to others  Be of service to others.  There is always someone else in need.  By helping others we can  get out of ourselves  and remember the really important things.  This also provides opportunities for practicing all the other parts of the program.    Humor  Laugh and be silly!  Adjust your TV habits for less news and crime-drama and more comedy.    Control your stress  Try breathing deep, massage therapy, biofeedback, and meditation. Find time to relax each day.     My support system    Clinic Contact:  Phone number:    Contact 1:  Phone number:    Contact 2:  Phone number:    Gnosticist/:  Phone number:    Therapist:  Phone number:    Local crisis center:    Phone number:    Other community support:  Phone number:

## 2019-02-12 NOTE — NURSING NOTE
"Chief Complaint   Patient presents with     Fever       Initial /60 (BP Location: Right arm, Patient Position: Sitting, Cuff Size: Adult Regular)   Pulse 80   Temp 98.1  F (36.7  C) (Tympanic)   Resp 18   Wt 92.5 kg (204 lb)   SpO2 98%   BMI 28.45 kg/m   Estimated body mass index is 28.45 kg/m  as calculated from the following:    Height as of 12/24/18: 1.803 m (5' 11\").    Weight as of this encounter: 92.5 kg (204 lb).  Medication Reconciliation: complete    Charlotte Whitt LPN  "
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
## 2019-02-13 ASSESSMENT — ANXIETY QUESTIONNAIRES: GAD7 TOTAL SCORE: 9

## 2019-02-14 DIAGNOSIS — M94.0 COSTOCHONDRITIS: ICD-10-CM

## 2019-02-14 LAB — CRP SERPL-MCNC: 5.4 MG/L (ref 0–8)

## 2019-02-14 PROCEDURE — 86140 C-REACTIVE PROTEIN: CPT | Mod: ZL | Performed by: FAMILY MEDICINE

## 2019-02-14 PROCEDURE — 36415 COLL VENOUS BLD VENIPUNCTURE: CPT | Mod: ZL | Performed by: FAMILY MEDICINE

## 2019-02-20 DIAGNOSIS — Z30.09 FAMILY PLANNING: Primary | ICD-10-CM

## 2019-02-20 RX ORDER — NORETHINDRONE AND ETHINYL ESTRADIOL 1 MG-35MCG
KIT ORAL
Qty: 84 TABLET | Refills: 0 | Status: SHIPPED | OUTPATIENT
Start: 2019-02-20 | End: 2019-05-03

## 2019-02-20 NOTE — TELEPHONE ENCOUNTER
norethindrone-ethinyl estradiol (ORTHO-NOVUM 1-35 TAB,NORTREL 1-35 TAB) 1-35 MG-MCG per tablet      Last Written Prescription Date:  historical  Last Fill Quantity: -,   # refills: -  Last Office Visit: 8/24/17  Future Office visit:    Next 5 appointments (look out 90 days)    Mar 12, 2019 11:00 AM CDT  (Arrive by 10:45 AM)  [facility] [address] [place] [id number]  [phone]           Routing refill request to provider for review/approval because:  Medication is reported/historical    Pt last saw OB on 8/24/17. Please advise. Thank you!

## 2019-03-06 ENCOUNTER — TELEPHONE (OUTPATIENT)
Dept: FAMILY MEDICINE | Facility: OTHER | Age: 29
End: 2019-03-06

## 2019-03-06 NOTE — TELEPHONE ENCOUNTER
"1:48 PM    Reason for Call: Phone Call    Description:     Was an appointment offered for this call? {YES / NO:217762::\"Yes\"}  If yes : Appointment type              Date    Preferred method for responding to this message: {Contact Preference:[id number]}  What is your phone number ?    If we cannot reach you directly, may we leave a detailed response at the number you provided? {YES / NO:380251::\"Yes\"}    Can this message wait until your PCP/provider returns, if available today? {YES CAPS/NO SMALL:828518::\"***\"}    Yari Trejo    "

## 2019-03-12 ENCOUNTER — OFFICE VISIT (OUTPATIENT)
Dept: PSYCHIATRY | Facility: OTHER | Age: 29
End: 2019-03-12
Attending: PSYCHIATRY & NEUROLOGY
Payer: COMMERCIAL

## 2019-03-12 VITALS
DIASTOLIC BLOOD PRESSURE: 70 MMHG | WEIGHT: 200 LBS | HEART RATE: 100 BPM | BODY MASS INDEX: 27.89 KG/M2 | SYSTOLIC BLOOD PRESSURE: 118 MMHG | TEMPERATURE: 98.9 F

## 2019-03-12 DIAGNOSIS — F90.2 ADHD (ATTENTION DEFICIT HYPERACTIVITY DISORDER), COMBINED TYPE: ICD-10-CM

## 2019-03-12 PROCEDURE — G0463 HOSPITAL OUTPT CLINIC VISIT: HCPCS

## 2019-03-12 PROCEDURE — 99214 OFFICE O/P EST MOD 30 MIN: CPT | Performed by: PSYCHIATRY & NEUROLOGY

## 2019-03-12 RX ORDER — LISDEXAMFETAMINE DIMESYLATE 30 MG/1
30 CAPSULE ORAL 2 TIMES DAILY
Qty: 60 CAPSULE | Refills: 0 | Status: SHIPPED | OUTPATIENT
Start: 2019-03-12 | End: 2019-06-12

## 2019-03-12 RX ORDER — LISDEXAMFETAMINE DIMESYLATE 30 MG/1
30 CAPSULE ORAL 2 TIMES DAILY
Qty: 60 CAPSULE | Refills: 0 | Status: SHIPPED | OUTPATIENT
Start: 2019-05-17 | End: 2019-06-12

## 2019-03-12 RX ORDER — LISDEXAMFETAMINE DIMESYLATE 30 MG/1
30 CAPSULE ORAL 2 TIMES DAILY
Qty: 60 CAPSULE | Refills: 0 | Status: SHIPPED | OUTPATIENT
Start: 2019-04-19 | End: 2019-06-12

## 2019-03-12 ASSESSMENT — ANXIETY QUESTIONNAIRES
6. BECOMING EASILY ANNOYED OR IRRITABLE: SEVERAL DAYS
GAD7 TOTAL SCORE: 7
IF YOU CHECKED OFF ANY PROBLEMS ON THIS QUESTIONNAIRE, HOW DIFFICULT HAVE THESE PROBLEMS MADE IT FOR YOU TO DO YOUR WORK, TAKE CARE OF THINGS AT HOME, OR GET ALONG WITH OTHER PEOPLE: VERY DIFFICULT
3. WORRYING TOO MUCH ABOUT DIFFERENT THINGS: SEVERAL DAYS
2. NOT BEING ABLE TO STOP OR CONTROL WORRYING: SEVERAL DAYS
1. FEELING NERVOUS, ANXIOUS, OR ON EDGE: SEVERAL DAYS
5. BEING SO RESTLESS THAT IT IS HARD TO SIT STILL: SEVERAL DAYS
7. FEELING AFRAID AS IF SOMETHING AWFUL MIGHT HAPPEN: SEVERAL DAYS

## 2019-03-12 ASSESSMENT — PATIENT HEALTH QUESTIONNAIRE - PHQ9
5. POOR APPETITE OR OVEREATING: SEVERAL DAYS
SUM OF ALL RESPONSES TO PHQ QUESTIONS 1-9: 9

## 2019-03-12 ASSESSMENT — PAIN SCALES - GENERAL: PAINLEVEL: MILD PAIN (2)

## 2019-03-12 NOTE — PROGRESS NOTES
PSYCHIATRY CLINIC PROGRESS NOTE   20 minute medication management, more than 50% of time spent counseling patient on medications, medication side effects, symptom history and management   SUBJECTIVE / INTERIM HISTORY                                                                       Last visit 12/12/18: continue Zoloft 100 mg daily and Vyvanse 30 mg twice daily last script was 11/30/18 and gave scripts today 12/28/18, 1/25/19 and 2/22/19  - Billy has bad skin issues and he had allergy testing and he has been very emotional  - been a rough winter. Family issues. Custody is 50/50. Ex is   - would like to go on a road trip   - would like to move to [place]  - not working with Yung Osullivan didn't feel like they connected  - current meds Zoloft & Vyvanse. I noted ? Increase Zoloft. She prefers not increase, leave as is.   - son Billy is 6  - parents are in [place]. They often come up for summer     Symptoms: feelings guilt, overwhelmed, depressed mood, low energy, anhedonia, sleep issues, poor attention / concentration. Passive SI, no intent or plan.  MEDICAL / SURGICAL HISTORY                pregnant [if applicable]--no     Patient Active Problem List   Diagnosis     Major depressive disorder, recurrent episode, moderate (H)     Lumbago     PTSD (post-traumatic stress disorder)     Migraine     Drug use disorder     Bilateral low back pain without sciatica     Bilateral low back pain with left-sided sciatica     Bipolar II disorder (H)     Fibromyalgia     Lump or mass in breast     Mastodynia     Encounter for gynecological examination without abnormal finding     Encounter for surveillance of contraceptives     ACP (advance care planning)     Ovulation pain     Family history of hemochromatosis     ALLERGY   Bee pollen  MEDICATIONS                                                                                             Current Outpatient Medications   Medication Sig     Acetaminophen (TYLENOL PO)  Take 1,000 mg by mouth every 4 hours as needed      ALAYCEN 1/35 1-35 MG-MCG tablet TAKE 1 TABLET BY MOUTH DAILY     augmented betamethasone dipropionate (DIPROLENE-AF) 0.05 % cream Apply sparingly to affected area  twice daily for 14 days.  Do not apply to face.     DiphenhydrAMINE HCl (BENADRYL PO) Take 75 mg by mouth nightly as needed     EPINEPHrine (EPIPEN 2-CONCHIS) 0.3 MG/0.3ML injection 2-pack Inject 0.3 mLs (0.3 mg) into the muscle as needed for anaphylaxis     ibuprofen (ADVIL/MOTRIN) 600 MG tablet Take 1 tablet (600 mg) by mouth every 8 hours as needed for moderate pain     lisdexamfetamine (VYVANSE) 30 MG capsule Take 1 capsule (30 mg) by mouth 2 times daily     MAGNESIUM OXIDE PO Take 500 mg by mouth daily      MELATONIN PO Take 5 mg by mouth At Bedtime      sertraline (ZOLOFT) 100 MG tablet Take 1 tablet (100 mg) by mouth daily     triamcinolone (KENALOG) 0.1 % paste Take by mouth 2 times daily     No current facility-administered medications for this visit.      Facility-Administered Medications Ordered in Other Visits   Medication     betamethasone acet & sod phos (CELESTONE) injection 12 mg     lidocaine 1 % injection 10 mL       VITALS   /70 (BP Location: Right arm, Patient Position: Sitting, Cuff Size: Adult Regular)   Pulse 100   Temp 98.9  F (37.2  C) (Tympanic)   Wt 90.7 kg (200 lb)   BMI 27.89 kg/m       LABS                                                                                                                           Results for orders placed or performed in visit on 02/14/19   CRP inflammation   Result Value Ref Range    CRP Inflammation 5.4 0.0 - 8.0 mg/L       MENTAL STATUS EXAM                                                                                        Alert. Oriented to person, place, and date / time. Well groomed, calm, cooperative with good eye contact. No problems with speech or psychomotor behavior. Mood was described depressed and affect was congruent to  speech content and full range - tearful. Thought process, including associations, was unremarkable and thought content was devoid homicidal ideation and psychotic thought. + SI with no plan or intent. No hallucinations. Insight was good. Judgment was intact and adequate for safety. Fund of knowledge was intact. Pt demonstrates no obvious problems with attention, concentration, language, recent or remote memory although these were not formally tested.       DIAGNOSES        Major depressive disorder, recurrent, moderate  ADHD  Borderline personality disorder    ASSESSMENT: Jannet San is a 27 yo with ADHD - was working with Jeff Woody, had testing. Depression, anxiety. For today we agreed on continuing meds as are. She is struggling with depression however feels doesn't warrant a med change. Was working with Yung Osullivan for while but didn't feel like they connect. I mentioned couple therapists I think she may work well with -- for now Jannet feels she is at point of needing a break from therapy.     PLAN                                                                                                                       1)  MEDICATIONS:    - continue Zoloft 100 mg daily and Vyvanse 30 mg twice daily last script 2/22/19 and gave scripts today 3/21/19, 4/19/19 and for 5/17/19    2)  THERAPY: no longer working with Jeff Woody.     3)  LABS:  none  4)  PT MONITOR [call for probs]: worsening symptoms , SI/HI  5)  REFERRALS [CD, medical, other]:  None  6)  RTC: ~3 months

## 2019-03-12 NOTE — NURSING NOTE
"Chief Complaint   Patient presents with     RECHECK     ADHD,        Initial /70 (BP Location: Right arm, Patient Position: Sitting, Cuff Size: Adult Regular)   Pulse 100   Temp 98.9  F (37.2  C) (Tympanic)   Wt 90.7 kg (200 lb)   BMI 27.89 kg/m   Estimated body mass index is 27.89 kg/m  as calculated from the following:    Height as of 12/24/18: 1.803 m (5' 11\").    Weight as of this encounter: 90.7 kg (200 lb).  Medication Reconciliation: complete    OLGA KITCHEN LPN    "

## 2019-03-13 ASSESSMENT — ANXIETY QUESTIONNAIRES: GAD7 TOTAL SCORE: 7

## 2019-03-25 ENCOUNTER — HOSPITAL ENCOUNTER (EMERGENCY)
Facility: HOSPITAL | Age: 29
Discharge: HOME | End: 2019-03-25
Attending: FAMILY MEDICINE | Admitting: FAMILY MEDICINE
Payer: COMMERCIAL

## 2019-03-25 VITALS
TEMPERATURE: 98.3 F | OXYGEN SATURATION: 98 % | SYSTOLIC BLOOD PRESSURE: 127 MMHG | DIASTOLIC BLOOD PRESSURE: 86 MMHG | RESPIRATION RATE: 16 BRPM

## 2019-03-25 DIAGNOSIS — G89.29 CHRONIC MIDLINE BACK PAIN, UNSPECIFIED BACK LOCATION: ICD-10-CM

## 2019-03-25 DIAGNOSIS — M54.50 CHRONIC BILATERAL LOW BACK PAIN WITHOUT SCIATICA: ICD-10-CM

## 2019-03-25 DIAGNOSIS — M54.9 CHRONIC MIDLINE BACK PAIN, UNSPECIFIED BACK LOCATION: ICD-10-CM

## 2019-03-25 DIAGNOSIS — G89.29 CHRONIC BILATERAL LOW BACK PAIN WITHOUT SCIATICA: ICD-10-CM

## 2019-03-25 PROCEDURE — 99282 EMERGENCY DEPT VISIT SF MDM: CPT | Mod: Z6 | Performed by: FAMILY MEDICINE

## 2019-03-25 PROCEDURE — 99282 EMERGENCY DEPT VISIT SF MDM: CPT

## 2019-03-25 RX ORDER — CYCLOBENZAPRINE HCL 10 MG
10 TABLET ORAL 3 TIMES DAILY PRN
Qty: 12 TABLET | Refills: 0 | Status: SHIPPED | OUTPATIENT
Start: 2019-03-25 | End: 2019-06-12

## 2019-03-25 NOTE — ED NOTES
"\"Here for lower back pain since yesterday, no known injury.  Wants a muscle relaxer.  Also having right lower abdominal pain since yesterday.  I have had this type of abdominal pain a lot in my life.  Has a history of ovarian cysts. \"   "

## 2019-03-25 NOTE — ED AVS SNAPSHOT
[facility]  [address] [address]  Phone:  [phone]                                    Jannet San   MRN: [id number]    Department:  [facility]   Date of Visit:  3/25/2019           After Visit Summary Signature Page    I have received my discharge instructions, and my questions have been answered. I have discussed any challenges I see with this plan with the nurse or doctor.    ..........................................................................................................................................  Patient/Patient Representative Signature      ..........................................................................................................................................  Patient Representative Print Name and Relationship to Patient    ..................................................               ................................................  Date                                   Time    ..........................................................................................................................................  Reviewed by Signature/Title    ...................................................              ..............................................  Date                                               Time          22EPIC Rev 08/18
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
## 2019-05-03 ENCOUNTER — HEALTH MAINTENANCE LETTER (OUTPATIENT)
Age: 29
End: 2019-05-03

## 2019-05-03 DIAGNOSIS — Z30.09 FAMILY PLANNING: ICD-10-CM

## 2019-05-03 RX ORDER — NORETHINDRONE AND ETHINYL ESTRADIOL 1 MG-35MCG
KIT ORAL
Qty: 84 TABLET | Refills: 0 | Status: SHIPPED | OUTPATIENT
Start: 2019-05-03 | End: 2019-08-07

## 2019-05-03 NOTE — TELEPHONE ENCOUNTER
BRET 1/35 1-35 MG-MCG tablet      Last Written Prescription Date:  2-10-19  Last Fill Quantity: 84,   # refills: 0  Last Office Visit: 4-19-17    OB out. Protocol failed due to    Recent (12 mo) or future (30 days) visit within the authorizing provider's specialty     Please advise. Thank you.

## 2019-06-07 ENCOUNTER — HOSPITAL ENCOUNTER (EMERGENCY)
Facility: HOSPITAL | Age: 29
Discharge: HOME | End: 2019-06-07
Attending: FAMILY MEDICINE | Admitting: FAMILY MEDICINE
Payer: COMMERCIAL

## 2019-06-07 ENCOUNTER — APPOINTMENT (OUTPATIENT)
Dept: GENERAL RADIOLOGY | Facility: HOSPITAL | Age: 29
End: 2019-06-07
Attending: INTERNAL MEDICINE
Payer: COMMERCIAL

## 2019-06-07 VITALS
OXYGEN SATURATION: 99 % | BODY MASS INDEX: 26.5 KG/M2 | SYSTOLIC BLOOD PRESSURE: 137 MMHG | RESPIRATION RATE: 18 BRPM | DIASTOLIC BLOOD PRESSURE: 74 MMHG | TEMPERATURE: 99.1 F | WEIGHT: 190 LBS

## 2019-06-07 DIAGNOSIS — R50.9 FEBRILE ILLNESS: ICD-10-CM

## 2019-06-07 LAB
ALBUMIN UR-MCNC: NEGATIVE MG/DL
ANION GAP SERPL CALCULATED.3IONS-SCNC: 7 MMOL/L (ref 3–14)
APPEARANCE UR: CLEAR
BACTERIA #/AREA URNS HPF: ABNORMAL /HPF
BASOPHILS # BLD AUTO: 0 10E9/L (ref 0–0.2)
BASOPHILS NFR BLD AUTO: 0.3 %
BILIRUB UR QL STRIP: NEGATIVE
BUN SERPL-MCNC: 11 MG/DL (ref 7–30)
CALCIUM SERPL-MCNC: 8.9 MG/DL (ref 8.5–10.1)
CHLORIDE SERPL-SCNC: 106 MMOL/L (ref 94–109)
CO2 SERPL-SCNC: 25 MMOL/L (ref 20–32)
COLOR UR AUTO: ABNORMAL
CREAT SERPL-MCNC: 0.76 MG/DL (ref 0.52–1.04)
CRP SERPL-MCNC: 39.7 MG/L (ref 0–8)
DEPRECATED S PYO AG THROAT QL EIA: NORMAL
DIFFERENTIAL METHOD BLD: ABNORMAL
EOSINOPHIL # BLD AUTO: 0 10E9/L (ref 0–0.7)
EOSINOPHIL NFR BLD AUTO: 0.1 %
ERYTHROCYTE [DISTWIDTH] IN BLOOD BY AUTOMATED COUNT: 13 % (ref 10–15)
GFR SERPL CREATININE-BSD FRML MDRD: >90 ML/MIN/{1.73_M2}
GLUCOSE SERPL-MCNC: 92 MG/DL (ref 70–99)
GLUCOSE UR STRIP-MCNC: NEGATIVE MG/DL
HCT VFR BLD AUTO: 37.5 % (ref 35–47)
HGB BLD-MCNC: 13 G/DL (ref 11.7–15.7)
HGB UR QL STRIP: NEGATIVE
IMM GRANULOCYTES # BLD: 0.1 10E9/L (ref 0–0.4)
IMM GRANULOCYTES NFR BLD: 0.4 %
KETONES UR STRIP-MCNC: NEGATIVE MG/DL
LACTATE BLD-SCNC: 0.6 MMOL/L (ref 0.7–2)
LEUKOCYTE ESTERASE UR QL STRIP: ABNORMAL
LYMPHOCYTES # BLD AUTO: 1.3 10E9/L (ref 0.8–5.3)
LYMPHOCYTES NFR BLD AUTO: 9 %
MCH RBC QN AUTO: 29.1 PG (ref 26.5–33)
MCHC RBC AUTO-ENTMCNC: 34.7 G/DL (ref 31.5–36.5)
MCV RBC AUTO: 84 FL (ref 78–100)
MONOCYTES # BLD AUTO: 0.7 10E9/L (ref 0–1.3)
MONOCYTES NFR BLD AUTO: 4.7 %
NEUTROPHILS # BLD AUTO: 12.2 10E9/L (ref 1.6–8.3)
NEUTROPHILS NFR BLD AUTO: 85.5 %
NITRATE UR QL: NEGATIVE
NRBC # BLD AUTO: 0 10*3/UL
NRBC BLD AUTO-RTO: 0 /100
PH UR STRIP: 7 PH (ref 4.7–8)
PLATELET # BLD AUTO: 233 10E9/L (ref 150–450)
POTASSIUM SERPL-SCNC: 3.9 MMOL/L (ref 3.4–5.3)
RBC # BLD AUTO: 4.47 10E12/L (ref 3.8–5.2)
RBC #/AREA URNS AUTO: 0 /HPF (ref 0–2)
SODIUM SERPL-SCNC: 138 MMOL/L (ref 133–144)
SOURCE: ABNORMAL
SP GR UR STRIP: 1 (ref 1–1.03)
SPECIMEN SOURCE: NORMAL
UROBILINOGEN UR STRIP-MCNC: NORMAL MG/DL (ref 0–2)
WBC # BLD AUTO: 14.3 10E9/L (ref 4–11)
WBC #/AREA URNS AUTO: 2 /HPF (ref 0–5)

## 2019-06-07 PROCEDURE — 87880 STREP A ASSAY W/OPTIC: CPT | Performed by: FAMILY MEDICINE

## 2019-06-07 PROCEDURE — 36415 COLL VENOUS BLD VENIPUNCTURE: CPT | Performed by: FAMILY MEDICINE

## 2019-06-07 PROCEDURE — 25000128 H RX IP 250 OP 636: Performed by: FAMILY MEDICINE

## 2019-06-07 PROCEDURE — 81001 URINALYSIS AUTO W/SCOPE: CPT | Performed by: FAMILY MEDICINE

## 2019-06-07 PROCEDURE — 99284 EMERGENCY DEPT VISIT MOD MDM: CPT | Mod: 25

## 2019-06-07 PROCEDURE — 83605 ASSAY OF LACTIC ACID: CPT | Performed by: INTERNAL MEDICINE

## 2019-06-07 PROCEDURE — 87081 CULTURE SCREEN ONLY: CPT | Performed by: FAMILY MEDICINE

## 2019-06-07 PROCEDURE — 80048 BASIC METABOLIC PNL TOTAL CA: CPT | Performed by: FAMILY MEDICINE

## 2019-06-07 PROCEDURE — 85025 COMPLETE CBC W/AUTO DIFF WBC: CPT | Performed by: FAMILY MEDICINE

## 2019-06-07 PROCEDURE — 96360 HYDRATION IV INFUSION INIT: CPT

## 2019-06-07 PROCEDURE — 25800030 ZZH RX IP 258 OP 636: Performed by: FAMILY MEDICINE

## 2019-06-07 PROCEDURE — 99284 EMERGENCY DEPT VISIT MOD MDM: CPT | Mod: Z6 | Performed by: FAMILY MEDICINE

## 2019-06-07 PROCEDURE — 96361 HYDRATE IV INFUSION ADD-ON: CPT

## 2019-06-07 PROCEDURE — 86140 C-REACTIVE PROTEIN: CPT | Performed by: FAMILY MEDICINE

## 2019-06-07 PROCEDURE — 71046 X-RAY EXAM CHEST 2 VIEWS: CPT | Mod: TC

## 2019-06-07 RX ORDER — SODIUM CHLORIDE 9 MG/ML
1000 INJECTION, SOLUTION INTRAVENOUS CONTINUOUS
Status: DISCONTINUED | OUTPATIENT
Start: 2019-06-07 | End: 2019-06-07 | Stop reason: HOSPADM

## 2019-06-07 RX ADMIN — SODIUM CHLORIDE 1000 ML: 9 INJECTION, SOLUTION INTRAVENOUS at 19:49

## 2019-06-07 RX ADMIN — SODIUM CHLORIDE 1000 ML: 9 INJECTION, SOLUTION INTRAVENOUS at 20:39

## 2019-06-07 ASSESSMENT — ENCOUNTER SYMPTOMS
PSYCHIATRIC NEGATIVE: 1
VOMITING: 0
LIGHT-HEADEDNESS: 0
SORE THROAT: 1
FEVER: 1
ACTIVITY CHANGE: 1
PALPITATIONS: 0
FLANK PAIN: 1
CONSTIPATION: 0
WEAKNESS: 1
DIARRHEA: 0
DYSURIA: 0
NAUSEA: 0
SHORTNESS OF BREATH: 1
BACK PAIN: 1
CHEST TIGHTNESS: 1
WHEEZING: 0
DIAPHORESIS: 0
ABDOMINAL PAIN: 0
FATIGUE: 1
COUGH: 0

## 2019-06-07 NOTE — ED TRIAGE NOTES
Patient arrives by self for evaluation of pharyngitis, fevers, and flank pain. Pharyngitis onset this morning. Reporting fevers up to 101 today. Rating pain 6/10 to throat. During triage also mentioned left flank pain, 3-4/10. Denies urinary changes.

## 2019-06-07 NOTE — ED AVS SNAPSHOT
[facility]  [address] [address]  Phone:  [phone]                                    Jannet San   MRN: [id number]    Department:  [facility]   Date of Visit:  6/7/2019           After Visit Summary Signature Page    I have received my discharge instructions, and my questions have been answered. I have discussed any challenges I see with this plan with the nurse or doctor.    ..........................................................................................................................................  Patient/Patient Representative Signature      ..........................................................................................................................................  Patient Representative Print Name and Relationship to Patient    ..................................................               ................................................  Date                                   Time    ..........................................................................................................................................  Reviewed by Signature/Title    ...................................................              ..............................................  Date                                               Time          22EPIC Rev 08/18
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
## 2019-06-09 LAB
BACTERIA SPEC CULT: NORMAL
SPECIMEN SOURCE: NORMAL

## 2019-06-12 ENCOUNTER — HOSPITAL ENCOUNTER (EMERGENCY)
Facility: HOSPITAL | Age: 29
Discharge: HOME | End: 2019-06-12
Attending: NURSE PRACTITIONER | Admitting: NURSE PRACTITIONER
Payer: COMMERCIAL

## 2019-06-12 ENCOUNTER — OFFICE VISIT (OUTPATIENT)
Dept: PSYCHIATRY | Facility: OTHER | Age: 29
End: 2019-06-12
Attending: PSYCHIATRY & NEUROLOGY
Payer: COMMERCIAL

## 2019-06-12 VITALS
HEART RATE: 98 BPM | BODY MASS INDEX: 26.5 KG/M2 | TEMPERATURE: 98.9 F | DIASTOLIC BLOOD PRESSURE: 80 MMHG | WEIGHT: 190 LBS | SYSTOLIC BLOOD PRESSURE: 136 MMHG

## 2019-06-12 VITALS
SYSTOLIC BLOOD PRESSURE: 156 MMHG | DIASTOLIC BLOOD PRESSURE: 103 MMHG | OXYGEN SATURATION: 100 % | TEMPERATURE: 98.9 F | RESPIRATION RATE: 14 BRPM

## 2019-06-12 DIAGNOSIS — Z79.899 ENCOUNTER FOR LONG-TERM (CURRENT) USE OF MEDICATIONS: Primary | ICD-10-CM

## 2019-06-12 DIAGNOSIS — F31.81 BIPOLAR 2 DISORDER (H): ICD-10-CM

## 2019-06-12 DIAGNOSIS — J02.9 VIRAL PHARYNGITIS: ICD-10-CM

## 2019-06-12 DIAGNOSIS — F90.2 ADHD (ATTENTION DEFICIT HYPERACTIVITY DISORDER), COMBINED TYPE: ICD-10-CM

## 2019-06-12 LAB
DEPRECATED S PYO AG THROAT QL EIA: NORMAL
SPECIMEN SOURCE: NORMAL

## 2019-06-12 PROCEDURE — 87081 CULTURE SCREEN ONLY: CPT | Performed by: NURSE PRACTITIONER

## 2019-06-12 PROCEDURE — 99214 OFFICE O/P EST MOD 30 MIN: CPT | Performed by: PSYCHIATRY & NEUROLOGY

## 2019-06-12 PROCEDURE — 99213 OFFICE O/P EST LOW 20 MIN: CPT | Performed by: NURSE PRACTITIONER

## 2019-06-12 PROCEDURE — 87880 STREP A ASSAY W/OPTIC: CPT | Performed by: NURSE PRACTITIONER

## 2019-06-12 PROCEDURE — 99000 SPECIMEN HANDLING OFFICE-LAB: CPT | Performed by: PSYCHIATRY & NEUROLOGY

## 2019-06-12 PROCEDURE — 80307 DRUG TEST PRSMV CHEM ANLYZR: CPT | Mod: ZL | Performed by: PSYCHIATRY & NEUROLOGY

## 2019-06-12 PROCEDURE — G0463 HOSPITAL OUTPT CLINIC VISIT: HCPCS

## 2019-06-12 RX ORDER — SERTRALINE HYDROCHLORIDE 100 MG/1
150 TABLET, FILM COATED ORAL DAILY
Qty: 45 TABLET | Refills: 6 | Status: SHIPPED | OUTPATIENT
Start: 2019-06-12 | End: 2019-12-18

## 2019-06-12 RX ORDER — AMOXICILLIN 500 MG/1
500 CAPSULE ORAL 2 TIMES DAILY
Qty: 20 CAPSULE | Refills: 0 | Status: SHIPPED | OUTPATIENT
Start: 2019-06-12 | End: 2019-06-15

## 2019-06-12 RX ORDER — LISDEXAMFETAMINE DIMESYLATE 30 MG/1
30 CAPSULE ORAL 2 TIMES DAILY
Qty: 60 CAPSULE | Refills: 0 | Status: SHIPPED | OUTPATIENT
Start: 2019-06-12 | End: 2019-09-09

## 2019-06-12 RX ORDER — LISDEXAMFETAMINE DIMESYLATE 30 MG/1
30 CAPSULE ORAL 2 TIMES DAILY
Qty: 60 CAPSULE | Refills: 0 | Status: SHIPPED | OUTPATIENT
Start: 2019-07-11 | End: 2019-09-09

## 2019-06-12 ASSESSMENT — ENCOUNTER SYMPTOMS
FEVER: 1
TROUBLE SWALLOWING: 1
ACTIVITY CHANGE: 1
EYE REDNESS: 1
SORE THROAT: 1
SHORTNESS OF BREATH: 1
HEADACHES: 1
SINUS PAIN: 1
COUGH: 1
MUSCULOSKELETAL NEGATIVE: 1
DIARRHEA: 1
CHILLS: 1
SINUS PRESSURE: 1

## 2019-06-12 ASSESSMENT — ANXIETY QUESTIONNAIRES
2. NOT BEING ABLE TO STOP OR CONTROL WORRYING: SEVERAL DAYS
5. BEING SO RESTLESS THAT IT IS HARD TO SIT STILL: SEVERAL DAYS
GAD7 TOTAL SCORE: 8
IF YOU CHECKED OFF ANY PROBLEMS ON THIS QUESTIONNAIRE, HOW DIFFICULT HAVE THESE PROBLEMS MADE IT FOR YOU TO DO YOUR WORK, TAKE CARE OF THINGS AT HOME, OR GET ALONG WITH OTHER PEOPLE: VERY DIFFICULT
3. WORRYING TOO MUCH ABOUT DIFFERENT THINGS: SEVERAL DAYS
1. FEELING NERVOUS, ANXIOUS, OR ON EDGE: SEVERAL DAYS
6. BECOMING EASILY ANNOYED OR IRRITABLE: SEVERAL DAYS
7. FEELING AFRAID AS IF SOMETHING AWFUL MIGHT HAPPEN: SEVERAL DAYS

## 2019-06-12 ASSESSMENT — PATIENT HEALTH QUESTIONNAIRE - PHQ9
SUM OF ALL RESPONSES TO PHQ QUESTIONS 1-9: 10
5. POOR APPETITE OR OVEREATING: MORE THAN HALF THE DAYS

## 2019-06-12 ASSESSMENT — PAIN SCALES - GENERAL: PAINLEVEL: MILD PAIN (2)

## 2019-06-12 NOTE — DISCHARGE INSTRUCTIONS
Increase oral intake, vaporizer as needed, rest, avoid sharing utensils, practice good hand washing techniques, cover mouth when you cough and sneeze. Throw your toothbrush away 24 hours after starting on antibiotics. Over the counter medications such as ibuprofen and/or acetaminophen for fever and generalized aches and pains. Robitussin DM for cough. Chest rubs such as Gallo's or Mentholatum may help sore throat symptoms.Chloraseptic spray for sore throat. Be reevaluated if symptoms persist longer than 10 - 14 days or worsen and if there is no improvement or worsening of symptoms in 24 to 48 hours. If started on antibiotics, complete all antibiotics as ordered, even if you are feeling better. Taking antibiotics with food may decrease the stomach upset that can occur when taking antibiotics. Antibiotics frequently cause diarrhea. Probiotics or yogurt may help prevent or decrease these symptoms.

## 2019-06-12 NOTE — LETTER
[facility]  06/12/19    Patient: Jannet San  YOB: 1990  Medical Record Number: [id number]  CSN: [id number]                                                                              Non-opioid Controlled Substance Agreement    I understand that my care provider has prescribed a controlled substance to help manage my condition(s). I am taking this medicine to help me function or work. I know this is strong medicine, and that it can cause serious side effects. Controlled substances can be sedating, addicting and may cause a dependency on the drug. They can affect my ability to drive or think, and cause depression. They need to be taken exactly as prescribed. Combining controlled substances with certain medicines or chemicals (such as cocaine, sedatives and tranquilizers, sleeping pills, meth) can be dangerous or even fatal. Also, if I stop controlled substances suddenly, I may have severe withdrawal symptoms.  If not helpful, I may be asked to stop them.    The risks, benefits, and side effects of these medicine(s) were explained to me. I agree that:    1. I will take part in other treatments as advised by my care team. This may be psychiatry or counseling, physical therapy, behavioral therapy, group treatment or a referral to a pain clinic. I will reduce or stop my medicine when my care team tells me to do so.  2. I will take my medicines as prescribed. I will not change the dose or schedule unless my care team tells me to. There will be no refills if I  run out early.   I may be contactedwithout warning and asked to complete a urine drug test or pill count at any time.   3. I will keep all my appointments, and understand this is part of the monitoring of controlled substances. My care team may require an office visit for EVERY controlled substance refill. If I miss appointments or don t follow instructions, my care team may stop my medicine.  4. I will not ask other providers to  prescribe controlled substances, and I will not accept controlled substances from other people. If I need another prescribed controlled substance for a new reason, I will tell my care team within 1 business day.  5. I will use one pharmacy to fill all of my controlled substance prescriptions, and it is up to me to make sure that I do not run out of my medicines on weekends or holidays. If my care team is willing to refill my controlled substance prescription without a visit, I must request refills only during office hours, refills may take up to 3 days to process, and it may take up to 5 to 7 days for my medicine to be mailed and ready at my pharmacy. Prescriptions will not be mailed anywhere except my pharmacy.    6. I am responsible for my prescriptions. If the medicine/prescription is lost or stolen, it will not be replaced. I also agree not to share controlled substance medicines with anyone.              [facility]  06/12/19  Patient:  Jannet San  YOB: 1990  Medical Record Number: [id number]  CSN: [id number]    7. I agree to not use ANY illegal or recreational drugs. This includes marijuana, cocaine, bath salts or other drugs. I agree not to use alcohol unless my care team says I may. I agree to give urine samples whenever asked. If I don t give a urine sample, the care team may stop my medicine.    8. If I enroll in the [place] Medical Marijuana program, I will tell my care team. I will also sign an agreement to share my medical records with my care team.    9. I will bring in my list of medicines (or my medicine bottles) each time I come to the clinic.   10. I will tell my care team right away if I become pregnant or have a new medical problem treated outside of my regular clinic.  11. I understand that this medicine can affect my thinking and judgment. It may be unsafe for me to drive, use machinery and do dangerous tasks. I will not do any of these things until I know how the  medicine affects me. If my dose changes, I will wait to see how it affects me. I will contact my care team if I have concerns about medicine side effects.    I understand that if I do not follow any of the conditions above, my prescriptions or treatment may be stopped.      I agree that my provider, clinic care team, and pharmacy may work with any city, state or federal law enforcement agency that investigates the misuse, sale, or other diversion of my controlled medicine. I will allow my provider to discuss my care with or share a copy of this agreement with any other treating provider, pharmacy or emergency room where I receive care. I agree to give up (waive) any right of privacy or confidentiality with respect to these consents.   I have read this agreement and have asked questions about anything I did not understand.    ____________________________________________________    ____________  ________  Patient signature                                                         Date      Time    ____________________________________________________     ____________  ________  Witness                                                          Date      Time    ____________________________________________________  Provider signature
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
## 2019-06-12 NOTE — PROGRESS NOTES
PSYCHIATRY CLINIC PROGRESS NOTE   20 minute medication management, more than 50% of time spent counseling patient on medications, medication side effects, symptom history and management   SUBJECTIVE / INTERIM HISTORY                                                                       Last visit 12/12/18: continue Zoloft 100 mg daily and Vyvanse 30 mg twice daily last script was 11/30/18 and gave scripts today 12/28/18, 1/25/19 and 2/22/19  - Billy has been very emotional and pushing boundaries  - mom said some really inappropriate things while they were in [place]  - feels really alone  - down even on her hobbies: disc golf and getting outside   - would like to move to [place]  - current meds Zoloft & Vyvanse. I noted ? Increase Zoloft. She prefers not increase, leave as is.   - son Billy is 6  - parents are in [place]. They often come up for summer     Symptoms: feelings guilt, overwhelmed, depressed mood, low energy, anhedonia, sleep issues, poor attention / concentration. Passive SI, no intent or plan.  MEDICAL / SURGICAL HISTORY                pregnant [if applicable]--no     Patient Active Problem List   Diagnosis     Major depressive disorder, recurrent episode, moderate (H)     Lumbago     PTSD (post-traumatic stress disorder)     Migraine     Drug use disorder     Bilateral low back pain without sciatica     Bilateral low back pain with left-sided sciatica     Bipolar II disorder (H)     Fibromyalgia     Lump or mass in breast     Mastodynia     Encounter for gynecological examination without abnormal finding     Encounter for surveillance of contraceptives     ACP (advance care planning)     Ovulation pain     Family history of hemochromatosis     ALLERGY   Bee pollen  MEDICATIONS                                                                                             Current Outpatient Medications   Medication Sig     Acetaminophen (TYLENOL PO) Take 1,000 mg by mouth every 4 hours as needed      BRET  1/35 1-35 MG-MCG tablet TAKE 1 TABLET BY MOUTH DAILY     augmented betamethasone dipropionate (DIPROLENE-AF) 0.05 % cream Apply sparingly to affected area  twice daily for 14 days.  Do not apply to face.     DiphenhydrAMINE HCl (BENADRYL PO) Take 75 mg by mouth nightly as needed     EPINEPHrine (EPIPEN 2-CONCHIS) 0.3 MG/0.3ML injection 2-pack Inject 0.3 mLs (0.3 mg) into the muscle as needed for anaphylaxis     ibuprofen (ADVIL/MOTRIN) 600 MG tablet Take 1 tablet (600 mg) by mouth every 8 hours as needed for moderate pain     lisdexamfetamine (VYVANSE) 30 MG capsule Take 1 capsule (30 mg) by mouth 2 times daily     MAGNESIUM OXIDE PO Take 500 mg by mouth daily      MELATONIN PO Take 5 mg by mouth At Bedtime      sertraline (ZOLOFT) 100 MG tablet Take 1 tablet (100 mg) by mouth daily     triamcinolone (KENALOG) 0.1 % paste Take by mouth 2 times daily     No current facility-administered medications for this visit.      Facility-Administered Medications Ordered in Other Visits   Medication     betamethasone acet & sod phos (CELESTONE) injection 12 mg     lidocaine 1 % injection 10 mL       VITALS   /80 (BP Location: Right arm, Patient Position: Sitting, Cuff Size: Adult Regular)   Pulse 98   Temp 98.9  F (37.2  C) (Tympanic)   Wt 86.2 kg (190 lb)   BMI 26.50 kg/m       LABS                                                                                                                           Results for orders placed or performed during the hospital encounter of 06/07/19   XR Chest 2 Views    Narrative    PROCEDURE:  XR CHEST 2 VW    HISTORY:  fever.     COMPARISON:  December 24, 2018    FINDINGS:   The cardiac silhouette is normal in size. The pulmonary vasculature is  normal.  The lungs are clear. No pleural effusion or pneumothorax.      Impression    IMPRESSION:  No acute cardiopulmonary disease.      CHRIS PALOMARES MD   UA with Microscopic reflex to Culture   Result Value Ref Range    Color Urine Straw      Appearance Urine Clear     Glucose Urine Negative NEG^Negative mg/dL    Bilirubin Urine Negative NEG^Negative    Ketones Urine Negative NEG^Negative mg/dL    Specific Gravity Urine 1.003 1.003 - 1.035    Blood Urine Negative NEG^Negative    pH Urine 7.0 4.7 - 8.0 pH    Protein Albumin Urine Negative NEG^Negative mg/dL    Urobilinogen mg/dL Normal 0.0 - 2.0 mg/dL    Nitrite Urine Negative NEG^Negative    Leukocyte Esterase Urine Small (A) NEG^Negative    Source Midstream Urine     WBC Urine 2 0 - 5 /HPF    RBC Urine 0 0 - 2 /HPF    Bacteria Urine None (A) NEG^Negative /HPF   Basic metabolic panel   Result Value Ref Range    Sodium 138 133 - 144 mmol/L    Potassium 3.9 3.4 - 5.3 mmol/L    Chloride 106 94 - 109 mmol/L    Carbon Dioxide 25 20 - 32 mmol/L    Anion Gap 7 3 - 14 mmol/L    Glucose 92 70 - 99 mg/dL    Urea Nitrogen 11 7 - 30 mg/dL    Creatinine 0.76 0.52 - 1.04 mg/dL    GFR Estimate >90 >60 mL/min/[1.73_m2]    GFR Estimate If Black >90 >60 mL/min/[1.73_m2]    Calcium 8.9 8.5 - 10.1 mg/dL   CBC with platelets differential   Result Value Ref Range    WBC 14.3 (H) 4.0 - 11.0 10e9/L    RBC Count 4.47 3.8 - 5.2 10e12/L    Hemoglobin 13.0 11.7 - 15.7 g/dL    Hematocrit 37.5 35.0 - 47.0 %    MCV 84 78 - 100 fl    MCH 29.1 26.5 - 33.0 pg    MCHC 34.7 31.5 - 36.5 g/dL    RDW 13.0 10.0 - 15.0 %    Platelet Count 233 150 - 450 10e9/L    Diff Method Automated Method     % Neutrophils 85.5 %    % Lymphocytes 9.0 %    % Monocytes 4.7 %    % Eosinophils 0.1 %    % Basophils 0.3 %    % Immature Granulocytes 0.4 %    Nucleated RBCs 0 0 /100    Absolute Neutrophil 12.2 (H) 1.6 - 8.3 10e9/L    Absolute Lymphocytes 1.3 0.8 - 5.3 10e9/L    Absolute Monocytes 0.7 0.0 - 1.3 10e9/L    Absolute Eosinophils 0.0 0.0 - 0.7 10e9/L    Absolute Basophils 0.0 0.0 - 0.2 10e9/L    Abs Immature Granulocytes 0.1 0 - 0.4 10e9/L    Absolute Nucleated RBC 0.0    CRP inflammation   Result Value Ref Range    CRP Inflammation 39.7 (H) 0.0 - 8.0  mg/L   Lactic acid whole blood   Result Value Ref Range    Lactic Acid 0.6 (L) 0.7 - 2.0 mmol/L   Rapid strep screen   Result Value Ref Range    Specimen Description Throat     Rapid Strep A Screen       NEGATIVE: No Group A streptococcal antigen detected by immunoassay, await culture report.   Beta strep group A culture   Result Value Ref Range    Specimen Description Throat     Culture Micro No beta hemolytic Streptococcus Group A isolated        MENTAL STATUS EXAM                                                                                        Alert. Oriented to person, place, and date / time. Well groomed, calm, cooperative with good eye contact. No problems with speech or psychomotor behavior. Mood was described depressed and affect was congruent to speech content and full range - tearful. Thought process, including associations, was unremarkable and thought content was devoid homicidal ideation and psychotic thought. + SI with no plan or intent. No hallucinations. Insight was good. Judgment was intact and adequate for safety. Fund of knowledge was intact. Pt demonstrates no obvious problems with attention, concentration, language, recent or remote memory although these were not formally tested.       DIAGNOSES        Major depressive disorder, recurrent, moderate  ADHD  Borderline personality disorder    ASSESSMENT: Jannet San is a 27 yo with ADHD - was working with [facility], had testing. Depression, anxiety. For today we agreed on continuing meds as are. She is struggling with depression. We both feel warrants increase of Zoloft. We reviwed controlled substance contract and reviewed and signed. She will submit UDS as part of this. I also mentioned group Shi here is doing I'm going to look into that I think she may benefit from.. She is open to this idea.     PLAN                                                                                                                       1)  MEDICATIONS:     Last script 5/17/19 and gave scripts today 6/12 and 7/11. Increase Zoloft from 100 to 150 mg daily.    2)  THERAPY: no longer working with [facility]y.     3)  LABS: UDS  4)  PT MONITOR [call for probs]: worsening symptoms , SI/[facility]  5)  REFERRALS [CD, medical, other]:  None  6)  RTC: ~2 months

## 2019-06-12 NOTE — NURSING NOTE
"Chief Complaint   Patient presents with     RECHECK     Mental health.  Refill Vyvanse       Initial /80 (BP Location: Right arm, Patient Position: Sitting, Cuff Size: Adult Regular)   Pulse 98   Temp 98.9  F (37.2  C) (Tympanic)   Wt 86.2 kg (190 lb)   BMI 26.50 kg/m   Estimated body mass index is 26.5 kg/m  as calculated from the following:    Height as of 12/24/18: 1.803 m (5' 11\").    Weight as of this encounter: 86.2 kg (190 lb).  Medication Reconciliation: complete    OLGA KITCHEN LPN    "

## 2019-06-12 NOTE — ED AVS SNAPSHOT
[facility]  [address] [address]  Phone:  [phone]                                    Jannet San   MRN: [id number]    Department:  [facility]   Date of Visit:  6/12/2019           After Visit Summary Signature Page    I have received my discharge instructions, and my questions have been answered. I have discussed any challenges I see with this plan with the nurse or doctor.    ..........................................................................................................................................  Patient/Patient Representative Signature      ..........................................................................................................................................  Patient Representative Print Name and Relationship to Patient    ..................................................               ................................................  Date                                   Time    ..........................................................................................................................................  Reviewed by Signature/Title    ...................................................              ..............................................  Date                                               Time          22EPIC Rev 08/18

## 2019-06-12 NOTE — ED TRIAGE NOTES
Pt presents with sinus pain and pressure to her head and ears for 5 days. Pt also has been coughing up green sputum. Took Tylenol aqt 0900 today.

## 2019-06-13 ASSESSMENT — ENCOUNTER SYMPTOMS
VOMITING: 0
NAUSEA: 0

## 2019-06-13 ASSESSMENT — ANXIETY QUESTIONNAIRES: GAD7 TOTAL SCORE: 8

## 2019-06-14 LAB
BACTERIA SPEC CULT: NORMAL
SPECIMEN SOURCE: NORMAL

## 2019-06-15 ENCOUNTER — HOSPITAL ENCOUNTER (EMERGENCY)
Facility: HOSPITAL | Age: 29
Discharge: HOME | End: 2019-06-15
Attending: PHYSICIAN ASSISTANT | Admitting: PHYSICIAN ASSISTANT
Payer: COMMERCIAL

## 2019-06-15 VITALS
DIASTOLIC BLOOD PRESSURE: 86 MMHG | TEMPERATURE: 98.6 F | HEART RATE: 87 BPM | RESPIRATION RATE: 14 BRPM | OXYGEN SATURATION: 99 % | SYSTOLIC BLOOD PRESSURE: 156 MMHG

## 2019-06-15 DIAGNOSIS — J02.9 VIRAL PHARYNGITIS: Primary | ICD-10-CM

## 2019-06-15 LAB
DEPRECATED S PYO AG THROAT QL EIA: NORMAL
SPECIMEN SOURCE: NORMAL

## 2019-06-15 PROCEDURE — 99213 OFFICE O/P EST LOW 20 MIN: CPT | Mod: Z6 | Performed by: PHYSICIAN ASSISTANT

## 2019-06-15 PROCEDURE — G0463 HOSPITAL OUTPT CLINIC VISIT: HCPCS

## 2019-06-15 PROCEDURE — 87081 CULTURE SCREEN ONLY: CPT | Performed by: NURSE PRACTITIONER

## 2019-06-15 PROCEDURE — 87880 STREP A ASSAY W/OPTIC: CPT | Performed by: NURSE PRACTITIONER

## 2019-06-15 ASSESSMENT — ENCOUNTER SYMPTOMS
SINUS PAIN: 0
COUGH: 0
NAUSEA: 0
SHORTNESS OF BREATH: 0
SORE THROAT: 1
VOMITING: 0
RHINORRHEA: 0
DIARRHEA: 0
FEVER: 0
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
## 2019-06-16 NOTE — ED NOTES
Provider out of pts room. Pt yelling and swearing at provider. Upset that he would not order her medications for her sore throat. Pt requested to have nurse come in to room to discuss. Nursing supervisor down to speak with the pt. Pt requesting to have Rx that was ar [facility] called over to [facility] as she is unable to  because they are closed tomorrow. Will address with provider and call Rx in for pt if appropriate. Pt left department upset continuing to be angry at staff and provider.

## 2019-06-17 LAB
BACTERIA SPEC CULT: NORMAL
SPECIMEN SOURCE: NORMAL

## 2019-06-20 LAB — PAIN DRUG SCR UR W RPTD MEDS: NORMAL

## 2019-07-22 DIAGNOSIS — Z30.09 FAMILY PLANNING: ICD-10-CM

## 2019-07-22 NOTE — TELEPHONE ENCOUNTER
BRET 1/35 1-35 MG-MCG tablet      Last Written Prescription Date:  5/3/19  Last Fill Quantity: 84,   # refills: 0  Last Office Visit: 7/20/17  Future Office visit:    Next 5 appointments (look out 90 days)    Aug 26, 2019  3:00 PM CDT  (Arrive by 2:45 PM)  Return Visit with Nilsa Hawkins MD  [facility] ) [address] [place] [id number] [phone]           Routing refill request to provider for review/approval because:  Due for f/u with OB? Please advise. Thank you!

## 2019-07-23 RX ORDER — NORETHINDRONE AND ETHINYL ESTRADIOL 1 MG-35MCG
KIT ORAL
Qty: 84 TABLET | Refills: 0 | OUTPATIENT
Start: 2019-07-23

## 2019-07-23 NOTE — TELEPHONE ENCOUNTER
Patient set up her annual exam on 8/7/2019. Hoping to be able to get one months worth of meds to get her through until her appointment.

## 2019-08-07 ENCOUNTER — OFFICE VISIT (OUTPATIENT)
Dept: OBGYN | Facility: OTHER | Age: 29
End: 2019-08-07
Attending: NURSE PRACTITIONER
Payer: COMMERCIAL

## 2019-08-07 VITALS
BODY MASS INDEX: 26.5 KG/M2 | WEIGHT: 190 LBS | OXYGEN SATURATION: 99 % | DIASTOLIC BLOOD PRESSURE: 78 MMHG | HEART RATE: 90 BPM | SYSTOLIC BLOOD PRESSURE: 134 MMHG

## 2019-08-07 DIAGNOSIS — Z12.4 SCREENING FOR CERVICAL CANCER: Primary | ICD-10-CM

## 2019-08-07 DIAGNOSIS — Z01.419 ENCOUNTER FOR GYNECOLOGICAL EXAMINATION WITHOUT ABNORMAL FINDING: ICD-10-CM

## 2019-08-07 DIAGNOSIS — Z30.09 FAMILY PLANNING: ICD-10-CM

## 2019-08-07 DIAGNOSIS — Z11.3 SCREEN FOR STD (SEXUALLY TRANSMITTED DISEASE): ICD-10-CM

## 2019-08-07 LAB
SPECIMEN SOURCE: NORMAL
WET PREP SPEC: NORMAL

## 2019-08-07 PROCEDURE — G0123 SCREEN CERV/VAG THIN LAYER: HCPCS | Mod: ZL | Performed by: NURSE PRACTITIONER

## 2019-08-07 PROCEDURE — 87591 N.GONORRHOEAE DNA AMP PROB: CPT | Mod: ZL | Performed by: NURSE PRACTITIONER

## 2019-08-07 PROCEDURE — 36415 COLL VENOUS BLD VENIPUNCTURE: CPT | Mod: ZL | Performed by: NURSE PRACTITIONER

## 2019-08-07 PROCEDURE — 99000 SPECIMEN HANDLING OFFICE-LAB: CPT | Performed by: NURSE PRACTITIONER

## 2019-08-07 PROCEDURE — 87491 CHLMYD TRACH DNA AMP PROBE: CPT | Mod: ZL | Performed by: NURSE PRACTITIONER

## 2019-08-07 PROCEDURE — 99395 PREV VISIT EST AGE 18-39: CPT | Performed by: NURSE PRACTITIONER

## 2019-08-07 PROCEDURE — 87210 SMEAR WET MOUNT SALINE/INK: CPT | Mod: ZL | Performed by: NURSE PRACTITIONER

## 2019-08-07 PROCEDURE — 88142 CYTOPATH C/V THIN LAYER: CPT | Performed by: NURSE PRACTITIONER

## 2019-08-07 PROCEDURE — 86780 TREPONEMA PALLIDUM: CPT | Mod: ZL | Performed by: NURSE PRACTITIONER

## 2019-08-07 PROCEDURE — 86803 HEPATITIS C AB TEST: CPT | Mod: ZL | Performed by: NURSE PRACTITIONER

## 2019-08-07 PROCEDURE — 87389 HIV-1 AG W/HIV-1&-2 AB AG IA: CPT | Mod: ZL | Performed by: NURSE PRACTITIONER

## 2019-08-07 ASSESSMENT — PAIN SCALES - GENERAL: PAINLEVEL: NO PAIN (0)

## 2019-08-07 NOTE — PROGRESS NOTES
"Chief Complaint   Patient presents with     Gyn Exam       Initial /78 (BP Location: Right arm, Patient Position: Sitting, Cuff Size: Adult Regular)   Pulse 90   Wt 86.2 kg (190 lb)   SpO2 99%   BMI 26.50 kg/m   Estimated body mass index is 26.5 kg/m  as calculated from the following:    Height as of 12/24/18: 1.803 m (5' 11\").    Weight as of this encounter: 86.2 kg (190 lb).  Medication Reconciliation: complete     Melina Cummins      "

## 2019-08-07 NOTE — PROGRESS NOTES
CC: Annual Exam  HPI: Jannet is a 29 year old patient who is a P1, LMP: 7-12-19. Menstrual status: Alaycen.  Periods regular. Plan to continue with Alaycen as ordered. Nonsmoker.  Denies any other complaints or concerns.  Past GYN History  STD Testing Offered: Yes, Completed  Last PAP Smear: Completed 2-8-17.  Completed Pap and HPV today due to patient possibly moving to [place] and would like to get it done early then later if she moves.  Mammogram: NA    ROS:  Constitutional: negative for fever, chills, change in weight  Breasts: Negative for masses, tenderness, or discharge  : Normal Menstrual cycles    Exam  General: Pleasant female in no acute distress  Breasts: No nodularity, asymmetry or nipple discharge bilateral  ABD: Soft, nontender, nondistended, no hepatosplenomegaly.  Pelvic: EG: normal adult female. BUS: WNL. Vagina: Well rugated, no discharge. Cervix: No lesions or CMT. Uterus: Firm, normal sized, nontender. Adnexae: no masses or tenderness.  Rectovaginal: Deferred  Musculoskeletal: No gross deformities  Neurological: Normal strength, sensation, and mental status    Plan:   Continue Alaycen as ordered  Screening for cervical cancer-Follow up with results when they return  STD Screening  RTC 1 year or sooner with any questions or concerns.

## 2019-08-08 DIAGNOSIS — F90.2 ADHD (ATTENTION DEFICIT HYPERACTIVITY DISORDER), COMBINED TYPE: Primary | ICD-10-CM

## 2019-08-08 LAB
C TRACH DNA SPEC QL PROBE+SIG AMP: NOT DETECTED
N GONORRHOEA DNA SPEC QL PROBE+SIG AMP: NOT DETECTED
SPECIMEN SOURCE: NORMAL

## 2019-08-08 NOTE — TELEPHONE ENCOUNTER
Not on protocol, please advise.    lisdexamfetamine (VYVANSE) 30 MG capsule      Last Written Prescription Date:  7/11/19  Last Fill Quantity: 60,   # refills: 0  Last Office Visit: *2/12/19**  Future Office visit:    Next 5 appointments (look out 90 days)    Aug 26, 2019  3:00 PM CDT  (Arrive by 2:45 PM)  Return Visit with Nilsa Hawkins MD  [facility] ([facility] ) [address] [place] [id number] [phone]           Routing refill request to provider for review/approval because:  Drug not on the [facility], [facility] or [facility] refill protocol or controlled substance

## 2019-08-09 LAB
HCV AB SERPL QL IA: NONREACTIVE
HIV 1+2 AB+HIV1 P24 AG SERPL QL IA: NONREACTIVE
T PALLIDUM AB SER QL: NONREACTIVE

## 2019-08-09 RX ORDER — LISDEXAMFETAMINE DIMESYLATE 30 MG/1
CAPSULE ORAL
Qty: 60 CAPSULE | Refills: 0 | Status: SHIPPED | OUTPATIENT
Start: 2019-08-09 | End: 2019-09-09

## 2019-08-13 LAB
COPATH REPORT: NORMAL
PAP: NORMAL

## 2019-08-25 ENCOUNTER — TRANSFERRED RECORDS (OUTPATIENT)
Dept: HEALTH INFORMATION MANAGEMENT | Facility: CLINIC | Age: 29
End: 2019-08-25

## 2019-09-09 ENCOUNTER — OFFICE VISIT (OUTPATIENT)
Dept: PSYCHIATRY | Facility: OTHER | Age: 29
End: 2019-09-09
Attending: PSYCHIATRY & NEUROLOGY
Payer: COMMERCIAL

## 2019-09-09 VITALS
DIASTOLIC BLOOD PRESSURE: 70 MMHG | HEIGHT: 71 IN | TEMPERATURE: 99.1 F | HEART RATE: 74 BPM | BODY MASS INDEX: 28.28 KG/M2 | OXYGEN SATURATION: 99 % | WEIGHT: 202 LBS | SYSTOLIC BLOOD PRESSURE: 108 MMHG

## 2019-09-09 DIAGNOSIS — F90.2 ADHD (ATTENTION DEFICIT HYPERACTIVITY DISORDER), COMBINED TYPE: ICD-10-CM

## 2019-09-09 PROCEDURE — 99214 OFFICE O/P EST MOD 30 MIN: CPT | Performed by: PSYCHIATRY & NEUROLOGY

## 2019-09-09 PROCEDURE — G0463 HOSPITAL OUTPT CLINIC VISIT: HCPCS

## 2019-09-09 RX ORDER — LISDEXAMFETAMINE DIMESYLATE 30 MG/1
30 CAPSULE ORAL 2 TIMES DAILY
Qty: 60 CAPSULE | Refills: 0 | Status: SHIPPED | OUTPATIENT
Start: 2019-10-09 | End: 2019-11-06

## 2019-09-09 RX ORDER — LISDEXAMFETAMINE DIMESYLATE 30 MG/1
30 CAPSULE ORAL 2 TIMES DAILY
Qty: 60 CAPSULE | Refills: 0 | Status: SHIPPED | OUTPATIENT
Start: 2019-09-09 | End: 2019-10-31

## 2019-09-09 ASSESSMENT — ANXIETY QUESTIONNAIRES
6. BECOMING EASILY ANNOYED OR IRRITABLE: SEVERAL DAYS
2. NOT BEING ABLE TO STOP OR CONTROL WORRYING: SEVERAL DAYS
GAD7 TOTAL SCORE: 10
5. BEING SO RESTLESS THAT IT IS HARD TO SIT STILL: SEVERAL DAYS
7. FEELING AFRAID AS IF SOMETHING AWFUL MIGHT HAPPEN: MORE THAN HALF THE DAYS
IF YOU CHECKED OFF ANY PROBLEMS ON THIS QUESTIONNAIRE, HOW DIFFICULT HAVE THESE PROBLEMS MADE IT FOR YOU TO DO YOUR WORK, TAKE CARE OF THINGS AT HOME, OR GET ALONG WITH OTHER PEOPLE: SOMEWHAT DIFFICULT
3. WORRYING TOO MUCH ABOUT DIFFERENT THINGS: SEVERAL DAYS
1. FEELING NERVOUS, ANXIOUS, OR ON EDGE: MORE THAN HALF THE DAYS

## 2019-09-09 ASSESSMENT — PATIENT HEALTH QUESTIONNAIRE - PHQ9
SUM OF ALL RESPONSES TO PHQ QUESTIONS 1-9: 9
5. POOR APPETITE OR OVEREATING: MORE THAN HALF THE DAYS

## 2019-09-09 ASSESSMENT — PAIN SCALES - GENERAL: PAINLEVEL: NO PAIN (0)

## 2019-09-09 ASSESSMENT — MIFFLIN-ST. JEOR: SCORE: 1737.4

## 2019-09-09 NOTE — NURSING NOTE
"Chief Complaint   Patient presents with     RECHECK     MDD, ADHD, Borderline personality disorder       Initial /70   Pulse 74   Temp 99.1  F (37.3  C)   Ht 1.803 m (5' 11\")   Wt 91.6 kg (202 lb)   SpO2 99%   BMI 28.17 kg/m   Estimated body mass index is 28.17 kg/m  as calculated from the following:    Height as of this encounter: 1.803 m (5' 11\").    Weight as of this encounter: 91.6 kg (202 lb).  Medication Reconciliation: complete    "

## 2019-09-09 NOTE — PATIENT INSTRUCTIONS
Thank you for allowing Dr. Nilsa Hawkins and our psychiatry team to participate in your care. Please call our office at [phone] with scheduling questions or with any other questions or concerns.
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
## 2019-09-10 ASSESSMENT — ANXIETY QUESTIONNAIRES: GAD7 TOTAL SCORE: 10

## 2019-10-31 ENCOUNTER — HOSPITAL ENCOUNTER (EMERGENCY)
Facility: HOSPITAL | Age: 29
Discharge: HOME | End: 2019-10-31
Attending: FAMILY MEDICINE | Admitting: FAMILY MEDICINE
Payer: COMMERCIAL

## 2019-10-31 VITALS
SYSTOLIC BLOOD PRESSURE: 122 MMHG | BODY MASS INDEX: 28 KG/M2 | DIASTOLIC BLOOD PRESSURE: 77 MMHG | RESPIRATION RATE: 16 BRPM | TEMPERATURE: 98.4 F | OXYGEN SATURATION: 100 % | WEIGHT: 200 LBS | HEART RATE: 66 BPM | HEIGHT: 71 IN

## 2019-10-31 DIAGNOSIS — B34.9 VIRAL SYNDROME: ICD-10-CM

## 2019-10-31 LAB
ALBUMIN UR-MCNC: NEGATIVE MG/DL
ANION GAP SERPL CALCULATED.3IONS-SCNC: 5 MMOL/L (ref 3–14)
APPEARANCE UR: CLEAR
BACTERIA #/AREA URNS HPF: ABNORMAL /HPF
BASOPHILS # BLD AUTO: 0 10E9/L (ref 0–0.2)
BASOPHILS NFR BLD AUTO: 0.6 %
BILIRUB UR QL STRIP: NEGATIVE
BUN SERPL-MCNC: 9 MG/DL (ref 7–30)
CALCIUM SERPL-MCNC: 8.4 MG/DL (ref 8.5–10.1)
CHLORIDE SERPL-SCNC: 110 MMOL/L (ref 94–109)
CO2 SERPL-SCNC: 25 MMOL/L (ref 20–32)
COLOR UR AUTO: ABNORMAL
CREAT SERPL-MCNC: 0.64 MG/DL (ref 0.52–1.04)
DIFFERENTIAL METHOD BLD: NORMAL
EOSINOPHIL # BLD AUTO: 0.1 10E9/L (ref 0–0.7)
EOSINOPHIL NFR BLD AUTO: 0.9 %
ERYTHROCYTE [DISTWIDTH] IN BLOOD BY AUTOMATED COUNT: 13.2 % (ref 10–15)
FLUAV+FLUBV RNA SPEC QL NAA+PROBE: NEGATIVE
FLUAV+FLUBV RNA SPEC QL NAA+PROBE: NEGATIVE
GFR SERPL CREATININE-BSD FRML MDRD: >90 ML/MIN/{1.73_M2}
GLUCOSE SERPL-MCNC: 81 MG/DL (ref 70–99)
GLUCOSE UR STRIP-MCNC: NEGATIVE MG/DL
HCT VFR BLD AUTO: 36.9 % (ref 35–47)
HGB BLD-MCNC: 12.6 G/DL (ref 11.7–15.7)
HGB UR QL STRIP: NEGATIVE
IMM GRANULOCYTES # BLD: 0 10E9/L (ref 0–0.4)
IMM GRANULOCYTES NFR BLD: 0.5 %
KETONES UR STRIP-MCNC: NEGATIVE MG/DL
LEUKOCYTE ESTERASE UR QL STRIP: ABNORMAL
LYMPHOCYTES # BLD AUTO: 1.9 10E9/L (ref 0.8–5.3)
LYMPHOCYTES NFR BLD AUTO: 29.6 %
MCH RBC QN AUTO: 28.6 PG (ref 26.5–33)
MCHC RBC AUTO-ENTMCNC: 34.1 G/DL (ref 31.5–36.5)
MCV RBC AUTO: 84 FL (ref 78–100)
MONOCYTES # BLD AUTO: 0.4 10E9/L (ref 0–1.3)
MONOCYTES NFR BLD AUTO: 6.7 %
MUCOUS THREADS #/AREA URNS LPF: PRESENT /LPF
NEUTROPHILS # BLD AUTO: 4 10E9/L (ref 1.6–8.3)
NEUTROPHILS NFR BLD AUTO: 61.7 %
NITRATE UR QL: NEGATIVE
NRBC # BLD AUTO: 0 10*3/UL
NRBC BLD AUTO-RTO: 0 /100
PH UR STRIP: 6 PH (ref 4.7–8)
PLATELET # BLD AUTO: 244 10E9/L (ref 150–450)
POTASSIUM SERPL-SCNC: 4.1 MMOL/L (ref 3.4–5.3)
RBC # BLD AUTO: 4.41 10E12/L (ref 3.8–5.2)
RBC #/AREA URNS AUTO: 2 /HPF (ref 0–2)
RSV RNA SPEC NAA+PROBE: NEGATIVE
SODIUM SERPL-SCNC: 140 MMOL/L (ref 133–144)
SOURCE: ABNORMAL
SP GR UR STRIP: 1.01 (ref 1–1.03)
SPECIMEN SOURCE: NORMAL
SQUAMOUS #/AREA URNS AUTO: 2 /HPF (ref 0–1)
UROBILINOGEN UR STRIP-MCNC: NORMAL MG/DL (ref 0–2)
WBC # BLD AUTO: 6.4 10E9/L (ref 4–11)
WBC #/AREA URNS AUTO: 3 /HPF (ref 0–5)

## 2019-10-31 PROCEDURE — 81001 URINALYSIS AUTO W/SCOPE: CPT | Performed by: FAMILY MEDICINE

## 2019-10-31 PROCEDURE — 99284 EMERGENCY DEPT VISIT MOD MDM: CPT | Mod: Z6 | Performed by: FAMILY MEDICINE

## 2019-10-31 PROCEDURE — 99283 EMERGENCY DEPT VISIT LOW MDM: CPT

## 2019-10-31 PROCEDURE — 25000128 H RX IP 250 OP 636: Performed by: FAMILY MEDICINE

## 2019-10-31 PROCEDURE — 85025 COMPLETE CBC W/AUTO DIFF WBC: CPT | Performed by: FAMILY MEDICINE

## 2019-10-31 PROCEDURE — 36415 COLL VENOUS BLD VENIPUNCTURE: CPT | Performed by: FAMILY MEDICINE

## 2019-10-31 PROCEDURE — 80048 BASIC METABOLIC PNL TOTAL CA: CPT | Performed by: FAMILY MEDICINE

## 2019-10-31 PROCEDURE — 87631 RESP VIRUS 3-5 TARGETS: CPT | Performed by: FAMILY MEDICINE

## 2019-10-31 RX ADMIN — SODIUM CHLORIDE 1000 ML: 9 INJECTION, SOLUTION INTRAVENOUS at 08:33

## 2019-10-31 ASSESSMENT — ENCOUNTER SYMPTOMS
CONSTIPATION: 0
DIAPHORESIS: 1
DYSURIA: 0
FREQUENCY: 0
FEVER: 1
MUSCULOSKELETAL NEGATIVE: 1
SHORTNESS OF BREATH: 0
PSYCHIATRIC NEGATIVE: 1
NAUSEA: 0
PALPITATIONS: 0
NEUROLOGICAL NEGATIVE: 1
ACTIVITY CHANGE: 0
DIARRHEA: 0
VOMITING: 0
FATIGUE: 0
ABDOMINAL PAIN: 0

## 2019-10-31 ASSESSMENT — MIFFLIN-ST. JEOR: SCORE: 1728.32

## 2019-10-31 NOTE — ED AVS SNAPSHOT
[facility]  [address] [address]  Phone:  [phone]                                    Jannet San   MRN: [id number]    Department:  [facility]   Date of Visit:  10/31/2019           After Visit Summary Signature Page    I have received my discharge instructions, and my questions have been answered. I have discussed any challenges I see with this plan with the nurse or doctor.    ..........................................................................................................................................  Patient/Patient Representative Signature      ..........................................................................................................................................  Patient Representative Print Name and Relationship to Patient    ..................................................               ................................................  Date                                   Time    ..........................................................................................................................................  Reviewed by Signature/Title    ...................................................              ..............................................  Date                                               Time          22EPIC Rev 08/18

## 2019-10-31 NOTE — ED NOTES
Pt states she has had intermittent fever from 99 degrees to 100 degrees, then up to 101 and drops back down, then this AM it was 100 degrees and jumped to 105 degrees. Pt feels her thermometer is accurate. When asked how she felt when it said 105 degrees, said about how she feels at this time. Skin pink warm and dry. Took Ibuprofen 800 mg and Tylenol 1000mg at 0630.
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
## 2019-10-31 NOTE — ED TRIAGE NOTES
Pt in for complaints of a fever intermittent since Saturday. Pt reports she awoke this am with at temp of 99, then felt more flushed and rechecked and got a reading of 104 prompting visit. Pt took ibuprofen and tylenol pta.

## 2019-11-01 ENCOUNTER — OFFICE VISIT (OUTPATIENT)
Dept: OBGYN | Facility: OTHER | Age: 29
End: 2019-11-01
Attending: NURSE PRACTITIONER
Payer: COMMERCIAL

## 2019-11-01 VITALS
HEIGHT: 71 IN | WEIGHT: 200 LBS | OXYGEN SATURATION: 100 % | DIASTOLIC BLOOD PRESSURE: 78 MMHG | SYSTOLIC BLOOD PRESSURE: 126 MMHG | HEART RATE: 88 BPM | BODY MASS INDEX: 28 KG/M2

## 2019-11-01 DIAGNOSIS — R10.2 PELVIC PAIN IN FEMALE: Primary | ICD-10-CM

## 2019-11-01 DIAGNOSIS — A59.01 TRICHOMONAL VAGINITIS: ICD-10-CM

## 2019-11-01 DIAGNOSIS — Z30.013 ENCOUNTER FOR INITIAL PRESCRIPTION OF INJECTABLE CONTRACEPTIVE: ICD-10-CM

## 2019-11-01 LAB
C TRACH DNA SPEC QL PROBE+SIG AMP: NOT DETECTED
HCG UR QL: NEGATIVE
N GONORRHOEA DNA SPEC QL PROBE+SIG AMP: NOT DETECTED
SPECIMEN SOURCE: ABNORMAL
SPECIMEN SOURCE: NORMAL
WET PREP SPEC: ABNORMAL

## 2019-11-01 PROCEDURE — 99213 OFFICE O/P EST LOW 20 MIN: CPT | Performed by: NURSE PRACTITIONER

## 2019-11-01 PROCEDURE — 87210 SMEAR WET MOUNT SALINE/INK: CPT | Mod: ZL | Performed by: NURSE PRACTITIONER

## 2019-11-01 PROCEDURE — 96372 THER/PROPH/DIAG INJ SC/IM: CPT

## 2019-11-01 PROCEDURE — 87591 N.GONORRHOEAE DNA AMP PROB: CPT | Mod: ZL | Performed by: NURSE PRACTITIONER

## 2019-11-01 PROCEDURE — 81025 URINE PREGNANCY TEST: CPT | Mod: ZL | Performed by: NURSE PRACTITIONER

## 2019-11-01 PROCEDURE — 87491 CHLMYD TRACH DNA AMP PROBE: CPT | Mod: ZL | Performed by: NURSE PRACTITIONER

## 2019-11-01 PROCEDURE — G0463 HOSPITAL OUTPT CLINIC VISIT: HCPCS | Mod: 25

## 2019-11-01 RX ORDER — MEDROXYPROGESTERONE ACETATE 150 MG/ML
150 INJECTION, SUSPENSION INTRAMUSCULAR
Status: COMPLETED | OUTPATIENT
Start: 2019-11-01 | End: 2020-07-07

## 2019-11-01 RX ORDER — METRONIDAZOLE 500 MG/1
2000 TABLET ORAL ONCE
Qty: 4 TABLET | Refills: 0 | Status: SHIPPED | OUTPATIENT
Start: 2019-11-01 | End: 2019-11-26

## 2019-11-01 RX ADMIN — MEDROXYPROGESTERONE ACETATE 150 MG: 150 INJECTION, SUSPENSION INTRAMUSCULAR at 10:11

## 2019-11-01 ASSESSMENT — PAIN SCALES - GENERAL: PAINLEVEL: NO PAIN (0)

## 2019-11-01 ASSESSMENT — MIFFLIN-ST. JEOR: SCORE: 1728.32

## 2019-11-01 NOTE — PATIENT INSTRUCTIONS
Patient Education     Trichomonas Vaginal Infection (Trichomoniasis)    Trichomonas vaginal infection is often called  trich.  It is caused by a parasite that is passed during sex. This makes trich a sexually transmitted disease (STD). Both men and women can get trich, but it is more common in women.  Most people who have trich don t have any symptoms at first. If symptoms do occur, they may take weeks or months to develop.  Symptoms in women can include:    Thin discharge from the vagina that may smell bad and be clear, white, gray, green, or yellow in color    Itching, burning, redness, or soreness in or around the vagina    Pain in the lower belly    Frequent urination or pain and burning during urination    Pain during sex  Symptoms in men are not very common. Men may have trich and pass it to women during sex without knowing they were ever infected.  Trich is most often treated with antibiotics. Without treatment, trich can increase the risk of more serious health problems such as:    Pelvic inflammatory disease (PID)     delivery (giving birth to a baby early if you re pregnant)    HIV and certain other sexually transmitted diseases (STDs)  Home care    Take the antibiotics you re prescribed exactly as directed. Finish all of the medicine, even if your symptoms go away.    Avoid drinking alcohol until you re done with your treatment.    Tell any partners you have sex with that you have trich. They will need be tested for trich and possibly treated as well.    Avoid having sex until you and any partners you have sex with are confirmed to no longer have trich.  Prevention  The only way to avoid getting trich or any other STD is to avoid having sex. If you choose to have sex, then take steps to lower your health risks:    Use condoms when having sex.    Limit the number of partners you have sex with.    Get tested regularly for STDs. Ask any partner you have sex with to do the same.    Don t have sex  with anyone who has symptoms that may be due to an STD.  Follow-up care  Follow up with your healthcare provider, or as advised. Testing will likely be done to ensure that the infection has cleared.  When to seek medical advice  Call your healthcare provider right away if:    You have a fever of 100.4 F (38 C) or higher, or as directed by your provider.    Your symptoms worsen, or they don t go away even after completing your treatment.    You have new pain in the lower belly or pelvic region.    You have side effects that bother you or a reaction to the medicine you re taking.    You or any partners you have sex with have new symptoms, such as a rash, joint pain, or sores.  Date Last Reviewed: 10/1/2017    9205-1909 The National Institutes of [employer] (NIH). [address] [place] [address]. All rights reserved. This information is not intended as a substitute for professional medical care. Always follow your healthcare professional's instructions.           Patient Education     Vaginal Infection: Understanding the Vaginal Environment  The vagina is a canal. It connects the uterus (womb) to the outside of the body. It is home to many types of bacteria and other tiny organisms. These different bacteria most often stay balanced in number. This keeps the vagina healthy. If the balance changes, it can cause infection.   A healthy environment  Many types of bacteria are present in a healthy vagina. When balanced, they don t cause problems. Small amounts of yeast may also be present without causing problems. The most common type of bacteria in the vagina is lactobacillus. It helps keep the vagina at a low pH. A low pH keeps bad bacteria from taking over.  Normal vaginal discharge  The vagina makes fluid. It is sent out as discharge. This also keeps the vagina healthy. Normal discharge can be clear, white, or yellowish. Most women find that normal discharge varies in amount and color through the month.  An unhealthy  environment  The vaginal environment may get out of balance. This may result in a vaginal infection. There are a few reasons this can happen. The pH may have changed. The amount of one organism, such as yeast, may increase. Or an outside organism may get into the vagina and throw off the balance:    Bacterial vaginosis (BV). BV is due to an imbalance in the normal bacteria in the vagina. Lactobacillus bacteria decrease. As a result, the numbers of bad bacteria increase.    Candidiasis (yeast infection). Yeast is a type of fungus. A yeast infection occurs when yeast cells in the vagina increase. They then attack vaginal tissues. A type of yeast called Candida albicans is often involved.    Trichomoniasis ( trich ). Trich is a parasite. It is passed from one person to another during sex. Men with trich often don t have any symptoms. In women, it can take weeks or months before symptoms appear.  Date Last Reviewed: 3/1/2017    9406-0536 [employer]. [address] [place] [address]. All rights reserved. This information is not intended as a substitute for professional medical care. Always follow your healthcare professional's instructions.

## 2019-11-01 NOTE — PROGRESS NOTES
Clinic Administered Medication Documentation    MEDICATION LIST:   Depo Provera Documentation    Prior to injection, verified patient identity using patient's name and date of birth. Medication was administered. Please see MAR and medication order for additional information.     BP: 126/78    LAST PAP/EXAM:   Lab Results   Component Value Date    PAP NIL 08/07/2019     URINE HCG:negative    NEXT INJECTION DUE: 1/17/20 - 1/31/20    Was entire vial of medication used? Yes  Vial/Syringe: Single dose vial  Expiration Date:  11/30/21    Right Deltoid    Melina Cummins LPN

## 2019-11-01 NOTE — PROGRESS NOTES
"[facility]                HPI   Jannet presents today for concerns of pelvic pain, vaginal pain, and dysuria.  She was seen in the ED yesterday and it was determined that she did not have a UTI.  Vaginal exam and HCG were not done.  She notes that the pain was most sever about a week ago and has now become a dull ache.  She is currently on a bcp and is getting a period every 3 months.  Notes light breakthrough bleeding in September. She does not have an exclusive sexual partner.  She does experience ovulatory pain and severe cramping with her menses.  We have discussed contraceptive options that may work better for her and she has chosen to ry the Depo Provera for a while to see if this eliminates her ovulatory pain.              Medications:     Current Outpatient Medications Ordered in Epic   Medication     Acetaminophen (TYLENOL PO)     EPINEPHrine (EPIPEN 2-CONCHIS) 0.3 MG/0.3ML injection 2-pack     ibuprofen (ADVIL/MOTRIN) 600 MG tablet     lisdexamfetamine (VYVANSE) 30 MG capsule     MELATONIN PO     metroNIDAZOLE (FLAGYL) 500 MG tablet     norethindrone-ethinyl estradiol (ALAYCEN 1/35) 1-35 MG-MCG tablet     sertraline (ZOLOFT) 100 MG tablet     triamcinolone (KENALOG) 0.1 % paste     Current Facility-Administered Medications Ordered in Epic   Medication Dose Route Frequency Last Rate Last Dose     betamethasone acet & sod phos (CELESTONE) injection 12 mg  12 mg EPIDURAL Once         lidocaine 1 % injection 10 mL  10 mL Other Once         medroxyPROGESTERone (DEPO-PROVERA) injection 150 mg  150 mg Intramuscular Q90 Days                    Allergies:   Bee pollen         Review of Systems:   The 5 point Review of Systems is negative other than noted in the HPI                     Physical Exam:   Blood pressure 126/78, pulse 88, height 1.803 m (5' 11\"), weight 90.7 kg (200 lb), SpO2 100 %, not currently breastfeeding.  Constitutional:   awake, alert, cooperative, no apparent distress, and appears " stated age     Abdomen:   soft, non-distended and tenderness noted in the suprapubic region     Genitounirinary:   External Genitalia:  Lesions absent  Vagina:  Lesions absent, thin yellow malodorous discharge  Cervix:  Lesions absent, Tenderness absent  Uterus:  Size normal, Position normal, Tenderness absent  Adenexa:  Enlargement absent              Data:     Results for orders placed or performed in visit on 11/01/19   HCG Qual, Urine ([id number])     Status: None   Result Value Ref Range    HCG Qual Urine Negative NEG^Negative   Wet prep     Status: Abnormal   Result Value Ref Range    Specimen Description Vagina     Wet Prep Trichomonas seen (A)     Wet Prep No clue cells seen     Wet Prep No yeast seen     Wet Prep Moderate  WBC'S seen                 Assessment and Plan:   Trichomonal vaginitis - metronidazole 2 gm orally today.  Treatment advised of any sexual partners.   Contraceptive management - Depo Provera 150 mg given today and refills for 1 year.      Nata Weathers NP, CFNP  11/1/2019  10:02 AM

## 2019-11-01 NOTE — NURSING NOTE
"Chief Complaint   Patient presents with     UTI     Vaginal Problem     Contraception       Initial /78 (BP Location: Right arm, Patient Position: Sitting, Cuff Size: Adult Regular)   Pulse 88   Ht 1.803 m (5' 11\")   Wt 90.7 kg (200 lb)   SpO2 100%   BMI 27.89 kg/m   Estimated body mass index is 27.89 kg/m  as calculated from the following:    Height as of this encounter: 1.803 m (5' 11\").    Weight as of this encounter: 90.7 kg (200 lb).  Medication Reconciliation: complete     Melina Cummins LPN    "

## 2019-11-06 DIAGNOSIS — F90.2 ADHD (ATTENTION DEFICIT HYPERACTIVITY DISORDER), COMBINED TYPE: ICD-10-CM

## 2019-11-07 NOTE — TELEPHONE ENCOUNTER
Vyvanse - reviewed.   Last visit: 9.9.19  Last refill: 10.9.19    Future appointments:   Next 5 appointments (look out 90 days)    Dec 18, 2019  8:00 AM CST  (Arrive by 7:45 AM)  Return Visit with Nilsa Hawkins MD  [facility] ([facility] ) [address] [place] [id number] [phone]

## 2019-11-08 RX ORDER — LISDEXAMFETAMINE DIMESYLATE 30 MG/1
CAPSULE ORAL
Qty: 60 CAPSULE | Refills: 0 | Status: SHIPPED | OUTPATIENT
Start: 2019-11-08 | End: 2019-12-04
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
## 2019-11-26 ENCOUNTER — OFFICE VISIT (OUTPATIENT)
Dept: FAMILY MEDICINE | Facility: OTHER | Age: 29
End: 2019-11-26
Attending: FAMILY MEDICINE
Payer: COMMERCIAL

## 2019-11-26 VITALS
SYSTOLIC BLOOD PRESSURE: 128 MMHG | DIASTOLIC BLOOD PRESSURE: 78 MMHG | HEIGHT: 71 IN | WEIGHT: 201 LBS | BODY MASS INDEX: 28.14 KG/M2 | RESPIRATION RATE: 19 BRPM | TEMPERATURE: 98.9 F | OXYGEN SATURATION: 99 % | HEART RATE: 89 BPM

## 2019-11-26 DIAGNOSIS — M54.16 LUMBAR RADICULOPATHY: Primary | ICD-10-CM

## 2019-11-26 PROCEDURE — 99213 OFFICE O/P EST LOW 20 MIN: CPT | Performed by: FAMILY MEDICINE

## 2019-11-26 PROCEDURE — G0463 HOSPITAL OUTPT CLINIC VISIT: HCPCS

## 2019-11-26 RX ORDER — CYCLOBENZAPRINE HCL 10 MG
10 TABLET ORAL 3 TIMES DAILY PRN
Qty: 20 TABLET | Refills: 0 | Status: SHIPPED | OUTPATIENT
Start: 2019-11-26 | End: 2020-03-03

## 2019-11-26 ASSESSMENT — PATIENT HEALTH QUESTIONNAIRE - PHQ9: SUM OF ALL RESPONSES TO PHQ QUESTIONS 1-9: 9

## 2019-11-26 ASSESSMENT — ANXIETY QUESTIONNAIRES
2. NOT BEING ABLE TO STOP OR CONTROL WORRYING: SEVERAL DAYS
3. WORRYING TOO MUCH ABOUT DIFFERENT THINGS: SEVERAL DAYS
6. BECOMING EASILY ANNOYED OR IRRITABLE: SEVERAL DAYS
GAD7 TOTAL SCORE: 8
7. FEELING AFRAID AS IF SOMETHING AWFUL MIGHT HAPPEN: SEVERAL DAYS
1. FEELING NERVOUS, ANXIOUS, OR ON EDGE: SEVERAL DAYS
IF YOU CHECKED OFF ANY PROBLEMS ON THIS QUESTIONNAIRE, HOW DIFFICULT HAVE THESE PROBLEMS MADE IT FOR YOU TO DO YOUR WORK, TAKE CARE OF THINGS AT HOME, OR GET ALONG WITH OTHER PEOPLE: SOMEWHAT DIFFICULT
5. BEING SO RESTLESS THAT IT IS HARD TO SIT STILL: MORE THAN HALF THE DAYS
4. TROUBLE RELAXING: SEVERAL DAYS

## 2019-11-26 ASSESSMENT — PAIN SCALES - GENERAL: PAINLEVEL: MILD PAIN (3)

## 2019-11-26 ASSESSMENT — MIFFLIN-ST. JEOR: SCORE: 1732.86

## 2019-11-26 NOTE — NURSING NOTE
"Chief Complaint   Patient presents with     Back Pain       Initial /78   Pulse 89   Temp 98.9  F (37.2  C) (Tympanic)   Resp 19   Ht 1.803 m (5' 11\")   Wt 91.2 kg (201 lb)   SpO2 99%   BMI 28.03 kg/m   Estimated body mass index is 28.03 kg/m  as calculated from the following:    Height as of this encounter: 1.803 m (5' 11\").    Weight as of this encounter: 91.2 kg (201 lb).  Medication Reconciliation: complete  Marium Lee LPN    "

## 2019-11-27 ASSESSMENT — ANXIETY QUESTIONNAIRES: GAD7 TOTAL SCORE: 8

## 2019-11-29 ENCOUNTER — TELEPHONE (OUTPATIENT)
Dept: FAMILY MEDICINE | Facility: OTHER | Age: 29
End: 2019-11-29

## 2019-12-04 ENCOUNTER — MYC REFILL (OUTPATIENT)
Dept: PSYCHIATRY | Facility: OTHER | Age: 29
End: 2019-12-04

## 2019-12-04 DIAGNOSIS — F90.2 ADHD (ATTENTION DEFICIT HYPERACTIVITY DISORDER), COMBINED TYPE: ICD-10-CM

## 2019-12-04 RX ORDER — LISDEXAMFETAMINE DIMESYLATE 30 MG/1
30 CAPSULE ORAL 2 TIMES DAILY
Qty: 60 CAPSULE | Refills: 0 | Status: SHIPPED | OUTPATIENT
Start: 2019-12-04 | End: 2019-12-18

## 2019-12-04 NOTE — TELEPHONE ENCOUNTER
Vyvanse -  reviewed   Last visit: 9.9.19  Last refill: 11.8.19    Future appointments:   Next 5 appointments (look out 90 days)    Dec 18, 2019  8:00 AM CST  (Arrive by 7:45 AM)  Return Visit with Nilsa Hawkins MD  [facility] [place] ([facility] ) [address] [place] [id number] [phone]

## 2019-12-18 ENCOUNTER — OFFICE VISIT (OUTPATIENT)
Dept: PSYCHIATRY | Facility: OTHER | Age: 29
End: 2019-12-18
Attending: PSYCHIATRY & NEUROLOGY
Payer: COMMERCIAL

## 2019-12-18 ENCOUNTER — TELEPHONE (OUTPATIENT)
Dept: PSYCHIATRY | Facility: OTHER | Age: 29
End: 2019-12-18

## 2019-12-18 VITALS
WEIGHT: 200 LBS | OXYGEN SATURATION: 99 % | HEART RATE: 79 BPM | BODY MASS INDEX: 27.09 KG/M2 | DIASTOLIC BLOOD PRESSURE: 76 MMHG | TEMPERATURE: 98.2 F | SYSTOLIC BLOOD PRESSURE: 126 MMHG | HEIGHT: 72 IN

## 2019-12-18 DIAGNOSIS — F90.2 ADHD (ATTENTION DEFICIT HYPERACTIVITY DISORDER), COMBINED TYPE: ICD-10-CM

## 2019-12-18 DIAGNOSIS — F31.81 BIPOLAR 2 DISORDER (H): ICD-10-CM

## 2019-12-18 PROCEDURE — G0463 HOSPITAL OUTPT CLINIC VISIT: HCPCS

## 2019-12-18 PROCEDURE — 99213 OFFICE O/P EST LOW 20 MIN: CPT | Performed by: PSYCHIATRY & NEUROLOGY

## 2019-12-18 RX ORDER — LISDEXAMFETAMINE DIMESYLATE 30 MG/1
30 CAPSULE ORAL 2 TIMES DAILY
Qty: 60 CAPSULE | Refills: 0 | Status: SHIPPED | OUTPATIENT
Start: 2020-02-03 | End: 2020-03-03

## 2019-12-18 RX ORDER — SERTRALINE HYDROCHLORIDE 100 MG/1
150 TABLET, FILM COATED ORAL DAILY
Qty: 45 TABLET | Refills: 6 | Status: SHIPPED | OUTPATIENT
Start: 2019-12-18 | End: 2020-03-11

## 2019-12-18 RX ORDER — LISDEXAMFETAMINE DIMESYLATE 30 MG/1
30 CAPSULE ORAL 2 TIMES DAILY
Qty: 60 CAPSULE | Refills: 0 | Status: SHIPPED | OUTPATIENT
Start: 2020-01-03 | End: 2020-03-18

## 2019-12-18 ASSESSMENT — PAIN SCALES - GENERAL: PAINLEVEL: MILD PAIN (3)

## 2019-12-18 ASSESSMENT — PATIENT HEALTH QUESTIONNAIRE - PHQ9
5. POOR APPETITE OR OVEREATING: SEVERAL DAYS
SUM OF ALL RESPONSES TO PHQ QUESTIONS 1-9: 10

## 2019-12-18 ASSESSMENT — ANXIETY QUESTIONNAIRES
1. FEELING NERVOUS, ANXIOUS, OR ON EDGE: SEVERAL DAYS
2. NOT BEING ABLE TO STOP OR CONTROL WORRYING: SEVERAL DAYS
GAD7 TOTAL SCORE: 7
IF YOU CHECKED OFF ANY PROBLEMS ON THIS QUESTIONNAIRE, HOW DIFFICULT HAVE THESE PROBLEMS MADE IT FOR YOU TO DO YOUR WORK, TAKE CARE OF THINGS AT HOME, OR GET ALONG WITH OTHER PEOPLE: SOMEWHAT DIFFICULT
3. WORRYING TOO MUCH ABOUT DIFFERENT THINGS: SEVERAL DAYS
5. BEING SO RESTLESS THAT IT IS HARD TO SIT STILL: SEVERAL DAYS
6. BECOMING EASILY ANNOYED OR IRRITABLE: SEVERAL DAYS
7. FEELING AFRAID AS IF SOMETHING AWFUL MIGHT HAPPEN: SEVERAL DAYS

## 2019-12-18 ASSESSMENT — MIFFLIN-ST. JEOR: SCORE: 1736.25

## 2019-12-18 NOTE — TELEPHONE ENCOUNTER
Walked Vyvanse RX for January and February to [facility].      Melina Mcclelland RN-BSN  Care Coordination, Behavioral Health

## 2019-12-18 NOTE — PROGRESS NOTES
PSYCHIATRY CLINIC PROGRESS NOTE   20 minute medication management, more than 50% of time spent counseling patient on medications, medication side effects, symptom history and management   SUBJECTIVE / INTERIM HISTORY                                                                       Last visit: Gave scripts for Vyvanse 30 mg twice daily and gave scripts today for 9/9/19 and 10/9/19.  Continue Zoloft 150 mg daily.    - been sleeping a lot. Then feels guilty not hanging with Billy  - Jan 22 mediation  - still feels like Zoloft does help. A bit less anxious  - mom said some really inappropriate things while they were in [place]  - down even on her hobbies: disc golf and getting outside   - would like to move to [place]  - son Billy is 6  - parents are in [place]. They often come up for summer     Symptoms: feelings guilt, overwhelmed, depressed mood, low energy, anhedonia, sleep issues, poor attention / concentration. Passive SI, no intent or plan.  MEDICAL / SURGICAL HISTORY                pregnant [if applicable]--no     Patient Active Problem List   Diagnosis     Major depressive disorder, recurrent episode, moderate (H)     Lumbago     Anxiety disorder     Migraine     Drug use disorder     Bilateral low back pain without sciatica     Bilateral low back pain with left-sided sciatica     Bipolar 2 disorder (H)     Fibromyalgia     Lump or mass in breast     Mastodynia     Encounter for gynecological examination without abnormal finding     Encounter for surveillance of contraceptives     ACP (advance care planning)     Ovulation pain     Family history of hemochromatosis     ADHD (attention deficit hyperactivity disorder), combined type     Chronic pain     Degeneration of lumbar or lumbosacral intervertebral disc     Osteoarthrosis, pelvic region and thigh     Other chronic cystitis     ALLERGY   Bee pollen  MEDICATIONS                                                                                          "    Current Outpatient Medications   Medication Sig     Acetaminophen (TYLENOL PO) Take 1,000 mg by mouth every 4 hours as needed      cyclobenzaprine (FLEXERIL) 10 MG tablet Take 1 tablet (10 mg) by mouth 3 times daily as needed for muscle spasms     EPINEPHrine (EPIPEN 2-CONCHIS) 0.3 MG/0.3ML injection 2-pack Inject 0.3 mLs (0.3 mg) into the muscle as needed for anaphylaxis     ibuprofen (ADVIL/MOTRIN) 600 MG tablet Take 1 tablet (600 mg) by mouth every 8 hours as needed for moderate pain     lisdexamfetamine (VYVANSE) 30 MG capsule Take 1 capsule (30 mg) by mouth 2 times daily     MELATONIN PO Take 5 mg by mouth At Bedtime      sertraline (ZOLOFT) 100 MG tablet Take 1.5 tablets (150 mg) by mouth daily     triamcinolone (KENALOG) 0.1 % paste Take by mouth 2 times daily     Current Facility-Administered Medications   Medication     medroxyPROGESTERone (DEPO-PROVERA) injection 150 mg     Facility-Administered Medications Ordered in Other Visits   Medication     betamethasone acet & sod phos (CELESTONE) injection 12 mg     lidocaine 1 % injection 10 mL       VITALS   /76 (BP Location: Right arm, Patient Position: Sitting, Cuff Size: Adult Regular)   Pulse 79   Temp 98.2  F (36.8  C) (Tympanic)   Ht 1.816 m (5' 11.5\")   Wt 90.7 kg (200 lb)   SpO2 99%   BMI 27.51 kg/m       LABS                                                                                                                           No results found for any visits on 12/18/19.    MENTAL STATUS EXAM                                                                                        Alert. Oriented to person, place, and date / time. Well groomed, calm, cooperative with good eye contact. No problems with speech or psychomotor behavior. Mood was described depressed and affect was congruent to speech content and full range.  Thought process, including associations, was unremarkable and thought content was devoid homicidal ideation and psychotic " thought. + SI with no plan or intent. No hallucinations. Insight was good. Judgment was intact and adequate for safety. Fund of knowledge was intact. Pt demonstrates no obvious problems with attention, concentration, language, recent or remote memory although these were not formally tested.       DIAGNOSES        Major depressive disorder, recurrent, moderate  ADHD  Borderline personality disorder    ASSESSMENT: Jannet San is a 30 yo with ADHD - was working with Jeff Woody, had testing. Depression, anxiety. For today we agreed on continuing meds as are. Increase of Zoloft helped.  We reviewed controlled substance contract and reviewed and signed and she submitted UD. No changes and she feels Zoloft is helping.     PLAN                                                                                                                       1)  MEDICATIONS:    Gave scripts for Vyvanse 30 mg twice daily and refilled today for Jan and for Feb. Continue Zoloft 150 mg daily.    2)  THERAPY: no longer working with Jeff Woody.     3)  LABS: none  4)  PT MONITOR [call for probs]: worsening symptoms , SI/HI  5)  REFERRALS [CD, medical, other]:  None  6)  RTC: ~2 months

## 2019-12-18 NOTE — NURSING NOTE
"Chief Complaint   Patient presents with     RECHECK     ADHD, depression.       Initial /76 (BP Location: Right arm, Patient Position: Sitting, Cuff Size: Adult Regular)   Pulse 79   Temp 98.2  F (36.8  C) (Tympanic)   Ht 1.816 m (5' 11.5\")   Wt 90.7 kg (200 lb)   SpO2 99%   BMI 27.51 kg/m   Estimated body mass index is 27.51 kg/m  as calculated from the following:    Height as of this encounter: 1.816 m (5' 11.5\").    Weight as of this encounter: 90.7 kg (200 lb).  Medication Reconciliation: complete  OLGA KITCHEN LPN    "

## 2019-12-19 ASSESSMENT — ANXIETY QUESTIONNAIRES: GAD7 TOTAL SCORE: 7

## 2019-12-23 ENCOUNTER — HOSPITAL ENCOUNTER (OUTPATIENT)
Dept: MRI IMAGING | Facility: HOSPITAL | Age: 29
Discharge: HOME | End: 2019-12-23
Attending: FAMILY MEDICINE | Admitting: FAMILY MEDICINE
Payer: COMMERCIAL

## 2019-12-23 ENCOUNTER — HOSPITAL ENCOUNTER (OUTPATIENT)
Dept: INTERVENTIONAL RADIOLOGY/VASCULAR | Facility: HOSPITAL | Age: 29
End: 2019-12-23
Attending: FAMILY MEDICINE
Payer: COMMERCIAL

## 2019-12-23 DIAGNOSIS — M54.16 LUMBAR RADICULOPATHY: ICD-10-CM

## 2019-12-23 PROCEDURE — 72148 MRI LUMBAR SPINE W/O DYE: CPT | Mod: TC

## 2019-12-23 PROCEDURE — G0463 HOSPITAL OUTPT CLINIC VISIT: HCPCS | Mod: TC

## 2020-01-09 ENCOUNTER — HOSPITAL ENCOUNTER (OUTPATIENT)
Dept: INTERVENTIONAL RADIOLOGY/VASCULAR | Facility: HOSPITAL | Age: 30
Discharge: HOME | End: 2020-01-09
Attending: RADIOLOGY | Admitting: FAMILY MEDICINE
Payer: COMMERCIAL

## 2020-01-09 DIAGNOSIS — M54.16 LUMBAR RADICULOPATHY: ICD-10-CM

## 2020-01-09 PROCEDURE — C1751 CATH, INF, PER/CENT/MIDLINE: HCPCS | Mod: TC

## 2020-01-09 PROCEDURE — 25000128 H RX IP 250 OP 636: Performed by: RADIOLOGY

## 2020-01-09 RX ORDER — METHYLPREDNISOLONE ACETATE 80 MG/ML
80 INJECTION, SUSPENSION INTRA-ARTICULAR; INTRALESIONAL; INTRAMUSCULAR; SOFT TISSUE ONCE
Status: COMPLETED | OUTPATIENT
Start: 2020-01-09 | End: 2020-01-09

## 2020-01-09 RX ORDER — METHYLPREDNISOLONE ACETATE 80 MG/ML
INJECTION, SUSPENSION INTRA-ARTICULAR; INTRALESIONAL; INTRAMUSCULAR; SOFT TISSUE
Status: DISCONTINUED
Start: 2020-01-09 | End: 2020-01-10 | Stop reason: HOSPADM

## 2020-01-09 RX ORDER — IOPAMIDOL 612 MG/ML
15 INJECTION, SOLUTION INTRATHECAL ONCE
Status: COMPLETED | OUTPATIENT
Start: 2020-01-09 | End: 2020-01-09

## 2020-01-09 RX ADMIN — METHYLPREDNISOLONE ACETATE 80 MG: 80 INJECTION, SUSPENSION INTRA-ARTICULAR; INTRALESIONAL; INTRAMUSCULAR; SOFT TISSUE at 15:45

## 2020-01-09 RX ADMIN — IOPAMIDOL 6 ML: 612 INJECTION, SOLUTION INTRATHECAL at 15:45

## 2020-01-09 NOTE — DISCHARGE INSTRUCTIONS
Home number on file [phone] (home)  Is it ok to leave a message at this number(s)? Yes    Dr. Duque completed your procedure on 1/9/2020.    Current Pain Level (0-10 Scale): 2/10  Post Pain Level (0-10):  3/10    Radiology Discharge instructions for Steroid Injection    Activity Level:     Do not do any heavy activity or exercise for 24 hours.   Do not drive for 4 hours after your injection.  Diet:   Return to your normal diet.  Medications:   If you have stopped taking your Aspirin, Coumadin/Warfarin, Ibuprofen, or any   other blood thinner for this procedure you may resume in the morning unless   your primary care provider has given you other instructions.    Diabetics may see an increase in blood sugar after steroid injections. If you are concerned about your blood sugar, please contact your family doctor.    Site Care:  Remove the bandage and bathe or shower the morning after the procedure.      This is a Pain Management procedure.  You will be contacted in two weeks for follow up.    Call your Primary Care Provider if you have the following (if your primary care provider is not available please seek emergency care):   Nausea with vomiting   Severe headache   Drowsiness or confusion   Redness or drainage at the injection or puncture site   Temperature over 101 degrees F   Other concerns   Worsening back pain   Stiff neck

## 2020-01-09 NOTE — IP AVS SNAPSHOT
[facility]  [address] [address]  Phone:  [phone]  Fax:  [phone]                                    After Visit Summary   1/9/2020    Jannet San    MRN: [id number]           After Visit Summary Signature Page    I have received my discharge instructions, and my questions have been answered. I have discussed any challenges I see with this plan with the nurse or doctor.    ..........................................................................................................................................  Patient/Patient Representative Signature      ..........................................................................................................................................  Patient Representative Print Name and Relationship to Patient    ..................................................               ................................................  Date                                   Time    ..........................................................................................................................................  Reviewed by Signature/Title    ...................................................              ..............................................  Date                                               Time          22EPIC Rev 08/18

## 2020-01-23 ENCOUNTER — TELEPHONE (OUTPATIENT)
Dept: INTERVENTIONAL RADIOLOGY/VASCULAR | Facility: HOSPITAL | Age: 30
End: 2020-01-23

## 2020-01-23 NOTE — TELEPHONE ENCOUNTER
CONSULT PATIENT  PAIN INJECTION POST CALL    Procedure: Epidural TL L 4-5  Radiologist(s): Dr. Jass Duque  Date of Procedure: 01/09/2020    The patient had no questions or concerns.    Pre-procedure pain score was: 2 (See pre-procedure score)  Post-procedure pain score as of today is: 0  Where is the pain? none  Is this new pain? No    Patient would not like to pursue another injection at this time.  The patient will contact IR at [phone] if that changes.      ASHLEY GLOVER RN

## 2020-01-28 ENCOUNTER — ALLIED HEALTH/NURSE VISIT (OUTPATIENT)
Dept: OBGYN | Facility: OTHER | Age: 30
End: 2020-01-28
Attending: NURSE PRACTITIONER
Payer: COMMERCIAL

## 2020-01-28 DIAGNOSIS — Z30.09 FAMILY PLANNING: Primary | ICD-10-CM

## 2020-01-28 PROCEDURE — 96372 THER/PROPH/DIAG INJ SC/IM: CPT

## 2020-01-28 RX ADMIN — MEDROXYPROGESTERONE ACETATE 150 MG: 150 INJECTION, SUSPENSION INTRAMUSCULAR at 08:42

## 2020-01-28 NOTE — PROGRESS NOTES
Clinic Administered Medication Documentation    MEDICATION LIST:   Depo Provera Documentation    Prior to injection, verified patient identity using patient's name and date of birth. Medication was administered. Please see MAR and medication order for additional information. Patient instructed to remain in clinic for 15 minutes.    BP: Data Unavailable    LAST PAP/EXAM:   Lab Results   Component Value Date    PAP NIL 08/07/2019     URINE HCG:not indicated    NEXT INJECTION DUE: 4/14/20 - 4/28/20    Was entire vial of medication used? Yes  Vial/Syringe: Single dose vial  Expiration Date:  11/21      Given right deltoid    JAIME KELLER LPN

## 2020-03-03 ENCOUNTER — MYC REFILL (OUTPATIENT)
Dept: FAMILY MEDICINE | Facility: OTHER | Age: 30
End: 2020-03-03

## 2020-03-03 DIAGNOSIS — M54.16 LUMBAR RADICULOPATHY: ICD-10-CM

## 2020-03-03 DIAGNOSIS — F90.2 ADHD (ATTENTION DEFICIT HYPERACTIVITY DISORDER), COMBINED TYPE: ICD-10-CM

## 2020-03-03 RX ORDER — LISDEXAMFETAMINE DIMESYLATE 30 MG/1
CAPSULE ORAL
Qty: 60 CAPSULE | Refills: 0 | Status: SHIPPED | OUTPATIENT
Start: 2020-03-03 | End: 2020-03-18

## 2020-03-03 NOTE — TELEPHONE ENCOUNTER
Vyvanse  Last visit: 12.18.19  Last refill: 2.3.20    Future appointments:   Next 5 appointments (look out 90 days)    Mar 18, 2020  8:00 AM CDT  (Arrive by 7:45 AM)  Return Visit with Nilsa Hawkins MD  [facility] ([facility] ) [address] [place] [id number]  [phone]

## 2020-03-04 RX ORDER — CYCLOBENZAPRINE HCL 10 MG
10 TABLET ORAL 3 TIMES DAILY PRN
Qty: 20 TABLET | Refills: 0 | Status: SHIPPED | OUTPATIENT
Start: 2020-03-04 | End: 2020-11-30

## 2020-03-04 NOTE — TELEPHONE ENCOUNTER
flexeril   Last Written Prescription Date:  11/26/19  Last Fill Quantity: 20,   # refills: 0  Last Office Visit: 11/26/19  Future Office visit:    Next 5 appointments (look out 90 days)    Mar 18, 2020  8:00 AM CDT  (Arrive by 7:45 AM)  Return Visit with Nilsa Hawkins MD  [facility] ([facility] ) [address] [place] [id number]  [phone]           Routing refill request to provider for review/approval because:  Drug not on the [facility], [facility] or [facility] refill protocol or controlled substance

## 2020-03-11 DIAGNOSIS — F31.81 BIPOLAR 2 DISORDER (H): ICD-10-CM

## 2020-03-11 RX ORDER — SERTRALINE HYDROCHLORIDE 100 MG/1
TABLET, FILM COATED ORAL
Qty: 45 TABLET | Refills: 5 | Status: SHIPPED | OUTPATIENT
Start: 2020-03-11 | End: 2020-09-15

## 2020-04-20 ENCOUNTER — ALLIED HEALTH/NURSE VISIT (OUTPATIENT)
Dept: ALLERGY | Facility: OTHER | Age: 30
End: 2020-04-20
Attending: NURSE PRACTITIONER
Payer: COMMERCIAL

## 2020-04-20 DIAGNOSIS — Z30.42 ENCOUNTER FOR SURVEILLANCE OF INJECTABLE CONTRACEPTIVE: Primary | ICD-10-CM

## 2020-04-20 PROCEDURE — 96372 THER/PROPH/DIAG INJ SC/IM: CPT

## 2020-04-20 RX ADMIN — MEDROXYPROGESTERONE ACETATE 150 MG: 150 INJECTION, SUSPENSION INTRAMUSCULAR at 14:24

## 2020-06-17 ENCOUNTER — VIRTUAL VISIT (OUTPATIENT)
Dept: PSYCHIATRY | Facility: OTHER | Age: 30
End: 2020-06-17
Attending: PSYCHIATRY & NEUROLOGY
Payer: COMMERCIAL

## 2020-06-17 ENCOUNTER — TELEPHONE (OUTPATIENT)
Dept: PSYCHIATRY | Facility: OTHER | Age: 30
End: 2020-06-17

## 2020-06-17 DIAGNOSIS — F90.0 ADHD (ATTENTION DEFICIT HYPERACTIVITY DISORDER), INATTENTIVE TYPE: Primary | ICD-10-CM

## 2020-06-17 PROCEDURE — 99213 OFFICE O/P EST LOW 20 MIN: CPT | Mod: 95 | Performed by: PSYCHIATRY & NEUROLOGY

## 2020-06-17 RX ORDER — LISDEXAMFETAMINE DIMESYLATE 30 MG/1
30 CAPSULE ORAL 2 TIMES DAILY
Qty: 60 CAPSULE | Refills: 0 | Status: SHIPPED | OUTPATIENT
Start: 2020-08-21 | End: 2020-07-07

## 2020-06-17 RX ORDER — LISDEXAMFETAMINE DIMESYLATE 30 MG/1
30 CAPSULE ORAL 2 TIMES DAILY
Qty: 60 CAPSULE | Refills: 0 | Status: SHIPPED | OUTPATIENT
Start: 2020-06-26 | End: 2020-07-07

## 2020-06-17 RX ORDER — LISDEXAMFETAMINE DIMESYLATE 30 MG/1
30 CAPSULE ORAL 2 TIMES DAILY
Qty: 60 CAPSULE | Refills: 0 | Status: SHIPPED | OUTPATIENT
Start: 2020-07-24 | End: 2020-07-07

## 2020-06-17 ASSESSMENT — ANXIETY QUESTIONNAIRES
3. WORRYING TOO MUCH ABOUT DIFFERENT THINGS: SEVERAL DAYS
5. BEING SO RESTLESS THAT IT IS HARD TO SIT STILL: SEVERAL DAYS
GAD7 TOTAL SCORE: 10
2. NOT BEING ABLE TO STOP OR CONTROL WORRYING: SEVERAL DAYS
6. BECOMING EASILY ANNOYED OR IRRITABLE: MORE THAN HALF THE DAYS
7. FEELING AFRAID AS IF SOMETHING AWFUL MIGHT HAPPEN: SEVERAL DAYS
1. FEELING NERVOUS, ANXIOUS, OR ON EDGE: MORE THAN HALF THE DAYS

## 2020-06-17 ASSESSMENT — PAIN SCALES - GENERAL: PAINLEVEL: MODERATE PAIN (5)

## 2020-06-17 NOTE — PROGRESS NOTES
"Jannet San is a 30 year old female who is being evaluated via a billable telephone visit.      The patient has been notified of following:     \"This telephone visit will be conducted via a call between you and your physician/provider. We have found that certain health care needs can be provided without the need for a physical exam.  This service lets us provide the care you need with a short phone conversation.  If a prescription is necessary we can send it directly to your pharmacy.  If lab work is needed we can place an order for that and you can then stop by our lab to have the test done at a later time.    Telephone visits are billed at different rates depending on your insurance coverage. During this emergency period, for some insurers they may be billed the same as an in-person visit.  Please reach out to your insurance provider with any questions.    If during the course of the call the physician/provider feels a telephone visit is not appropriate, you will not be charged for this service.\"    Patient has given verbal consent for Telephone visit?  Yes    What phone number would you like to be contacted at? [phone]      Phone call duration: 27 minutes        SUBJECTIVE / INTERIM HISTORY                                                                       Last visit December '19 at which time: Gave scripts for Vyvanse 30 mg twice daily and refilled today for Jan and for Feb. Continue Zoloft 150 mg daily.    - Billy home for most part except when Billy with his dad.   - parents home from [place] but home now on [place] and her and Billy are there  - Jan 22 (couple months ago)  was mediation. She got   to help her. They ended up in agreement so went better than she thought it would. Dad still gets weekend visits (should be public visits)  - still feels like Zoloft does help. A bit less anxious  -  Ronald Dominguez   - down even on her hobbies: disc golf and getting outside   - last July " ('19) when found out Billy's dad child porn. Charges still waiting on final stuff   - would like to move to [place]  - son Billy is 6    Symptoms: feelings guilt, overwhelmed, depressed mood, low energy, anhedonia, sleep issues, poor attention / concentration. Passive SI, no intent or plan.  MEDICAL / SURGICAL HISTORY                pregnant [if applicable]--no     Patient Active Problem List   Diagnosis     Major depressive disorder, recurrent episode, moderate (H)     Lumbago     Anxiety disorder     Migraine     Drug use disorder     Bilateral low back pain without sciatica     Bilateral low back pain with left-sided sciatica     Bipolar 2 disorder (H)     Fibromyalgia     Lump or mass in breast     Mastodynia     Encounter for gynecological examination without abnormal finding     Encounter for surveillance of contraceptives     ACP (advance care planning)     Ovulation pain     Family history of hemochromatosis     ADHD (attention deficit hyperactivity disorder), combined type     Chronic pain     Degeneration of lumbar or lumbosacral intervertebral disc     Osteoarthrosis, pelvic region and thigh     Other chronic cystitis     ALLERGY   Bee pollen  MEDICATIONS                                                                                             Current Outpatient Medications   Medication Sig     Acetaminophen (TYLENOL PO) Take 1,000 mg by mouth every 4 hours as needed      cyclobenzaprine (FLEXERIL) 10 MG tablet Take 1 tablet (10 mg) by mouth 3 times daily as needed for muscle spasms     EPINEPHrine (ANY BX GENERIC EQUIV) 0.3 MG/0.3ML injection 2-pack INJECT 0.3 MLS INTO THE MUSCLE AS NEEDED FOR ANAPHYLAXIS     lisdexamfetamine (VYVANSE) 30 MG capsule Take 1 capsule (30 mg) by mouth 2 times daily     MELATONIN PO Take 5 mg by mouth At Bedtime      sertraline (ZOLOFT) 100 MG tablet TAKE 1 & 1/2 TABLETS (150MG) BY MOUTH DAILY     triamcinolone (KENALOG) 0.1 % paste Take by mouth 2 times daily      Current Facility-Administered Medications   Medication     medroxyPROGESTERone (DEPO-PROVERA) injection 150 mg     Facility-Administered Medications Ordered in Other Visits   Medication     betamethasone acet & sod phos (CELESTONE) injection 12 mg     lidocaine 1 % injection 10 mL       VITALS   There were no vitals taken for this visit.     LABS                                                                                                                           No results found for any visits on 06/17/20.        DIAGNOSES        Major depressive disorder, recurrent, moderate  ADHD  Borderline personality disorder    ASSESSMENT: Jannet San is a 31 yo with ADHD - was working with Jeffamanda Woody, had testing. Depression, anxiety. For today we agreed on continuing meds as are. Increase of Zoloft helped.  We reviewed controlled substance contract and reviewed and signed and she submitted UDS. No changes and she feels Zoloft is helping.     PLAN                                                                                                                       1)  MEDICATIONS:    Refilled Vyvanse for next 3 months and filled 6/26, 7/24, 8/21. Continue Zoloft 150 mg daily.    2)  THERAPY: no longer working with Jeff Woody.     3)  LABS: none  4)  PT MONITOR [call for probs]: worsening symptoms , SI/HI  5)  REFERRALS [CD, medical, other]:  None  6)  RTC: ~3 months

## 2020-06-17 NOTE — NURSING NOTE
"Chief Complaint   Patient presents with     RECHECK     Telephone visit       Initial There were no vitals taken for this visit. Estimated body mass index is 27.51 kg/m  as calculated from the following:    Height as of 12/18/19: 1.816 m (5' 11.5\").    Weight as of 12/18/19: 90.7 kg (200 lb).  Medication Reconciliation: complete  OLGA KITCHEN LPN    "

## 2020-06-18 ASSESSMENT — ANXIETY QUESTIONNAIRES: GAD7 TOTAL SCORE: 10

## 2020-07-07 ENCOUNTER — OFFICE VISIT (OUTPATIENT)
Dept: FAMILY MEDICINE | Facility: OTHER | Age: 30
End: 2020-07-07
Attending: FAMILY MEDICINE
Payer: COMMERCIAL

## 2020-07-07 VITALS
DIASTOLIC BLOOD PRESSURE: 70 MMHG | WEIGHT: 232 LBS | HEART RATE: 77 BPM | OXYGEN SATURATION: 98 % | SYSTOLIC BLOOD PRESSURE: 124 MMHG | TEMPERATURE: 96.9 F | BODY MASS INDEX: 31.42 KG/M2 | HEIGHT: 72 IN | RESPIRATION RATE: 19 BRPM

## 2020-07-07 DIAGNOSIS — Z30.42 ENCOUNTER FOR SURVEILLANCE OF INJECTABLE CONTRACEPTIVE: ICD-10-CM

## 2020-07-07 DIAGNOSIS — M72.2 PLANTAR FASCIITIS, BILATERAL: Primary | ICD-10-CM

## 2020-07-07 DIAGNOSIS — R63.5 WEIGHT GAIN: ICD-10-CM

## 2020-07-07 LAB — TSH SERPL DL<=0.005 MIU/L-ACNC: 2.2 MU/L (ref 0.4–4)

## 2020-07-07 PROCEDURE — 36415 COLL VENOUS BLD VENIPUNCTURE: CPT | Mod: ZL | Performed by: FAMILY MEDICINE

## 2020-07-07 PROCEDURE — G0463 HOSPITAL OUTPT CLINIC VISIT: HCPCS | Mod: 25

## 2020-07-07 PROCEDURE — 96372 THER/PROPH/DIAG INJ SC/IM: CPT

## 2020-07-07 PROCEDURE — 99213 OFFICE O/P EST LOW 20 MIN: CPT | Performed by: FAMILY MEDICINE

## 2020-07-07 PROCEDURE — 84443 ASSAY THYROID STIM HORMONE: CPT | Mod: ZL | Performed by: FAMILY MEDICINE

## 2020-07-07 RX ORDER — NABUMETONE 500 MG/1
500 TABLET, FILM COATED ORAL 2 TIMES DAILY
Qty: 60 TABLET | Refills: 1 | Status: SHIPPED | OUTPATIENT
Start: 2020-07-07 | End: 2020-12-18

## 2020-07-07 RX ADMIN — MEDROXYPROGESTERONE ACETATE 150 MG: 150 INJECTION, SUSPENSION INTRAMUSCULAR at 10:39

## 2020-07-07 ASSESSMENT — PAIN SCALES - GENERAL: PAINLEVEL: NO PAIN (0)

## 2020-07-07 ASSESSMENT — MIFFLIN-ST. JEOR: SCORE: 1876.41
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
## 2020-07-07 NOTE — NURSING NOTE
"Chief Complaint   Patient presents with     Musculoskeletal Problem       Initial /70   Pulse 77   Temp 96.9  F (36.1  C) (Tympanic)   Resp 19   Ht 1.816 m (5' 11.5\")   Wt 105.2 kg (232 lb)   SpO2 98%   BMI 31.91 kg/m   Estimated body mass index is 31.91 kg/m  as calculated from the following:    Height as of this encounter: 1.816 m (5' 11.5\").    Weight as of this encounter: 105.2 kg (232 lb).  Medication Reconciliation: complete  Marium Lee LPN    "

## 2020-09-14 DIAGNOSIS — F31.81 BIPOLAR 2 DISORDER (H): ICD-10-CM

## 2020-09-15 RX ORDER — SERTRALINE HYDROCHLORIDE 100 MG/1
TABLET, FILM COATED ORAL
Qty: 45 TABLET | Refills: 2 | Status: SHIPPED | OUTPATIENT
Start: 2020-09-15 | End: 2020-10-05

## 2020-09-30 DIAGNOSIS — F90.2 ADHD (ATTENTION DEFICIT HYPERACTIVITY DISORDER), COMBINED TYPE: ICD-10-CM

## 2020-09-30 RX ORDER — LISDEXAMFETAMINE DIMESYLATE 30 MG/1
CAPSULE ORAL
Qty: 60 CAPSULE | Refills: 0 | Status: SHIPPED | OUTPATIENT
Start: 2020-09-30 | End: 2021-01-30

## 2020-09-30 NOTE — TELEPHONE ENCOUNTER
Gurvinder      Last Written Prescription Date:  6.1.2020  Last Fill Quantity: 60,   # refills: 0  Last Office Visit: 07.07.2020

## 2020-10-05 ENCOUNTER — OFFICE VISIT (OUTPATIENT)
Dept: PSYCHIATRY | Facility: OTHER | Age: 30
End: 2020-10-05
Attending: PSYCHIATRY & NEUROLOGY
Payer: COMMERCIAL

## 2020-10-05 VITALS
BODY MASS INDEX: 30.8 KG/M2 | DIASTOLIC BLOOD PRESSURE: 82 MMHG | SYSTOLIC BLOOD PRESSURE: 130 MMHG | WEIGHT: 220 LBS | HEART RATE: 95 BPM | TEMPERATURE: 98.1 F | OXYGEN SATURATION: 99 % | HEIGHT: 71 IN

## 2020-10-05 DIAGNOSIS — Z23 NEED FOR PROPHYLACTIC VACCINATION AND INOCULATION AGAINST INFLUENZA: ICD-10-CM

## 2020-10-05 DIAGNOSIS — F31.81 BIPOLAR 2 DISORDER (H): ICD-10-CM

## 2020-10-05 DIAGNOSIS — Z30.42 ENCOUNTER FOR SURVEILLANCE OF INJECTABLE CONTRACEPTIVE: Primary | ICD-10-CM

## 2020-10-05 DIAGNOSIS — F33.9 MAJOR DEPRESSIVE DISORDER, RECURRENT EPISODE WITH SEASONAL PATTERN (H): ICD-10-CM

## 2020-10-05 DIAGNOSIS — F90.2 ADHD (ATTENTION DEFICIT HYPERACTIVITY DISORDER), COMBINED TYPE: Primary | ICD-10-CM

## 2020-10-05 PROCEDURE — G0008 ADMIN INFLUENZA VIRUS VAC: HCPCS

## 2020-10-05 PROCEDURE — 99214 OFFICE O/P EST MOD 30 MIN: CPT | Performed by: PSYCHIATRY & NEUROLOGY

## 2020-10-05 PROCEDURE — G0463 HOSPITAL OUTPT CLINIC VISIT: HCPCS | Mod: 25

## 2020-10-05 RX ORDER — LISDEXAMFETAMINE DIMESYLATE 30 MG/1
30 CAPSULE ORAL 2 TIMES DAILY
Qty: 60 CAPSULE | Refills: 0 | Status: SHIPPED | OUTPATIENT
Start: 2020-10-28 | End: 2020-11-27

## 2020-10-05 RX ORDER — LISDEXAMFETAMINE DIMESYLATE 30 MG/1
30 CAPSULE ORAL 2 TIMES DAILY
Qty: 60 CAPSULE | Refills: 0 | Status: SHIPPED | OUTPATIENT
Start: 2020-11-24 | End: 2020-12-24

## 2020-10-05 RX ORDER — SERTRALINE HYDROCHLORIDE 100 MG/1
TABLET, FILM COATED ORAL
Qty: 45 TABLET | Refills: 11 | Status: SHIPPED | OUTPATIENT
Start: 2020-10-05 | End: 2021-10-18

## 2020-10-05 RX ORDER — LISDEXAMFETAMINE DIMESYLATE 30 MG/1
30 CAPSULE ORAL 2 TIMES DAILY
Qty: 60 CAPSULE | Refills: 0 | Status: SHIPPED | OUTPATIENT
Start: 2020-12-22 | End: 2021-01-11

## 2020-10-05 ASSESSMENT — PAIN SCALES - GENERAL: PAINLEVEL: NO PAIN (0)

## 2020-10-05 ASSESSMENT — ANXIETY QUESTIONNAIRES
7. FEELING AFRAID AS IF SOMETHING AWFUL MIGHT HAPPEN: MORE THAN HALF THE DAYS
6. BECOMING EASILY ANNOYED OR IRRITABLE: MORE THAN HALF THE DAYS
IF YOU CHECKED OFF ANY PROBLEMS ON THIS QUESTIONNAIRE, HOW DIFFICULT HAVE THESE PROBLEMS MADE IT FOR YOU TO DO YOUR WORK, TAKE CARE OF THINGS AT HOME, OR GET ALONG WITH OTHER PEOPLE: VERY DIFFICULT
1. FEELING NERVOUS, ANXIOUS, OR ON EDGE: SEVERAL DAYS
GAD7 TOTAL SCORE: 11
2. NOT BEING ABLE TO STOP OR CONTROL WORRYING: SEVERAL DAYS
3. WORRYING TOO MUCH ABOUT DIFFERENT THINGS: MORE THAN HALF THE DAYS
5. BEING SO RESTLESS THAT IT IS HARD TO SIT STILL: SEVERAL DAYS

## 2020-10-05 ASSESSMENT — MIFFLIN-ST. JEOR: SCORE: 1814.04

## 2020-10-05 NOTE — NURSING NOTE
"Chief Complaint   Patient presents with     RECHECK     Mental health.       Initial /82 (BP Location: Right arm, Patient Position: Sitting, Cuff Size: Adult Regular)   Pulse 95   Temp 98.1  F (36.7  C) (Tympanic)   Ht 1.803 m (5' 11\")   Wt 99.8 kg (220 lb)   SpO2 99%   BMI 30.68 kg/m   Estimated body mass index is 30.68 kg/m  as calculated from the following:    Height as of this encounter: 1.803 m (5' 11\").    Weight as of this encounter: 99.8 kg (220 lb).  Medication Reconciliation: complete  OLGA KITCHEN LPN    "

## 2020-10-05 NOTE — PROGRESS NOTES
SUBJECTIVE / INTERIM HISTORY                                                                       Last visit March '20:  Refilled Vyvanse for next 3 months Continue Zoloft 150 mg daily.      - Billy home for school given Covid  - Billy is a handful  - depression getting worse as it usually does as fall comes and then winter  - still feels like Zoloft does help. A bit less anxious  - had been walking a lot. Ronald Dominguez is 10 yo  - down even on her hobbies: disc golf and getting outside   - would like to move to [place]  - son Billy is 10 yo  - parents are in [place]. They were going to visit but right now not looking like it given corona virus     Symptoms: feelings guilt, overwhelmed, depressed mood, low energy, anhedonia, sleep issues, poor attention / concentration. Passive SI, no intent or plan.  MEDICAL / SURGICAL HISTORY                pregnant [if applicable]--no     Patient Active Problem List   Diagnosis     Major depressive disorder, recurrent episode, moderate (H)     Lumbago     Anxiety disorder     Migraine     Drug use disorder     Bilateral low back pain without sciatica     Bilateral low back pain with left-sided sciatica     Bipolar 2 disorder (H)     Fibromyalgia     Lump or mass in breast     Mastodynia     Encounter for gynecological examination without abnormal finding     Encounter for surveillance of contraceptives     ACP (advance care planning)     Ovulation pain     Family history of hemochromatosis     ADHD (attention deficit hyperactivity disorder), combined type     Chronic pain     Degeneration of lumbar or lumbosacral intervertebral disc     Osteoarthrosis, pelvic region and thigh     Other chronic cystitis     ALLERGY   Bee pollen  MEDICATIONS                                                                                             Current Outpatient Medications   Medication Sig     Acetaminophen (TYLENOL PO) Take 1,000 mg by mouth every 4 hours as needed      EPINEPHrine (ANY  "BX GENERIC EQUIV) 0.3 MG/0.3ML injection 2-pack INJECT 0.3 MLS INTO THE MUSCLE AS NEEDED FOR ANAPHYLAXIS     MELATONIN PO Take 5 mg by mouth At Bedtime      nabumetone (RELAFEN) 500 MG tablet Take 1 tablet (500 mg) by mouth 2 times daily     sertraline (ZOLOFT) 100 MG tablet TAKE 1 & 1/2 TABLETS (150MG) BY MOUTH DAILY     triamcinolone (KENALOG) 0.1 % paste Take by mouth 2 times daily     VYVANSE 30 MG capsule TAKE 1 CAPSULE BY MOUTH 2 TIMES A DAY     cyclobenzaprine (FLEXERIL) 10 MG tablet Take 1 tablet (10 mg) by mouth 3 times daily as needed for muscle spasms (Patient not taking: Reported on 7/7/2020)     No current facility-administered medications for this visit.      Facility-Administered Medications Ordered in Other Visits   Medication     betamethasone acet & sod phos (CELESTONE) injection 12 mg     lidocaine 1 % injection 10 mL       VITALS   /82   Pulse 95   Temp 98.1  F (36.7  C) (Tympanic)   Ht 1.803 m (5' 11\")   Wt 99.8 kg (220 lb)   SpO2 99%   BMI 30.68 kg/m       LABS                                                                                                                           No results found for any visits on 10/05/20.     Alert. Oriented to person, place, and date / time. Well groomed, calm, cooperative with good eye contact. No problems with speech or psychomotor behavior. Mood was depressed and affect was congruent to speech content and full range.  Thought process, including associations, was unremarkable and thought content was devoid homicidal ideation and psychotic thought. + SI with no plan or intent. No hallucinations. Insight was good. Judgment was intact and adequate for safety. Fund of knowledge was intact. Pt demonstrates no obvious problems with attention, concentration, language, recent or remote memory although these were not formally tested.        DIAGNOSES        Major depressive disorder, recurrent, moderate with seasonal component  ADHD  Borderline " personality disorder    ASSESSMENT: Jannet San is a 29 yo with ADHD - was working with [facility], had testing. Depression, anxiety. For today we agreed on continuing meds as are. Increase of Zoloft helped.  That being said she is experiencing more depression as the season is coming and I noted I think worth trying SAD lamp. We reviewed controlled substance contract and reviewed and signed and she submitted UDs. No changes and she feels Zoloft is helping other than I am going to write script for SAD lamp and send it o [facility].     PLAN                                                                                                                       1)  MEDICATIONS:    Script for SAD lamp and I will have it faxed to [facility]. Refilled Vyvanse for next 3 months. Last fill was 9/30/20 and filled for end Oct, end Nov. And for end Dec.  Continue Zoloft 150 mg daily.    2)  THERAPY: no longer working with [facility]y.     3)  LABS: none  4)  PT MONITOR [call for probs]: worsening symptoms , SI/HI  5)  REFERRALS [CD, medical, other]:  None  6)  RTC: ~3 months

## 2020-10-06 RX ORDER — MEDROXYPROGESTERONE ACETATE 150 MG/ML
150 INJECTION, SUSPENSION INTRAMUSCULAR
Status: DISCONTINUED | OUTPATIENT
Start: 2020-10-06 | End: 2020-12-18

## 2020-10-06 ASSESSMENT — ANXIETY QUESTIONNAIRES: GAD7 TOTAL SCORE: 11

## 2020-10-26 ENCOUNTER — NURSE TRIAGE (OUTPATIENT)
Dept: FAMILY MEDICINE | Facility: OTHER | Age: 30
End: 2020-10-26

## 2020-10-26 DIAGNOSIS — R30.0 DYSURIA: Primary | ICD-10-CM

## 2020-10-26 NOTE — TELEPHONE ENCOUNTER
Pt with symptoms of urgency, frequency, burning upon urination. Low Grade Temp 99.9 on 10/25/20. No vaginal discharge. Pt reports odor to urine. See protocol for details.     No openings on Dr. ELIO Matthews schedule for today, 10/26/20. Pt inquiring if Dr. Matthews can order a UA for lab only.     Please advise.       Additional Information    Negative: Shock suspected (e.g., cold/pale/clammy skin, too weak to stand, low BP, rapid pulse)    Negative: Sounds like a life-threatening emergency to the triager    Negative: Followed a genital area injury    Negative: Followed a genital area injury (penis, scrotum)    Negative: Vaginal discharge    Negative: Pus (white, yellow) or bloody discharge from end of penis    Negative: [1] Taking antibiotic for urinary tract infection (UTI) AND [2] female    Negative: [1] Taking antibiotic for urinary tract infection (UTI) AND [2] male    Negative: [1] Discomfort (pain, burning or stinging) when passing urine AND [2] pregnant    Negative: [1] Discomfort (pain, burning or stinging) when passing urine AND [2] postpartum (from 0 to 6 weeks after delivery)    Negative: [1] Discomfort (pain, burning or stinging) when passing urine AND [2] female    Negative: [1] Discomfort (pain, burning or stinging) when passing urine AND [2] male    Negative: Pain or itching in the vulvar area    Negative: Pain in scrotum is main symptom    Negative: Blood in the urine is main symptom    Negative: Symptoms arising from use of a urinary catheter ([place] or [place])    Negative: [1] Decreased urination and [2] drinking very little AND [2] dehydration suspected (e.g., dark urine, no urine > 12 hours, very dry mouth, very lightheaded)    Negative: Patient sounds very sick or weak to the triager    Negative: [1] Unable to urinate (or only a few drops) > 4 hours AND     [2] bladder feels very full (e.g., palpable bladder or strong urge to urinate)    Negative: Fever > 100.5 F (38.1 C)    Negative: Side (flank) or  "lower back pain present    Negative: [1] Can't control passage of urine (i.e., urinary incontinence) AND [2] new onset (< 2 weeks) or worsening    Urinating more frequently than usual (i.e., frequency)    Answer Assessment - Initial Assessment Questions  1. SYMPTOM: \"What's the main symptom you're concerned about?\" (e.g., frequency, incontinence)      Urgency and frequency and pain     2. ONSET: \"When did the  Urinary  start?\"      10/25/20    3. PAIN: \"Is there any pain?\" If so, ask: \"How bad is it?\" (Scale: 1-10; mild, moderate, severe)      Mild     4. CAUSE: \"What do you think is causing the symptoms?\"      UTI    5. OTHER SYMPTOMS: \"Do you have any other symptoms?\" (e.g., fever, flank pain, blood in urine, pain with urination)      Pain with urination, low grade temp on 10/25/20    6. PREGNANCY: \"Is there any chance you are pregnant?\" \"When was your last menstrual period?\"      No    Protocols used: URINARY SYMPTOMS-A-AH      "

## 2020-10-27 DIAGNOSIS — R30.0 DYSURIA: ICD-10-CM

## 2020-10-27 LAB
ALBUMIN UR-MCNC: NEGATIVE MG/DL
APPEARANCE UR: CLEAR
BACTERIA #/AREA URNS HPF: ABNORMAL /HPF
BILIRUB UR QL STRIP: NEGATIVE
COLOR UR AUTO: ABNORMAL
GLUCOSE UR STRIP-MCNC: NEGATIVE MG/DL
HGB UR QL STRIP: NEGATIVE
KETONES UR STRIP-MCNC: NEGATIVE MG/DL
LEUKOCYTE ESTERASE UR QL STRIP: ABNORMAL
MUCOUS THREADS #/AREA URNS LPF: PRESENT /LPF
NITRATE UR QL: NEGATIVE
PH UR STRIP: 5.5 PH (ref 4.7–8)
RBC #/AREA URNS AUTO: <1 /HPF (ref 0–2)
SOURCE: ABNORMAL
SP GR UR STRIP: 1.02 (ref 1–1.03)
SQUAMOUS #/AREA URNS AUTO: 4 /HPF (ref 0–1)
UROBILINOGEN UR STRIP-MCNC: NORMAL MG/DL (ref 0–2)
WBC #/AREA URNS AUTO: 4 /HPF (ref 0–5)

## 2020-10-27 PROCEDURE — 81001 URINALYSIS AUTO W/SCOPE: CPT | Mod: ZL | Performed by: FAMILY MEDICINE

## 2020-11-16 ENCOUNTER — HEALTH MAINTENANCE LETTER (OUTPATIENT)
Age: 30
End: 2020-11-16

## 2020-11-30 DIAGNOSIS — M54.16 LUMBAR RADICULOPATHY: ICD-10-CM

## 2020-11-30 RX ORDER — CYCLOBENZAPRINE HCL 10 MG
TABLET ORAL
Qty: 20 TABLET | Refills: 0 | Status: SHIPPED | OUTPATIENT
Start: 2020-11-30 | End: 2021-01-04

## 2020-11-30 NOTE — TELEPHONE ENCOUNTER
Cyclobenzaprine 10 mg      Last Written Prescription Date:  03/04/2020  Last Fill Quantity: 20,   # refills: 0  Last Office Visit: 07/07/2020  Future Office visit:

## 2020-12-18 ENCOUNTER — OFFICE VISIT (OUTPATIENT)
Dept: OBGYN | Facility: OTHER | Age: 30
End: 2020-12-18
Attending: FAMILY MEDICINE
Payer: COMMERCIAL

## 2020-12-18 VITALS
HEIGHT: 71 IN | BODY MASS INDEX: 32.2 KG/M2 | SYSTOLIC BLOOD PRESSURE: 132 MMHG | DIASTOLIC BLOOD PRESSURE: 79 MMHG | HEART RATE: 94 BPM | OXYGEN SATURATION: 99 % | WEIGHT: 230 LBS

## 2020-12-18 DIAGNOSIS — Z12.4 SCREENING FOR CERVICAL CANCER: ICD-10-CM

## 2020-12-18 DIAGNOSIS — Z30.011 ENCOUNTER FOR INITIAL PRESCRIPTION OF CONTRACEPTIVE PILLS: Primary | ICD-10-CM

## 2020-12-18 DIAGNOSIS — Z01.419 WELL WOMAN EXAM WITH ROUTINE GYNECOLOGICAL EXAM: ICD-10-CM

## 2020-12-18 DIAGNOSIS — Z11.3 SCREEN FOR STD (SEXUALLY TRANSMITTED DISEASE): ICD-10-CM

## 2020-12-18 LAB
SPECIMEN SOURCE: NORMAL
WET PREP SPEC: NORMAL

## 2020-12-18 PROCEDURE — 86803 HEPATITIS C AB TEST: CPT | Mod: ZL | Performed by: NURSE PRACTITIONER

## 2020-12-18 PROCEDURE — 87491 CHLMYD TRACH DNA AMP PROBE: CPT | Mod: ZL | Performed by: NURSE PRACTITIONER

## 2020-12-18 PROCEDURE — 36415 COLL VENOUS BLD VENIPUNCTURE: CPT | Mod: ZL | Performed by: NURSE PRACTITIONER

## 2020-12-18 PROCEDURE — 99395 PREV VISIT EST AGE 18-39: CPT | Performed by: NURSE PRACTITIONER

## 2020-12-18 PROCEDURE — G0463 HOSPITAL OUTPT CLINIC VISIT: HCPCS | Mod: 25 | Performed by: COUNSELOR

## 2020-12-18 PROCEDURE — G0123 SCREEN CERV/VAG THIN LAYER: HCPCS | Mod: ZL | Performed by: NURSE PRACTITIONER

## 2020-12-18 PROCEDURE — 87389 HIV-1 AG W/HIV-1&-2 AB AG IA: CPT | Mod: ZL | Performed by: NURSE PRACTITIONER

## 2020-12-18 PROCEDURE — 86780 TREPONEMA PALLIDUM: CPT | Mod: ZL | Performed by: NURSE PRACTITIONER

## 2020-12-18 PROCEDURE — 87591 N.GONORRHOEAE DNA AMP PROB: CPT | Mod: ZL | Performed by: NURSE PRACTITIONER

## 2020-12-18 PROCEDURE — 87624 HPV HI-RISK TYP POOLED RSLT: CPT | Mod: ZL | Performed by: NURSE PRACTITIONER

## 2020-12-18 PROCEDURE — 87210 SMEAR WET MOUNT SALINE/INK: CPT | Mod: ZL | Performed by: NURSE PRACTITIONER

## 2020-12-18 ASSESSMENT — PAIN SCALES - GENERAL: PAINLEVEL: NO PAIN (0)

## 2020-12-18 ASSESSMENT — MIFFLIN-ST. JEOR: SCORE: 1859.4

## 2020-12-18 ASSESSMENT — PATIENT HEALTH QUESTIONNAIRE - PHQ9: SUM OF ALL RESPONSES TO PHQ QUESTIONS 1-9: 9
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
## 2020-12-18 NOTE — NURSING NOTE
"Chief Complaint   Patient presents with     Gyn Exam       Initial /79 (BP Location: Right arm, Patient Position: Sitting, Cuff Size: Adult Large)   Pulse 94   Ht 1.803 m (5' 11\")   Wt 104.3 kg (230 lb)   SpO2 99%   BMI 32.08 kg/m   Estimated body mass index is 32.08 kg/m  as calculated from the following:    Height as of this encounter: 1.803 m (5' 11\").    Weight as of this encounter: 104.3 kg (230 lb).  Medication Reconciliation: complete     Melina Cummins LPN    "

## 2020-12-19 LAB
C TRACH DNA SPEC QL NAA+PROBE: NOT DETECTED
N GONORRHOEA DNA SPEC QL NAA+PROBE: NOT DETECTED
SPECIMEN SOURCE: NORMAL

## 2020-12-20 LAB — T PALLIDUM AB SER QL: NONREACTIVE

## 2020-12-21 LAB
HCV AB SERPL QL IA: NONREACTIVE
HIV 1+2 AB+HIV1 P24 AG SERPL QL IA: NONREACTIVE

## 2020-12-23 LAB
COPATH REPORT: NORMAL
PAP: NORMAL

## 2020-12-24 LAB
FINAL DIAGNOSIS: NORMAL
HPV HR 12 DNA CVX QL NAA+PROBE: NEGATIVE
HPV16 DNA SPEC QL NAA+PROBE: NEGATIVE
HPV18 DNA SPEC QL NAA+PROBE: NEGATIVE
SPECIMEN DESCRIPTION: NORMAL
SPECIMEN SOURCE CVX/VAG CYTO: NORMAL

## 2021-01-04 ENCOUNTER — MYC REFILL (OUTPATIENT)
Dept: FAMILY MEDICINE | Facility: OTHER | Age: 31
End: 2021-01-04

## 2021-01-04 DIAGNOSIS — M54.16 LUMBAR RADICULOPATHY: ICD-10-CM
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
## 2021-01-05 RX ORDER — CYCLOBENZAPRINE HCL 10 MG
10 TABLET ORAL 3 TIMES DAILY PRN
Qty: 20 TABLET | Refills: 0 | Status: SHIPPED | OUTPATIENT
Start: 2021-01-05 | End: 2021-02-02

## 2021-01-05 NOTE — TELEPHONE ENCOUNTER
flexeril      Last Written Prescription Date:  11/30/20  Last Fill Quantity: 20,   # refills: 0  Last Office Visit: 7/7/20  Future Office visit:    Next 5 appointments (look out 90 days)    Jan 11, 2021  4:00 PM  (Arrive by 3:45 PM)  PHYSICAL with Leona Matthews MD  [facility] [address]  [place] [address]  [phone]           Routing refill request to provider for review/approval because:  Drug not on the [facility], [facility] or [facility] refill protocol or controlled substance

## 2021-01-11 ENCOUNTER — OFFICE VISIT (OUTPATIENT)
Dept: FAMILY MEDICINE | Facility: OTHER | Age: 31
End: 2021-01-11
Attending: FAMILY MEDICINE
Payer: COMMERCIAL

## 2021-01-11 VITALS
BODY MASS INDEX: 34.36 KG/M2 | WEIGHT: 240 LBS | DIASTOLIC BLOOD PRESSURE: 78 MMHG | HEART RATE: 86 BPM | TEMPERATURE: 98.8 F | RESPIRATION RATE: 19 BRPM | SYSTOLIC BLOOD PRESSURE: 128 MMHG | OXYGEN SATURATION: 98 % | HEIGHT: 70 IN

## 2021-01-11 DIAGNOSIS — F90.2 ADHD (ATTENTION DEFICIT HYPERACTIVITY DISORDER), COMBINED TYPE: ICD-10-CM

## 2021-01-11 DIAGNOSIS — F31.81 BIPOLAR 2 DISORDER (H): ICD-10-CM

## 2021-01-11 DIAGNOSIS — R00.0 TACHYCARDIA: ICD-10-CM

## 2021-01-11 DIAGNOSIS — I95.1 ORTHOSTATIC HYPOTENSION: ICD-10-CM

## 2021-01-11 DIAGNOSIS — Z00.00 ROUTINE GENERAL MEDICAL EXAMINATION AT A HEALTH CARE FACILITY: Primary | ICD-10-CM

## 2021-01-11 DIAGNOSIS — B35.1 ONYCHOMYCOSIS: ICD-10-CM

## 2021-01-11 PROCEDURE — 99395 PREV VISIT EST AGE 18-39: CPT | Performed by: FAMILY MEDICINE

## 2021-01-11 PROCEDURE — G0463 HOSPITAL OUTPT CLINIC VISIT: HCPCS

## 2021-01-11 RX ORDER — PRENATAL VIT 91/IRON/FOLIC/DHA 28-975-200
COMBINATION PACKAGE (EA) ORAL 2 TIMES DAILY
Qty: 42 G | Refills: 3 | Status: SHIPPED | OUTPATIENT
Start: 2021-01-11 | End: 2021-06-28

## 2021-01-11 ASSESSMENT — ANXIETY QUESTIONNAIRES
IF YOU CHECKED OFF ANY PROBLEMS ON THIS QUESTIONNAIRE, HOW DIFFICULT HAVE THESE PROBLEMS MADE IT FOR YOU TO DO YOUR WORK, TAKE CARE OF THINGS AT HOME, OR GET ALONG WITH OTHER PEOPLE: SOMEWHAT DIFFICULT
1. FEELING NERVOUS, ANXIOUS, OR ON EDGE: SEVERAL DAYS
6. BECOMING EASILY ANNOYED OR IRRITABLE: SEVERAL DAYS
3. WORRYING TOO MUCH ABOUT DIFFERENT THINGS: SEVERAL DAYS
GAD7 TOTAL SCORE: 7
5. BEING SO RESTLESS THAT IT IS HARD TO SIT STILL: SEVERAL DAYS
7. FEELING AFRAID AS IF SOMETHING AWFUL MIGHT HAPPEN: SEVERAL DAYS
2. NOT BEING ABLE TO STOP OR CONTROL WORRYING: SEVERAL DAYS

## 2021-01-11 ASSESSMENT — PAIN SCALES - GENERAL: PAINLEVEL: NO PAIN (0)

## 2021-01-11 ASSESSMENT — MIFFLIN-ST. JEOR: SCORE: 1888.88

## 2021-01-11 ASSESSMENT — PATIENT HEALTH QUESTIONNAIRE - PHQ9
SUM OF ALL RESPONSES TO PHQ QUESTIONS 1-9: 7
5. POOR APPETITE OR OVEREATING: SEVERAL DAYS

## 2021-01-11 NOTE — NURSING NOTE
"Chief Complaint   Patient presents with     Physical       Initial /78   Pulse 86   Temp 98.8  F (37.1  C) (Tympanic)   Resp 19   Ht 1.778 m (5' 10\")   Wt 108.9 kg (240 lb)   SpO2 98%   BMI 34.44 kg/m   Estimated body mass index is 34.44 kg/m  as calculated from the following:    Height as of this encounter: 1.778 m (5' 10\").    Weight as of this encounter: 108.9 kg (240 lb).  Medication Reconciliation: complete  Marium Lee LPN    "

## 2021-01-12 ASSESSMENT — ANXIETY QUESTIONNAIRES: GAD7 TOTAL SCORE: 7

## 2021-01-29 DIAGNOSIS — F90.2 ADHD (ATTENTION DEFICIT HYPERACTIVITY DISORDER), COMBINED TYPE: ICD-10-CM

## 2021-01-29 NOTE — TELEPHONE ENCOUNTER
Vyvanse 30mg      Last Written Prescription Date:  9/30/20  Last Fill Quantity: 60,   # refills: 0  Last Office Visit: 10/5/20  Future Office visit:       Routing refill request to provider for review/approval because:

## 2021-01-30 RX ORDER — LISDEXAMFETAMINE DIMESYLATE 30 MG/1
CAPSULE ORAL
Qty: 60 CAPSULE | Refills: 0 | Status: SHIPPED | OUTPATIENT
Start: 2021-01-30 | End: 2021-03-01

## 2021-02-01 DIAGNOSIS — M54.16 LUMBAR RADICULOPATHY: ICD-10-CM

## 2021-02-02 RX ORDER — CYCLOBENZAPRINE HCL 10 MG
TABLET ORAL
Qty: 20 TABLET | Refills: 0 | Status: SHIPPED | OUTPATIENT
Start: 2021-02-02 | End: 2021-04-28

## 2021-02-02 NOTE — TELEPHONE ENCOUNTER
Flexeril 10 mg      Last Written Prescription Date:  1/5/21  Last Fill Quantity: 20,   # refills: 0  Last Office Visit:   Future Office visit:

## 2021-03-01 DIAGNOSIS — F90.2 ADHD (ATTENTION DEFICIT HYPERACTIVITY DISORDER), COMBINED TYPE: ICD-10-CM

## 2021-03-01 RX ORDER — LISDEXAMFETAMINE DIMESYLATE 30 MG/1
CAPSULE ORAL
Qty: 60 CAPSULE | Refills: 0 | Status: SHIPPED | OUTPATIENT
Start: 2021-03-01 | End: 2021-04-01

## 2021-03-01 NOTE — TELEPHONE ENCOUNTER
gustavo      Last Written Prescription Date:  1/30/21  Last Fill Quantity: 60,   # refills: 0  Last Office Visit: 1/11/21  Future Office visit:       Routing refill request to provider for review/approval because:  Drug not on the [facility], [facility] or [facility] refill protocol or controlled substance

## 2021-03-24 ENCOUNTER — IMMUNIZATION (OUTPATIENT)
Dept: FAMILY MEDICINE | Facility: OTHER | Age: 31
End: 2021-03-24
Attending: FAMILY MEDICINE
Payer: COMMERCIAL

## 2021-03-24 PROCEDURE — 91300 PR COVID VAC PFIZER DIL RECON 30 MCG/0.3 ML IM: CPT

## 2021-03-31 DIAGNOSIS — F90.2 ADHD (ATTENTION DEFICIT HYPERACTIVITY DISORDER), COMBINED TYPE: ICD-10-CM

## 2021-04-01 RX ORDER — LISDEXAMFETAMINE DIMESYLATE 30 MG/1
CAPSULE ORAL
Qty: 60 CAPSULE | Refills: 0 | Status: SHIPPED | OUTPATIENT
Start: 2021-04-01 | End: 2021-04-30

## 2021-04-01 NOTE — TELEPHONE ENCOUNTER
Not on protocol please advise.      VYVANSE 30 MG capsule      Last Written Prescription Date:  3/1/21  Last Fill Quantity: 60,   # refills: 0  Last Office Visit: 7/7/20  Future Office visit:       Routing refill request to provider for review/approval because:  Drug not on the [facility], [facility] or [facility] refill protocol or controlled substance

## 2021-04-12 ENCOUNTER — IMMUNIZATION (OUTPATIENT)
Dept: FAMILY MEDICINE | Facility: OTHER | Age: 31
End: 2021-04-12
Attending: FAMILY MEDICINE
Payer: COMMERCIAL

## 2021-04-12 PROCEDURE — 0002A PR COVID VAC PFIZER DIL RECON 30 MCG/0.3 ML IM: CPT

## 2021-04-28 DIAGNOSIS — M54.16 LUMBAR RADICULOPATHY: ICD-10-CM

## 2021-04-28 RX ORDER — CYCLOBENZAPRINE HCL 10 MG
TABLET ORAL
Qty: 20 TABLET | Refills: 0 | Status: SHIPPED | OUTPATIENT
Start: 2021-04-28 | End: 2021-07-07

## 2021-04-28 NOTE — TELEPHONE ENCOUNTER
flexeril      Last Written Prescription Date:  2/2/21  Last Fill Quantity: 20,   # refills: 0  Last Office Visit: 1/11/21  Future Office visit:

## 2021-04-30 ENCOUNTER — MYC REFILL (OUTPATIENT)
Dept: PSYCHIATRY | Facility: OTHER | Age: 31
End: 2021-04-30

## 2021-04-30 ENCOUNTER — TELEPHONE (OUTPATIENT)
Dept: PSYCHIATRY | Facility: OTHER | Age: 31
End: 2021-04-30

## 2021-04-30 DIAGNOSIS — F90.2 ADHD (ATTENTION DEFICIT HYPERACTIVITY DISORDER), COMBINED TYPE: ICD-10-CM

## 2021-04-30 RX ORDER — LISDEXAMFETAMINE DIMESYLATE 30 MG/1
30 CAPSULE ORAL EVERY MORNING
Qty: 60 CAPSULE | Refills: 0 | Status: SHIPPED | OUTPATIENT
Start: 2021-04-30 | End: 2021-04-30

## 2021-04-30 RX ORDER — LISDEXAMFETAMINE DIMESYLATE 30 MG/1
30 CAPSULE ORAL 2 TIMES DAILY
Qty: 60 CAPSULE | Refills: 0 | Status: SHIPPED | OUTPATIENT
Start: 2021-04-30 | End: 2021-05-31

## 2021-04-30 NOTE — TELEPHONE ENCOUNTER
Contacted [facility] for verification on Vyvanse 30 mg.  Script sent reads for today:  take 1 capsule (30 mg) every morning.  Previous script states take 1 capsule (30 mg) 2 times daily.  Pharmacy wondering if the 30 mg every morning was an error.  Vyvanse was verified with pharmacy for 30 mg 2 times daily  (dispense 60 capsules with 0 refills)  Thank you, please let me know if there is a change.  cj

## 2021-04-30 NOTE — TELEPHONE ENCOUNTER
Lisdexamfetamine (Vyvanse) 30 mg capsule     Take 1 capsule by mouth 2 times daily   Last Written Prescription Date:  4-1-21  Last Fill Quantity: 60 capsule,   # refills: 0  Last Office Visit: 10-5-20  Future Office visit:   0

## 2021-05-19 DIAGNOSIS — T63.441A BEE STING REACTION, ACCIDENTAL OR UNINTENTIONAL, INITIAL ENCOUNTER: ICD-10-CM

## 2021-05-19 RX ORDER — EPINEPHRINE 0.3 MG/.3ML
INJECTION SUBCUTANEOUS
Qty: 2 EACH | Refills: 0 | OUTPATIENT
Start: 2021-05-19

## 2021-05-28 DIAGNOSIS — F90.2 ADHD (ATTENTION DEFICIT HYPERACTIVITY DISORDER), COMBINED TYPE: ICD-10-CM

## 2021-05-30 NOTE — TELEPHONE ENCOUNTER
VYVANSE 30 MG capsule     Last Written Prescription Date:  4/30/21  Last Fill Quantity: 60,   # refills: 0  Last Office Visit: 1/11/21  Future Office visit:

## 2021-05-31 RX ORDER — LISDEXAMFETAMINE DIMESYLATE 30 MG/1
CAPSULE ORAL
Qty: 60 CAPSULE | Refills: 0 | Status: SHIPPED | OUTPATIENT
Start: 2021-05-31 | End: 2021-06-30

## 2021-06-21 ENCOUNTER — APPOINTMENT (OUTPATIENT)
Dept: GENERAL RADIOLOGY | Facility: HOSPITAL | Age: 31
End: 2021-06-21
Attending: NURSE PRACTITIONER
Payer: COMMERCIAL

## 2021-06-21 ENCOUNTER — HOSPITAL ENCOUNTER (EMERGENCY)
Facility: HOSPITAL | Age: 31
Discharge: HOME | End: 2021-06-21
Attending: NURSE PRACTITIONER | Admitting: NURSE PRACTITIONER
Payer: COMMERCIAL

## 2021-06-21 VITALS
TEMPERATURE: 98.6 F | DIASTOLIC BLOOD PRESSURE: 101 MMHG | RESPIRATION RATE: 16 BRPM | SYSTOLIC BLOOD PRESSURE: 137 MMHG | OXYGEN SATURATION: 98 % | HEART RATE: 76 BPM

## 2021-06-21 DIAGNOSIS — M25.561 RIGHT KNEE PAIN: ICD-10-CM

## 2021-06-21 PROCEDURE — G0463 HOSPITAL OUTPT CLINIC VISIT: HCPCS

## 2021-06-21 PROCEDURE — 99213 OFFICE O/P EST LOW 20 MIN: CPT | Performed by: NURSE PRACTITIONER

## 2021-06-21 PROCEDURE — 73562 X-RAY EXAM OF KNEE 3: CPT | Mod: RT

## 2021-06-21 NOTE — ED TRIAGE NOTES
Pt states on Wednesday she tripped and hurt right knee. Pain continues and pt states clicking noise noted when she  Straightens her right leg.

## 2021-06-21 NOTE — ED NOTES
Pt states she is seeing a therapist for her depression and has no thoughts of hurting herself today. States will come back if feeling get worse.

## 2021-06-21 NOTE — ED TRIAGE NOTES
Pt presents with right knee pain . States that she fell while hiking on uneven ground on Wednesday.
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
## 2021-06-21 NOTE — DISCHARGE INSTRUCTIONS
Use soft knee brace as needed to help with swelling and stability of knee while up and moving around.     Alternate tylenol and ibuprofen.    Ibuprofen 600 to 800 mg (3 - 4 tabs of over the counter med) every six to eight hours as needed; not to exceed maximum amount of 3200 mg in 24 hours.  Tylenol 650 to 1000 mg every four to six hours as needed (not to exceed more than 4000 mg in a 24 hour period).   May use interchangeably. Suggest medicating around the clock for the next 24-48 hours.    Seek immediate medical care with any new or worsening symptoms.

## 2021-06-25 ENCOUNTER — TELEPHONE (OUTPATIENT)
Dept: FAMILY MEDICINE | Facility: OTHER | Age: 31
End: 2021-06-25

## 2021-06-25 ENCOUNTER — HOSPITAL ENCOUNTER (EMERGENCY)
Facility: HOSPITAL | Age: 31
Discharge: LEFT BEFORE DISCHARGE | End: 2021-06-25
Admitting: NURSE PRACTITIONER
Payer: COMMERCIAL

## 2021-06-25 PROCEDURE — 999N000104 HC STATISTIC NO CHARGE

## 2021-06-25 NOTE — TELEPHONE ENCOUNTER
"Patient calling and wanting to get in sooner than 06/28 for knee pain. Patient crying on phone due to knee pain and stated \"I was at  this morning, but the triage nurse told me there was nothing further they could do for me so I left\". Patient was seen in the  on 06/21/21 and advised to return if she had any new or worsening symptoms. Patient is scheduled to see PCP on 06/28. Advised patient we do not have any openings today in [place]. Offered patient an appointment at the [facility] this afternoon, but patient declined due to timing and \"MRI will be closed by then\". Advised patient to continue with ibuprofen and tylenol, rest, and ice. Advised patient she could go back to ER/ or be seen at another /ER. Patient verbalized understanding.    5 days of right knee pain following injury to it as she fell on it while hiking. Has been walking on it since, mild improvement in pain. CMS intact. X-ray negative for acute findings. Suspect right knee strain/contusion.         Use soft knee brace as needed to help with swelling and stability of knee while up and moving around. She agrees to pick one up OTC that is not as bulky. Encouraged not to use all the time, as needs to continue with gentle ROM of knee joint.       Alternate tylenol and ibuprofen.  Ice, elevate, compression.   Ibuprofen 600 to 800 mg (3 - 4 tabs of over the counter med) every six to eight hours as needed; not to exceed maximum amount of 3200 mg in 24 hours.  Tylenol 650 to 1000 mg every four to six hours as needed (not to exceed more than 4000 mg in a 24 hour period).   May use interchangeably. Suggest medicating around the clock for the next 24-48 hours.     Instructed to follow up in clinic in 5-7 days regarding this Urgent Care visit.   If any new or worsening symptoms, told to seek immediate medical care in ER. If any worsening symptoms, or no improvement within 1 week, may need MRI of knee.   Patient agrees with plan of care and " verbalizes understanding.

## 2021-06-28 ENCOUNTER — OFFICE VISIT (OUTPATIENT)
Dept: FAMILY MEDICINE | Facility: OTHER | Age: 31
End: 2021-06-28
Attending: FAMILY MEDICINE
Payer: COMMERCIAL

## 2021-06-28 VITALS
SYSTOLIC BLOOD PRESSURE: 128 MMHG | WEIGHT: 237 LBS | BODY MASS INDEX: 34.01 KG/M2 | DIASTOLIC BLOOD PRESSURE: 80 MMHG | OXYGEN SATURATION: 98 % | TEMPERATURE: 97.3 F | HEART RATE: 82 BPM | RESPIRATION RATE: 16 BRPM

## 2021-06-28 DIAGNOSIS — F33.1 MAJOR DEPRESSIVE DISORDER, RECURRENT EPISODE, MODERATE (H): ICD-10-CM

## 2021-06-28 DIAGNOSIS — F31.81 BIPOLAR 2 DISORDER (H): ICD-10-CM

## 2021-06-28 DIAGNOSIS — M25.561 ACUTE PAIN OF RIGHT KNEE: Primary | ICD-10-CM

## 2021-06-28 PROCEDURE — G0463 HOSPITAL OUTPT CLINIC VISIT: HCPCS

## 2021-06-28 PROCEDURE — 99213 OFFICE O/P EST LOW 20 MIN: CPT | Performed by: FAMILY MEDICINE

## 2021-06-28 ASSESSMENT — ANXIETY QUESTIONNAIRES
6. BECOMING EASILY ANNOYED OR IRRITABLE: SEVERAL DAYS
7. FEELING AFRAID AS IF SOMETHING AWFUL MIGHT HAPPEN: SEVERAL DAYS
GAD7 TOTAL SCORE: 6
IF YOU CHECKED OFF ANY PROBLEMS ON THIS QUESTIONNAIRE, HOW DIFFICULT HAVE THESE PROBLEMS MADE IT FOR YOU TO DO YOUR WORK, TAKE CARE OF THINGS AT HOME, OR GET ALONG WITH OTHER PEOPLE: SOMEWHAT DIFFICULT
5. BEING SO RESTLESS THAT IT IS HARD TO SIT STILL: NOT AT ALL
1. FEELING NERVOUS, ANXIOUS, OR ON EDGE: SEVERAL DAYS
3. WORRYING TOO MUCH ABOUT DIFFERENT THINGS: SEVERAL DAYS
2. NOT BEING ABLE TO STOP OR CONTROL WORRYING: SEVERAL DAYS

## 2021-06-28 ASSESSMENT — PATIENT HEALTH QUESTIONNAIRE - PHQ9
5. POOR APPETITE OR OVEREATING: SEVERAL DAYS
SUM OF ALL RESPONSES TO PHQ QUESTIONS 1-9: 8

## 2021-06-28 ASSESSMENT — PAIN SCALES - GENERAL: PAINLEVEL: NO PAIN (0)
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
## 2021-06-28 NOTE — NURSING NOTE
"Chief Complaint   Patient presents with     Musculoskeletal Problem       Initial /80   Pulse 82   Temp 97.3  F (36.3  C)   Resp 16   Wt 107.5 kg (237 lb)   SpO2 98%   BMI 34.01 kg/m   Estimated body mass index is 34.01 kg/m  as calculated from the following:    Height as of 1/11/21: 1.778 m (5' 10\").    Weight as of this encounter: 107.5 kg (237 lb).  Medication Reconciliation: complete  Christy Martin LPN  "

## 2021-06-29 ASSESSMENT — ANXIETY QUESTIONNAIRES: GAD7 TOTAL SCORE: 6

## 2021-06-30 DIAGNOSIS — F90.2 ADHD (ATTENTION DEFICIT HYPERACTIVITY DISORDER), COMBINED TYPE: ICD-10-CM

## 2021-06-30 RX ORDER — LISDEXAMFETAMINE DIMESYLATE 30 MG/1
CAPSULE ORAL
Qty: 60 CAPSULE | Refills: 0 | Status: SHIPPED | OUTPATIENT
Start: 2021-06-30 | End: 2021-07-30

## 2021-06-30 NOTE — TELEPHONE ENCOUNTER
Vyvanse 30mg      Last Written Prescription Date:  5/31/21  Last Fill Quantity: 60,   # refills: 0  Last Office Visit: 6/28/21  Future Office visit:       Routing refill request to provider for review/approval because:

## 2021-07-03 ENCOUNTER — HOSPITAL ENCOUNTER (OUTPATIENT)
Dept: MRI IMAGING | Facility: OTHER | Age: 31
Discharge: HOME | End: 2021-07-03
Attending: FAMILY MEDICINE | Admitting: FAMILY MEDICINE
Payer: COMMERCIAL

## 2021-07-03 ENCOUNTER — TRANSFERRED RECORDS (OUTPATIENT)
Dept: HEALTH INFORMATION MANAGEMENT | Facility: CLINIC | Age: 31
End: 2021-07-03

## 2021-07-03 DIAGNOSIS — M25.561 ACUTE PAIN OF RIGHT KNEE: ICD-10-CM

## 2021-07-03 PROCEDURE — 73721 MRI JNT OF LWR EXTRE W/O DYE: CPT | Mod: RT

## 2021-07-07 ENCOUNTER — MYC REFILL (OUTPATIENT)
Dept: FAMILY MEDICINE | Facility: OTHER | Age: 31
End: 2021-07-07

## 2021-07-07 DIAGNOSIS — M54.16 LUMBAR RADICULOPATHY: ICD-10-CM

## 2021-07-07 RX ORDER — CYCLOBENZAPRINE HCL 10 MG
10 TABLET ORAL 3 TIMES DAILY PRN
Qty: 20 TABLET | Refills: 0 | Status: SHIPPED | OUTPATIENT
Start: 2021-07-07 | End: 2021-08-20

## 2021-07-07 NOTE — TELEPHONE ENCOUNTER
Flexeril 10mg      Last Written Prescription Date:  4/28/21  Last Fill Quantity: 20,   # refills: 0  Last Office Visit: 6/28/21  Future Office visit:       Routing refill request to provider for review/approval because:      
Normal rate, regular rhythm.  Heart sounds S1, S2.  No murmurs, rubs or gallops.

## 2021-07-12 ENCOUNTER — MYC MEDICAL ADVICE (OUTPATIENT)
Dept: FAMILY MEDICINE | Facility: OTHER | Age: 31
End: 2021-07-12

## 2021-07-12 ENCOUNTER — TELEPHONE (OUTPATIENT)
Dept: FAMILY MEDICINE | Facility: OTHER | Age: 31
End: 2021-07-12

## 2021-07-12 DIAGNOSIS — M25.561 ACUTE PAIN OF RIGHT KNEE: ICD-10-CM

## 2021-07-12 DIAGNOSIS — M25.561 RIGHT KNEE PAIN: Primary | ICD-10-CM

## 2021-07-20 ENCOUNTER — OFFICE VISIT (OUTPATIENT)
Dept: PSYCHOLOGY | Facility: OTHER | Age: 31
End: 2021-07-20
Attending: FAMILY MEDICINE
Payer: COMMERCIAL

## 2021-07-20 DIAGNOSIS — F41.9 ANXIETY AND DEPRESSION: Primary | ICD-10-CM

## 2021-07-20 DIAGNOSIS — F32.A ANXIETY AND DEPRESSION: Primary | ICD-10-CM

## 2021-07-20 PROCEDURE — 90837 PSYTX W PT 60 MINUTES: CPT | Performed by: COUNSELOR
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
## 2021-07-22 ASSESSMENT — PATIENT HEALTH QUESTIONNAIRE - PHQ9
5. POOR APPETITE OR OVEREATING: MORE THAN HALF THE DAYS
SUM OF ALL RESPONSES TO PHQ QUESTIONS 1-9: 8

## 2021-07-22 ASSESSMENT — ANXIETY QUESTIONNAIRES
3. WORRYING TOO MUCH ABOUT DIFFERENT THINGS: SEVERAL DAYS
7. FEELING AFRAID AS IF SOMETHING AWFUL MIGHT HAPPEN: SEVERAL DAYS
GAD7 TOTAL SCORE: 11
5. BEING SO RESTLESS THAT IT IS HARD TO SIT STILL: MORE THAN HALF THE DAYS
2. NOT BEING ABLE TO STOP OR CONTROL WORRYING: SEVERAL DAYS
1. FEELING NERVOUS, ANXIOUS, OR ON EDGE: MORE THAN HALF THE DAYS
6. BECOMING EASILY ANNOYED OR IRRITABLE: MORE THAN HALF THE DAYS

## 2021-07-23 ASSESSMENT — ANXIETY QUESTIONNAIRES: GAD7 TOTAL SCORE: 11

## 2021-07-30 ENCOUNTER — MYC REFILL (OUTPATIENT)
Dept: FAMILY MEDICINE | Facility: OTHER | Age: 31
End: 2021-07-30

## 2021-07-30 DIAGNOSIS — F90.2 ADHD (ATTENTION DEFICIT HYPERACTIVITY DISORDER), COMBINED TYPE: ICD-10-CM

## 2021-07-30 RX ORDER — LISDEXAMFETAMINE DIMESYLATE 30 MG/1
30 CAPSULE ORAL 2 TIMES DAILY
Qty: 60 CAPSULE | Refills: 0 | Status: SHIPPED | OUTPATIENT
Start: 2021-07-30 | End: 2021-08-30

## 2021-07-30 NOTE — TELEPHONE ENCOUNTER
gustavo      Last Written Prescription Date:  6/30/21  Last Fill Quantity: 60,   # refills: 0  Last Office Visit: 6/28/21  Future Office visit:

## 2021-08-20 ENCOUNTER — MYC REFILL (OUTPATIENT)
Dept: FAMILY MEDICINE | Facility: OTHER | Age: 31
End: 2021-08-20

## 2021-08-20 DIAGNOSIS — M54.16 LUMBAR RADICULOPATHY: ICD-10-CM

## 2021-08-20 NOTE — TELEPHONE ENCOUNTER
Flexeril      Last Written Prescription Date:  7/7/21  Last Fill Quantity: 20,   # refills: 0  Last Office Visit: 6/28/21  Future Office visit:

## 2021-08-22 RX ORDER — CYCLOBENZAPRINE HCL 10 MG
10 TABLET ORAL 3 TIMES DAILY PRN
Qty: 20 TABLET | Refills: 0 | Status: SHIPPED | OUTPATIENT
Start: 2021-08-22 | End: 2021-09-24

## 2021-08-30 ENCOUNTER — MYC REFILL (OUTPATIENT)
Dept: FAMILY MEDICINE | Facility: OTHER | Age: 31
End: 2021-08-30

## 2021-08-30 DIAGNOSIS — F90.2 ADHD (ATTENTION DEFICIT HYPERACTIVITY DISORDER), COMBINED TYPE: ICD-10-CM

## 2021-08-30 NOTE — ANESTHESIA CARE TRANSFER NOTE
Patient: Jannet San    Procedure(s):  DIAGNOSTIC LAPAROSCOPY WITH IRRIGATION OF PELVIS - Wound Class: II-Clean Contaminated    Diagnosis: PELVIC PAIN IN FEMALE  Diagnosis Additional Information: No value filed.    Anesthesia Type:   General, ETT     Note:  Airway :Nasal Cannula  Patient transferred to:PACU        Vitals: (Last set prior to Anesthesia Care Transfer)              Electronically Signed By: FLACO Brenner CRNA  August 16, 2017  1:43 PM   No bruits; no thyromegaly or nodules

## 2021-08-31 ENCOUNTER — MYC REFILL (OUTPATIENT)
Dept: FAMILY MEDICINE | Facility: OTHER | Age: 31
End: 2021-08-31

## 2021-08-31 DIAGNOSIS — F90.2 ADHD (ATTENTION DEFICIT HYPERACTIVITY DISORDER), COMBINED TYPE: ICD-10-CM

## 2021-08-31 RX ORDER — LISDEXAMFETAMINE DIMESYLATE 30 MG/1
30 CAPSULE ORAL 2 TIMES DAILY
Qty: 60 CAPSULE | Refills: 0 | Status: CANCELLED | OUTPATIENT
Start: 2021-08-31

## 2021-09-01 DIAGNOSIS — F90.2 ADHD (ATTENTION DEFICIT HYPERACTIVITY DISORDER), COMBINED TYPE: ICD-10-CM

## 2021-09-01 RX ORDER — LISDEXAMFETAMINE DIMESYLATE 30 MG/1
30 CAPSULE ORAL 2 TIMES DAILY
Qty: 60 CAPSULE | Refills: 0 | Status: SHIPPED | OUTPATIENT
Start: 2021-09-01 | End: 2021-09-28

## 2021-09-01 RX ORDER — LISDEXAMFETAMINE DIMESYLATE 30 MG/1
CAPSULE ORAL
Qty: 60 CAPSULE | Refills: 0 | OUTPATIENT
Start: 2021-09-01

## 2021-09-01 NOTE — TELEPHONE ENCOUNTER
gustavo      Last Written Prescription Date:  today  Last Fill Quantity: 60,   # refills: 0  Last Office Visit: 6/28/21  Future Office visit:       Routing refill request to provider for review/approval because:  Drug not on the [facility], [facility] or [facility] refill protocol or controlled substance

## 2021-09-01 NOTE — TELEPHONE ENCOUNTER
Anders      Last Written Prescription Date:  7.30.21  Last Fill Quantity: #30,   # refills: 0  Last Office Visit: 6.28.21  Future Office visit:       Routing refill request to provider for review/approval because:  Drug not on the [facility], [facility] or [facility] refill protocol or controlled substance

## 2021-09-12 ENCOUNTER — HEALTH MAINTENANCE LETTER (OUTPATIENT)
Age: 31
End: 2021-09-12

## 2021-09-24 ENCOUNTER — MYC REFILL (OUTPATIENT)
Dept: FAMILY MEDICINE | Facility: OTHER | Age: 31
End: 2021-09-24

## 2021-09-24 DIAGNOSIS — M54.16 LUMBAR RADICULOPATHY: ICD-10-CM

## 2021-09-24 RX ORDER — CYCLOBENZAPRINE HCL 10 MG
10 TABLET ORAL 3 TIMES DAILY PRN
Qty: 20 TABLET | Refills: 0 | Status: SHIPPED | OUTPATIENT
Start: 2021-09-24 | End: 2021-10-26

## 2021-09-24 NOTE — TELEPHONE ENCOUNTER
Flexeril  Last Written Prescription Date: 8/22/21  Last Fill Quantity: 20 # of Refills: 0  Last Office Visit: 6/28/21

## 2021-09-28 ENCOUNTER — MYC REFILL (OUTPATIENT)
Dept: FAMILY MEDICINE | Facility: OTHER | Age: 31
End: 2021-09-28

## 2021-09-28 DIAGNOSIS — F90.2 ADHD (ATTENTION DEFICIT HYPERACTIVITY DISORDER), COMBINED TYPE: ICD-10-CM

## 2021-09-30 ENCOUNTER — E-VISIT (OUTPATIENT)
Dept: FAMILY MEDICINE | Facility: OTHER | Age: 31
End: 2021-09-30
Attending: FAMILY MEDICINE
Payer: COMMERCIAL

## 2021-09-30 DIAGNOSIS — J06.9 VIRAL URI: Primary | ICD-10-CM

## 2021-09-30 RX ORDER — LISDEXAMFETAMINE DIMESYLATE 30 MG/1
30 CAPSULE ORAL 2 TIMES DAILY
Qty: 60 CAPSULE | Refills: 0 | Status: SHIPPED | OUTPATIENT
Start: 2021-09-30 | End: 2021-10-28

## 2021-09-30 NOTE — TELEPHONE ENCOUNTER
gustavo      Last Written Prescription Date:  9/1/21  Last Fill Quantity: 60,   # refills: 0  Last Office Visit: 6/28/21  Future Office visit:

## 2021-10-01 ENCOUNTER — NURSE TRIAGE (OUTPATIENT)
Dept: FAMILY MEDICINE | Facility: OTHER | Age: 31
End: 2021-10-01

## 2021-10-01 ENCOUNTER — OFFICE VISIT (OUTPATIENT)
Dept: FAMILY MEDICINE | Facility: OTHER | Age: 31
End: 2021-10-01
Attending: FAMILY MEDICINE
Payer: COMMERCIAL

## 2021-10-01 DIAGNOSIS — Z20.822 SUSPECTED 2019 NOVEL CORONAVIRUS INFECTION: Primary | ICD-10-CM

## 2021-10-01 DIAGNOSIS — Z20.822 SUSPECTED 2019 NOVEL CORONAVIRUS INFECTION: ICD-10-CM

## 2021-10-01 PROCEDURE — U0005 INFEC AGEN DETEC AMPLI PROBE: HCPCS | Mod: ZL

## 2021-10-01 NOTE — TELEPHONE ENCOUNTER
ELECTRONIC VISIT DOCUMENTATION:    SUBJECTIVE:  Note above accurately captures the substance of my conversation with the patient.    ASSESSMENT/ PLAN:  There are no diagnoses linked to this encounter.    Provider E-Visit time total (minutes): 5

## 2021-10-01 NOTE — PATIENT INSTRUCTIONS
Jannet,  We only have rapid tests for use for those who will be admitted to the hospital,  however you can be tested by PCR in the clinic by calling the scheduling line and be scheduled for a test. Time for results varies from same day to a couple of days depending on the volume of testing done.   Call the scheduling line to be tested. I think it is good to be cautious with your son as you are doing.   Dr Barry

## 2021-10-01 NOTE — TELEPHONE ENCOUNTER
Reason for Disposition    [1] COVID-19 infection suspected by caller or triager AND [2] mild symptoms (cough, fever, or others) AND [3] no complications or SOB    Additional Information    Negative: SEVERE difficulty breathing (e.g., struggling for each breath, speaks in single words)    Negative: Difficult to awaken or acting confused (e.g., disoriented, slurred speech)    Negative: Bluish (or gray) lips or face now    Negative: Shock suspected (e.g., cold/pale/clammy skin, too weak to stand, low BP, rapid pulse)    Negative: Sounds like a life-threatening emergency to the triager    Negative: [1] COVID-19 exposure AND [2] has not completed COVID-19 vaccine series AND [3] no symptoms    Negative: [1] COVID-19 exposure AND [2] completed COVID-19 vaccine series (fully vaccinated) AND [3] no symptoms    Negative: COVID-19 vaccine reaction suspected (e.g., fever, headache, muscle aches) occurring during days 1-3 after getting vaccine    Negative: COVID-19 vaccine, questions about    Negative: [1] COVID-19 vaccine series completed (fully vaccinated) in past 3 months AND [2] new-onset of COVID-19 symptoms BUT [3] no known exposure    Negative: [1] Had lab test confirmed COVID-19 infection within last 3 monthsAND [2] new-onset of COVID-19 symptoms BUT [3] no known exposure    Negative: [1] Lives with someone known to have influenza (flu test positive) AND [2] flu-like symptoms (e.g., cough, runny nose, sore throat, SOB; with or without fever)    Negative: [1] Adult with possible COVID-19 symptoms AND [2] triager concerned about severity of symptoms or other causes    Negative: COVID-19 and breastfeeding, questions about    Negative: SEVERE or constant chest pain or pressure (Exception: mild central chest pain, present only when coughing)    Negative: MODERATE difficulty breathing (e.g., speaks in phrases, SOB even at rest, pulse 100-120)    Negative: [1] Headache AND [2] stiff neck (can't touch chin to chest)     "Negative: MILD difficulty breathing (e.g., minimal/no SOB at rest, SOB with walking, pulse <100)    Negative: Chest pain or pressure    Negative: Patient sounds very sick or weak to the triager    Negative: Fever > 103 F (39.4 C)    Negative: [1] Fever > 101 F (38.3 C) AND [2] age > 60 years    Negative: [1] Fever > 100.0 F (37.8 C) AND [2] bedridden (e.g., nursing home patient, CVA, chronic illness, recovering from surgery)    Negative: [1] HIGH RISK patient (e.g., age > 64 years, diabetes, heart or lung disease, weak immune system) AND [2] new or worsening symptoms    Negative: [1] HIGH RISK patient AND [2] influenza is widespread in the community AND [3] ONE OR MORE respiratory symptoms: cough, sore throat, runny or stuffy nose    Negative: [1] HIGH RISK patient AND [2] influenza exposure within the last 7 days AND [3] ONE OR MORE respiratory symptoms: cough, sore throat, runny or stuffy nose    Negative: Fever present > 3 days (72 hours)    Negative: [1] Fever returns after gone for over 24 hours AND [2] symptoms worse or not improved    Negative: [1] Continuous (nonstop) coughing interferes with work or school AND [2] no improvement using cough treatment per protocol    Answer Assessment - Initial Assessment Questions  1. COVID-19 DIAGNOSIS: \"Who made your Coronavirus (COVID-19) diagnosis?\" \"Was it confirmed by a positive lab test?\" If not diagnosed by a HCP, ask \"Are there lots of cases (community spread) where you live?\" (See public health department website, if unsure)      Not confirmed  2. COVID-19 EXPOSURE: \"Was there any known exposure to COVID before the symptoms began?\" CDC Definition of close contact: within 6 feet (2 meters) for a total of 15 minutes or more over a 24-hour period.       no  3. ONSET: \"When did the COVID-19 symptoms start?\"       wednesday  4. WORST SYMPTOM: \"What is your worst symptom?\" (e.g., cough, fever, shortness of breath, muscle aches)      Drainage, congestion  5. COUGH: \"Do " "you have a cough?\" If so, ask: \"How bad is the cough?\"        yes  6. FEVER: \"Do you have a fever?\" If so, ask: \"What is your temperature, how was it measured, and when did it start?\"      no  7. RESPIRATORY STATUS: \"Describe your breathing?\" (e.g., shortness of breath, wheezing, unable to speak)       Slight SOB last night  8. BETTER-SAME-WORSE: \"Are you getting better, staying the same or getting worse compared to yesterday?\"  If getting worse, ask, \"In what way?\"      same  9. HIGH RISK DISEASE: \"Do you have any chronic medical problems?\" (e.g., asthma, heart or lung disease, weak immune system, obesity, etc.)      no  10. PREGNANCY: \"Is there any chance you are pregnant?\" \"When was your last menstrual period?\"        no  11. OTHER SYMPTOMS: \"Do you have any other symptoms?\"  (e.g., chills, fatigue, headache, loss of smell or taste, muscle pain, sore throat; new loss of smell or taste especially support the diagnosis of COVID-19)        Congestion, loss of smell and taste    Protocols used: CORONAVIRUS (COVID-19) DIAGNOSED OR [id number] 3.25      "

## 2021-10-03 LAB — SARS-COV-2 RNA RESP QL NAA+PROBE: NEGATIVE

## 2021-10-15 DIAGNOSIS — F31.81 BIPOLAR 2 DISORDER (H): ICD-10-CM

## 2021-10-18 RX ORDER — SERTRALINE HYDROCHLORIDE 100 MG/1
TABLET, FILM COATED ORAL
Qty: 45 TABLET | Refills: 11 | Status: SHIPPED | OUTPATIENT
Start: 2021-10-18 | End: 2022-11-14

## 2021-10-18 RX ORDER — SERTRALINE HYDROCHLORIDE 100 MG/1
TABLET, FILM COATED ORAL
Qty: 45 TABLET | Refills: 0 | Status: SHIPPED | OUTPATIENT
Start: 2021-10-18 | End: 2021-10-18

## 2021-10-26 ENCOUNTER — MYC REFILL (OUTPATIENT)
Dept: FAMILY MEDICINE | Facility: OTHER | Age: 31
End: 2021-10-26

## 2021-10-26 DIAGNOSIS — M54.16 LUMBAR RADICULOPATHY: ICD-10-CM

## 2021-10-28 ENCOUNTER — MYC REFILL (OUTPATIENT)
Dept: FAMILY MEDICINE | Facility: OTHER | Age: 31
End: 2021-10-28

## 2021-10-28 DIAGNOSIS — F90.2 ADHD (ATTENTION DEFICIT HYPERACTIVITY DISORDER), COMBINED TYPE: ICD-10-CM

## 2021-10-28 RX ORDER — LISDEXAMFETAMINE DIMESYLATE 30 MG/1
30 CAPSULE ORAL 2 TIMES DAILY
Qty: 60 CAPSULE | Refills: 0 | Status: SHIPPED | OUTPATIENT
Start: 2021-10-28 | End: 2021-11-28

## 2021-10-28 RX ORDER — CYCLOBENZAPRINE HCL 10 MG
10 TABLET ORAL 3 TIMES DAILY PRN
Qty: 20 TABLET | Refills: 0 | Status: SHIPPED | OUTPATIENT
Start: 2021-10-28 | End: 2022-11-04

## 2021-10-28 NOTE — TELEPHONE ENCOUNTER
cyclobenzaprine (FLEXERIL) 10 MG tablet      Last Written Prescription Date:  9/24/2021  Last Fill Quantity: 20,   # refills: 0  Last Office Visit: 6/28/2021  Future Office visit:       Routing refill request to provider for review/approval because:  Drug not on the [facility], [facility] or [facility] refill protocol or controlled substance

## 2021-10-28 NOTE — TELEPHONE ENCOUNTER
Anders      Last Written Prescription Date:  9/30/21  Last Fill Quantity: 60,   # refills: 0  Last Office Visit: 9/30/21  Future Office visit:       Routing refill request to provider for review/approval because:

## 2021-11-08 ENCOUNTER — IMMUNIZATION (OUTPATIENT)
Dept: FAMILY MEDICINE | Facility: OTHER | Age: 31
End: 2021-11-08
Attending: FAMILY MEDICINE
Payer: COMMERCIAL

## 2021-11-08 PROCEDURE — 91300 PR COVID VAC PFIZER DIL RECON 30 MCG/0.3 ML IM: CPT

## 2021-11-28 ENCOUNTER — MYC REFILL (OUTPATIENT)
Dept: FAMILY MEDICINE | Facility: OTHER | Age: 31
End: 2021-11-28
Payer: COMMERCIAL

## 2021-11-28 DIAGNOSIS — F90.2 ADHD (ATTENTION DEFICIT HYPERACTIVITY DISORDER), COMBINED TYPE: ICD-10-CM

## 2021-11-30 RX ORDER — LISDEXAMFETAMINE DIMESYLATE 30 MG/1
30 CAPSULE ORAL 2 TIMES DAILY
Qty: 60 CAPSULE | Refills: 0 | Status: SHIPPED | OUTPATIENT
Start: 2021-11-30 | End: 2021-12-29

## 2021-11-30 NOTE — TELEPHONE ENCOUNTER
Anders      Last Written Prescription Date:  10/28/21  Last Fill Quantity: 60,   # refills: 0  Last Office Visit: 6/28/21  Future Office visit:       Routing refill request to provider for review/approval because:

## 2021-12-11 ENCOUNTER — TELEPHONE (OUTPATIENT)
Dept: FAMILY MEDICINE | Facility: OTHER | Age: 31
End: 2021-12-11
Payer: COMMERCIAL

## 2021-12-11 NOTE — TELEPHONE ENCOUNTER
Pt stated that she is unsure who to Est Care w/and is only taking Flexeril as needed. Pt has a hard time w/change & making her mind up as who to Est Care with so the patient will call when she is ready to schedule an appt.

## 2021-12-11 NOTE — TELEPHONE ENCOUNTER
Called pt to try & setup a new est care appt but pts vmail is NOT setup @ this time & to try again @ a later time.

## 2021-12-29 ENCOUNTER — MYC REFILL (OUTPATIENT)
Dept: FAMILY MEDICINE | Facility: OTHER | Age: 31
End: 2021-12-29
Payer: COMMERCIAL

## 2021-12-29 DIAGNOSIS — F90.2 ADHD (ATTENTION DEFICIT HYPERACTIVITY DISORDER), COMBINED TYPE: ICD-10-CM

## 2021-12-30 RX ORDER — LISDEXAMFETAMINE DIMESYLATE 30 MG/1
30 CAPSULE ORAL 2 TIMES DAILY
Qty: 60 CAPSULE | Refills: 0 | Status: SHIPPED | OUTPATIENT
Start: 2021-12-30 | End: 2022-01-30

## 2021-12-30 NOTE — TELEPHONE ENCOUNTER
Anders      Last Written Prescription Date:  11/30/21  Last Fill Quantity: 60,   # refills: 0  Last Office Visit: 9/30/21  Future Office visit:       Routing refill request to provider for review/approval because:

## 2022-01-28 DIAGNOSIS — Z30.011 ENCOUNTER FOR INITIAL PRESCRIPTION OF CONTRACEPTIVE PILLS: ICD-10-CM

## 2022-01-30 ENCOUNTER — MYC REFILL (OUTPATIENT)
Dept: FAMILY MEDICINE | Facility: OTHER | Age: 32
End: 2022-01-30
Payer: COMMERCIAL

## 2022-01-30 DIAGNOSIS — F90.2 ADHD (ATTENTION DEFICIT HYPERACTIVITY DISORDER), COMBINED TYPE: ICD-10-CM

## 2022-01-31 NOTE — TELEPHONE ENCOUNTER
Patient is overdue for an Annual Exam. Please get her scheduled as she will not receive any refills until this is set up, thank you.

## 2022-01-31 NOTE — TELEPHONE ENCOUNTER
Needs new PCP    Vyvanse      Last Written Prescription Date:  12.30.21  Last Fill Quantity: #60,   # refills: 0  Last Office Visit: 6.28.21  Future Office visit:       Routing refill request to provider for review/approval because:  Drug not on the [facility], [facility] or [facility] refill protocol or controlled substance

## 2022-02-01 ENCOUNTER — PATIENT OUTREACH (OUTPATIENT)
Dept: CARE COORDINATION | Facility: OTHER | Age: 32
End: 2022-02-01
Payer: COMMERCIAL

## 2022-02-01 RX ORDER — LISDEXAMFETAMINE DIMESYLATE 30 MG/1
30 CAPSULE ORAL 2 TIMES DAILY
Qty: 60 CAPSULE | Refills: 0 | Status: SHIPPED | OUTPATIENT
Start: 2022-02-01 | End: 2022-09-13

## 2022-02-01 NOTE — PROGRESS NOTES
Clinic Care Coordination Contact  Care Team Conversations    Received message from Dr Hawkins requesting that this writer reach out to patient. She is due for a follow up appt. Called and spoke with patient. Scheduled for follow up appt 2/11/22.     Stephanie Live, RN-BSN  Behavioral Health Care Coordinator  [phone] option 7

## 2022-02-04 RX ORDER — NORETHINDRONE AND ETHINYL ESTRADIOL
KIT
Qty: 112 TABLET | Refills: 0 | Status: SHIPPED | OUTPATIENT
Start: 2022-02-04 | End: 2022-02-11

## 2022-02-11 ENCOUNTER — OFFICE VISIT (OUTPATIENT)
Dept: OBGYN | Facility: OTHER | Age: 32
End: 2022-02-11
Attending: NURSE PRACTITIONER
Payer: COMMERCIAL

## 2022-02-11 VITALS
OXYGEN SATURATION: 98 % | BODY MASS INDEX: 35.36 KG/M2 | WEIGHT: 247 LBS | SYSTOLIC BLOOD PRESSURE: 129 MMHG | HEART RATE: 90 BPM | HEIGHT: 70 IN | DIASTOLIC BLOOD PRESSURE: 70 MMHG

## 2022-02-11 DIAGNOSIS — Z12.4 SCREENING FOR CERVICAL CANCER: Primary | ICD-10-CM

## 2022-02-11 DIAGNOSIS — Z30.011 ENCOUNTER FOR INITIAL PRESCRIPTION OF CONTRACEPTIVE PILLS: ICD-10-CM

## 2022-02-11 DIAGNOSIS — N89.8 VAGINAL ITCHING: ICD-10-CM

## 2022-02-11 DIAGNOSIS — Z01.419 WELL WOMAN EXAM WITH ROUTINE GYNECOLOGICAL EXAM: ICD-10-CM

## 2022-02-11 LAB
CLUE CELLS: ABNORMAL
TRICHOMONAS, WET PREP: ABNORMAL
WBC'S/HIGH POWER FIELD, WET PREP: ABNORMAL
YEAST, WET PREP: ABNORMAL

## 2022-02-11 PROCEDURE — 87624 HPV HI-RISK TYP POOLED RSLT: CPT | Mod: ZL | Performed by: NURSE PRACTITIONER

## 2022-02-11 PROCEDURE — G0463 HOSPITAL OUTPT CLINIC VISIT: HCPCS | Mod: 25 | Performed by: COUNSELOR

## 2022-02-11 PROCEDURE — 99395 PREV VISIT EST AGE 18-39: CPT | Performed by: NURSE PRACTITIONER

## 2022-02-11 PROCEDURE — G0145 SCR C/V CYTO,THINLAYER,RESCR: HCPCS | Mod: ZL | Performed by: NURSE PRACTITIONER

## 2022-02-11 PROCEDURE — 87210 SMEAR WET MOUNT SALINE/INK: CPT | Mod: ZL | Performed by: NURSE PRACTITIONER

## 2022-02-11 RX ORDER — NORETHINDRONE AND ETHINYL ESTRADIOL
KIT
Qty: 112 TABLET | Refills: 3 | Status: SHIPPED | OUTPATIENT
Start: 2022-02-11 | End: 2023-03-24

## 2022-02-11 ASSESSMENT — MIFFLIN-ST. JEOR: SCORE: 1915.63

## 2022-02-11 ASSESSMENT — PATIENT HEALTH QUESTIONNAIRE - PHQ9: SUM OF ALL RESPONSES TO PHQ QUESTIONS 1-9: 8

## 2022-02-11 ASSESSMENT — PAIN SCALES - GENERAL: PAINLEVEL: NO PAIN (0)

## 2022-02-11 NOTE — PROGRESS NOTES
"ANNUAL    Jannet San is a 31 year old P1 here today for an annual exam. Pt currently using GLENNY for contraception with no concerns. Reports vaginal spotting only occurring when pills are missed. Reports intermittent vaginal itching occurring for the past year. Denies burning or odor. Not currently sexually active. Declines STI screening.      Reports navel itching and irritation since laparoscopy in 2017. Currently using neosporin to decrease symptoms with no improvement.     Reports mood is stable. States her son's mental health is a large stressor in her life. Does say she has thoughts of hurting herself monthly when her son causes increased stress. Denies a plan. Declines therapy or need for further resources at this time.     Former PCP now retired; Plans to schedule with PCP to complete the remainder of health needs.     GENERAL: Denies fever, chills, change in weight, heat/cold intolerance.  PSYCH: Denies significant changes in mood.   INTEGUMENTARY: Denies skin changes, rashes, lesions. Denies hair changes.  HEENT: Denies headaches, changes in vision/hearing.  BREAST: Denies pain, masses, nipple discharge, or skin changes.  RESPIRATORY: Denies difficulty breathing, cough, or chest pain.  CARDIAC: Denies palpitations and dizziness.   ABDOMEN: Denies abdominal pain, changes in bowel habits, or changes in appetite.   GENITOURINARY: Denies dysuria, dyspareunia, irregular vaginal bleeding.   MUSCULOSKELETAL: Denies weakness and numbness/tingling.     EXAM    /70 (BP Location: Right arm, Patient Position: Sitting, Cuff Size: Adult Large)   Pulse 90   Ht 1.778 m (5' 10\")   Wt 112 kg (247 lb)   SpO2 98%   BMI 35.44 kg/m     BMI: Body mass index is 35.44 kg/m .  CONSTITUTIONAL: healthy, alert and no distress.  PSYCH: Affect appropriate. PHQ-9: 8   INTEGUMENTARY: No irregular nevi, rashes, or lesions.  HEENT: Normocephalic. No masses, lesions, tenderness or abnormalities. Trachea midline. No adenopathy. " Thyroid symmetric, normal size.   BREAST: Fibrocystic changes present. No tenderness upon palpation. No skin changes or nipple discharge.   CARDIOVASCULAR: Regular S1, S2. No murmurs detected.   RESPIRATORY: Vesicular breath sounds noted in all lobes bilaterally.   ABDOMEN: Erythema of umbilicus present; skin intact.   :  External: normal hair distribution, no lesions noted.  Internal: Mucosa pink, moist, and intact. Rugae present. Copious clear/white discharge present without odor. Cervix without erythema or tenderness.   Bimanual: ovaries nonpalpable and nontender. Uterus midline without tenderness.   RECTAL: Deferred      PLAN    1. Well woman exam with routine gynecological exam    - Screening for cervical cancer: A pap thin layer screen with  HPV  - Educated to discontinue use of neosporin for navel itching/irritation. Advised use of OTC hydrocortisone cream to decrease symptoms.  - Continue care with Nilsa Adams for mental health medication management.     2. Encounter for prescription of contraceptive pills   Refilled for 1 year      4. Vaginal itching    - Wet prep  - Reviewed common vaginal irritants and vaginal hygiene. Continue using fragrance free skin products and detergents. Advised to wear cotton underwear and consider changing every 4-6 hrs due to increased perspiration.     Return to clinic annually for well woman care and as needed.

## 2022-02-11 NOTE — NURSING NOTE
"Chief Complaint   Patient presents with     Gyn Exam       Initial /70 (BP Location: Right arm, Patient Position: Sitting, Cuff Size: Adult Large)   Pulse 90   Ht 1.778 m (5' 10\")   Wt 112 kg (247 lb)   SpO2 98%   BMI 35.44 kg/m   Estimated body mass index is 35.44 kg/m  as calculated from the following:    Height as of this encounter: 1.778 m (5' 10\").    Weight as of this encounter: 112 kg (247 lb).  Medication Reconciliation: complete     Melina Cummins LPN    "

## 2022-02-15 LAB
BKR LAB AP GYN ADEQUACY: NORMAL
BKR LAB AP GYN INTERPRETATION: NORMAL
BKR LAB AP HPV REFLEX: NORMAL
BKR LAB AP PREVIOUS ABNORMAL: NORMAL
PATH REPORT.COMMENTS IMP SPEC: NORMAL
PATH REPORT.COMMENTS IMP SPEC: NORMAL
PATH REPORT.RELEVANT HX SPEC: NORMAL

## 2022-02-17 LAB
HUMAN PAPILLOMA VIRUS 16 DNA: NEGATIVE
HUMAN PAPILLOMA VIRUS 18 DNA: NEGATIVE
HUMAN PAPILLOMA VIRUS FINAL DIAGNOSIS: ABNORMAL
HUMAN PAPILLOMA VIRUS OTHER HR: POSITIVE

## 2022-02-18 ENCOUNTER — VIRTUAL VISIT (OUTPATIENT)
Dept: PSYCHIATRY | Facility: OTHER | Age: 32
End: 2022-02-18
Attending: PSYCHIATRY & NEUROLOGY
Payer: COMMERCIAL

## 2022-02-18 VITALS — BODY MASS INDEX: 34.58 KG/M2 | WEIGHT: 247 LBS | HEIGHT: 71 IN

## 2022-02-18 DIAGNOSIS — F90.2 ADHD (ATTENTION DEFICIT HYPERACTIVITY DISORDER), COMBINED TYPE: Primary | ICD-10-CM

## 2022-02-18 PROCEDURE — 99214 OFFICE O/P EST MOD 30 MIN: CPT | Mod: 95 | Performed by: PSYCHIATRY & NEUROLOGY

## 2022-02-18 RX ORDER — LISDEXAMFETAMINE DIMESYLATE 30 MG/1
30 CAPSULE ORAL DAILY
Qty: 30 CAPSULE | Refills: 0 | Status: SHIPPED | OUTPATIENT
Start: 2022-04-15 | End: 2022-03-03 | Stop reason: DRUGHIGH

## 2022-02-18 RX ORDER — LISDEXAMFETAMINE DIMESYLATE 30 MG/1
30 CAPSULE ORAL DAILY
Qty: 30 CAPSULE | Refills: 0 | Status: SHIPPED | OUTPATIENT
Start: 2022-03-18 | End: 2022-03-03 | Stop reason: DRUGHIGH

## 2022-02-18 RX ORDER — LISDEXAMFETAMINE DIMESYLATE 30 MG/1
30 CAPSULE ORAL DAILY
Qty: 30 CAPSULE | Refills: 0 | Status: SHIPPED | OUTPATIENT
Start: 2022-02-18 | End: 2022-03-03 | Stop reason: DRUGHIGH

## 2022-02-18 ASSESSMENT — ANXIETY QUESTIONNAIRES
6. BECOMING EASILY ANNOYED OR IRRITABLE: SEVERAL DAYS
3. WORRYING TOO MUCH ABOUT DIFFERENT THINGS: SEVERAL DAYS
IF YOU CHECKED OFF ANY PROBLEMS ON THIS QUESTIONNAIRE, HOW DIFFICULT HAVE THESE PROBLEMS MADE IT FOR YOU TO DO YOUR WORK, TAKE CARE OF THINGS AT HOME, OR GET ALONG WITH OTHER PEOPLE: VERY DIFFICULT
5. BEING SO RESTLESS THAT IT IS HARD TO SIT STILL: SEVERAL DAYS
4. TROUBLE RELAXING: SEVERAL DAYS
7. FEELING AFRAID AS IF SOMETHING AWFUL MIGHT HAPPEN: SEVERAL DAYS
2. NOT BEING ABLE TO STOP OR CONTROL WORRYING: SEVERAL DAYS
GAD7 TOTAL SCORE: 7
1. FEELING NERVOUS, ANXIOUS, OR ON EDGE: SEVERAL DAYS

## 2022-02-18 ASSESSMENT — PATIENT HEALTH QUESTIONNAIRE - PHQ9: SUM OF ALL RESPONSES TO PHQ QUESTIONS 1-9: 9

## 2022-02-18 ASSESSMENT — PAIN SCALES - GENERAL: PAINLEVEL: MILD PAIN (2)
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
## 2022-02-18 NOTE — NURSING NOTE
"Chief Complaint   Patient presents with     RECHECK       Initial Ht 1.803 m (5' 11\")   Wt 112 kg (247 lb)   BMI 34.45 kg/m   Estimated body mass index is 34.45 kg/m  as calculated from the following:    Height as of this encounter: 1.803 m (5' 11\").    Weight as of this encounter: 112 kg (247 lb).  Medication Reconciliation: complete  Nancy Moreno LPN    "

## 2022-02-19 ASSESSMENT — ANXIETY QUESTIONNAIRES: GAD7 TOTAL SCORE: 7

## 2022-03-03 ENCOUNTER — MYC MEDICAL ADVICE (OUTPATIENT)
Dept: PSYCHIATRY | Facility: OTHER | Age: 32
End: 2022-03-03
Payer: COMMERCIAL

## 2022-03-03 DIAGNOSIS — F90.2 ADHD (ATTENTION DEFICIT HYPERACTIVITY DISORDER), COMBINED TYPE: ICD-10-CM

## 2022-03-04 RX ORDER — LISDEXAMFETAMINE DIMESYLATE 30 MG/1
30 CAPSULE ORAL 2 TIMES DAILY
Qty: 60 CAPSULE | Refills: 0 | Status: SHIPPED | OUTPATIENT
Start: 2022-04-15 | End: 2022-09-13

## 2022-03-04 RX ORDER — LISDEXAMFETAMINE DIMESYLATE 30 MG/1
30 CAPSULE ORAL 2 TIMES DAILY
Qty: 60 CAPSULE | Refills: 0 | Status: SHIPPED | OUTPATIENT
Start: 2022-03-17 | End: 2022-08-29

## 2022-03-06 ENCOUNTER — E-VISIT (OUTPATIENT)
Dept: URGENT CARE | Facility: URGENT CARE | Age: 32
End: 2022-03-06
Payer: COMMERCIAL

## 2022-03-06 DIAGNOSIS — J32.9 SINUSITIS, UNSPECIFIED CHRONICITY, UNSPECIFIED LOCATION: Primary | ICD-10-CM

## 2022-03-06 PROCEDURE — 99207 PR NO CHARGE LOS: CPT | Performed by: EMERGENCY MEDICINE

## 2022-03-06 RX ORDER — GUAIFENESIN 1200 MG/1
1200 TABLET, EXTENDED RELEASE ORAL 2 TIMES DAILY
Qty: 12 TABLET | Refills: 0 | Status: SHIPPED | OUTPATIENT
Start: 2022-03-06 | End: 2022-09-13

## 2022-03-06 RX ORDER — FLUTICASONE PROPIONATE 50 MCG
1 SPRAY, SUSPENSION (ML) NASAL DAILY
Qty: 9.9 ML | Refills: 0 | Status: SHIPPED | OUTPATIENT
Start: 2022-03-06 | End: 2022-03-09

## 2022-03-06 NOTE — PATIENT INSTRUCTIONS
You may want to try a nasal lavage (also known as nasal irrigation). You can find over-the-counter products, such as Neti-Pot, at retail locations or make your own at home. Instructions for homemade nasal lavage and more information on the process are available online at http://www.aafp.org/afp/2009/1115/p1121.html.    Dear Jannet San    After reviewing your responses, I've been able to diagnose you with?a sinus infection caused by a virus.? >90% of sinus infections are viral and DO NOT respond to antibiotics. I suspect with the other symptoms you are experiencing you likely have a viral upper respiratory infection.     Based on your responses and diagnosis, I have prescribed Flonase and Mucinex to treat your symptoms. I have sent this to your pharmacy.?     It is also important to stay well hydrated, get lots of rest and take over-the-counter decongestants,?tylenol?or ibuprofen if you?are able to?take those medications per your primary care provider to help relieve discomfort.?     It is important that you take?all of?your prescribed medication even if your symptoms are improving after a few doses.? Taking?all of?your medicine helps prevent the symptoms from returning.?     If your symptoms worsen, you develop severe headache, vomiting, high fever (>102), or are not improving in 5 days, please contact your primary care provider for an appointment or visit any of our convenient Walk-in Care or Urgent Care Centers to be seen which can be found on our website?here.?     Thanks again for choosing?us?as your health care partner,?   ?  Salomon Dailey PA-C?

## 2022-05-18 DIAGNOSIS — F90.2 ADHD (ATTENTION DEFICIT HYPERACTIVITY DISORDER), COMBINED TYPE: Primary | ICD-10-CM

## 2022-05-20 NOTE — TELEPHONE ENCOUNTER
Anders - pt of Dr. Hawkins - no PCP      Last Written Prescription Date:  4.15.22  Last Fill Quantity: #60,   # refills: 0  Last Office Visit: 2.18.22  Future Office visit:    Next 5 appointments (look out 90 days)    Jul 12, 2022  3:20 PM  (Arrive by 3:05 PM)  Return Visit with Nilsa Hawkins MD  [facility] ([facility] ) [address] [place] [id number] [phone]           Routing refill request to provider for review/approval because:  Drug not on the [facility], [facility] or  Health refill protocol or controlled substance

## 2022-05-23 RX ORDER — LISDEXAMFETAMINE DIMESYLATE 30 MG/1
CAPSULE ORAL
Qty: 60 CAPSULE | Refills: 0 | Status: SHIPPED | OUTPATIENT
Start: 2022-05-23 | End: 2022-06-23

## 2022-06-23 ENCOUNTER — MYC REFILL (OUTPATIENT)
Dept: PSYCHIATRY | Facility: OTHER | Age: 32
End: 2022-06-23

## 2022-06-23 DIAGNOSIS — F90.2 ADHD (ATTENTION DEFICIT HYPERACTIVITY DISORDER), COMBINED TYPE: ICD-10-CM

## 2022-06-27 RX ORDER — LISDEXAMFETAMINE DIMESYLATE 30 MG/1
30 CAPSULE ORAL 2 TIMES DAILY
Qty: 60 CAPSULE | Refills: 0 | Status: SHIPPED | OUTPATIENT
Start: 2022-06-27 | End: 2022-07-26

## 2022-06-27 NOTE — TELEPHONE ENCOUNTER
Lisdexamfetamine (Vyvanse) 30 mg capsule  Take 1 capsule by mouth 2 times daily  Last Written Prescription Date:  5-  Last Fill Quantity: 60 capsule,   # refills: 0  Last Office Visit: 2-  Future Office visit:    Next 5 appointments (look out 90 days)    Jul 12, 2022  3:20 PM  (Arrive by 3:05 PM)  Return Visit with Nilsa Hawkins MD  [facility] ([facility] ) [address] [place] [id number] [phone]

## 2022-07-06 NOTE — ED NOTES
Urine Culture - growth of Escherichia coli, ESBL, reviewed by BERE Hardy.  Per MARIANA Anaya pt to stop Cipro, start Macrobid 100 mg PO 2 x daily x 7 days and follow up with PCP.  TC to patient, notified of urine culture results, antibiotic changes and follow up recommended.  Pt stated still having intermittent fever, but pain is less.  Advised patient she should return to ED if having increased symptoms or concerns.  She stated she will start the new antibiotic and follow up with PCP or return to ED if needed. Rx  to [facility], [place] per patient request.   Ear Star Wedge Flap Text: The defect edges were debeveled with a #15 blade scalpel.  Given the location of the defect and the proximity to free margins (helical rim) an ear star wedge flap was deemed most appropriate.  Using a sterile surgical marker, the appropriate flap was drawn incorporating the defect and placing the expected incisions between the helical rim and antihelix where possible.  The area thus outlined was incised through and through with a #15 scalpel blade.

## 2022-07-26 ENCOUNTER — MYC REFILL (OUTPATIENT)
Dept: PSYCHIATRY | Facility: OTHER | Age: 32
End: 2022-07-26

## 2022-07-26 DIAGNOSIS — F90.2 ADHD (ATTENTION DEFICIT HYPERACTIVITY DISORDER), COMBINED TYPE: ICD-10-CM

## 2022-07-26 RX ORDER — LISDEXAMFETAMINE DIMESYLATE 30 MG/1
30 CAPSULE ORAL 2 TIMES DAILY
Qty: 60 CAPSULE | Refills: 0 | Status: SHIPPED | OUTPATIENT
Start: 2022-07-26 | End: 2022-09-13

## 2022-07-28 PROCEDURE — 88305 TISSUE EXAM BY PATHOLOGIST: CPT | Mod: TC | Performed by: OPHTHALMOLOGY

## 2022-07-28 PROCEDURE — 88305 TISSUE EXAM BY PATHOLOGIST: CPT | Mod: 26 | Performed by: PATHOLOGY

## 2022-08-01 ENCOUNTER — LAB REQUISITION (OUTPATIENT)
Dept: LAB | Facility: HOSPITAL | Age: 32
End: 2022-08-01
Payer: COMMERCIAL

## 2022-08-01 DIAGNOSIS — D23.122 OTHER BENIGN NEOPLASM OF SKIN OF LEFT LOWER EYELID, INCLUDING CANTHUS: ICD-10-CM

## 2022-08-02 LAB
PATH REPORT.COMMENTS IMP SPEC: NORMAL
PATH REPORT.FINAL DX SPEC: NORMAL
PATH REPORT.GROSS SPEC: NORMAL
PATH REPORT.MICROSCOPIC SPEC OTHER STN: NORMAL
PATH REPORT.RELEVANT HX SPEC: NORMAL
PHOTO IMAGE: NORMAL

## 2022-08-29 ENCOUNTER — MYC REFILL (OUTPATIENT)
Dept: PSYCHIATRY | Facility: OTHER | Age: 32
End: 2022-08-29

## 2022-08-29 DIAGNOSIS — F90.2 ADHD (ATTENTION DEFICIT HYPERACTIVITY DISORDER), COMBINED TYPE: ICD-10-CM

## 2022-08-31 RX ORDER — LISDEXAMFETAMINE DIMESYLATE 30 MG/1
30 CAPSULE ORAL 2 TIMES DAILY
Qty: 60 CAPSULE | Refills: 0 | Status: SHIPPED | OUTPATIENT
Start: 2022-08-31 | End: 2022-10-03

## 2022-08-31 NOTE — TELEPHONE ENCOUNTER
gustavo      Last Written Prescription Date:  7/26/22  Last Fill Quantity: 60,   # refills: 0  Last Office Visit: 2/18/22  Future Office visit:    Next 5 appointments (look out 90 days)    Sep 23, 2022  8:00 AM  (Arrive by 7:45 AM)  Return Visit with Nilsa Hawkins MD  [facility] ([facility] ) [address] [place] [id number] [phone]           Routing refill request to provider for review/approval because:

## 2022-09-08 ENCOUNTER — HOSPITAL ENCOUNTER (EMERGENCY)
Facility: HOSPITAL | Age: 32
Discharge: HOME | End: 2022-09-08
Admitting: PHYSICIAN ASSISTANT
Payer: COMMERCIAL

## 2022-09-08 VITALS
HEART RATE: 78 BPM | OXYGEN SATURATION: 100 % | SYSTOLIC BLOOD PRESSURE: 161 MMHG | RESPIRATION RATE: 18 BRPM | TEMPERATURE: 99.3 F | DIASTOLIC BLOOD PRESSURE: 99 MMHG

## 2022-09-08 PROCEDURE — 999N000104 HC STATISTIC NO CHARGE
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
## 2022-09-13 ENCOUNTER — OFFICE VISIT (OUTPATIENT)
Dept: FAMILY MEDICINE | Facility: OTHER | Age: 32
End: 2022-09-13
Attending: STUDENT IN AN ORGANIZED HEALTH CARE EDUCATION/TRAINING PROGRAM
Payer: COMMERCIAL

## 2022-09-13 VITALS
SYSTOLIC BLOOD PRESSURE: 130 MMHG | OXYGEN SATURATION: 99 % | HEIGHT: 71 IN | RESPIRATION RATE: 16 BRPM | TEMPERATURE: 97.6 F | BODY MASS INDEX: 30.66 KG/M2 | WEIGHT: 219 LBS | DIASTOLIC BLOOD PRESSURE: 80 MMHG | HEART RATE: 108 BPM

## 2022-09-13 DIAGNOSIS — E55.9 VITAMIN D DEFICIENCY: Primary | ICD-10-CM

## 2022-09-13 DIAGNOSIS — G89.29 CHRONIC BILATERAL LOW BACK PAIN WITH BILATERAL SCIATICA: ICD-10-CM

## 2022-09-13 DIAGNOSIS — B97.7 HIGH RISK HPV INFECTION: ICD-10-CM

## 2022-09-13 DIAGNOSIS — Z76.89 ENCOUNTER TO ESTABLISH CARE: ICD-10-CM

## 2022-09-13 DIAGNOSIS — Z83.49 FAMILY HISTORY OF HEMOCHROMATOSIS: ICD-10-CM

## 2022-09-13 DIAGNOSIS — M54.42 CHRONIC BILATERAL LOW BACK PAIN WITH BILATERAL SCIATICA: ICD-10-CM

## 2022-09-13 DIAGNOSIS — M54.41 CHRONIC BILATERAL LOW BACK PAIN WITH BILATERAL SCIATICA: ICD-10-CM

## 2022-09-13 LAB
ALBUMIN SERPL-MCNC: 3.4 G/DL (ref 3.4–5)
ALP SERPL-CCNC: 90 U/L (ref 40–150)
ALT SERPL W P-5'-P-CCNC: 18 U/L (ref 0–50)
ANION GAP SERPL CALCULATED.3IONS-SCNC: 7 MMOL/L (ref 3–14)
AST SERPL W P-5'-P-CCNC: 13 U/L (ref 0–45)
BASOPHILS # BLD AUTO: 0 10E3/UL (ref 0–0.2)
BASOPHILS NFR BLD AUTO: 0 %
BILIRUB SERPL-MCNC: 0.3 MG/DL (ref 0.2–1.3)
BUN SERPL-MCNC: 10 MG/DL (ref 7–30)
CALCIUM SERPL-MCNC: 9.1 MG/DL (ref 8.5–10.1)
CHLORIDE BLD-SCNC: 105 MMOL/L (ref 94–109)
CO2 SERPL-SCNC: 24 MMOL/L (ref 20–32)
CREAT SERPL-MCNC: 0.72 MG/DL (ref 0.52–1.04)
EOSINOPHIL # BLD AUTO: 0.1 10E3/UL (ref 0–0.7)
EOSINOPHIL NFR BLD AUTO: 0 %
ERYTHROCYTE [DISTWIDTH] IN BLOOD BY AUTOMATED COUNT: 13 % (ref 10–15)
GFR SERPL CREATININE-BSD FRML MDRD: >90 ML/MIN/1.73M2
GLUCOSE BLD-MCNC: 92 MG/DL (ref 70–99)
HCT VFR BLD AUTO: 39.8 % (ref 35–47)
HGB BLD-MCNC: 13.2 G/DL (ref 11.7–15.7)
IMM GRANULOCYTES # BLD: 0.1 10E3/UL
IMM GRANULOCYTES NFR BLD: 1 %
LYMPHOCYTES # BLD AUTO: 2.8 10E3/UL (ref 0.8–5.3)
LYMPHOCYTES NFR BLD AUTO: 20 %
MCH RBC QN AUTO: 27.2 PG (ref 26.5–33)
MCHC RBC AUTO-ENTMCNC: 33.2 G/DL (ref 31.5–36.5)
MCV RBC AUTO: 82 FL (ref 78–100)
MONOCYTES # BLD AUTO: 0.4 10E3/UL (ref 0–1.3)
MONOCYTES NFR BLD AUTO: 3 %
NEUTROPHILS # BLD AUTO: 10.9 10E3/UL (ref 1.6–8.3)
NEUTROPHILS NFR BLD AUTO: 76 %
NRBC # BLD AUTO: 0 10E3/UL
NRBC BLD AUTO-RTO: 0 /100
PLATELET # BLD AUTO: 333 10E3/UL (ref 150–450)
POTASSIUM BLD-SCNC: 4.3 MMOL/L (ref 3.4–5.3)
PROT SERPL-MCNC: 7.7 G/DL (ref 6.8–8.8)
RBC # BLD AUTO: 4.86 10E6/UL (ref 3.8–5.2)
SODIUM SERPL-SCNC: 136 MMOL/L (ref 133–144)
WBC # BLD AUTO: 14.3 10E3/UL (ref 4–11)

## 2022-09-13 PROCEDURE — 80053 COMPREHEN METABOLIC PANEL: CPT | Mod: ZL | Performed by: STUDENT IN AN ORGANIZED HEALTH CARE EDUCATION/TRAINING PROGRAM

## 2022-09-13 PROCEDURE — 36415 COLL VENOUS BLD VENIPUNCTURE: CPT | Mod: ZL | Performed by: STUDENT IN AN ORGANIZED HEALTH CARE EDUCATION/TRAINING PROGRAM

## 2022-09-13 PROCEDURE — 85014 HEMATOCRIT: CPT | Mod: ZL | Performed by: STUDENT IN AN ORGANIZED HEALTH CARE EDUCATION/TRAINING PROGRAM

## 2022-09-13 PROCEDURE — 82040 ASSAY OF SERUM ALBUMIN: CPT | Mod: ZL | Performed by: STUDENT IN AN ORGANIZED HEALTH CARE EDUCATION/TRAINING PROGRAM

## 2022-09-13 PROCEDURE — 99213 OFFICE O/P EST LOW 20 MIN: CPT | Performed by: STUDENT IN AN ORGANIZED HEALTH CARE EDUCATION/TRAINING PROGRAM

## 2022-09-13 PROCEDURE — 82306 VITAMIN D 25 HYDROXY: CPT | Mod: ZL | Performed by: STUDENT IN AN ORGANIZED HEALTH CARE EDUCATION/TRAINING PROGRAM

## 2022-09-13 PROCEDURE — G0463 HOSPITAL OUTPT CLINIC VISIT: HCPCS

## 2022-09-13 ASSESSMENT — ANXIETY QUESTIONNAIRES
7. FEELING AFRAID AS IF SOMETHING AWFUL MIGHT HAPPEN: SEVERAL DAYS
5. BEING SO RESTLESS THAT IT IS HARD TO SIT STILL: SEVERAL DAYS
6. BECOMING EASILY ANNOYED OR IRRITABLE: SEVERAL DAYS
4. TROUBLE RELAXING: SEVERAL DAYS
GAD7 TOTAL SCORE: 7
GAD7 TOTAL SCORE: 7
IF YOU CHECKED OFF ANY PROBLEMS ON THIS QUESTIONNAIRE, HOW DIFFICULT HAVE THESE PROBLEMS MADE IT FOR YOU TO DO YOUR WORK, TAKE CARE OF THINGS AT HOME, OR GET ALONG WITH OTHER PEOPLE: SOMEWHAT DIFFICULT
3. WORRYING TOO MUCH ABOUT DIFFERENT THINGS: SEVERAL DAYS
1. FEELING NERVOUS, ANXIOUS, OR ON EDGE: SEVERAL DAYS
2. NOT BEING ABLE TO STOP OR CONTROL WORRYING: SEVERAL DAYS

## 2022-09-13 ASSESSMENT — PAIN SCALES - GENERAL: PAINLEVEL: NO PAIN (0)

## 2022-09-13 ASSESSMENT — PATIENT HEALTH QUESTIONNAIRE - PHQ9: SUM OF ALL RESPONSES TO PHQ QUESTIONS 1-9: 7

## 2022-09-13 NOTE — NURSING NOTE
"Chief Complaint   Patient presents with     Establish Care       Initial /80 (BP Location: Right arm, Patient Position: Chair)   Pulse 108   Temp 97.6  F (36.4  C)   Resp 16   Ht 1.803 m (5' 11\")   Wt 99.3 kg (219 lb)   SpO2 99%   BMI 30.54 kg/m   Estimated body mass index is 30.54 kg/m  as calculated from the following:    Height as of this encounter: 1.803 m (5' 11\").    Weight as of this encounter: 99.3 kg (219 lb).  Medication Reconciliation: complete  Karen Abraham LPN    "

## 2022-09-14 ENCOUNTER — HOSPITAL ENCOUNTER (OUTPATIENT)
Dept: INTERVENTIONAL RADIOLOGY/VASCULAR | Facility: HOSPITAL | Age: 32
Discharge: HOME | End: 2022-09-14
Attending: STUDENT IN AN ORGANIZED HEALTH CARE EDUCATION/TRAINING PROGRAM | Admitting: RADIOLOGY
Payer: COMMERCIAL

## 2022-09-14 DIAGNOSIS — G89.29 CHRONIC BILATERAL LOW BACK PAIN WITH BILATERAL SCIATICA: ICD-10-CM

## 2022-09-14 DIAGNOSIS — M54.41 CHRONIC BILATERAL LOW BACK PAIN WITH BILATERAL SCIATICA: ICD-10-CM

## 2022-09-14 DIAGNOSIS — M54.42 CHRONIC BILATERAL LOW BACK PAIN WITH BILATERAL SCIATICA: ICD-10-CM

## 2022-09-14 LAB — DEPRECATED CALCIDIOL+CALCIFEROL SERPL-MC: 47 UG/L (ref 20–75)

## 2022-09-14 PROCEDURE — G0463 HOSPITAL OUTPT CLINIC VISIT: HCPCS

## 2022-09-15 ENCOUNTER — TELEPHONE (OUTPATIENT)
Dept: PSYCHIATRY | Facility: OTHER | Age: 32
End: 2022-09-15

## 2022-09-29 ENCOUNTER — OFFICE VISIT (OUTPATIENT)
Dept: FAMILY MEDICINE | Facility: OTHER | Age: 32
End: 2022-09-29
Attending: STUDENT IN AN ORGANIZED HEALTH CARE EDUCATION/TRAINING PROGRAM
Payer: COMMERCIAL

## 2022-09-29 VITALS
WEIGHT: 213.4 LBS | DIASTOLIC BLOOD PRESSURE: 72 MMHG | RESPIRATION RATE: 16 BRPM | OXYGEN SATURATION: 99 % | BODY MASS INDEX: 29.76 KG/M2 | HEART RATE: 69 BPM | SYSTOLIC BLOOD PRESSURE: 122 MMHG | TEMPERATURE: 98.2 F

## 2022-09-29 DIAGNOSIS — R53.83 FATIGUE, UNSPECIFIED TYPE: ICD-10-CM

## 2022-09-29 DIAGNOSIS — J20.9 ACUTE BRONCHITIS, UNSPECIFIED ORGANISM: Primary | ICD-10-CM

## 2022-09-29 LAB
ALBUMIN SERPL-MCNC: 3.8 G/DL (ref 3.4–5)
ALP SERPL-CCNC: 76 U/L (ref 40–150)
ALT SERPL W P-5'-P-CCNC: 17 U/L (ref 0–50)
ANION GAP SERPL CALCULATED.3IONS-SCNC: 4 MMOL/L (ref 3–14)
AST SERPL W P-5'-P-CCNC: 16 U/L (ref 0–45)
BASOPHILS # BLD AUTO: 0 10E3/UL (ref 0–0.2)
BASOPHILS NFR BLD AUTO: 0 %
BILIRUB SERPL-MCNC: 0.4 MG/DL (ref 0.2–1.3)
BUN SERPL-MCNC: 7 MG/DL (ref 7–30)
CALCIUM SERPL-MCNC: 9.3 MG/DL (ref 8.5–10.1)
CHLORIDE BLD-SCNC: 108 MMOL/L (ref 94–109)
CO2 SERPL-SCNC: 25 MMOL/L (ref 20–32)
CREAT SERPL-MCNC: 0.63 MG/DL (ref 0.52–1.04)
CRP SERPL-MCNC: 16.5 MG/L (ref 0–8)
EOSINOPHIL # BLD AUTO: 0.1 10E3/UL (ref 0–0.7)
EOSINOPHIL NFR BLD AUTO: 1 %
ERYTHROCYTE [DISTWIDTH] IN BLOOD BY AUTOMATED COUNT: 13.1 % (ref 10–15)
ERYTHROCYTE [SEDIMENTATION RATE] IN BLOOD BY WESTERGREN METHOD: 12 MM/HR (ref 0–20)
GFR SERPL CREATININE-BSD FRML MDRD: >90 ML/MIN/1.73M2
GLUCOSE BLD-MCNC: 93 MG/DL (ref 70–99)
HCT VFR BLD AUTO: 42.8 % (ref 35–47)
HGB BLD-MCNC: 14 G/DL (ref 11.7–15.7)
IMM GRANULOCYTES # BLD: 0 10E3/UL
IMM GRANULOCYTES NFR BLD: 0 %
LYMPHOCYTES # BLD AUTO: 3.4 10E3/UL (ref 0.8–5.3)
LYMPHOCYTES NFR BLD AUTO: 35 %
MCH RBC QN AUTO: 26.6 PG (ref 26.5–33)
MCHC RBC AUTO-ENTMCNC: 32.7 G/DL (ref 31.5–36.5)
MCV RBC AUTO: 81 FL (ref 78–100)
MONOCYTES # BLD AUTO: 0.6 10E3/UL (ref 0–1.3)
MONOCYTES NFR BLD AUTO: 7 %
NEUTROPHILS # BLD AUTO: 5.6 10E3/UL (ref 1.6–8.3)
NEUTROPHILS NFR BLD AUTO: 57 %
NRBC # BLD AUTO: 0 10E3/UL
NRBC BLD AUTO-RTO: 0 /100
PLATELET # BLD AUTO: 384 10E3/UL (ref 150–450)
POTASSIUM BLD-SCNC: 4 MMOL/L (ref 3.4–5.3)
PROT SERPL-MCNC: 8.1 G/DL (ref 6.8–8.8)
RBC # BLD AUTO: 5.26 10E6/UL (ref 3.8–5.2)
SODIUM SERPL-SCNC: 137 MMOL/L (ref 133–144)
TSH SERPL DL<=0.005 MIU/L-ACNC: 1.97 MU/L (ref 0.4–4)
WBC # BLD AUTO: 9.8 10E3/UL (ref 4–11)

## 2022-09-29 PROCEDURE — 84443 ASSAY THYROID STIM HORMONE: CPT | Mod: ZL | Performed by: STUDENT IN AN ORGANIZED HEALTH CARE EDUCATION/TRAINING PROGRAM

## 2022-09-29 PROCEDURE — G0008 ADMIN INFLUENZA VIRUS VAC: HCPCS

## 2022-09-29 PROCEDURE — 86140 C-REACTIVE PROTEIN: CPT | Mod: ZL | Performed by: STUDENT IN AN ORGANIZED HEALTH CARE EDUCATION/TRAINING PROGRAM

## 2022-09-29 PROCEDURE — G0463 HOSPITAL OUTPT CLINIC VISIT: HCPCS | Mod: 25

## 2022-09-29 PROCEDURE — 36415 COLL VENOUS BLD VENIPUNCTURE: CPT | Mod: ZL | Performed by: STUDENT IN AN ORGANIZED HEALTH CARE EDUCATION/TRAINING PROGRAM

## 2022-09-29 PROCEDURE — G0463 HOSPITAL OUTPT CLINIC VISIT: HCPCS

## 2022-09-29 PROCEDURE — 85652 RBC SED RATE AUTOMATED: CPT | Mod: ZL | Performed by: STUDENT IN AN ORGANIZED HEALTH CARE EDUCATION/TRAINING PROGRAM

## 2022-09-29 PROCEDURE — 85025 COMPLETE CBC W/AUTO DIFF WBC: CPT | Mod: ZL | Performed by: STUDENT IN AN ORGANIZED HEALTH CARE EDUCATION/TRAINING PROGRAM

## 2022-09-29 PROCEDURE — 80053 COMPREHEN METABOLIC PANEL: CPT | Mod: ZL | Performed by: STUDENT IN AN ORGANIZED HEALTH CARE EDUCATION/TRAINING PROGRAM

## 2022-09-29 PROCEDURE — 99214 OFFICE O/P EST MOD 30 MIN: CPT | Performed by: STUDENT IN AN ORGANIZED HEALTH CARE EDUCATION/TRAINING PROGRAM

## 2022-09-29 PROCEDURE — 90686 IIV4 VACC NO PRSV 0.5 ML IM: CPT

## 2022-09-29 RX ORDER — ERYTHROMYCIN 5 MG/G
OINTMENT OPHTHALMIC
COMMUNITY
Start: 2022-07-29 | End: 2022-11-04

## 2022-09-29 RX ORDER — AZITHROMYCIN 250 MG/1
TABLET, FILM COATED ORAL
Qty: 6 TABLET | Refills: 0 | Status: SHIPPED | OUTPATIENT
Start: 2022-09-29 | End: 2022-10-04

## 2022-09-29 RX ORDER — PREDNISONE 20 MG/1
40 TABLET ORAL DAILY
Qty: 10 TABLET | Refills: 0 | Status: SHIPPED | OUTPATIENT
Start: 2022-09-29 | End: 2022-11-04

## 2022-09-29 ASSESSMENT — PAIN SCALES - GENERAL: PAINLEVEL: MODERATE PAIN (5)

## 2022-09-29 NOTE — PROGRESS NOTES
"  Assessment & Plan     Acute bronchitis  Will treat as acute bronchitis  - CBC with platelets and differential; Future  - ESR: Erythrocyte sedimentation rate; Future  - CRP, inflammation; Future  - predniSONE (DELTASONE) 20 MG tablet; Take 2 tablets (40 mg) by mouth daily  - CBC with platelets and differential  - ESR: Erythrocyte sedimentation rate  - CRP, inflammation  - azithromycin (ZITHROMAX) 250 MG tablet; Take 2 tablets (500 mg) by mouth daily for 1 day, THEN 1 tablet (250 mg) daily for 4 days.    Fatigue, unspecified type  Will check some labs for this  - TSH; Future  - Comprehensive metabolic panel; Future  - predniSONE (DELTASONE) 20 MG tablet; Take 2 tablets (40 mg) by mouth daily  - TSH  - Comprehensive metabolic panel       BMI:   Estimated body mass index is 29.76 kg/m  as calculated from the following:    Height as of 9/13/22: 1.803 m (5' 11\").    Weight as of this encounter: 96.8 kg (213 lb 6.4 oz).     Peggy Alejo MD  [facility] - [place]SH[place] Power is a 32 year old, presenting for the following health issues:  No chief complaint on file.      HPI   Haven't been feeling well since Sep 6th (thinks she had Covid, never tested) - August 15 th (possibly had covid, her son tested positive)   - Brother tested positive for COVID before the 6th  Previous history of EBV  Tired, hasn't been able to stop clearing her throat, constant drainage.   Dry and sore throat most days  Symptoms have remained the same and haven't worsened   She has tried taking Dayquil with mild relief  Orange juice, water, and rest, but constantly tired.  Naps during the day often  Stays at home   Humidifier hasn't helped   Endorses occasionally 1-2 fever (over 100), night sweats and clammy, congestion  Nasal drainage yellow to white, chunky in the morning.  Endorses mild shortness of breath and wheezing at night.   Endorses heart racing  Endorses constipation and diarrhea this month  (constipation was a " symptom of her covid)    Denies chest pain or palpitations     Used oximeter and her pulse. Goes to 110-120 when she stands.    Never had previous seasonal allergies.  No asthma    Sore throat, coughing up phlegm.  Shortness of breath at night when lying down flat.  Ears hurt a little.  No facial pressure or headaches.  Headache in the beginning.  Appetite is a little decreased.          Acute Illness  Acute illness concerns: Sore throat, dry throat  Onset/Duration: 9/6/22  Symptoms:  Fever: YES- couple times throughout month  Chills/Sweats: YES- sweats at night  Headache (location?): No  Sinus Pressure: No  Conjunctivitis:  No  Ear Pain: YES: both  Rhinorrhea: No  Congestion: YES  Sore Throat: YES  Cough: YES-productive of yellow sputum  Wheeze: YES- little bit  Decreased Appetite: YES  Nausea: No  Vomiting: No  Diarrhea: YES- two times last month  Dysuria/Freq.: YES- couple times in the last month  Dysuria or Hematuria: No  Fatigue/Achiness: YES- fatigue  Sick/Strep Exposure: YES- prior to the 6th was exposed, within 30 days of having covid  Therapies tried and outcome: Drinking a lot of orange juice and water.      Review of Systems   Constitutional, HEENT, cardiovascular, pulmonary, gi and gu systems are negative, except as otherwise noted.      Objective    /72   Pulse 69   Temp 98.2  F (36.8  C) (Tympanic)   Resp 16   Wt 96.8 kg (213 lb 6.4 oz)   SpO2 99%   BMI 29.76 kg/m    Body mass index is 29.76 kg/m .  Physical Exam   GENERAL: healthy, alert and no distress  EYES: Eyes grossly normal to inspection, PERRL and conjunctivae and sclerae normal  HENT: ear canals and TM's normal, nose and mouth without ulcers or lesions  NECK: no adenopathy, no asymmetry, masses, or scars and thyroid normal to palpation  RESP: lungs clear to auscultation - no rales, rhonchi or wheezes  CV: regular rate and rhythm, normal S1 S2, no S3 or S4, no murmur, click or rub, no peripheral edema and peripheral pulses  strong  ABDOMEN: soft, nontender, no hepatosplenomegaly, no masses and bowel sounds normal  MS: no gross musculoskeletal defects noted, no edema  SKIN: no suspicious lesions or rashes  NEURO: Normal strength and tone, mentation intact and speech normal  PSYCH: mentation appears normal, affect normal/bright

## 2022-09-29 NOTE — NURSING NOTE
"Chief Complaint   Patient presents with     Pharyngitis       Initial /72   Pulse 69   Temp 98.2  F (36.8  C) (Tympanic)   Resp 16   Wt 96.8 kg (213 lb 6.4 oz)   SpO2 99%   BMI 29.76 kg/m   Estimated body mass index is 29.76 kg/m  as calculated from the following:    Height as of 9/13/22: 1.803 m (5' 11\").    Weight as of this encounter: 96.8 kg (213 lb 6.4 oz).  Medication Reconciliation: complete  Demian Uribe  "

## 2022-10-03 ENCOUNTER — MYC REFILL (OUTPATIENT)
Dept: PSYCHIATRY | Facility: OTHER | Age: 32
End: 2022-10-03

## 2022-10-03 DIAGNOSIS — F90.2 ADHD (ATTENTION DEFICIT HYPERACTIVITY DISORDER), COMBINED TYPE: ICD-10-CM

## 2022-10-05 RX ORDER — LISDEXAMFETAMINE DIMESYLATE 30 MG/1
30 CAPSULE ORAL 2 TIMES DAILY
Qty: 60 CAPSULE | Refills: 0 | Status: SHIPPED | OUTPATIENT
Start: 2022-10-05 | End: 2023-01-06

## 2022-10-05 NOTE — TELEPHONE ENCOUNTER
Anders      Last Written Prescription Date:  9.1.22  Last Fill Quantity: #60,   # refills: 0  Last Office Visit: 2.18.22  Future Office visit:    Next 5 appointments (look out 90 days)    Nov 04, 2022  8:20 AM  (Arrive by 8:05 AM)  Return Visit with Nilsa Hawkins MD  [facility] [place] ([facility] ) [address] [place] [id number]  [phone]           Routing refill request to provider for review/approval because:  Drug not on the [facility], [facility] or [facility] refill protocol or controlled substance

## 2022-10-12 ENCOUNTER — HOSPITAL ENCOUNTER (OUTPATIENT)
Facility: HOSPITAL | Age: 32
Discharge: HOME | End: 2022-10-12
Attending: RADIOLOGY | Admitting: RADIOLOGY
Payer: COMMERCIAL

## 2022-10-12 DIAGNOSIS — F41.9 ANXIETY: Primary | ICD-10-CM

## 2022-10-12 RX ORDER — DIAZEPAM 10 MG
TABLET ORAL
Qty: 1 TABLET | Refills: 0 | Status: SHIPPED | OUTPATIENT
Start: 2022-10-12 | End: 2023-06-09

## 2022-10-12 ASSESSMENT — ACTIVITIES OF DAILY LIVING (ADL): ADLS_ACUITY_SCORE: 35

## 2022-10-28 ENCOUNTER — PATIENT OUTREACH (OUTPATIENT)
Dept: CARE COORDINATION | Facility: OTHER | Age: 32
End: 2022-10-28

## 2022-10-28 NOTE — PROGRESS NOTES
Clinic Care Coordination Contact  Care Team Conversations    RN CC received request for information from patient's apartment complex regarding emotional support animal.  Paperwork completed, will have Dr. Hawkins sign and fax back to apartment. Patient updated.     Makayla CHAVEZ RN  Care Coordinator  [facility]  [phone]

## 2022-10-31 ENCOUNTER — TELEPHONE (OUTPATIENT)
Dept: FAMILY MEDICINE | Facility: OTHER | Age: 32
End: 2022-10-31

## 2022-10-31 NOTE — PROGRESS NOTES
"  Assessment & Plan     Abscess of anal and rectal regions  Right perineum, lateral inner groin.    Slightly fluctuant, non-tender  Advised on warm compresses, return for drainage in 2 days        BMI:   Estimated body mass index is 29.29 kg/m  as calculated from the following:    Height as of this encounter: 1.803 m (5' 11\").    Weight as of this encounter: 95.3 kg (210 lb).     Return in about 2 days (around 11/3/2022).    Peggy Alejo MD  [facility] - [place]SHLEY Power is a 32 year old, presenting for the following health issues:  Derm Problem      HPI     Worried about this- had a bartholin cyst in the past- had a really bad experience with this in the past.    No history of MRSA.      Skin Lesion  Onset/Duration: 1 week   Description  Location: States near butt and vagina   Color: red   Border description: raised, red  Character: round, red  Itching: no  Bleeding:  YES- litlle bit   Intensity:  moderate  Progression of Symptoms:  same  Accompanying signs and symptoms:   Bleeding: YES  Scaling: No  Excessive sun exposure/tanning: No  Sunscreen used: No  History:           Any previous history of skin cancer: No  Any family history of melanoma: No  Previous episodes of similar lesion: No  Precipitating or alleviating factors: none   Therapies tried and outcome: none      Review of Systems   Constitutional, HEENT, cardiovascular, pulmonary, gi and gu systems are negative, except as otherwise noted.      Objective    /85 (BP Location: Right arm, Patient Position: Chair)   Pulse 60   Temp 98.3  F (36.8  C)   Resp 16   Ht 1.803 m (5' 11\")   Wt 95.3 kg (210 lb)   SpO2 99%   BMI 29.29 kg/m    Body mass index is 29.29 kg/m .  Physical Exam    GENERAL: healthy, alert and no distress  EYES: Eyes grossly normal to inspection, PERRL and conjunctivae and sclerae normal  HENT: ear canals and TM's normal, nose and mouth without ulcers or lesions  NECK: no adenopathy, no asymmetry, " masses, or scars and thyroid normal to palpation  RESP: lungs clear to auscultation - no rales, rhonchi or wheezes  BREAST: normal without masses, tenderness or nipple discharge and no palpable axillary masses or adenopathy  CV: regular rate and rhythm, normal S1 S2, no S3 or S4, no murmur, click or rub, no peripheral edema and peripheral pulses strong  ABDOMEN: soft, nontender, no hepatosplenomegaly, no masses and bowel sounds normal  MS: no gross musculoskeletal defects noted, no edema  SKIN: no suspicious lesions or rashes, 2 cm in size raised and slightly fluctuant mass on right perineal region just lateral and posterior to vaginal opening  NEURO: Normal strength and tone, mentation intact and speech normal  PSYCH: mentation appears normal, affect normal/bright

## 2022-10-31 NOTE — TELEPHONE ENCOUNTER
8:52 AM    Reason for Call: OVERBOOK    Patient is having the following symptoms: Patient needs to frantz seen  for boil that she has.wants to be seen  days.    The patient is requesting an appointment for Overbook with .    Was an appointment offered for this call? Yes  If yes : Appointment type              Date    Preferred method for responding to this message: Telephone Call  What is your phone number ?  [phone]    If we cannot reach you directly, may we leave a detailed response at the number you provided? Yes    Can this message wait until your PCP/provider returns, if unavailable today? YES, Provider is in today    Aimee Prather

## 2022-11-01 ENCOUNTER — OFFICE VISIT (OUTPATIENT)
Dept: FAMILY MEDICINE | Facility: OTHER | Age: 32
End: 2022-11-01
Attending: STUDENT IN AN ORGANIZED HEALTH CARE EDUCATION/TRAINING PROGRAM
Payer: COMMERCIAL

## 2022-11-01 VITALS
RESPIRATION RATE: 16 BRPM | HEIGHT: 71 IN | OXYGEN SATURATION: 99 % | HEART RATE: 60 BPM | WEIGHT: 210 LBS | BODY MASS INDEX: 29.4 KG/M2 | SYSTOLIC BLOOD PRESSURE: 115 MMHG | DIASTOLIC BLOOD PRESSURE: 85 MMHG | TEMPERATURE: 98.3 F

## 2022-11-01 DIAGNOSIS — K61.2 ABSCESS OF ANAL AND RECTAL REGIONS: Primary | ICD-10-CM

## 2022-11-01 PROCEDURE — 99213 OFFICE O/P EST LOW 20 MIN: CPT | Performed by: STUDENT IN AN ORGANIZED HEALTH CARE EDUCATION/TRAINING PROGRAM

## 2022-11-01 PROCEDURE — G0463 HOSPITAL OUTPT CLINIC VISIT: HCPCS

## 2022-11-01 ASSESSMENT — PAIN SCALES - GENERAL: PAINLEVEL: MILD PAIN (3)

## 2022-11-01 NOTE — NURSING NOTE
"Chief Complaint   Patient presents with     Derm Problem       Initial /85 (BP Location: Right arm, Patient Position: Chair)   Pulse 60   Temp 98.3  F (36.8  C)   Resp 16   Ht 1.803 m (5' 11\")   Wt 95.3 kg (210 lb)   SpO2 99%   BMI 29.29 kg/m   Estimated body mass index is 29.29 kg/m  as calculated from the following:    Height as of this encounter: 1.803 m (5' 11\").    Weight as of this encounter: 95.3 kg (210 lb).  Medication Reconciliation: complete  Karen Abraham LPN    "

## 2022-11-02 NOTE — PROGRESS NOTES
"  Assessment & Plan     Abscess of buttock, right  S/p incision and drainage  Patient tolerated procedure well  Topical antibiotics for prophylaxis given  Precautions given  - Abscess Aerobic Bacterial Culture Routine; Future  - Abscess Aerobic Bacterial Culture Routine  - clindamycin (CLINDAMAX) 1 % external gel; Apply topically 2 times daily       BMI:   Estimated body mass index is 29.29 kg/m  as calculated from the following:    Height as of this encounter: 1.803 m (5' 11\").    Weight as of this encounter: 95.3 kg (210 lb).           No follow-ups on file.    Peggy Alejo MD  [facility] - [place] Power is a 32 year old female}, presenting for the following health issues:  Abscess      HPI     Concern -  L & D Abscess     Here for incision and drainage of right labial abscess      Review of Systems   Constitutional, HEENT, cardiovascular, pulmonary, gi and gu systems are negative, except as otherwise noted.      Objective    /80 (BP Location: Right arm, Patient Position: Chair)   Pulse 64   Temp 98.6  F (37  C)   Resp 15   Ht 1.803 m (5' 11\")   Wt 95.3 kg (210 lb)   SpO2 99%   BMI 29.29 kg/m    Body mass index is 29.29 kg/m .  Physical Exam   GENERAL: healthy, alert and no distress  EYES: Eyes grossly normal to inspection, PERRL and conjunctivae and sclerae normal  HENT: ear canals and TM's normal, nose and mouth without ulcers or lesions  NECK: no adenopathy, no asymmetry, masses, or scars and thyroid normal to palpation  RESP: lungs clear to auscultation - no rales, rhonchi or wheezes  CV: regular rate and rhythm, normal S1 S2, no S3 or S4, no murmur, click or rub, no peripheral edema and peripheral pulses strong  ABDOMEN: soft, nontender, no hepatosplenomegaly, no masses and bowel sounds normal  MS: no gross musculoskeletal defects noted, no edema  SKIN: no suspicious lesions or rashes  NEURO: Normal strength and tone, mentation intact and speech normal  PSYCH: " mentation appears normal, affect normal/bright      PRE-OP DIAGNOSIS: Right labial abscess  POST-OP DIAGNOSIS: Same   PROCEDURE: incision and drainage of abscess  Performing Physician: Peggy Alejo     PROCEDURE:   A timeout protocol was performed prior to initiating the procedure.   The area was prepared and draped in the usual, sterile manner. The site  was anesthetized with 1% lidocaine with epinephrine. A linear incision along the local skin lines was made and predominantly serosanguinous material expressed.  The abcess was explored thoroughly and sequestered pockets were opened.  Bleeding was minimal.   Packing: none    Followup: The patient tolerated the procedure well without  complications.  Standard post-procedure care is explained and return  precautions are given.

## 2022-11-03 ENCOUNTER — OFFICE VISIT (OUTPATIENT)
Dept: FAMILY MEDICINE | Facility: OTHER | Age: 32
End: 2022-11-03
Attending: STUDENT IN AN ORGANIZED HEALTH CARE EDUCATION/TRAINING PROGRAM
Payer: COMMERCIAL

## 2022-11-03 VITALS
OXYGEN SATURATION: 99 % | HEART RATE: 64 BPM | RESPIRATION RATE: 15 BRPM | WEIGHT: 210 LBS | BODY MASS INDEX: 29.4 KG/M2 | DIASTOLIC BLOOD PRESSURE: 80 MMHG | TEMPERATURE: 98.6 F | HEIGHT: 71 IN | SYSTOLIC BLOOD PRESSURE: 115 MMHG

## 2022-11-03 DIAGNOSIS — L02.31 ABSCESS OF BUTTOCK, RIGHT: Primary | ICD-10-CM

## 2022-11-03 PROCEDURE — 99213 OFFICE O/P EST LOW 20 MIN: CPT | Performed by: STUDENT IN AN ORGANIZED HEALTH CARE EDUCATION/TRAINING PROGRAM

## 2022-11-03 PROCEDURE — G0463 HOSPITAL OUTPT CLINIC VISIT: HCPCS

## 2022-11-03 PROCEDURE — 87070 CULTURE OTHR SPECIMN AEROBIC: CPT | Mod: ZL | Performed by: STUDENT IN AN ORGANIZED HEALTH CARE EDUCATION/TRAINING PROGRAM

## 2022-11-03 RX ORDER — CLINDAMYCIN PHOSPHATE 10 MG/G
GEL TOPICAL 2 TIMES DAILY
Qty: 30 G | Refills: 0 | Status: SHIPPED | OUTPATIENT
Start: 2022-11-03 | End: 2023-10-09

## 2022-11-03 ASSESSMENT — PAIN SCALES - GENERAL: PAINLEVEL: MILD PAIN (2)

## 2022-11-03 NOTE — NURSING NOTE
"Chief Complaint   Patient presents with     Abscess       Initial /80 (BP Location: Right arm, Patient Position: Chair)   Pulse 64   Temp 98.6  F (37  C)   Resp 15   Ht 1.803 m (5' 11\")   Wt 95.3 kg (210 lb)   SpO2 99%   BMI 29.29 kg/m   Estimated body mass index is 29.29 kg/m  as calculated from the following:    Height as of this encounter: 1.803 m (5' 11\").    Weight as of this encounter: 95.3 kg (210 lb).  Medication Reconciliation: complete  Karen Abraham LPN    "

## 2022-11-04 ENCOUNTER — OFFICE VISIT (OUTPATIENT)
Dept: PSYCHIATRY | Facility: OTHER | Age: 32
End: 2022-11-04
Attending: PSYCHIATRY & NEUROLOGY
Payer: COMMERCIAL

## 2022-11-04 VITALS
TEMPERATURE: 98.4 F | HEART RATE: 71 BPM | SYSTOLIC BLOOD PRESSURE: 130 MMHG | OXYGEN SATURATION: 98 % | BODY MASS INDEX: 29.29 KG/M2 | WEIGHT: 210 LBS | DIASTOLIC BLOOD PRESSURE: 82 MMHG

## 2022-11-04 DIAGNOSIS — F90.2 ADHD (ATTENTION DEFICIT HYPERACTIVITY DISORDER), COMBINED TYPE: Primary | ICD-10-CM

## 2022-11-04 PROCEDURE — 99214 OFFICE O/P EST MOD 30 MIN: CPT | Performed by: PSYCHIATRY & NEUROLOGY

## 2022-11-04 PROCEDURE — G0463 HOSPITAL OUTPT CLINIC VISIT: HCPCS

## 2022-11-04 RX ORDER — LISDEXAMFETAMINE DIMESYLATE 30 MG/1
30 CAPSULE ORAL 2 TIMES DAILY
Qty: 60 CAPSULE | Refills: 0 | Status: SHIPPED | OUTPATIENT
Start: 2022-11-04 | End: 2022-12-04

## 2022-11-04 RX ORDER — LISDEXAMFETAMINE DIMESYLATE 30 MG/1
30 CAPSULE ORAL 2 TIMES DAILY
Qty: 60 CAPSULE | Refills: 0 | Status: SHIPPED | OUTPATIENT
Start: 2022-12-02 | End: 2023-01-01

## 2022-11-04 RX ORDER — LISDEXAMFETAMINE DIMESYLATE 30 MG/1
30 CAPSULE ORAL 2 TIMES DAILY
Qty: 60 CAPSULE | Refills: 0 | Status: SHIPPED | OUTPATIENT
Start: 2022-12-30 | End: 2023-01-29

## 2022-11-04 ASSESSMENT — ANXIETY QUESTIONNAIRES
7. FEELING AFRAID AS IF SOMETHING AWFUL MIGHT HAPPEN: SEVERAL DAYS
IF YOU CHECKED OFF ANY PROBLEMS ON THIS QUESTIONNAIRE, HOW DIFFICULT HAVE THESE PROBLEMS MADE IT FOR YOU TO DO YOUR WORK, TAKE CARE OF THINGS AT HOME, OR GET ALONG WITH OTHER PEOPLE: SOMEWHAT DIFFICULT
1. FEELING NERVOUS, ANXIOUS, OR ON EDGE: SEVERAL DAYS
6. BECOMING EASILY ANNOYED OR IRRITABLE: SEVERAL DAYS
2. NOT BEING ABLE TO STOP OR CONTROL WORRYING: SEVERAL DAYS
5. BEING SO RESTLESS THAT IT IS HARD TO SIT STILL: SEVERAL DAYS
GAD7 TOTAL SCORE: 7
3. WORRYING TOO MUCH ABOUT DIFFERENT THINGS: SEVERAL DAYS
GAD7 TOTAL SCORE: 7

## 2022-11-04 ASSESSMENT — PATIENT HEALTH QUESTIONNAIRE - PHQ9
SUM OF ALL RESPONSES TO PHQ QUESTIONS 1-9: 8
5. POOR APPETITE OR OVEREATING: SEVERAL DAYS

## 2022-11-04 ASSESSMENT — PAIN SCALES - GENERAL: PAINLEVEL: MILD PAIN (2)
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
## 2022-11-04 NOTE — NURSING NOTE
"Chief Complaint   Patient presents with     RECHECK     ADHD, depression.       Initial /82 (BP Location: Left arm, Patient Position: Sitting, Cuff Size: Adult Regular)   Pulse 71   Temp 98.4  F (36.9  C) (Tympanic)   Wt 95.3 kg (210 lb)   SpO2 98%   BMI 29.29 kg/m   Estimated body mass index is 29.29 kg/m  as calculated from the following:    Height as of 11/3/22: 1.803 m (5' 11\").    Weight as of this encounter: 95.3 kg (210 lb).  Medication Reconciliation: complete  OLGA KITCHEN LPN    "

## 2022-11-06 LAB — BACTERIA ABSC ANAEROBE+AEROBE CULT: NORMAL

## 2022-11-07 NOTE — PROGRESS NOTES
"  Assessment & Plan     Back muscle spasm  Endorsing back spasms, will Rx short course of flexeril to be used as needed until her low back injection.  Discussed that there is no benefit in long term use of flexeril for >1 month.    - cyclobenzaprine (FLEXERIL) 5 MG tablet; Take 1 tablet (5 mg) by mouth nightly as needed for muscle spasms    Major depressive disorder, recurrent episode, moderate (H)  Stable mood.  Zoloft increased to 150 mg daily but has only been taking 100 mg.  Says that the pill is difficult to cut  Forgets to take her medication on some days.  Also inconsistent with her OCP.  Discussed importance of daily adherence to zoloft for maximal benefit.  Can safely increase to 150 mg and keep at this dose, as instructed by psychiatrist.  Discussed taking OCP correctly and that if taking more than 24 hours apart, we should consider alternative contraception such as LARC.        Abscess of perineum  S/P I&D last week.  Culture notable for normal nelly and no isolation of any pathogens  Examined today.  Appears to be healing well.  There is still some induration around incision site but no redness, no swelling and no bleeding or drainage.  No tenderness to palpation.  Patient denies any pain.  Continue to monitor.           BMI:   Estimated body mass index is 29.29 kg/m  as calculated from the following:    Height as of this encounter: 1.803 m (5' 11\").    Weight as of this encounter: 95.3 kg (210 lb).   Weight management plan: Discussed healthy diet and exercise guidelines    No follow-ups on file.    Peggy Alejo MD  [facility] - [place] Power is a 32 year old female, presenting for the following health issues:  Follow Up      HPI     Has been taking Zoloft 100 mg a day.  Has been having trouble remembering to take it every day.  Does not take her OCP consistently either and not using back up contraception     No increased pain or drainage from site of I&D.      Concern " "- I & D F/U  Onset: 6 days   Description: States everything feels okay, states it feels better after than before    Intensity: moderate  Progression of Symptoms:  improving  Accompanying Signs & Symptoms: none  Previous history of similar problem: none  Precipitating factors:        Worsened by: none  Alleviating factors:        Improved by: none  Therapies tried and outcome:  none       Review of Systems   Constitutional, HEENT, cardiovascular, pulmonary, gi and gu systems are negative, except as otherwise noted.      Objective    /80 (BP Location: Right arm, Patient Position: Chair)   Pulse 69   Temp 98  F (36.7  C)   Resp 16   Ht 1.803 m (5' 11\")   Wt 95.3 kg (210 lb)   SpO2 100%   BMI 29.29 kg/m    Body mass index is 29.29 kg/m .  Physical Exam   GENERAL: healthy, alert and no distress  EYES: Eyes grossly normal to inspection, PERRL and conjunctivae and sclerae normal  MS: no gross musculoskeletal defects noted, no edema  SKIN: no suspicious lesions or rashes, I&D incision appears to be healing well.  There is surrounding induration but this seems to be small than before and there is no overlying erythema, swelling or drainage.  NEURO: Normal strength and tone, mentation intact and speech normal  PSYCH: mentation appears normal, affect normal/bright            "

## 2022-11-08 ENCOUNTER — OFFICE VISIT (OUTPATIENT)
Dept: FAMILY MEDICINE | Facility: OTHER | Age: 32
End: 2022-11-08
Attending: STUDENT IN AN ORGANIZED HEALTH CARE EDUCATION/TRAINING PROGRAM
Payer: COMMERCIAL

## 2022-11-08 VITALS
DIASTOLIC BLOOD PRESSURE: 80 MMHG | BODY MASS INDEX: 29.4 KG/M2 | TEMPERATURE: 98 F | HEIGHT: 71 IN | SYSTOLIC BLOOD PRESSURE: 129 MMHG | RESPIRATION RATE: 16 BRPM | WEIGHT: 210 LBS | OXYGEN SATURATION: 100 % | HEART RATE: 69 BPM

## 2022-11-08 DIAGNOSIS — M62.830 BACK MUSCLE SPASM: Primary | ICD-10-CM

## 2022-11-08 DIAGNOSIS — L02.215 ABSCESS OF PERINEUM: ICD-10-CM

## 2022-11-08 DIAGNOSIS — F33.1 MAJOR DEPRESSIVE DISORDER, RECURRENT EPISODE, MODERATE (H): ICD-10-CM

## 2022-11-08 PROCEDURE — 99213 OFFICE O/P EST LOW 20 MIN: CPT | Performed by: STUDENT IN AN ORGANIZED HEALTH CARE EDUCATION/TRAINING PROGRAM

## 2022-11-08 PROCEDURE — G0463 HOSPITAL OUTPT CLINIC VISIT: HCPCS

## 2022-11-08 RX ORDER — CYCLOBENZAPRINE HCL 5 MG
5 TABLET ORAL
Qty: 14 TABLET | Refills: 0 | Status: SHIPPED | OUTPATIENT
Start: 2022-11-08 | End: 2023-06-09

## 2022-11-08 ASSESSMENT — PAIN SCALES - GENERAL: PAINLEVEL: NO PAIN (0)

## 2022-11-08 NOTE — NURSING NOTE
"Chief Complaint   Patient presents with     Follow Up       Initial /80 (BP Location: Right arm, Patient Position: Chair)   Pulse 69   Temp 98  F (36.7  C)   Resp 16   Ht 1.803 m (5' 11\")   Wt 95.3 kg (210 lb)   SpO2 100%   BMI 29.29 kg/m   Estimated body mass index is 29.29 kg/m  as calculated from the following:    Height as of this encounter: 1.803 m (5' 11\").    Weight as of this encounter: 95.3 kg (210 lb).  Medication Reconciliation: complete  Karen Abraham LPN    "

## 2022-11-10 DIAGNOSIS — F31.81 BIPOLAR 2 DISORDER (H): ICD-10-CM

## 2022-11-14 RX ORDER — SERTRALINE HYDROCHLORIDE 100 MG/1
TABLET, FILM COATED ORAL
Qty: 45 TABLET | Refills: 0 | Status: SHIPPED | OUTPATIENT
Start: 2022-11-14 | End: 2022-12-28

## 2022-12-09 ENCOUNTER — HOSPITAL ENCOUNTER (OUTPATIENT)
Facility: HOSPITAL | Age: 32
Discharge: HOME | End: 2022-12-09
Attending: RADIOLOGY | Admitting: RADIOLOGY
Payer: COMMERCIAL

## 2022-12-09 ENCOUNTER — HOSPITAL ENCOUNTER (OUTPATIENT)
Dept: INTERVENTIONAL RADIOLOGY/VASCULAR | Facility: HOSPITAL | Age: 32
Discharge: HOME | End: 2022-12-09
Attending: STUDENT IN AN ORGANIZED HEALTH CARE EDUCATION/TRAINING PROGRAM
Payer: COMMERCIAL

## 2022-12-09 PROCEDURE — 62323 NJX INTERLAMINAR LMBR/SAC: CPT

## 2022-12-09 PROCEDURE — 250N000011 HC RX IP 250 OP 636: Performed by: RADIOLOGY

## 2022-12-09 RX ORDER — DEXAMETHASONE SODIUM PHOSPHATE 10 MG/ML
10 INJECTION, SOLUTION INTRAMUSCULAR; INTRAVENOUS ONCE
Status: COMPLETED | OUTPATIENT
Start: 2022-12-09 | End: 2022-12-09

## 2022-12-09 RX ORDER — IOPAMIDOL 612 MG/ML
15 INJECTION, SOLUTION INTRATHECAL ONCE
Status: COMPLETED | OUTPATIENT
Start: 2022-12-09 | End: 2022-12-09

## 2022-12-09 RX ADMIN — DEXAMETHASONE SODIUM PHOSPHATE 10 MG: 10 INJECTION, SOLUTION INTRAMUSCULAR; INTRAVENOUS at 14:03

## 2022-12-09 RX ADMIN — IOPAMIDOL 6 ML: 612 INJECTION, SOLUTION INTRATHECAL at 14:03

## 2022-12-09 NOTE — DISCHARGE INSTRUCTIONS
Cell number on file:    Telephone Information:   Mobile [phone]     Is it ok to leave a message at this number(s)? Yes    Dr. Duque completed your procedure on 12/9/2022.    Current Pain Level (0-10 Scale): 3/10  Post Pain Level (0-10):  0/10    Radiology Discharge instructions for Steroid Injection    Activity Level:     Do not do any heavy activity or exercise for 24 hours.   Do not drive for 4 hours after your injection.  Diet:   Return to your normal diet.  Medications:   If you have stopped taking your Aspirin, Coumadin/Warfarin, Ibuprofen, or any   other blood thinner for this procedure you may resume in the morning unless   your primary care provider has given you other instructions.    Diabetics may see an increase in blood sugar after steroid injections. If you are concerned about your blood sugar, please contact your family doctor.    Site Care:  Remove the bandage and bathe or shower the morning after the procedure.      This is a Pain Management procedure.  You will be contacted in two weeks for follow up.    Call your Primary Care Provider if you have the following (if your primary care provider is not available please seek emergency care):   Nausea with vomiting   Severe headache   Drowsiness or confusion   Redness or drainage at the injection or puncture site   Temperature over 101 degrees F   Other concerns   Worsening back pain   Stiff neck

## 2022-12-16 NOTE — ED NOTES
Pt moved to room 8. VS monitor in place. VSS. Pt cooperative, rouses easily to voice. Denies wanting to harm self when questioned. Security at door.   oral

## 2022-12-21 ENCOUNTER — TELEPHONE (OUTPATIENT)
Dept: INTERVENTIONAL RADIOLOGY/VASCULAR | Facility: HOSPITAL | Age: 32
End: 2022-12-21

## 2022-12-21 NOTE — TELEPHONE ENCOUNTER
CONSULT PATIENT  PAIN INJECTION POST CALL    Procedure: Epidural TL L3-4  Radiologist(s): Dr. Jass Duque  Date of Procedure: 12/9/22    The patient had no questions or concerns.    Relief of pain from this injection    A = 90%  A- = 85%  B = 80%  B- =75%  C = 70%  C- = 65%  D = 60%  D- = 50%  F = less than 50%       Would you say this injection has been beneficial? Yes 60% of relief.  If yes, for how long? Patient is still currently feeling this relief.    Was there one injection that worked better than the other? Patient states that has been a long time since the other injections. But she feels like they have all helped her.     Where is the pain?  The pain is still in the lower back below the waistline in the center of her spine.  Can you describe the pain? Achy  Does the pain radiate anywhere? No  If yes, where does it radiate and where does the pain stop? It use to but since the shot she hasn't noticed it traveling.     Is this new pain? No    Patient would not like to pursue another injection at this time.  The patient will contact IR at [phone] if that changes.      Hoa Juarez

## 2022-12-27 DIAGNOSIS — F31.81 BIPOLAR 2 DISORDER (H): ICD-10-CM

## 2022-12-28 RX ORDER — SERTRALINE HYDROCHLORIDE 100 MG/1
TABLET, FILM COATED ORAL
Qty: 45 TABLET | Refills: 4 | Status: SHIPPED | OUTPATIENT
Start: 2022-12-28 | End: 2023-08-09

## 2023-01-06 ENCOUNTER — OFFICE VISIT (OUTPATIENT)
Dept: PSYCHIATRY | Facility: OTHER | Age: 33
End: 2023-01-06
Attending: PSYCHIATRY & NEUROLOGY
Payer: COMMERCIAL

## 2023-01-06 VITALS
TEMPERATURE: 99.3 F | DIASTOLIC BLOOD PRESSURE: 88 MMHG | HEART RATE: 100 BPM | WEIGHT: 205 LBS | OXYGEN SATURATION: 98 % | SYSTOLIC BLOOD PRESSURE: 136 MMHG | BODY MASS INDEX: 28.59 KG/M2

## 2023-01-06 DIAGNOSIS — F41.1 GENERALIZED ANXIETY DISORDER: ICD-10-CM

## 2023-01-06 DIAGNOSIS — F90.2 ADHD (ATTENTION DEFICIT HYPERACTIVITY DISORDER), COMBINED TYPE: Primary | ICD-10-CM

## 2023-01-06 PROCEDURE — 99214 OFFICE O/P EST MOD 30 MIN: CPT | Performed by: PSYCHIATRY & NEUROLOGY

## 2023-01-06 PROCEDURE — G0463 HOSPITAL OUTPT CLINIC VISIT: HCPCS

## 2023-01-06 RX ORDER — LISDEXAMFETAMINE DIMESYLATE 30 MG/1
30 CAPSULE ORAL 2 TIMES DAILY
Qty: 60 CAPSULE | Refills: 0 | Status: SHIPPED | OUTPATIENT
Start: 2023-02-22 | End: 2023-04-04

## 2023-01-06 RX ORDER — HYDROXYZINE HYDROCHLORIDE 25 MG/1
TABLET, FILM COATED ORAL
Qty: 20 TABLET | Refills: 0 | Status: SHIPPED | OUTPATIENT
Start: 2023-01-06 | End: 2023-05-22

## 2023-01-06 RX ORDER — LISDEXAMFETAMINE DIMESYLATE 30 MG/1
30 CAPSULE ORAL 2 TIMES DAILY
Qty: 60 CAPSULE | Refills: 0 | Status: SHIPPED | OUTPATIENT
Start: 2023-01-25 | End: 2023-02-24

## 2023-01-06 ASSESSMENT — ANXIETY QUESTIONNAIRES
6. BECOMING EASILY ANNOYED OR IRRITABLE: SEVERAL DAYS
7. FEELING AFRAID AS IF SOMETHING AWFUL MIGHT HAPPEN: MORE THAN HALF THE DAYS
GAD7 TOTAL SCORE: 9
3. WORRYING TOO MUCH ABOUT DIFFERENT THINGS: SEVERAL DAYS
IF YOU CHECKED OFF ANY PROBLEMS ON THIS QUESTIONNAIRE, HOW DIFFICULT HAVE THESE PROBLEMS MADE IT FOR YOU TO DO YOUR WORK, TAKE CARE OF THINGS AT HOME, OR GET ALONG WITH OTHER PEOPLE: VERY DIFFICULT
GAD7 TOTAL SCORE: 9
5. BEING SO RESTLESS THAT IT IS HARD TO SIT STILL: SEVERAL DAYS
1. FEELING NERVOUS, ANXIOUS, OR ON EDGE: MORE THAN HALF THE DAYS
2. NOT BEING ABLE TO STOP OR CONTROL WORRYING: SEVERAL DAYS

## 2023-01-06 ASSESSMENT — PATIENT HEALTH QUESTIONNAIRE - PHQ9
5. POOR APPETITE OR OVEREATING: SEVERAL DAYS
SUM OF ALL RESPONSES TO PHQ QUESTIONS 1-9: 10

## 2023-01-06 ASSESSMENT — PAIN SCALES - GENERAL: PAINLEVEL: MILD PAIN (2)

## 2023-01-06 NOTE — PROGRESS NOTES
SUBJECTIVE / INTERIM HISTORY                                                                       Last visit November '22: Continue Vyvanse 30 mg twice daily and filled 11/4/2, 12/2/22 and for 12/30/22.  Continue Zoloft 150 mg daily.     - apartment complex issues. Got an eviction notice which makes no sense since she pays rent. Mold in my apartment.  - [place] vacation went well. Coming back was hard.   - car isn't starting  - this summer Jannet was out walking with her dogs on leashes. A small dog came up and started barking  - her 36 yo brother got Covid and had a stroke and meningitis. He's a nurse at [employer] (12 yo)  - still feels like Zoloft does help. A bit less anxious  - Ronald Angelica is 10 yo. Angelica is shared with her ex. Now [facility] Box / Pit mix: he's pretty chill  - down even on her hobbies: disc golf and getting outside   - would like to move to [place]        Symptoms: feelings guilt, overwhelmed, depressed mood, low energy, anhedonia, sleep issues, poor attention / concentration. Passive SI, no intent or plan.  MEDICAL / SURGICAL HISTORY                pregnant [if applicable]--no          Patient Active Problem List   Diagnosis     Major depressive disorder, recurrent episode, moderate (H)     Lumbago     Anxiety disorder     Migraine     Drug use disorder     Bilateral low back pain without sciatica     Bilateral low back pain with left-sided sciatica     Bipolar 2 disorder (H)     Fibromyalgia     Lump or mass in breast     Mastodynia     Encounter for gynecological examination without abnormal finding     Encounter for surveillance of contraceptives     ACP (advance care planning)     Ovulation pain     Family history of hemochromatosis     ADHD (attention deficit hyperactivity disorder), combined type     Chronic pain     Degeneration of lumbar or lumbosacral intervertebral disc     Osteoarthrosis, pelvic region and thigh     Other chronic cystitis     Orthostatic hypotension      Onychomycosis      ALLERGY   Bee pollen  MEDICATIONS                                                                                                   Current Outpatient Medications   Medication Sig     Acetaminophen (TYLENOL PO) Take 500 mg by mouth every 4 hours as needed     clindamycin (CLINDAMAX) 1 % external gel Apply topically 2 times daily     diazepam (VALIUM) 10 MG tablet Take 1/2 tablet about 45 minutes before procedureand if not feeling better within 30 minutes, take other half     EPINEPHrine (ANY BX GENERIC EQUIV) 0.3 MG/0.3ML injection 2-pack INJECT 0.3 MLS INTO THE MUSCLE AS NEEDED FOR ANAPHYLAXIS     lisdexamfetamine (VYVANSE) 30 MG capsule Take 1 capsule (30 mg) by mouth 2 times daily     MELATONIN PO Take 5 mg by mouth At Bedtime      norethindrone-ethinyl estradiol (ALYACEN 7/7/7) 0.5/0.75/1-35 MG-MCG tablet TAKE 1 TABLET BY MOUTH DAILY ACTIVE PILLS ONLY     sertraline (ZOLOFT) 100 MG tablet TAKE 1 & 1/2 TABLETS (150MG) BY MOUTH DAILY      No current facility-administered medications for this visit.          Facility-Administered Medications Ordered in Other Visits   Medication     betamethasone acet & sod phos (CELESTONE) injection 12 mg     lidocaine 1 % injection 10 mL         VITALS   /82 (BP Location: Left arm, Patient Position: Sitting, Cuff Size: Adult Regular)   Pulse 71   Temp 98.4  F (36.9  C) (Tympanic)   Wt 95.3 kg (210 lb)   SpO2 98%   BMI 29.29 kg/m        LABS                                                                                                                            No results found for any visits on 11/04/22.      Alert. Oriented to person, place, and date / time. Well groomed, calm, cooperative with good eye contact. No problems with speech or psychomotor behavior. Mood was depressed and affect was congruent to speech content and full range.  Thought process, including associations, was unremarkable and thought content was devoid homicidal ideation and  "psychotic thought. + SI with no plan or intent. No hallucinations. Insight was good. Judgment was intact and adequate for safety. Fund of knowledge was intact. Pt demonstrates no obvious problems with attention, concentration, language, recent or remote memory although these were not formally tested.         DIAGNOSES        Major depressive disorder, recurrent, moderate with seasonal component  ADHD  Borderline personality disorder     ASSESSMENT: Jannet San is a 33 yo with ADHD - was working with Jeff Woody, had testing. Depression, anxiety. For today we agreed on continuing meds as are.      Billy is now 12 yo and doing okay..Has had a lot going on. Was out of Zoloft for 6 days, lot of stressors.. anxiety has been up in regards to several stressors. We agreed on a \"as needed\" med for Jannet. Will have her try hydroxyzine. Discussed it being in the class antihistamines and thus can be sedating.         PLAN                                                                                                                       1)  MEDICATIONS:    Continue Vyvanse 30 mg twice daily script left yet for 12/30/22 I set one to fill 1/25/23 and for 2/22/23.  Continue Zoloft 150 mg daily. Start hydroxyzine 25 mg take 1-2 tablets daily as needed for anxiety.     2)  THERAPY: no change     3)  LABS: none  4)  PT MONITOR [call for probs]: worsening symptoms , SI/HI  5)  REFERRALS [CD, medical, other]:  None  6)  RTC: ~2 months             "

## 2023-01-09 ENCOUNTER — TELEPHONE (OUTPATIENT)
Dept: PSYCHIATRY | Facility: OTHER | Age: 33
End: 2023-01-09

## 2023-03-03 DIAGNOSIS — F90.2 ADHD (ATTENTION DEFICIT HYPERACTIVITY DISORDER), COMBINED TYPE: ICD-10-CM

## 2023-03-06 RX ORDER — LISDEXAMFETAMINE DIMESYLATE 30 MG/1
CAPSULE ORAL
Qty: 60 CAPSULE | Refills: 0 | OUTPATIENT
Start: 2023-03-06

## 2023-03-22 DIAGNOSIS — Z30.011 ENCOUNTER FOR INITIAL PRESCRIPTION OF CONTRACEPTIVE PILLS: ICD-10-CM

## 2023-03-24 RX ORDER — NORETHINDRONE AND ETHINYL ESTRADIOL 7 DAYS X 3
KIT ORAL
Qty: 112 TABLET | Refills: 0 | Status: SHIPPED | OUTPATIENT
Start: 2023-03-24 | End: 2023-06-23

## 2023-03-24 NOTE — TELEPHONE ENCOUNTER
norethindrone-ethinyl estradiol (ALYACEN 7/7/7) 0.5/0.75/1-35 MG-MCG tablet   Last Written Prescription Date:  2-11-22  Last Fill Quantity: 112,   # refills: 3  Last Office Visit: 2-11-22  Future Office visit:       Routing refill request to provider for review/approval because:  Drug not on the [facility], P or [facility] refill protocol or controlled substance

## 2023-04-04 ENCOUNTER — MYC REFILL (OUTPATIENT)
Dept: PSYCHIATRY | Facility: OTHER | Age: 33
End: 2023-04-04

## 2023-04-04 DIAGNOSIS — F90.2 ADHD (ATTENTION DEFICIT HYPERACTIVITY DISORDER), COMBINED TYPE: ICD-10-CM

## 2023-04-04 RX ORDER — LISDEXAMFETAMINE DIMESYLATE 30 MG/1
30 CAPSULE ORAL 2 TIMES DAILY
Qty: 60 CAPSULE | Refills: 0 | Status: SHIPPED | OUTPATIENT
Start: 2023-04-04 | End: 2023-05-04

## 2023-04-04 NOTE — TELEPHONE ENCOUNTER
Lisdexamfetamine (Vyvanse) 30 MG capsule    Last Written Prescription Date:  02/22/2023  Last Fill Quantity: 60,   # refills: 0  Last Office Visit: 11/08/2022

## 2023-04-04 NOTE — TELEPHONE ENCOUNTER
Anders      Last Written Prescription Date:  3.10.23  Last Fill Quantity: #60,   # refills: 0  Last Office Visit: 1.6.23  Future Office visit:       Routing refill request to provider for review/approval because:  Drug not on the [facility], P or [facility] refill protocol or controlled substance

## 2023-04-09 ENCOUNTER — HEALTH MAINTENANCE LETTER (OUTPATIENT)
Age: 33
End: 2023-04-09

## 2023-05-04 ENCOUNTER — MYC REFILL (OUTPATIENT)
Dept: PSYCHIATRY | Facility: OTHER | Age: 33
End: 2023-05-04

## 2023-05-04 DIAGNOSIS — F90.2 ADHD (ATTENTION DEFICIT HYPERACTIVITY DISORDER), COMBINED TYPE: ICD-10-CM

## 2023-05-05 RX ORDER — LISDEXAMFETAMINE DIMESYLATE 30 MG/1
30 CAPSULE ORAL 2 TIMES DAILY
Qty: 60 CAPSULE | Refills: 0 | Status: SHIPPED | OUTPATIENT
Start: 2023-05-05 | End: 2023-06-09

## 2023-05-05 NOTE — TELEPHONE ENCOUNTER
Lisdexamfetamine (Vyvanse) 30 MG capsule    Last Written Prescription Date:  04/04/2023  Last Fill Quantity: 60,   # refills: 0  Last Office Visit: 11/08/2022  Future Office visit:       Routing refill request to provider for review/approval because:  Drug not on the [facility], P or [facility] refill protocol or controlled substance

## 2023-05-21 ENCOUNTER — HOSPITAL ENCOUNTER (EMERGENCY)
Facility: HOSPITAL | Age: 33
Discharge: HOME | End: 2023-05-21
Attending: PHYSICIAN ASSISTANT | Admitting: PHYSICIAN ASSISTANT
Payer: COMMERCIAL

## 2023-05-21 VITALS
DIASTOLIC BLOOD PRESSURE: 68 MMHG | SYSTOLIC BLOOD PRESSURE: 122 MMHG | OXYGEN SATURATION: 100 % | RESPIRATION RATE: 20 BRPM | TEMPERATURE: 98.9 F | HEART RATE: 108 BPM
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
## 2023-05-21 DIAGNOSIS — S61.459A DOG BITE OF HAND, UNSPECIFIED LATERALITY, INITIAL ENCOUNTER: ICD-10-CM

## 2023-05-21 PROCEDURE — G0463 HOSPITAL OUTPT CLINIC VISIT: HCPCS

## 2023-05-21 PROCEDURE — 99213 OFFICE O/P EST LOW 20 MIN: CPT | Performed by: PHYSICIAN ASSISTANT

## 2023-05-21 ASSESSMENT — ENCOUNTER SYMPTOMS
ABDOMINAL PAIN: 0
HEADACHES: 0
FEVER: 0
DYSURIA: 0
BACK PAIN: 0
COUGH: 0
ACTIVITY CHANGE: 0
WOUND: 1
EYE REDNESS: 0

## 2023-05-22 ENCOUNTER — MYC REFILL (OUTPATIENT)
Dept: PSYCHIATRY | Facility: OTHER | Age: 33
End: 2023-05-22

## 2023-05-22 DIAGNOSIS — F41.1 GENERALIZED ANXIETY DISORDER: ICD-10-CM

## 2023-05-22 NOTE — ED TRIAGE NOTES
Pt presents with c/o getting bit what a dog   Unsure if dog is up to date on vaccines will find out tomorrow.   pts last TDAP  2016 in [place] thinks she might have had one since   Pt got bit on the left top of the hand. And has a superficial cut on the right pointer finger   No otc meds taken today    Triage Assessment     Row Name 05/21/23 2021       Triage Assessment (Adult)    Airway WDL WDL       Respiratory WDL    Respiratory WDL WDL       Skin Circulation/Temperature WDL    Skin Circulation/Temperature WDL WDL       Cardiac WDL    Cardiac WDL WDL       Peripheral/Neurovascular WDL    Peripheral Neurovascular WDL WDL       Cognitive/Neuro/Behavioral WDL    Cognitive/Neuro/Behavioral WDL WDL

## 2023-05-22 NOTE — ED TRIAGE NOTES
My dog and neighbor dog got into altercation received bite from neighbor dog doberman mix to left hand bleeding controled.cms intact     Triage Assessment     Row Name 05/21/23 2021       Triage Assessment (Adult)    Airway WDL WDL       Respiratory WDL    Respiratory WDL WDL       Skin Circulation/Temperature WDL    Skin Circulation/Temperature WDL WDL       Cardiac WDL    Cardiac WDL WDL       Peripheral/Neurovascular WDL    Peripheral Neurovascular WDL WDL       Cognitive/Neuro/Behavioral WDL    Cognitive/Neuro/Behavioral WDL WDL

## 2023-05-23 RX ORDER — HYDROXYZINE HYDROCHLORIDE 25 MG/1
TABLET, FILM COATED ORAL
Qty: 20 TABLET | Refills: 4 | Status: SHIPPED | OUTPATIENT
Start: 2023-05-23 | End: 2024-09-09

## 2023-05-23 NOTE — TELEPHONE ENCOUNTER
Hydroxyzine      Last Written Prescription Date:  1/6/23  Last Fill Quantity: 20,   # refills: 0  Last Office Visit: 1/6/23  Future Office visit:

## 2023-06-09 ENCOUNTER — MYC REFILL (OUTPATIENT)
Dept: PSYCHIATRY | Facility: OTHER | Age: 33
End: 2023-06-09

## 2023-06-09 ENCOUNTER — MYC REFILL (OUTPATIENT)
Dept: FAMILY MEDICINE | Facility: OTHER | Age: 33
End: 2023-06-09

## 2023-06-09 DIAGNOSIS — M62.830 BACK MUSCLE SPASM: ICD-10-CM

## 2023-06-09 DIAGNOSIS — F41.9 ANXIETY: ICD-10-CM

## 2023-06-09 DIAGNOSIS — F90.2 ADHD (ATTENTION DEFICIT HYPERACTIVITY DISORDER), COMBINED TYPE: ICD-10-CM

## 2023-06-12 RX ORDER — CYCLOBENZAPRINE HCL 5 MG
5 TABLET ORAL
Qty: 14 TABLET | Refills: 0 | Status: SHIPPED | OUTPATIENT
Start: 2023-06-12

## 2023-06-12 RX ORDER — DIAZEPAM 10 MG
TABLET ORAL
Qty: 1 TABLET | Refills: 0 | Status: SHIPPED | OUTPATIENT
Start: 2023-06-12

## 2023-06-12 RX ORDER — LISDEXAMFETAMINE DIMESYLATE 30 MG/1
30 CAPSULE ORAL 2 TIMES DAILY
Qty: 60 CAPSULE | Refills: 0 | Status: SHIPPED | OUTPATIENT
Start: 2023-06-12 | End: 2023-07-16

## 2023-06-12 NOTE — TELEPHONE ENCOUNTER
Flexeril, Valium      Last Written Prescription Date:  11.8.22, 10.21.22  Last Fill Quantity: #14, #1,   # refills: 0  Last Office Visit: 11.8.22  Future Office visit:       Routing refill request to provider for review/approval because:  Drug not on the [facility], P or [facility] refill protocol or controlled substance

## 2023-06-12 NOTE — TELEPHONE ENCOUNTER
Anders      Last Written Prescription Date:  5.5.23  Last Fill Quantity: #60,   # refills: 0  Last Office Visit: 1.6.23  Future Office visit:       Routing refill request to provider for review/approval because:  Drug not on the [facility], P or [facility] refill protocol or controlled substance

## 2023-06-13 ENCOUNTER — OFFICE VISIT (OUTPATIENT)
Dept: CHIROPRACTIC MEDICINE | Facility: OTHER | Age: 33
End: 2023-06-13
Attending: CHIROPRACTOR
Payer: COMMERCIAL

## 2023-06-13 DIAGNOSIS — M99.02 SEGMENTAL AND SOMATIC DYSFUNCTION OF THORACIC REGION: ICD-10-CM

## 2023-06-13 DIAGNOSIS — M99.03 SEGMENTAL AND SOMATIC DYSFUNCTION OF LUMBAR REGION: Primary | ICD-10-CM

## 2023-06-13 DIAGNOSIS — M99.01 SEGMENTAL AND SOMATIC DYSFUNCTION OF CERVICAL REGION: ICD-10-CM

## 2023-06-13 DIAGNOSIS — M54.50 ACUTE BILATERAL LOW BACK PAIN WITHOUT SCIATICA: ICD-10-CM

## 2023-06-13 PROCEDURE — 98941 CHIROPRACT MANJ 3-4 REGIONS: CPT | Mod: AT | Performed by: CHIROPRACTOR

## 2023-06-13 PROCEDURE — 99202 OFFICE O/P NEW SF 15 MIN: CPT | Mod: 25 | Performed by: CHIROPRACTOR

## 2023-06-14 ENCOUNTER — HOSPITAL ENCOUNTER (OUTPATIENT)
Facility: HOSPITAL | Age: 33
Discharge: HOME | End: 2023-06-14
Attending: RADIOLOGY | Admitting: RADIOLOGY
Payer: COMMERCIAL

## 2023-06-14 ENCOUNTER — HOSPITAL ENCOUNTER (OUTPATIENT)
Dept: INTERVENTIONAL RADIOLOGY/VASCULAR | Facility: HOSPITAL | Age: 33
Discharge: HOME | End: 2023-06-14
Attending: STUDENT IN AN ORGANIZED HEALTH CARE EDUCATION/TRAINING PROGRAM | Admitting: RADIOLOGY
Payer: COMMERCIAL

## 2023-06-14 DIAGNOSIS — M47.816 LUMBAR SPONDYLOSIS: ICD-10-CM

## 2023-06-14 PROCEDURE — 250N000011 HC RX IP 250 OP 636: Performed by: RADIOLOGY

## 2023-06-14 PROCEDURE — 250N000009 HC RX 250: Performed by: RADIOLOGY

## 2023-06-14 PROCEDURE — 272N000472 XR LUMBAR TRANSLAMINAR INJ INCL IMAGING

## 2023-06-14 RX ORDER — IOPAMIDOL 612 MG/ML
15 INJECTION, SOLUTION INTRATHECAL ONCE
Status: COMPLETED | OUTPATIENT
Start: 2023-06-14 | End: 2023-06-14

## 2023-06-14 RX ORDER — LIDOCAINE HYDROCHLORIDE 10 MG/ML
5 INJECTION, SOLUTION EPIDURAL; INFILTRATION; INTRACAUDAL; PERINEURAL ONCE
Status: COMPLETED | OUTPATIENT
Start: 2023-06-14 | End: 2023-06-14

## 2023-06-14 RX ORDER — DEXAMETHASONE SODIUM PHOSPHATE 10 MG/ML
10 INJECTION, SOLUTION INTRAMUSCULAR; INTRAVENOUS ONCE
Status: COMPLETED | OUTPATIENT
Start: 2023-06-14 | End: 2023-06-14

## 2023-06-14 RX ADMIN — IOPAMIDOL 4 ML: 612 INJECTION, SOLUTION INTRATHECAL at 14:42

## 2023-06-14 RX ADMIN — DEXAMETHASONE SODIUM PHOSPHATE 10 MG: 10 INJECTION, SOLUTION INTRAMUSCULAR; INTRAVENOUS at 14:41

## 2023-06-14 RX ADMIN — LIDOCAINE HYDROCHLORIDE 3 ML: 10 INJECTION, SOLUTION EPIDURAL; INFILTRATION; INTRACAUDAL; PERINEURAL at 14:41

## 2023-06-14 NOTE — PROGRESS NOTES
Subjective Finding:    Chief compalint: Patient presents with:  Back Pain: Neck and back stiffness .  Back pain is bilaterally  , Pain Scale: 6/10, Intensity: sharp, Duration: 2 weeks, Radiating:  bilateral buttock.    Date of injury:     Activities that the pain restricts:   Home/household/hobbies/social activities: Yes.  Work duties: Yes.  Sleep: Yes.  Makes symptoms better: rest.  Makes symptoms worse: activity, lumbar extension and lumbar flexion.  Have you seen anyone else for the symptoms? No.  Work related: No.  Automobile related injury: No.    Objective and Assessment:    Posture Analysis:   High shoulder: .  Head tilt: .  High iliac crest: .  Head carriage: neutral.  Thoracic Kyphosis: neutral.  Lumbar Lordosis: forward.    Lumbar Range of Motion: extension decreased.  Cervical Range of Motion: .  Thoracic Range of Motion: .  Extremity Range of Motion: .    Palpation:   Quad lumb: bilateral, referred pain: no    Segmental dysfunction pre-treatment and treatment area: C3, C4, T6, L4 and L5.    Assessment post-treatment:  Cervical: ROM increased.  Thoracic: ROM increased.  Lumbar: ROM increased.    Comments: .      Complicating Factors: .    Procedure(s):  CMT:  00976 Chiropractic manipulative treatment 3-4 regions performed   Cervical: Diversified, See above for level, Supine, Thoracic: Diversified, See above for level, Prone, Lumbar: Diversified, See above for level, Side posture and Pelvis: Diversified, See above for level, Side posture    Modalities:  None performed this visit    Therapeutic procedures:  None    Plan:  Treatment plan: 2 times per week for 2 weeks.  Instructed patient: stretch as instructed at visit.  Short term goals: increase ROM.  Long term goals: restore normal function.  Prognosis: excellent.

## 2023-06-14 NOTE — DISCHARGE INSTRUCTIONS
Cell number on file:    Telephone Information:   Mobile [phone]     Is it ok to leave a message at this number(s)? Yes    Dr Pope completed your procedure on 6/14/2023.    Current Pain Level (0-10 Scale): 2/10  Post Pain Level (0-10):  0/10    Radiology Discharge instructions for Steroid Injection    Activity Level:     Do not do any heavy activity or exercise for 24 hours.   Do not drive for 4 hours after your injection.  Diet:   Return to your normal diet.  Medications:   If you have stopped taking your Aspirin, Coumadin/Warfarin, Ibuprofen, or any   other blood thinner for this procedure you may resume in the morning unless   your primary care provider has given you other instructions.    Diabetics may see an increase in blood sugar after steroid injections. If you are concerned about your blood sugar, please contact your family doctor.    Site Care:  Remove the bandage and bathe or shower the morning after the procedure.      This is a Pain Management procedure.  You will be contacted in two weeks for follow up.    Call your Primary Care Provider if you have the following (if your primary care provider is not available please seek emergency care):   Nausea with vomiting   Severe headache   Drowsiness or confusion   Redness or drainage at the injection or puncture site   Temperature over 101 degrees F   Other concerns   Worsening back pain   Stiff neck

## 2023-06-23 DIAGNOSIS — Z30.011 ENCOUNTER FOR INITIAL PRESCRIPTION OF CONTRACEPTIVE PILLS: ICD-10-CM

## 2023-06-23 RX ORDER — NORETHINDRONE AND ETHINYL ESTRADIOL 7 DAYS X 3
KIT ORAL
Qty: 112 TABLET | Refills: 0 | Status: SHIPPED | OUTPATIENT
Start: 2023-06-23

## 2023-06-23 NOTE — TELEPHONE ENCOUNTER
DASETTA 7/7/7 0.5/0.75/1-35 MG-MCG tablet  Last Written Prescription Date:  3/24/2023  Last Fill Quantity: 112,   # refills: 0  Last Office Visit: 6/13/2023  Future Office visit:

## 2023-06-28 ENCOUNTER — TELEPHONE (OUTPATIENT)
Dept: INTERVENTIONAL RADIOLOGY/VASCULAR | Facility: HOSPITAL | Age: 33
End: 2023-06-28

## 2023-06-28 NOTE — TELEPHONE ENCOUNTER
CONSULT PATIENT  PAIN INJECTION POST CALL    Procedure: Epidural TL L3-4  Radiologist(s): Dr. Simba Pope  Date of Procedure: 06/14/23    The patient had no questions or concerns.    Relief of pain from this injection    A = 90%  A- = 85%  B = 80%  B- =75%  C = 70%  C- = 65%  D = 60%  D- = 50%  F = less than 50%       Did you get any relief from this injection in the past 2 weeks? Yes      If yes,  how long did the relief last: Patient is still receiving relief from this injection    Are you stlll feeling relief? (2 weeks out)  yes  Would you say this injection has been beneficial?Yes      Was there one injection that worked better than the other?Patient has had the same injection the past few years and this one has worked for her    Where is the pain? Patient states that the pain is right at the top of her buttock in the sciatica area.  Can you describe the pain?Dull constant aching   Does the pain radiate anywhere?N/a  If yes, where does it radiate and where does the pain stop?N/a    Is this new pain? No    Patient would not like to pursue another injection at this time.  The patient will contact IR at [phone] if that changes.      Leslie Guillaume

## 2023-07-16 ENCOUNTER — MYC REFILL (OUTPATIENT)
Dept: PSYCHIATRY | Facility: OTHER | Age: 33
End: 2023-07-16

## 2023-07-16 DIAGNOSIS — F90.2 ADHD (ATTENTION DEFICIT HYPERACTIVITY DISORDER), COMBINED TYPE: ICD-10-CM

## 2023-07-17 NOTE — TELEPHONE ENCOUNTER
Anders      Last Written Prescription Date:  6.12.23  Last Fill Quantity: #60,   # refills: 0  Last Office Visit: 1.6.23  Future Office visit:       Routing refill request to provider for review/approval because:  Drug not on the [facility], P or [facility] refill protocol or controlled substance

## 2023-07-18 RX ORDER — LISDEXAMFETAMINE DIMESYLATE 30 MG/1
30 CAPSULE ORAL 2 TIMES DAILY
Qty: 60 CAPSULE | Refills: 0 | Status: SHIPPED | OUTPATIENT
Start: 2023-07-18 | End: 2023-08-09

## 2023-08-09 ENCOUNTER — VIRTUAL VISIT (OUTPATIENT)
Dept: PSYCHIATRY | Facility: OTHER | Age: 33
End: 2023-08-09
Attending: PSYCHIATRY & NEUROLOGY
Payer: COMMERCIAL

## 2023-08-09 DIAGNOSIS — F90.2 ADHD (ATTENTION DEFICIT HYPERACTIVITY DISORDER), COMBINED TYPE: ICD-10-CM

## 2023-08-09 DIAGNOSIS — F31.81 BIPOLAR 2 DISORDER (H): ICD-10-CM

## 2023-08-09 PROCEDURE — 99442 PR PHYSICIAN TELEPHONE EVALUATION 11-20 MIN: CPT | Mod: 95 | Performed by: PSYCHIATRY & NEUROLOGY

## 2023-08-09 RX ORDER — SERTRALINE HYDROCHLORIDE 100 MG/1
150 TABLET, FILM COATED ORAL DAILY
Qty: 45 TABLET | Refills: 6 | Status: SHIPPED | OUTPATIENT
Start: 2023-08-09 | End: 2023-10-09

## 2023-08-09 RX ORDER — LISDEXAMFETAMINE DIMESYLATE 30 MG/1
30 CAPSULE ORAL 2 TIMES DAILY
Qty: 60 CAPSULE | Refills: 0 | Status: SHIPPED | OUTPATIENT
Start: 2023-08-15 | End: 2023-10-09

## 2023-08-09 ASSESSMENT — ANXIETY QUESTIONNAIRES
6. BECOMING EASILY ANNOYED OR IRRITABLE: NOT AT ALL
7. FEELING AFRAID AS IF SOMETHING AWFUL MIGHT HAPPEN: NOT AT ALL
GAD7 TOTAL SCORE: 4
2. NOT BEING ABLE TO STOP OR CONTROL WORRYING: NOT AT ALL
3. WORRYING TOO MUCH ABOUT DIFFERENT THINGS: SEVERAL DAYS
1. FEELING NERVOUS, ANXIOUS, OR ON EDGE: SEVERAL DAYS
GAD7 TOTAL SCORE: 4
5. BEING SO RESTLESS THAT IT IS HARD TO SIT STILL: SEVERAL DAYS

## 2023-08-09 ASSESSMENT — PATIENT HEALTH QUESTIONNAIRE - PHQ9
5. POOR APPETITE OR OVEREATING: SEVERAL DAYS
SUM OF ALL RESPONSES TO PHQ QUESTIONS 1-9: 7

## 2023-08-09 ASSESSMENT — PAIN SCALES - GENERAL: PAINLEVEL: MILD PAIN (2)

## 2023-08-09 NOTE — PROGRESS NOTES
"Telephone visit 19 minutes. Total visit time 19 minutes (documented day after visit)    SUBJECTIVE / INTERIM HISTORY                                                                       Last visit 1/6/23:Continue Vyvanse 30 mg twice daily script left yet for 12/30/22 I set one to fill 1/25/23 and for 2/22/23.  Continue Zoloft 150 mg daily. Start hydroxyzine 25 mg take 1-2 tablets daily as needed for anxiety.    - started ketamine. Got 6 infusions. Positive experience thus far. Needs to schedule a booster. \"I know they did help\"  - Friday 8/4/23 Jannet had to put Amada down. Amada was aggressive with their ronald Dominguze. Son isn't doing well  - her 36 yo brother got Covid and had a stroke and meningitis. He's a nurse at [employer] (12 yo)  - still feels like Zoloft does help. A bit less anxious  - Ronald Dominguez is 10 yo. Angelica is shared with her ex. Now Amada Box / Pit mix: he's pretty chill  - down even on her hobbies: disc golf and getting outside   - is going to be moving to [place] to a house September 1      Symptoms: feelings guilt, overwhelmed, depressed mood, low energy, anhedonia, sleep issues, poor attention / concentration. Passive SI, no intent or plan.  MEDICAL / SURGICAL HISTORY                pregnant [if applicable]--no          Patient Active Problem List   Diagnosis    Major depressive disorder, recurrent episode, moderate (H)    Lumbago    Anxiety disorder    Migraine    Drug use disorder    Bilateral low back pain without sciatica    Bilateral low back pain with left-sided sciatica    Bipolar 2 disorder (H)    Fibromyalgia    Lump or mass in breast    Mastodynia    Encounter for gynecological examination without abnormal finding    Encounter for surveillance of contraceptives    ACP (advance care planning)    Ovulation pain    Family history of hemochromatosis    ADHD (attention deficit hyperactivity disorder), combined type    Chronic pain    Degeneration of lumbar or " lumbosacral intervertebral disc    Osteoarthrosis, pelvic region and thigh    Other chronic cystitis    Orthostatic hypotension    Onychomycosis      ALLERGY   Bee pollen  MEDICATIONS                                                                                                   Current Outpatient Medications   Medication Sig    Acetaminophen (TYLENOL PO) Take 500 mg by mouth every 4 hours as needed    clindamycin (CLINDAMAX) 1 % external gel Apply topically 2 times daily    diazepam (VALIUM) 10 MG tablet Take 1/2 tablet about 45 minutes before procedureand if not feeling better within 30 minutes, take other half    EPINEPHrine (ANY BX GENERIC EQUIV) 0.3 MG/0.3ML injection 2-pack INJECT 0.3 MLS INTO THE MUSCLE AS NEEDED FOR ANAPHYLAXIS    lisdexamfetamine (VYVANSE) 30 MG capsule Take 1 capsule (30 mg) by mouth 2 times daily    MELATONIN PO Take 5 mg by mouth At Bedtime     norethindrone-ethinyl estradiol (ALYACEN 7/7/7) 0.5/0.75/1-35 MG-MCG tablet TAKE 1 TABLET BY MOUTH DAILY ACTIVE PILLS ONLY    sertraline (ZOLOFT) 100 MG tablet TAKE 1 & 1/2 TABLETS (150MG) BY MOUTH DAILY      No current facility-administered medications for this visit.          Facility-Administered Medications Ordered in Other Visits   Medication    betamethasone acet & sod phos (CELESTONE) injection 12 mg    lidocaine 1 % injection 10 mL         VITALS   /82 (BP Location: Left arm, Patient Position: Sitting, Cuff Size: Adult Regular)   Pulse 71   Temp 98.4  F (36.9  C) (Tympanic)   Wt 95.3 kg (210 lb)   SpO2 98%   BMI 29.29 kg/m        LABS                                                                                                                            No results found for any visits on 11/04/22.      Alert. Oriented to person, place, and date / time. Well groomed, calm, cooperative with good eye contact. No problems with speech or psychomotor behavior. Mood was depressed and affect was congruent to speech content and full  range.  Thought process, including associations, was unremarkable and thought content was devoid homicidal ideation and psychotic thought. + SI with no plan or intent. No hallucinations. Insight was good. Judgment was intact and adequate for safety. Fund of knowledge was intact. Pt demonstrates no obvious problems with attention, concentration, language, recent or remote memory although these were not formally tested.         DIAGNOSES        Major depressive disorder, recurrent, moderate with seasonal component  ADHD  Borderline personality disorder     ASSESSMENT: Jannet San is a 31 yo with ADHD - was working with Jeff Woody, had testing. Depression, anxiety. For today we agreed on continuing meds as are.      Billy is now 12 yo and doing okay..Has had a lot going on. Had to put her dog Amada, 10 yo, down last Friday         PLAN                                                                                                                       1)  MEDICATIONS:    Continue Vyvanse 30 mg twice daily script set to fill for this month 8/15 and for 9/12. .  Continue Zoloft 150 mg daily.      2)  THERAPY: no change     3)  LABS: none  4)  PT MONITOR [call for probs]: worsening symptoms , SI/HI  5)  REFERRALS [CD, medical, other]:  None  6)  RTC: ~2 months

## 2023-08-10 RX ORDER — LISDEXAMFETAMINE DIMESYLATE 30 MG/1
30 CAPSULE ORAL 2 TIMES DAILY
Qty: 60 CAPSULE | Refills: 0 | Status: SHIPPED | OUTPATIENT
Start: 2023-09-12 | End: 2023-12-11

## 2023-10-09 ENCOUNTER — VIRTUAL VISIT (OUTPATIENT)
Dept: PSYCHIATRY | Facility: OTHER | Age: 33
End: 2023-10-09
Attending: PSYCHIATRY & NEUROLOGY
Payer: COMMERCIAL

## 2023-10-09 DIAGNOSIS — F90.2 ADHD (ATTENTION DEFICIT HYPERACTIVITY DISORDER), COMBINED TYPE: Primary | ICD-10-CM

## 2023-10-09 PROCEDURE — 99442 PR PHYSICIAN TELEPHONE EVALUATION 11-20 MIN: CPT | Mod: 95 | Performed by: PSYCHIATRY & NEUROLOGY

## 2023-10-09 RX ORDER — LISDEXAMFETAMINE DIMESYLATE 30 MG/1
30 CAPSULE ORAL 2 TIMES DAILY
Qty: 60 CAPSULE | Refills: 0 | Status: SHIPPED | OUTPATIENT
Start: 2023-10-09 | End: 2023-11-08

## 2023-10-09 RX ORDER — LISDEXAMFETAMINE DIMESYLATE 30 MG/1
30 CAPSULE ORAL 2 TIMES DAILY
Qty: 60 CAPSULE | Refills: 0 | Status: SHIPPED | OUTPATIENT
Start: 2023-12-04 | End: 2024-01-03

## 2023-10-09 RX ORDER — LISDEXAMFETAMINE DIMESYLATE 30 MG/1
30 CAPSULE ORAL 2 TIMES DAILY
Qty: 60 CAPSULE | Refills: 0 | Status: SHIPPED | OUTPATIENT
Start: 2023-11-06 | End: 2023-12-06

## 2023-10-09 ASSESSMENT — ANXIETY QUESTIONNAIRES
1. FEELING NERVOUS, ANXIOUS, OR ON EDGE: SEVERAL DAYS
2. NOT BEING ABLE TO STOP OR CONTROL WORRYING: NOT AT ALL
7. FEELING AFRAID AS IF SOMETHING AWFUL MIGHT HAPPEN: NOT AT ALL
6. BECOMING EASILY ANNOYED OR IRRITABLE: NOT AT ALL
5. BEING SO RESTLESS THAT IT IS HARD TO SIT STILL: SEVERAL DAYS
3. WORRYING TOO MUCH ABOUT DIFFERENT THINGS: SEVERAL DAYS
GAD7 TOTAL SCORE: 4
GAD7 TOTAL SCORE: 4

## 2023-10-09 ASSESSMENT — PATIENT HEALTH QUESTIONNAIRE - PHQ9
5. POOR APPETITE OR OVEREATING: SEVERAL DAYS
SUM OF ALL RESPONSES TO PHQ QUESTIONS 1-9: 6

## 2023-10-09 ASSESSMENT — PAIN SCALES - GENERAL: PAINLEVEL: MILD PAIN (3)

## 2023-10-09 NOTE — PROGRESS NOTES
Telephone visit 16 minutes. Total visit time 22 minutes. 6 minutes reviewing chart, documenting.     SUBJECTIVE / INTERIM HISTORY                                                                       Last visit 8/9/23: Continue Vyvanse 30 mg twice daily script set to fill for this month 8/15 and for 9/12. .  Continue Zoloft 150 mg daily.    - Last I spoke with Jannet she had gotten 6 ketamine infusions. 2 more since  - liking their new house [place] near [facility]  - feeling ready to try going off Zoloft. Is down to 50 mg.  - Billy (10 yo)  - Ronald Dominguez is 103yo. Angelica is shared with her ex.    - is going to be moving to [place] to a house September 1      Symptoms: feelings guilt, overwhelmed, depressed mood, low energy, anhedonia, sleep issues, poor attention / concentration. Passive SI, no intent or plan.  MEDICAL / SURGICAL HISTORY                pregnant [if applicable]--no          Patient Active Problem List   Diagnosis    Major depressive disorder, recurrent episode, moderate (H)    Lumbago    Anxiety disorder    Migraine    Drug use disorder    Bilateral low back pain without sciatica    Bilateral low back pain with left-sided sciatica    Bipolar 2 disorder (H)    Fibromyalgia    Lump or mass in breast    Mastodynia    Encounter for gynecological examination without abnormal finding    Encounter for surveillance of contraceptives    ACP (advance care planning)    Ovulation pain    Family history of hemochromatosis    ADHD (attention deficit hyperactivity disorder), combined type    Chronic pain    Degeneration of lumbar or lumbosacral intervertebral disc    Osteoarthrosis, pelvic region and thigh    Other chronic cystitis    Orthostatic hypotension    Onychomycosis      ALLERGY   Bee pollen  MEDICATIONS                                                                                                   Current Outpatient Medications   Medication Sig    Acetaminophen (TYLENOL PO) Take 500 mg by mouth every 4  hours as needed    clindamycin (CLINDAMAX) 1 % external gel Apply topically 2 times daily    diazepam (VALIUM) 10 MG tablet Take 1/2 tablet about 45 minutes before procedureand if not feeling better within 30 minutes, take other half    EPINEPHrine (ANY BX GENERIC EQUIV) 0.3 MG/0.3ML injection 2-pack INJECT 0.3 MLS INTO THE MUSCLE AS NEEDED FOR ANAPHYLAXIS    lisdexamfetamine (VYVANSE) 30 MG capsule Take 1 capsule (30 mg) by mouth 2 times daily    MELATONIN PO Take 5 mg by mouth At Bedtime     norethindrone-ethinyl estradiol (ALYACEN 7/7/7) 0.5/0.75/1-35 MG-MCG tablet TAKE 1 TABLET BY MOUTH DAILY ACTIVE PILLS ONLY    sertraline (ZOLOFT) 100 MG tablet TAKE 1 & 1/2 TABLETS (150MG) BY MOUTH DAILY      No current facility-administered medications for this visit.          Facility-Administered Medications Ordered in Other Visits   Medication    betamethasone acet & sod phos (CELESTONE) injection 12 mg    lidocaine 1 % injection 10 mL         VITALS   /82 (BP Location: Left arm, Patient Position: Sitting, Cuff Size: Adult Regular)   Pulse 71   Temp 98.4  F (36.9  C) (Tympanic)   Wt 95.3 kg (210 lb)   SpO2 98%   BMI 29.29 kg/m        LABS                                                                                                                            No results found for any visits on 11/04/22.    Mental Status Exam      No problems with speech  Mood was described as doing better. Thought process, including associations, was unremarkable and thought content was devoid of SI, homicidal ideation and psychotic thought. No hallucinations. Insight was good. Judgment was intact and adequate for safety. Fund of knowledge was intact. Pt demonstrates no obvious problems with attention, concentration, language, recent or remote memory although these were not formally tested.         DIAGNOSES        Major depressive disorder, recurrent, moderate with seasonal component  ADHD  Borderline personality disorder      ASSESSMENT: Jannet San is a 34 yo with ADHD - was working with Jeff Woody, had testing. Depression, anxiety. Today Jannet notes she has been feeling better and is down to 50 mg Zoloft. Asks if I feel okay she trying discontnuing remaining dose. I think this appropriate given she is feeling better. We agreed touch base n 2-3 months.            PLAN                                                                                                                       1)  MEDICATIONS:    Continue Vyvanse 30 mg twice daily filled for today 10/9.  Discontinue Zoloft 50 mg daily.   2)  THERAPY: no change     3)  LABS: none  4)  PT MONITOR [call for probs]: worsening symptoms , SI/HI  5)  REFERRALS [CD, medical, other]:  None  6)  RTC: ~2 months

## 2023-11-04 NOTE — ED AVS SNAPSHOT
[facility]    [address]    [place] [address]    Phone:  [phone]                                       Jannet San   MRN: [id number]    Department:  [facility]   Date of Visit:  1/19/2017           Patient Information     Date Of Birth          1990        Your diagnoses for this visit were:     Contusion of rib, right, initial encounter        You were seen by Cori Conti NP.      Follow-up Information     Follow up with Leona Matthews MD.    Specialty:  Family Practice    Why:  As needed, If symptoms worsen    Contact information:    [facility] [place]  [address]  [place] [address] [phone]          Follow up with [facility].    Specialty:  EMERGENCY MEDICINE    Contact information:    [address]  [place] [address] [phone]    Additional information:    From [place]: Take [address]. Turn left at [address]. Turn left at the first stoplight on [address]. At the first stop sign, take a right onto [address]. Take a left into the parking lot and continue through until you reach the North enterance of the building.       From [place]: Take [address]. Take the [address] ramp towards [place]. Turn left onto [address]. Take a slight right onto [address]. Turn left at the first stoplight on [address]. At the first stop sign, take a right onto [address]. Take a left into the parking lot and continue through until you reach the North enterance of the building.       From [place]: Take [address]. Take a right at [address]. At the first stop sign, take a right onto [address]. Take a left into the parking lot and continue through until you reach the North enterance of the building.         Discharge Instructions       Take tylenol and or ibuprofen for pain.   Take Flexeril as directed for spasm. Do not drive or  participate in activities that require alertness when taking.   Use a heating pad to right lower rib pain. Protect skin from burn.   Follow up with PCP with an increase in symptoms or concerns.   Return to urgent care or emergency department with an increase in symptoms or concerns.     Discharge References/Attachments     RIB CONTUSION (ENGLISH)         Review of your medicines      START taking        Dose / Directions Last dose taken    cyclobenzaprine 10 MG tablet   Commonly known as:  FLEXERIL   Dose:  5 mg   Quantity:  5 tablet        Take 0.5 tablets (5 mg) by mouth 3 times daily as needed for muscle spasms (Take 5-10mg 3 times daily as needed for muscle spasm.)   Refills:  0          CONTINUE these medicines which may have CHANGED, or have new prescriptions. If we are uncertain of the size of tablets/capsules you have at home, strength may be listed as something that might have changed.        Dose / Directions Last dose taken    benzonatate 100 MG capsule   Commonly known as:  TESSALON   Dose:  100 mg   What changed:    - medication strength  - how much to take   Quantity:  30 capsule        Take 1 capsule (100 mg) by mouth 3 times daily as needed for cough   Refills:  0          Our records show that you are taking the medicines listed below. If these are incorrect, please call your family doctor or clinic.        Dose / Directions Last dose taken    etonogestrel 68 MG Impl   Commonly known as:  IMPLANON/NEXPLANON   Dose:  1 each        1 each (68 mg) by Subdermal route continuous   Refills:  0        IBUPROFEN PO   Dose:  800 mg        Take 800 mg by mouth   Refills:  0                Prescriptions were sent or printed at these locations (2 Prescriptions)                   [id number] - [place] Quijano - [id number] [place] [address]    Telephone:  [phone]   Fax:  [phone]   Hours:                  E-Prescribed (2 of 2)         benzonatate (TESSALON) 100 MG  capsule               cyclobenzaprine (FLEXERIL) 10 MG tablet                Procedures and tests performed during your visit     Chest XR,  PA & LAT      Orders Needing Specimen Collection     None      Pending Results     Date and Time Order Name Status Description    1/19/2017 1033 Chest XR,  PA & LAT Preliminary             Pending Culture Results     No orders found from 1/18/2017 to 1/20/2017.            Thank you for choosing [facility]       Thank you for choosing [facility] for your care. Our goal is always to provide you with excellent care. Hearing back from our patients is one way we can continue to improve our services. Please take a few minutes to complete the written survey that you may receive in the mail after you visit with us. Thank you!        [facility] Information     [employer] gives you secure access to your electronic health record. If you see a primary care provider, you can also send messages to your care team and make appointments. If you have questions, please call your primary care clinic.  If you do not have a primary care provider, please call [phone] and they will assist you.        Care EveryWhere ID     This is your Care EveryWhere ID. This could be used by other organizations to access your [facility] medical records  [id number]        After Visit Summary       This is your record. Keep this with you and show to your community pharmacist(s) and doctor(s) at your next visit.                   Yes

## 2023-12-11 ENCOUNTER — VIRTUAL VISIT (OUTPATIENT)
Dept: PSYCHIATRY | Facility: OTHER | Age: 33
End: 2023-12-11
Attending: PSYCHIATRY & NEUROLOGY
Payer: COMMERCIAL

## 2023-12-11 DIAGNOSIS — F90.2 ADHD (ATTENTION DEFICIT HYPERACTIVITY DISORDER), COMBINED TYPE: Primary | ICD-10-CM

## 2023-12-11 PROCEDURE — 99443 PR PHYSICIAN TELEPHONE EVALUATION 21-30 MIN: CPT | Mod: 95 | Performed by: PSYCHIATRY & NEUROLOGY

## 2023-12-11 RX ORDER — LISDEXAMFETAMINE DIMESYLATE 30 MG/1
30 CAPSULE ORAL 2 TIMES DAILY
Qty: 60 CAPSULE | Refills: 0 | Status: SHIPPED | OUTPATIENT
Start: 2024-01-30 | End: 2024-02-29

## 2023-12-11 RX ORDER — LISDEXAMFETAMINE DIMESYLATE 30 MG/1
30 CAPSULE ORAL 2 TIMES DAILY
Qty: 60 CAPSULE | Refills: 0 | Status: SHIPPED | OUTPATIENT
Start: 2024-01-02 | End: 2024-02-01

## 2023-12-11 RX ORDER — GABAPENTIN 400 MG/1
1 CAPSULE ORAL 2 TIMES DAILY
COMMUNITY
Start: 2023-11-20

## 2023-12-11 RX ORDER — LISDEXAMFETAMINE DIMESYLATE 30 MG/1
30 CAPSULE ORAL 2 TIMES DAILY
Qty: 60 CAPSULE | Refills: 0 | Status: SHIPPED | OUTPATIENT
Start: 2024-02-27 | End: 2024-03-28

## 2023-12-11 ASSESSMENT — PATIENT HEALTH QUESTIONNAIRE - PHQ9
SUM OF ALL RESPONSES TO PHQ QUESTIONS 1-9: 7
5. POOR APPETITE OR OVEREATING: SEVERAL DAYS

## 2023-12-11 ASSESSMENT — ANXIETY QUESTIONNAIRES
6. BECOMING EASILY ANNOYED OR IRRITABLE: SEVERAL DAYS
GAD7 TOTAL SCORE: 7
1. FEELING NERVOUS, ANXIOUS, OR ON EDGE: SEVERAL DAYS
5. BEING SO RESTLESS THAT IT IS HARD TO SIT STILL: SEVERAL DAYS
2. NOT BEING ABLE TO STOP OR CONTROL WORRYING: SEVERAL DAYS
GAD7 TOTAL SCORE: 7
3. WORRYING TOO MUCH ABOUT DIFFERENT THINGS: SEVERAL DAYS
7. FEELING AFRAID AS IF SOMETHING AWFUL MIGHT HAPPEN: SEVERAL DAYS

## 2023-12-11 ASSESSMENT — PAIN SCALES - GENERAL: PAINLEVEL: NO PAIN (0)

## 2023-12-11 NOTE — PROGRESS NOTES
"Telephone visit 21 minutes. Total visit time 24 minutes. 3 minutes reviewing chart, documenting.     SUBJECTIVE / INTERIM HISTORY                                                                       Last visit 10/9/23: Continue Vyvanse 30 mg twice daily filled for today 10/9.  Discontinue Zoloft 50 mg daily.      - was rough for about first month going off Zoloft  - ketamine infusions: maintenance. This helps.  - liking their new house [place] near [facility]  - \"deschooling\" Billy  - going to [place] with Billy to her parents  - Billy (12 yo) has had issues. Going to [facility]. Jannet thinking about homeschooling for him   - Ronald Dominguez 12 yo       Symptoms: feelings guilt, overwhelmed, depressed mood, low energy, anhedonia, sleep issues, poor attention / concentration. Passive SI, no intent or plan.  MEDICAL / SURGICAL HISTORY                pregnant [if applicable]--no          Patient Active Problem List   Diagnosis    Major depressive disorder, recurrent episode, moderate (H)    Lumbago    Anxiety disorder    Migraine    Drug use disorder    Bilateral low back pain without sciatica    Bilateral low back pain with left-sided sciatica    Bipolar 2 disorder (H)    Fibromyalgia    Lump or mass in breast    Mastodynia    Encounter for gynecological examination without abnormal finding    Encounter for surveillance of contraceptives    ACP (advance care planning)    Ovulation pain    Family history of hemochromatosis    ADHD (attention deficit hyperactivity disorder), combined type    Chronic pain    Degeneration of lumbar or lumbosacral intervertebral disc    Osteoarthrosis, pelvic region and thigh    Other chronic cystitis    Orthostatic hypotension    Onychomycosis      ALLERGY   Bee pollen  MEDICATIONS                                                                                                   Current Outpatient Medications   Medication Sig    Acetaminophen (TYLENOL PO) Take 500 mg by " mouth every 4 hours as needed    clindamycin (CLINDAMAX) 1 % external gel Apply topically 2 times daily    diazepam (VALIUM) 10 MG tablet Take 1/2 tablet about 45 minutes before procedureand if not feeling better within 30 minutes, take other half    EPINEPHrine (ANY BX GENERIC EQUIV) 0.3 MG/0.3ML injection 2-pack INJECT 0.3 MLS INTO THE MUSCLE AS NEEDED FOR ANAPHYLAXIS    lisdexamfetamine (VYVANSE) 30 MG capsule Take 1 capsule (30 mg) by mouth 2 times daily    MELATONIN PO Take 5 mg by mouth At Bedtime     norethindrone-ethinyl estradiol (ALYACEN 7/7/7) 0.5/0.75/1-35 MG-MCG tablet TAKE 1 TABLET BY MOUTH DAILY ACTIVE PILLS ONLY    sertraline (ZOLOFT) 100 MG tablet TAKE 1 & 1/2 TABLETS (150MG) BY MOUTH DAILY      No current facility-administered medications for this visit.          Facility-Administered Medications Ordered in Other Visits   Medication    betamethasone acet & sod phos (CELESTONE) injection 12 mg    lidocaine 1 % injection 10 mL         VITALS   /82 (BP Location: Left arm, Patient Position: Sitting, Cuff Size: Adult Regular)   Pulse 71   Temp 98.4  F (36.9  C) (Tympanic)   Wt 95.3 kg (210 lb)   SpO2 98%   BMI 29.29 kg/m        LABS                                                                                                                            No results found for any visits on 11/04/22.    Mental Status Exam      No problems with speech  Mood was described as doing better. Thought process, including associations, was unremarkable and thought content was devoid of SI, homicidal ideation and psychotic thought. No hallucinations. Insight was good. Judgment was intact and adequate for safety. Fund of knowledge was intact. Pt demonstrates no obvious problems with attention, concentration, language, recent or remote memory although these were not formally tested.         DIAGNOSES        Major depressive disorder, recurrent, moderate with seasonal component  ADHD  Borderline personality  disorder     ASSESSMENT: Jannet San is a 34 yo with ADHD - was working with Jeff Woody, had testing. Last visit Jannet noted feeling ready to go off Zoloft. WAs difficult for ~ month however she her son had issues going on so bit difficult to say how much was med related. We agreed on continuing Vyvanse and touch base again in ~3 months.            PLAN                                                                                                                       1)  MEDICATIONS:    Continue Vyvanse 30 mg twice daily filled for 1/2, 1/30 and 2/27/24  2)  THERAPY: no change     3)  LABS: none  4)  PT MONITOR [call for probs]: worsening symptoms , SI/HI  5)  REFERRALS [CD, medical, other]:  None  6)  RTC: ~3 months

## 2024-02-29 NOTE — ED NOTES
Right ankle, Right wrist, Left ankle, Left wrist and Lap belt restraints discontinued [id number] am on 7/9/2017.    Restraint discontinue criteria met, patient is calm, cooperative and safe. Restraints removed.           Attending Physician Notified: Dr. Rosana Govea notified 0912 pt out of restraints. Pt calm, cooperative, sleeping, rouses to voice and contracts for safety and to be cooperative Bibiana WILSON     hide

## 2024-04-01 ENCOUNTER — VIRTUAL VISIT (OUTPATIENT)
Dept: PSYCHIATRY | Facility: OTHER | Age: 34
End: 2024-04-01
Attending: PSYCHIATRY & NEUROLOGY
Payer: COMMERCIAL

## 2024-04-01 DIAGNOSIS — G47.00 PERSISTENT INSOMNIA: Primary | ICD-10-CM

## 2024-04-01 DIAGNOSIS — F90.2 ADHD (ATTENTION DEFICIT HYPERACTIVITY DISORDER), COMBINED TYPE: ICD-10-CM

## 2024-04-01 PROCEDURE — 99443 PR PHYSICIAN TELEPHONE EVALUATION 21-30 MIN: CPT | Mod: 93 | Performed by: PSYCHIATRY & NEUROLOGY

## 2024-04-01 RX ORDER — LISDEXAMFETAMINE DIMESYLATE 30 MG/1
30 CAPSULE ORAL 2 TIMES DAILY
Qty: 60 CAPSULE | Refills: 0 | Status: SHIPPED | OUTPATIENT
Start: 2024-05-20 | End: 2024-06-19

## 2024-04-01 RX ORDER — TRAZODONE HYDROCHLORIDE 100 MG/1
TABLET ORAL
Qty: 30 TABLET | Refills: 4 | Status: SHIPPED | OUTPATIENT
Start: 2024-04-01

## 2024-04-01 RX ORDER — LISDEXAMFETAMINE DIMESYLATE 30 MG/1
30 CAPSULE ORAL 2 TIMES DAILY
Qty: 60 CAPSULE | Refills: 0 | Status: SHIPPED | OUTPATIENT
Start: 2024-04-22 | End: 2024-05-22

## 2024-04-01 RX ORDER — LISDEXAMFETAMINE DIMESYLATE 30 MG/1
30 CAPSULE ORAL 2 TIMES DAILY
Qty: 60 CAPSULE | Refills: 0 | Status: SHIPPED | OUTPATIENT
Start: 2024-06-17 | End: 2024-07-17

## 2024-04-01 ASSESSMENT — PAIN SCALES - GENERAL: PAINLEVEL: MILD PAIN (3)
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
## 2024-06-03 ENCOUNTER — VIRTUAL VISIT (OUTPATIENT)
Dept: PSYCHIATRY | Facility: OTHER | Age: 34
End: 2024-06-03
Attending: PSYCHIATRY & NEUROLOGY
Payer: COMMERCIAL

## 2024-06-03 DIAGNOSIS — F90.2 ADHD (ATTENTION DEFICIT HYPERACTIVITY DISORDER), COMBINED TYPE: Primary | ICD-10-CM

## 2024-06-03 PROCEDURE — 99443 PR PHYSICIAN TELEPHONE EVALUATION 21-30 MIN: CPT | Mod: 93 | Performed by: PSYCHIATRY & NEUROLOGY

## 2024-06-03 RX ORDER — LISDEXAMFETAMINE DIMESYLATE 30 MG/1
30 CAPSULE ORAL 2 TIMES DAILY
Qty: 60 CAPSULE | Refills: 0 | Status: SHIPPED | OUTPATIENT
Start: 2024-07-23 | End: 2024-09-06

## 2024-06-03 RX ORDER — LISDEXAMFETAMINE DIMESYLATE 30 MG/1
30 CAPSULE ORAL 2 TIMES DAILY
Qty: 60 CAPSULE | Refills: 0 | Status: SHIPPED | OUTPATIENT
Start: 2024-06-25 | End: 2024-07-25

## 2024-06-03 ASSESSMENT — ANXIETY QUESTIONNAIRES
GAD7 TOTAL SCORE: 6
6. BECOMING EASILY ANNOYED OR IRRITABLE: NOT AT ALL
1. FEELING NERVOUS, ANXIOUS, OR ON EDGE: SEVERAL DAYS
2. NOT BEING ABLE TO STOP OR CONTROL WORRYING: SEVERAL DAYS
7. FEELING AFRAID AS IF SOMETHING AWFUL MIGHT HAPPEN: SEVERAL DAYS
GAD7 TOTAL SCORE: 6
3. WORRYING TOO MUCH ABOUT DIFFERENT THINGS: SEVERAL DAYS
5. BEING SO RESTLESS THAT IT IS HARD TO SIT STILL: SEVERAL DAYS

## 2024-06-03 ASSESSMENT — PAIN SCALES - GENERAL: PAINLEVEL: NO PAIN (0)

## 2024-06-03 ASSESSMENT — PATIENT HEALTH QUESTIONNAIRE - PHQ9
SUM OF ALL RESPONSES TO PHQ QUESTIONS 1-9: 7
5. POOR APPETITE OR OVEREATING: SEVERAL DAYS

## 2024-06-03 NOTE — PROGRESS NOTES
Virtual Visit Details    Type of service:  Telephone Visit   Phone call duration: 37  minutes   Originating Location (pt. Location): Home    Distant Location (provider location):  Off-site    Start time: 4:40  pm  End time:  5:17  pm    SUBJECTIVE / INTERIM HISTORY                                                                       Last visit      - been hiking but been more difficult given her back hurts   - applied for a carisa that could help with her son  - Billy (13 yo) has had issues. Home schooling   - Ronald Dominguez 13 yo       Symptoms: feelings guilt, overwhelmed, depressed mood, low energy, anhedonia, sleep issues, poor attention / concentration. Passive SI, no intent or plan.  MEDICAL / SURGICAL HISTORY                pregnant [if applicable]--no          Patient Active Problem List   Diagnosis    Major depressive disorder, recurrent episode, moderate (H)    Lumbago    Anxiety disorder    Migraine    Drug use disorder    Bilateral low back pain without sciatica    Bilateral low back pain with left-sided sciatica    Bipolar 2 disorder (H)    Fibromyalgia    Lump or mass in breast    Mastodynia    Encounter for gynecological examination without abnormal finding    Encounter for surveillance of contraceptives    ACP (advance care planning)    Ovulation pain    Family history of hemochromatosis    ADHD (attention deficit hyperactivity disorder), combined type    Chronic pain    Degeneration of lumbar or lumbosacral intervertebral disc    Osteoarthrosis, pelvic region and thigh    Other chronic cystitis    Orthostatic hypotension    Onychomycosis      ALLERGY   Bee pollen  MEDICATIONS                                                                                                   Current Outpatient Medications   Medication Sig    Acetaminophen (TYLENOL PO) Take 500 mg by mouth every 4 hours as needed    clindamycin (CLINDAMAX) 1 % external gel Apply topically 2 times daily    diazepam (VALIUM) 10 MG  tablet Take 1/2 tablet about 45 minutes before procedureand if not feeling better within 30 minutes, take other half    EPINEPHrine (ANY BX GENERIC EQUIV) 0.3 MG/0.3ML injection 2-pack INJECT 0.3 MLS INTO THE MUSCLE AS NEEDED FOR ANAPHYLAXIS    lisdexamfetamine (VYVANSE) 30 MG capsule Take 1 capsule (30 mg) by mouth 2 times daily    MELATONIN PO Take 5 mg by mouth At Bedtime     norethindrone-ethinyl estradiol (ALYACEN 7/7/7) 0.5/0.75/1-35 MG-MCG tablet TAKE 1 TABLET BY MOUTH DAILY ACTIVE PILLS ONLY    sertraline (ZOLOFT) 100 MG tablet TAKE 1 & 1/2 TABLETS (150MG) BY MOUTH DAILY      No current facility-administered medications for this visit.          Facility-Administered Medications Ordered in Other Visits   Medication    betamethasone acet & sod phos (CELESTONE) injection 12 mg    lidocaine 1 % injection 10 mL         VITALS   /82 (BP Location: Left arm, Patient Position: Sitting, Cuff Size: Adult Regular)   Pulse 71   Temp 98.4  F (36.9  C) (Tympanic)   Wt 95.3 kg (210 lb)   SpO2 98%   BMI 29.29 kg/m        LABS                                                                                                                                Mental Status Exam      No problems with speech  Mood  Thought process, including associations, was unremarkable and thought content was devoid of suicidal ideation, homicidal ideation and psychosis. No hallucinations. Insight was good. Judgment was intact and adequate for safety. Fund of knowledge was intact. Pt demonstrates no obvious problems with attention, concentration, language, recent or remote memory although these were not formally tested.         DIAGNOSES        Major depressive disorder, recurrent, moderate with seasonal component  ADHD  Borderline personality disorder     ASSESSMENT: Jannet San is a 33 yo with ADHD, depression, anxiety.Last visit she noted issues with insomnia. We started trazodone as prn last visit and she notes does help,  alternates at times with taking Benadryl but has to take sparingly as if she takes it daily effect wanes. We agreed on having her RTC after summer thus in September.        PLAN                                                                                                                       1)  MEDICATIONS:    Continue Vyvanse 30 mg twice daily has script still for 6/17 and last fill 5/28 hence she will fill around end of 6/2024. I set scripts for 6/25 and 7/25. Continue trazadone 100 mg take 1/2 to 1 tab bedtime as needed for insomnia.     2)  THERAPY: no change     3)  LABS: none  4)  PT MONITOR [call for probs]: worsening symptoms , SI/HI  5)  REFERRALS [CD, medical, other]:  None  6)  RTC: ~September

## 2024-09-04 DIAGNOSIS — F90.2 ADHD (ATTENTION DEFICIT HYPERACTIVITY DISORDER), COMBINED TYPE: ICD-10-CM

## 2024-09-06 RX ORDER — LISDEXAMFETAMINE DIMESYLATE 30 MG/1
30 CAPSULE ORAL 2 TIMES DAILY
Qty: 60 CAPSULE | Refills: 0 | Status: SHIPPED | OUTPATIENT
Start: 2024-09-06 | End: 2024-09-09

## 2024-09-06 NOTE — TELEPHONE ENCOUNTER
Vyvanse 30 mg       Last Written Prescription Date:  7-23-24  Last Fill Quantity: 60,   # refills: 0  Last Office Visit: 6-3-24

## 2024-09-09 ENCOUNTER — VIRTUAL VISIT (OUTPATIENT)
Dept: PSYCHIATRY | Facility: OTHER | Age: 34
End: 2024-09-09
Attending: PSYCHIATRY & NEUROLOGY
Payer: COMMERCIAL

## 2024-09-09 DIAGNOSIS — F41.1 GENERALIZED ANXIETY DISORDER: ICD-10-CM

## 2024-09-09 DIAGNOSIS — F90.2 ADHD (ATTENTION DEFICIT HYPERACTIVITY DISORDER), COMBINED TYPE: Primary | ICD-10-CM

## 2024-09-09 PROCEDURE — 99443 PR PHYSICIAN TELEPHONE EVALUATION 21-30 MIN: CPT | Mod: 93 | Performed by: PSYCHIATRY & NEUROLOGY

## 2024-09-09 RX ORDER — HYDROXYZINE HYDROCHLORIDE 25 MG/1
TABLET, FILM COATED ORAL
Qty: 30 TABLET | Refills: 5 | Status: SHIPPED | OUTPATIENT
Start: 2024-09-09

## 2024-09-09 RX ORDER — LISDEXAMFETAMINE DIMESYLATE 30 MG/1
30 CAPSULE ORAL 2 TIMES DAILY
Qty: 60 CAPSULE | Refills: 0 | Status: SHIPPED | OUTPATIENT
Start: 2024-11-01 | End: 2024-12-01

## 2024-09-09 RX ORDER — LISDEXAMFETAMINE DIMESYLATE 30 MG/1
30 CAPSULE ORAL 2 TIMES DAILY
Qty: 60 CAPSULE | Refills: 0 | Status: SHIPPED | OUTPATIENT
Start: 2024-10-04 | End: 2024-11-03

## 2024-09-09 RX ORDER — LISDEXAMFETAMINE DIMESYLATE 30 MG/1
30 CAPSULE ORAL 2 TIMES DAILY
Qty: 60 CAPSULE | Refills: 0 | Status: SHIPPED | OUTPATIENT
Start: 2024-11-29 | End: 2024-12-29

## 2024-09-09 ASSESSMENT — PAIN SCALES - GENERAL: PAINLEVEL: NO PAIN (0)

## 2024-09-09 NOTE — NURSING NOTE
Patient declined the depression and screening questions.  Patient felt the answers would be the same as before.

## 2024-09-09 NOTE — PROGRESS NOTES
Virtual Visit Details    Type of service:  Telephone Visit   Phone call duration: 34 minutes   Originating Location (pt. Location): Home    Distant Location (provider location):  Off-site    Start time: 4:26 pm  End time: 5:00 pm    SUBJECTIVE / INTERIM HISTORY                                                                       Last visit 6/3/24: Continue Vyvanse 30 mg twice daily has script still for 6/17 and last fill 5/28 hence she will fill around end of 6/2024. I set scripts for 6/25 and 7/25. Continue trazodone 100 mg take 1/2 to 1 tab bedtime as needed for insomnia.        - Billy (14 yo) has had issues in school. Just started again (home schooling). He started in [place] and they got to point he was only in school 2 hours. Then he was in special ed school   [employer].   - Billy's dad gets him 3 times per month.  - Billy will be doing 3 x a week Tues, Wed, Thurs Nin class  - Ronald Dominguez 15 yo. Had to put her down August 2.   - dad has prostate cancer      MEDICAL / SURGICAL HISTORY                pregnant - fo[if applicable]--no          Patient Active Problem List   Diagnosis    Major depressive disorder, recurrent episode, moderate (H)    Lumbago    Anxiety disorder    Migraine    Drug use disorder    Bilateral low back pain without sciatica    Bilateral low back pain with left-sided sciatica    Bipolar 2 disorder (H)    Fibromyalgia    Lump or mass in breast    Mastodynia    Encounter for gynecological examination without abnormal finding    Encounter for surveillance of contraceptives    ACP (advance care planning)    Ovulation pain    Family history of hemochromatosis    ADHD (attention deficit hyperactivity disorder), combined type    Chronic pain    Degeneration of lumbar or lumbosacral intervertebral disc    Osteoarthrosis, pelvic region and thigh    Other chronic cystitis    Orthostatic hypotension    Onychomycosis      ALLERGY   Bee pollen  MEDICATIONS                                                                                                    Current Outpatient Medications   Medication Sig    Acetaminophen (TYLENOL PO) Take 500 mg by mouth every 4 hours as needed    clindamycin (CLINDAMAX) 1 % external gel Apply topically 2 times daily    diazepam (VALIUM) 10 MG tablet Take 1/2 tablet about 45 minutes before procedureand if not feeling better within 30 minutes, take other half    EPINEPHrine (ANY BX GENERIC EQUIV) 0.3 MG/0.3ML injection 2-pack INJECT 0.3 MLS INTO THE MUSCLE AS NEEDED FOR ANAPHYLAXIS    lisdexamfetamine (VYVANSE) 30 MG capsule Take 1 capsule (30 mg) by mouth 2 times daily    MELATONIN PO Take 5 mg by mouth At Bedtime     norethindrone-ethinyl estradiol (ALYACEN 7/7/7) 0.5/0.75/1-35 MG-MCG tablet TAKE 1 TABLET BY MOUTH DAILY ACTIVE PILLS ONLY    sertraline (ZOLOFT) 100 MG tablet TAKE 1 & 1/2 TABLETS (150MG) BY MOUTH DAILY      No current facility-administered medications for this visit.          Facility-Administered Medications Ordered in Other Visits   Medication    betamethasone acet & sod phos (CELESTONE) injection 12 mg    lidocaine 1 % injection 10 mL         VITALS   /82 (BP Location: Left arm, Patient Position: Sitting, Cuff Size: Adult Regular)   Pulse 71   Temp 98.4  F (36.9  C) (Tympanic)   Wt 95.3 kg (210 lb)   SpO2 98%   BMI 29.29 kg/m        LABS                                                                                                                             Mental Status Exam     No problems with speech  Mood depressed, anxious. Thought process, including associations, was unremarkable and thought content was devoid of suicidal ideation, homicidal ideation and psychosis. No hallucinations. Insight was good. Judgment was intact and adequate for safety. Fund of knowledge was intact. Pt demonstrates no obvious problems with attention, concentration, language, recent or remote memory although these were not formally tested.         DIAGNOSES         Major depressive disorder, recurrent, moderate with seasonal component  ADHD  Borderline personality disorder     ASSESSMENT: Jannet San is a 35 yo with ADHD, depression, anxiety. Trazodone prn does help with insomnia. Today she inquires about refill of hydroxyzine of which we agreed on continuing for anxiety.        PLAN                                                                                                                       1)  MEDICATIONS:    Continue Vyvanse 30 mg twice daily last filled 9/6/24 filled next for 10/4, 11/1 and 11/29. Continue trazodone 100 mg take 1/2 to 1 tab bedtime as needed for insomnia. Refilled hydroxyzine 25 mg take 1 to 2 tablets daily as needed for anxiety    2)  THERAPY: no change     3)  LABS: none  4)  PT MONITOR [call for probs]: worsening symptoms , SI/HI  5)  REFERRALS [CD, medical, other]:  None  6)  RTC: ~December ~3 months

## 2024-09-10 ENCOUNTER — MYC MEDICAL ADVICE (OUTPATIENT)
Dept: PSYCHIATRY | Facility: OTHER | Age: 34
End: 2024-09-10

## 2024-09-10 DIAGNOSIS — F41.1 GENERALIZED ANXIETY DISORDER: Primary | ICD-10-CM

## 2024-09-11 RX ORDER — GABAPENTIN 100 MG/1
CAPSULE ORAL
Qty: 42 CAPSULE | Refills: 0 | Status: SHIPPED | OUTPATIENT
Start: 2024-09-11

## 2024-12-09 ENCOUNTER — VIRTUAL VISIT (OUTPATIENT)
Dept: PSYCHIATRY | Facility: OTHER | Age: 34
End: 2024-12-09
Attending: PSYCHIATRY & NEUROLOGY
Payer: COMMERCIAL

## 2024-12-09 DIAGNOSIS — G47.00 PERSISTENT INSOMNIA: ICD-10-CM

## 2024-12-09 DIAGNOSIS — F90.2 ADHD (ATTENTION DEFICIT HYPERACTIVITY DISORDER), COMBINED TYPE: Primary | ICD-10-CM

## 2024-12-09 PROCEDURE — 99443 PR PHYSICIAN TELEPHONE EVALUATION 21-30 MIN: CPT | Mod: 93 | Performed by: PSYCHIATRY & NEUROLOGY

## 2024-12-09 RX ORDER — LISDEXAMFETAMINE DIMESYLATE 30 MG/1
30 CAPSULE ORAL 2 TIMES DAILY
Qty: 60 CAPSULE | Refills: 0 | Status: SHIPPED | OUTPATIENT
Start: 2024-12-09 | End: 2025-01-08

## 2024-12-09 RX ORDER — LISDEXAMFETAMINE DIMESYLATE 30 MG/1
30 CAPSULE ORAL 2 TIMES DAILY
Qty: 60 CAPSULE | Refills: 0 | Status: SHIPPED | OUTPATIENT
Start: 2025-01-06 | End: 2025-02-05

## 2024-12-09 RX ORDER — TRAZODONE HYDROCHLORIDE 100 MG/1
TABLET ORAL
Qty: 30 TABLET | Refills: 4 | Status: SHIPPED | OUTPATIENT
Start: 2024-12-09

## 2024-12-09 RX ORDER — LISDEXAMFETAMINE DIMESYLATE 30 MG/1
30 CAPSULE ORAL 2 TIMES DAILY
Qty: 60 CAPSULE | Refills: 0 | Status: SHIPPED | OUTPATIENT
Start: 2025-02-03 | End: 2025-03-05

## 2024-12-09 ASSESSMENT — PATIENT HEALTH QUESTIONNAIRE - PHQ9
SUM OF ALL RESPONSES TO PHQ QUESTIONS 1-9: 8
5. POOR APPETITE OR OVEREATING: MORE THAN HALF THE DAYS

## 2024-12-09 ASSESSMENT — ANXIETY QUESTIONNAIRES
2. NOT BEING ABLE TO STOP OR CONTROL WORRYING: SEVERAL DAYS
GAD7 TOTAL SCORE: 10
6. BECOMING EASILY ANNOYED OR IRRITABLE: MORE THAN HALF THE DAYS
7. FEELING AFRAID AS IF SOMETHING AWFUL MIGHT HAPPEN: MORE THAN HALF THE DAYS
5. BEING SO RESTLESS THAT IT IS HARD TO SIT STILL: SEVERAL DAYS
3. WORRYING TOO MUCH ABOUT DIFFERENT THINGS: SEVERAL DAYS
GAD7 TOTAL SCORE: 10
1. FEELING NERVOUS, ANXIOUS, OR ON EDGE: SEVERAL DAYS

## 2024-12-09 ASSESSMENT — PAIN SCALES - GENERAL: PAINLEVEL_OUTOF10: MILD PAIN (2)

## 2024-12-09 NOTE — PROGRESS NOTES
"Virtual Visit Details    Type of service:  Telephone Visit   Phone call duration: 37 minutes   Originating Location (pt. Location): Home    Distant Location (provider location):  Off-site    Start time: 3:42 pm  End time: 4:19 pm    SUBJECTIVE / INTERIM HISTORY                                                                       Last visit 9/9/24: Continue Vyvanse 30 mg twice daily last filled 9/6/24 filled next for 10/4, 11/1 and 11/29. Continue trazodone 100 mg take 1/2 to 1 tab bedtime as needed for insomnia. Refilled hydroxyzine 25 mg take 1 to 2 tablets daily as needed for anxiety    - October her, brother, son went biking [place] fall and had a fall   - thinking they will stay here in [place] for [place]. (Her parents are in [place])  - going off gabapentin was tough  - Billy (12 yo) doing home school. They do \"un-schooling\" in which is child - lead  - Billy had mono in November   - working with [facility] and thus far finds it helpful  - Billy's dad gets him 3 times per month. This has been going a little better.       MEDICAL / SURGICAL HISTORY                pregnant - fo[if applicable]--no          Patient Active Problem List   Diagnosis    Major depressive disorder, recurrent episode, moderate (H)    Lumbago    Anxiety disorder    Migraine    Drug use disorder    Bilateral low back pain without sciatica    Bilateral low back pain with left-sided sciatica    Bipolar 2 disorder (H)    Fibromyalgia    Lump or mass in breast    Mastodynia    Encounter for gynecological examination without abnormal finding    Encounter for surveillance of contraceptives    ACP (advance care planning)    Ovulation pain    Family history of hemochromatosis    ADHD (attention deficit hyperactivity disorder), combined type    Chronic pain    Degeneration of lumbar or lumbosacral intervertebral disc    Osteoarthrosis, pelvic region and thigh    Other chronic cystitis    Orthostatic hypotension    Onychomycosis      ALLERGY "   Bee pollen  MEDICATIONS                                                                                                   Current Outpatient Medications   Medication Sig    Acetaminophen (TYLENOL PO) Take 500 mg by mouth every 4 hours as needed    clindamycin (CLINDAMAX) 1 % external gel Apply topically 2 times daily    diazepam (VALIUM) 10 MG tablet Take 1/2 tablet about 45 minutes before procedureand if not feeling better within 30 minutes, take other half    EPINEPHrine (ANY BX GENERIC EQUIV) 0.3 MG/0.3ML injection 2-pack INJECT 0.3 MLS INTO THE MUSCLE AS NEEDED FOR ANAPHYLAXIS    lisdexamfetamine (VYVANSE) 30 MG capsule Take 1 capsule (30 mg) by mouth 2 times daily    MELATONIN PO Take 5 mg by mouth At Bedtime     norethindrone-ethinyl estradiol (ALYACEN 7/7/7) 0.5/0.75/1-35 MG-MCG tablet TAKE 1 TABLET BY MOUTH DAILY ACTIVE PILLS ONLY    sertraline (ZOLOFT) 100 MG tablet TAKE 1 & 1/2 TABLETS (150MG) BY MOUTH DAILY      No current facility-administered medications for this visit.          Facility-Administered Medications Ordered in Other Visits   Medication    betamethasone acet & sod phos (CELESTONE) injection 12 mg    lidocaine 1 % injection 10 mL         VITALS   /82 (BP Location: Left arm, Patient Position: Sitting, Cuff Size: Adult Regular)   Pulse 71   Temp 98.4  F (36.9  C) (Tympanic)   Wt 95.3 kg (210 lb)   SpO2 98%   BMI 29.29 kg/m        LABS                                                                                                                           HgbA1C 5 as of 10/15/24. CMP as of 10/15/24 normal. Lipid profile normal as of 10/15/24.     Mental Status Exam     No problems with speech  Mood depressed, anxious. Thought process, including associations, was unremarkable and thought content was devoid of suicidal ideation, homicidal ideation and psychosis. No hallucinations. Insight was good. Judgment was intact and adequate for safety. Fund of knowledge was intact. Pt  demonstrates no obvious problems with attention, concentration, language, recent or remote memory although these were not formally tested.         DIAGNOSES        Major depressive disorder, recurrent, moderate with seasonal component  ADHD  Borderline personality disorder     ASSESSMENT: Jannet San is a 33 yo with ADHD, depression, anxiety. Trazodone prn does help with insomnia. Overall she is doing well and she reminded me she has been of Zoloft for ~1 year and doing okay. In fact Jannet feels she is able to experieince more happiness since being off the medication. She is working with a therapist through [facility] and feels they have good rapport.        PLAN                                                                                                                       1)  MEDICATIONS:    Continue Vyvanse 30 mg twice daily lfilled today 12/9, 1/6 and 2/3. Continue trazodone 100 mg take 1/2 to 1 tab bedtime as needed for insomnia.  hydroxyzine 25 mg take 1 to 2 tablets daily as needed for anxiety    2)  THERAPY: no change     3)  LABS: none  4)  PT MONITOR [call for probs]: worsening symptoms , SI/HI  5)  REFERRALS [CD, medical, other]:  None  6)  RTC: ~3 months

## 2025-03-10 ENCOUNTER — VIRTUAL VISIT (OUTPATIENT)
Dept: PSYCHIATRY | Facility: OTHER | Age: 35
End: 2025-03-10
Attending: PSYCHIATRY & NEUROLOGY
Payer: COMMERCIAL

## 2025-03-10 DIAGNOSIS — F90.2 ADHD (ATTENTION DEFICIT HYPERACTIVITY DISORDER), COMBINED TYPE: Primary | ICD-10-CM

## 2025-03-10 DIAGNOSIS — F41.1 GENERALIZED ANXIETY DISORDER: ICD-10-CM

## 2025-03-10 RX ORDER — LISINOPRIL 20 MG/1
1 TABLET ORAL DAILY
COMMUNITY
Start: 2025-02-10

## 2025-03-10 RX ORDER — LISDEXAMFETAMINE DIMESYLATE 30 MG/1
30 CAPSULE ORAL 2 TIMES DAILY
Qty: 60 CAPSULE | Refills: 0 | Status: SHIPPED | OUTPATIENT
Start: 2025-03-10 | End: 2025-04-09

## 2025-03-10 RX ORDER — LISDEXAMFETAMINE DIMESYLATE 30 MG/1
30 CAPSULE ORAL 2 TIMES DAILY
Qty: 60 CAPSULE | Refills: 0 | Status: SHIPPED | OUTPATIENT
Start: 2025-04-07 | End: 2025-05-07

## 2025-03-10 RX ORDER — HYDROXYZINE HYDROCHLORIDE 25 MG/1
TABLET, FILM COATED ORAL
Qty: 30 TABLET | Refills: 5 | Status: SHIPPED | OUTPATIENT
Start: 2025-03-10

## 2025-03-10 RX ORDER — LISDEXAMFETAMINE DIMESYLATE 30 MG/1
30 CAPSULE ORAL 2 TIMES DAILY
Qty: 60 CAPSULE | Refills: 0 | Status: SHIPPED | OUTPATIENT
Start: 2025-05-05 | End: 2025-06-04

## 2025-03-10 ASSESSMENT — PAIN SCALES - GENERAL: PAINLEVEL_OUTOF10: MILD PAIN (2)

## 2025-03-10 NOTE — PROGRESS NOTES
Virtual Visit Details    Type of service:  Telephone Visit   Phone call duration: 34 minutes   Originating Location (pt. Location): Home    Distant Location (provider location):  Off-site    Start time: 3:45  pm  End time: 4:19 pm    SUBJECTIVE / INTERIM HISTORY                                                                       Last visit 12/9/24: Continue Vyvanse 30 mg twice daily lfilled today 12/9, 1/6 and 2/3. Continue trazodone 100 mg take 1/2 to 1 tab bedtime as needed for insomnia.  hydroxyzine 25 mg take 1 to 2 tablets daily as needed for anxiety    - Billy doing pretty good.   - thinking have Billy go to school in [place] next year  - started on lisinopril   - political dynamics of the country contributing to anxiety  - hydroxyzine helps   - working with [facility]. [facility]. [place]  - Billy's dad gets him 3 times per month. This has been going a little better.       MEDICAL / SURGICAL HISTORY                pregnant - [if applicable]--no          Patient Active Problem List   Diagnosis    Major depressive disorder, recurrent episode, moderate (H)    Lumbago    Anxiety disorder    Migraine    Drug use disorder    Bilateral low back pain without sciatica    Bilateral low back pain with left-sided sciatica    Bipolar 2 disorder (H)    Fibromyalgia    Lump or mass in breast    Mastodynia    Encounter for gynecological examination without abnormal finding    Encounter for surveillance of contraceptives    ACP (advance care planning)    Ovulation pain    Family history of hemochromatosis    ADHD (attention deficit hyperactivity disorder), combined type    Chronic pain    Degeneration of lumbar or lumbosacral intervertebral disc    Osteoarthrosis, pelvic region and thigh    Other chronic cystitis    Orthostatic hypotension    Onychomycosis      ALLERGY   Bee pollen  MEDICATIONS                                                                                                   Current  Outpatient Medications   Medication Sig    Acetaminophen (TYLENOL PO) Take 500 mg by mouth every 4 hours as needed    clindamycin (CLINDAMAX) 1 % external gel Apply topically 2 times daily    diazepam (VALIUM) 10 MG tablet Take 1/2 tablet about 45 minutes before procedureand if not feeling better within 30 minutes, take other half    EPINEPHrine (ANY BX GENERIC EQUIV) 0.3 MG/0.3ML injection 2-pack INJECT 0.3 MLS INTO THE MUSCLE AS NEEDED FOR ANAPHYLAXIS    lisdexamfetamine (VYVANSE) 30 MG capsule Take 1 capsule (30 mg) by mouth 2 times daily    MELATONIN PO Take 5 mg by mouth At Bedtime     norethindrone-ethinyl estradiol (ALYACEN 7/7/7) 0.5/0.75/1-35 MG-MCG tablet TAKE 1 TABLET BY MOUTH DAILY ACTIVE PILLS ONLY    sertraline (ZOLOFT) 100 MG tablet TAKE 1 & 1/2 TABLETS (150MG) BY MOUTH DAILY      No current facility-administered medications for this visit.          Facility-Administered Medications Ordered in Other Visits   Medication    betamethasone acet & sod phos (CELESTONE) injection 12 mg    lidocaine 1 % injection 10 mL         VITALS   /82 (BP Location: Left arm, Patient Position: Sitting, Cuff Size: Adult Regular)   Pulse 71   Temp 98.4  F (36.9  C) (Tympanic)   Wt 95.3 kg (210 lb)   SpO2 98%   BMI 29.29 kg/m        LABS                                                                                                                           HgbA1C 5 as of 10/15/24. CMP as of 10/15/24 normal. Lipid profile normal as of 10/15/24.     Mental Status Exam     No problems with speech  Mood depressed, anxious. Thought process, including associations, was unremarkable and thought content was devoid of suicidal ideation, homicidal ideation and psychosis. No hallucinations. Insight was good. Judgment was intact and adequate for safety. Fund of knowledge was intact. Pt demonstrates no obvious problems with attention, concentration, language, recent or remote memory although these were not formally tested.          DIAGNOSES        Major depressive disorder, recurrent, moderate with seasonal component  ADHD  Borderline personality disorder    Notes: gabapentin was helpful but going off was awful     ASSESSMENT: Jannet San is a 33 yo with ADHD, depression, anxiety. Trazodone prn does help with insomnia. Hydroxyzine prn helps with anxiety. She has been of Zoloft for ~1 + year and doing okay. She continues to work with [employer] through [facility] and finds it helpful.        PLAN                                                                                                                       1)  MEDICATIONS:    Continue Vyvanse 30 mg twice daily filled today 3/10, 4/7 and 5/5 Continue trazodone 100 mg take 1/2 to 1 tab bedtime as needed for insomnia.  hydroxyzine 25 mg take 1 to 2 tablets daily as needed for anxiety refilled today    2)  THERAPY: no change     3)  LABS: none  4)  PT MONITOR [call for probs]: worsening symptoms , SI/HI  5)  REFERRALS [CD, medical, other]:  None  6)  RTC: ~3 months

## 2025-06-09 ENCOUNTER — VIRTUAL VISIT (OUTPATIENT)
Dept: PSYCHIATRY | Facility: OTHER | Age: 35
End: 2025-06-09
Attending: PSYCHIATRY & NEUROLOGY
Payer: COMMERCIAL

## 2025-06-09 DIAGNOSIS — F33.1 MAJOR DEPRESSIVE DISORDER, RECURRENT EPISODE, MODERATE WITH SEASONAL PATTERN (H): ICD-10-CM

## 2025-06-09 DIAGNOSIS — F90.2 ADHD (ATTENTION DEFICIT HYPERACTIVITY DISORDER), COMBINED TYPE: Primary | ICD-10-CM

## 2025-06-09 DIAGNOSIS — F41.1 GAD (GENERALIZED ANXIETY DISORDER): ICD-10-CM

## 2025-06-09 DIAGNOSIS — G47.00 PERSISTENT INSOMNIA: ICD-10-CM

## 2025-06-09 RX ORDER — LISDEXAMFETAMINE DIMESYLATE 30 MG/1
30 CAPSULE ORAL 2 TIMES DAILY
Qty: 60 CAPSULE | Refills: 0 | Status: SHIPPED | OUTPATIENT
Start: 2025-06-13 | End: 2025-07-13

## 2025-06-09 RX ORDER — LISDEXAMFETAMINE DIMESYLATE 30 MG/1
30 CAPSULE ORAL 2 TIMES DAILY
Qty: 60 CAPSULE | Refills: 0 | Status: SHIPPED | OUTPATIENT
Start: 2025-07-11 | End: 2025-08-10

## 2025-06-09 RX ORDER — LISDEXAMFETAMINE DIMESYLATE 30 MG/1
30 CAPSULE ORAL 2 TIMES DAILY
Qty: 60 CAPSULE | Refills: 0 | Status: SHIPPED | OUTPATIENT
Start: 2025-08-08 | End: 2025-09-07

## 2025-06-09 RX ORDER — TRAZODONE HYDROCHLORIDE 100 MG/1
TABLET ORAL
Qty: 30 TABLET | Refills: 4 | Status: SHIPPED | OUTPATIENT
Start: 2025-06-09

## 2025-06-09 NOTE — PROGRESS NOTES
Virtual Visit Details    Type of service:  Video Visit   Video Start Time: 4:11 pm  Video End Time:434    Originating Location (pt. Location): Home    Distant Location (provider location):  Off-site  Platform used for Video Visit: Olesya      SUBJECTIVE / INTERIM HISTORY                                                                       Last visit 3/10/25: Continue Vyvanse 30 mg twice daily filled today 3/10, 4/7 and 5/5 Continue trazodone 100 mg take 1/2 to 1 tab bedtime as needed for insomnia.  hydroxyzine 25 mg take 1 to 2 tablets daily as needed for anxiety refilled today    - still takes trazodone but not every night  - disc golf course  - Billy in order to finish up schooling needs to do standardized testing but she hasn't been able to afford it.  - thinking have Billy go to school in [place] next year. Billy is thinking more [place]  - feeling Billy has some strengths in science (engineering for example)  - learning bass guitar and may have to tell bandmates she isn't ready   - hydroxyzine helps with anxiety  - [facility]. [facility].   - Billy's dad gets him 3 times per month. This has been going a little better.       MEDICAL / SURGICAL HISTORY                          Patient Active Problem List   Diagnosis    Major depressive disorder, recurrent episode, moderate (H)    Lumbago    Anxiety disorder    Migraine    Drug use disorder    Bilateral low back pain without sciatica    Bilateral low back pain with left-sided sciatica    Bipolar 2 disorder (H)    Fibromyalgia    Lump or mass in breast    Mastodynia    Encounter for gynecological examination without abnormal finding    Encounter for surveillance of contraceptives    ACP (advance care planning)    Ovulation pain    Family history of hemochromatosis    ADHD (attention deficit hyperactivity disorder), combined type    Chronic pain    Degeneration of lumbar or lumbosacral intervertebral disc    Osteoarthrosis, pelvic region and  thigh    Other chronic cystitis    Orthostatic hypotension    Onychomycosis      ALLERGY   Bee pollen  MEDICATIONS                                                                                                   Current Outpatient Medications   Medication Sig    Acetaminophen (TYLENOL PO) Take 500 mg by mouth every 4 hours as needed    clindamycin (CLINDAMAX) 1 % external gel Apply topically 2 times daily    diazepam (VALIUM) 10 MG tablet Take 1/2 tablet about 45 minutes before procedureand if not feeling better within 30 minutes, take other half    EPINEPHrine (ANY BX GENERIC EQUIV) 0.3 MG/0.3ML injection 2-pack INJECT 0.3 MLS INTO THE MUSCLE AS NEEDED FOR ANAPHYLAXIS    lisdexamfetamine (VYVANSE) 30 MG capsule Take 1 capsule (30 mg) by mouth 2 times daily    MELATONIN PO Take 5 mg by mouth At Bedtime     norethindrone-ethinyl estradiol (ALYACEN 7/7/7) 0.5/0.75/1-35 MG-MCG tablet TAKE 1 TABLET BY MOUTH DAILY ACTIVE PILLS ONLY    sertraline (ZOLOFT) 100 MG tablet TAKE 1 & 1/2 TABLETS (150MG) BY MOUTH DAILY      No current facility-administered medications for this visit.          Facility-Administered Medications Ordered in Other Visits   Medication    betamethasone acet & sod phos (CELESTONE) injection 12 mg    lidocaine 1 % injection 10 mL         VITALS   /82 (BP Location: Left arm, Patient Position: Sitting, Cuff Size: Adult Regular)   Pulse 71   Temp 98.4  F (36.9  C) (Tympanic)   Wt 95.3 kg (210 lb)   SpO2 98%   BMI 29.29 kg/m        LABS                                                                                                                           HgbA1C 5 as of 10/15/24. CMP as of 10/15/24 normal. Lipid profile normal as of 10/15/24.     Mental Status Exam     No problems with speech  Mood depressed, anxious. Thought process, including associations, was unremarkable and thought content was devoid of suicidal ideation, homicidal ideation and psychosis. No hallucinations. Insight was  good. Judgment was intact and adequate for safety. Fund of knowledge was intact. Pt demonstrates no obvious problems with attention, concentration, language, recent or remote memory although these were not formally tested.         DIAGNOSES        Major depressive disorder, recurrent, moderate with seasonal component  MARY  ADHD  Borderline personality disorder    Notes: gabapentin was helpful but going off was awful     ASSESSMENT:     Jannet San is a 34 yo with ADHD, MDD, MARY and borderline personality disorder. Jannet takes Vyvanse for ADHD and we will continue as ADHD sx are stable. Will continue hydroxyzine prn for MARY. Continue trazodone prn insomnia and refilled today.         PLAN                                                                                                                       1)  MEDICATIONS:    Continue Vyvanse 30 mg twice daily last filled 5/16 I set to fill next for 6/13, 7/11 and 8/8.      Continue trazodone 100 mg take 1/2 to 1 tab bedtime as needed for insomnia refilled. Continue hydroxyzine 25 mg take 1 to 2 tablets daily as needed for anxiety    2)  THERAPY: no change     3)  LABS: none  4)  PT MONITOR [call for probs]: worsening symptoms , SI/HI  5)  REFERRALS [CD, medical, other]:  None  6)  RTC: ~4 months

## 2025-06-12 ENCOUNTER — TELEPHONE (OUTPATIENT)
Dept: PSYCHIATRY | Facility: OTHER | Age: 35
End: 2025-06-12

## 2025-06-12 NOTE — CONFIDENTIAL NOTE
June 12, 2025    Left message for patient to return call to schedule 4 mo follow up with Nilsa Hawkins.    -Louise Baron on 6/12/2025 at 4:24 PM
